# Patient Record
Sex: MALE | Race: WHITE | Employment: OTHER | URBAN - METROPOLITAN AREA
[De-identification: names, ages, dates, MRNs, and addresses within clinical notes are randomized per-mention and may not be internally consistent; named-entity substitution may affect disease eponyms.]

---

## 2019-01-01 ENCOUNTER — APPOINTMENT (OUTPATIENT)
Dept: GENERAL RADIOLOGY | Age: 69
DRG: 215 | End: 2019-01-01
Attending: INTERNAL MEDICINE
Payer: MEDICARE

## 2019-01-01 ENCOUNTER — TELEPHONE (OUTPATIENT)
Dept: CARDIOLOGY CLINIC | Age: 69
End: 2019-01-01

## 2019-01-01 ENCOUNTER — APPOINTMENT (OUTPATIENT)
Dept: NON INVASIVE DIAGNOSTICS | Age: 69
DRG: 215 | End: 2019-01-01
Payer: MEDICARE

## 2019-01-01 ENCOUNTER — HOSPITAL ENCOUNTER (OUTPATIENT)
Age: 69
Discharge: HOME | End: 2019-05-03
Attending: INTERNAL MEDICINE | Admitting: INTERNAL MEDICINE
Payer: MEDICARE

## 2019-01-01 ENCOUNTER — APPOINTMENT (OUTPATIENT)
Dept: NON INVASIVE DIAGNOSTICS | Age: 69
DRG: 215 | End: 2019-01-01
Attending: THORACIC SURGERY (CARDIOTHORACIC VASCULAR SURGERY)
Payer: MEDICARE

## 2019-01-01 ENCOUNTER — APPOINTMENT (OUTPATIENT)
Dept: NON INVASIVE DIAGNOSTICS | Age: 69
DRG: 215 | End: 2019-01-01
Attending: NURSE PRACTITIONER
Payer: MEDICARE

## 2019-01-01 ENCOUNTER — APPOINTMENT (OUTPATIENT)
Dept: GENERAL RADIOLOGY | Age: 69
DRG: 215 | End: 2019-01-01
Attending: THORACIC SURGERY (CARDIOTHORACIC VASCULAR SURGERY)
Payer: MEDICARE

## 2019-01-01 ENCOUNTER — APPOINTMENT (OUTPATIENT)
Dept: ULTRASOUND IMAGING | Age: 69
DRG: 215 | End: 2019-01-01
Attending: NURSE PRACTITIONER
Payer: MEDICARE

## 2019-01-01 ENCOUNTER — APPOINTMENT (OUTPATIENT)
Dept: CT IMAGING | Age: 69
DRG: 215 | End: 2019-01-01
Payer: MEDICARE

## 2019-01-01 ENCOUNTER — APPOINTMENT (OUTPATIENT)
Dept: GENERAL RADIOLOGY | Age: 69
DRG: 215 | End: 2019-01-01
Attending: NURSE PRACTITIONER
Payer: MEDICARE

## 2019-01-01 ENCOUNTER — HOSPITAL ENCOUNTER (OUTPATIENT)
Dept: MRI IMAGING | Age: 69
Discharge: ANOTHER HOSPITAL | End: 2019-05-14
Payer: MEDICARE

## 2019-01-01 ENCOUNTER — APPOINTMENT (OUTPATIENT)
Dept: GENERAL RADIOLOGY | Age: 69
DRG: 215 | End: 2019-01-01
Payer: MEDICARE

## 2019-01-01 ENCOUNTER — ANESTHESIA (OUTPATIENT)
Dept: CARDIOTHORACIC SURGERY | Age: 69
DRG: 215 | End: 2019-01-01
Payer: MEDICARE

## 2019-01-01 ENCOUNTER — APPOINTMENT (OUTPATIENT)
Dept: CT IMAGING | Age: 69
DRG: 215 | End: 2019-01-01
Attending: NURSE PRACTITIONER
Payer: MEDICARE

## 2019-01-01 ENCOUNTER — APPOINTMENT (OUTPATIENT)
Dept: VASCULAR SURGERY | Age: 69
DRG: 215 | End: 2019-01-01
Payer: MEDICARE

## 2019-01-01 ENCOUNTER — OFFICE VISIT (OUTPATIENT)
Dept: CARDIOLOGY CLINIC | Age: 69
End: 2019-01-01

## 2019-01-01 ENCOUNTER — ANESTHESIA EVENT (OUTPATIENT)
Dept: CARDIOTHORACIC SURGERY | Age: 69
DRG: 215 | End: 2019-01-01
Payer: MEDICARE

## 2019-01-01 ENCOUNTER — HOSPITAL ENCOUNTER (INPATIENT)
Age: 69
LOS: 18 days | DRG: 215 | End: 2019-05-27
Attending: INTERNAL MEDICINE | Admitting: INTERNAL MEDICINE
Payer: MEDICARE

## 2019-01-01 ENCOUNTER — APPOINTMENT (OUTPATIENT)
Dept: VASCULAR SURGERY | Age: 69
End: 2019-01-01
Attending: INTERNAL MEDICINE
Payer: MEDICARE

## 2019-01-01 VITALS
WEIGHT: 179.01 LBS | TEMPERATURE: 97.2 F | OXYGEN SATURATION: 91 % | SYSTOLIC BLOOD PRESSURE: 122 MMHG | BODY MASS INDEX: 25.06 KG/M2 | HEIGHT: 71 IN | DIASTOLIC BLOOD PRESSURE: 88 MMHG

## 2019-01-01 VITALS
RESPIRATION RATE: 16 BRPM | HEART RATE: 64 BPM | SYSTOLIC BLOOD PRESSURE: 102 MMHG | HEIGHT: 70 IN | DIASTOLIC BLOOD PRESSURE: 72 MMHG | WEIGHT: 193.4 LBS | BODY MASS INDEX: 27.69 KG/M2 | TEMPERATURE: 97.7 F | OXYGEN SATURATION: 97 %

## 2019-01-01 VITALS
WEIGHT: 187.83 LBS | HEIGHT: 70 IN | HEART RATE: 84 BPM | TEMPERATURE: 98.4 F | DIASTOLIC BLOOD PRESSURE: 74 MMHG | SYSTOLIC BLOOD PRESSURE: 111 MMHG | RESPIRATION RATE: 14 BRPM | BODY MASS INDEX: 26.89 KG/M2 | OXYGEN SATURATION: 99 %

## 2019-01-01 DIAGNOSIS — I73.9 PERIPHERAL VASCULAR DISEASE (HCC): ICD-10-CM

## 2019-01-01 DIAGNOSIS — I24.9 ACS (ACUTE CORONARY SYNDROME) (HCC): ICD-10-CM

## 2019-01-01 DIAGNOSIS — E87.79 OTHER HYPERVOLEMIA: ICD-10-CM

## 2019-01-01 DIAGNOSIS — I25.10 CORONARY ARTERY DISEASE INVOLVING NATIVE CORONARY ARTERY OF NATIVE HEART WITHOUT ANGINA PECTORIS: ICD-10-CM

## 2019-01-01 DIAGNOSIS — R06.02 SHORTNESS OF BREATH: ICD-10-CM

## 2019-01-01 DIAGNOSIS — I25.5 ISCHEMIC CARDIOMYOPATHY: ICD-10-CM

## 2019-01-01 DIAGNOSIS — N18.30 STAGE 3 CHRONIC KIDNEY DISEASE (HCC): ICD-10-CM

## 2019-01-01 DIAGNOSIS — R52 PAIN: ICD-10-CM

## 2019-01-01 DIAGNOSIS — E11.51 CONTROLLED TYPE 2 DIABETES MELLITUS WITH DIABETIC PERIPHERAL ANGIOPATHY WITHOUT GANGRENE, WITHOUT LONG-TERM CURRENT USE OF INSULIN (HCC): ICD-10-CM

## 2019-01-01 DIAGNOSIS — Z71.89 GOALS OF CARE, COUNSELING/DISCUSSION: ICD-10-CM

## 2019-01-01 DIAGNOSIS — R57.0 CARDIOGENIC SHOCK (HCC): ICD-10-CM

## 2019-01-01 DIAGNOSIS — Z71.89 DNR (DO NOT RESUSCITATE) DISCUSSION: ICD-10-CM

## 2019-01-01 DIAGNOSIS — I50.22 SYSTOLIC CHF, CHRONIC (HCC): Primary | ICD-10-CM

## 2019-01-01 DIAGNOSIS — Z01.818 PREOPERATIVE EVALUATION TO RULE OUT SURGICAL CONTRAINDICATION: ICD-10-CM

## 2019-01-01 DIAGNOSIS — N17.9 ACUTE KIDNEY INJURY (HCC): ICD-10-CM

## 2019-01-01 DIAGNOSIS — I35.0 SEVERE AORTIC STENOSIS: ICD-10-CM

## 2019-01-01 DIAGNOSIS — R53.83 FATIGUE, UNSPECIFIED TYPE: ICD-10-CM

## 2019-01-01 DIAGNOSIS — I50.23 ACUTE ON CHRONIC SYSTOLIC (CONGESTIVE) HEART FAILURE (HCC): ICD-10-CM

## 2019-01-01 DIAGNOSIS — E11.59 CONTROLLED TYPE 2 DIABETES MELLITUS WITH OTHER CIRCULATORY COMPLICATION, WITHOUT LONG-TERM CURRENT USE OF INSULIN (HCC): ICD-10-CM

## 2019-01-01 DIAGNOSIS — Z79.899 RECEIVING INOTROPIC MEDICATION: ICD-10-CM

## 2019-01-01 DIAGNOSIS — I50.22 SYSTOLIC CHF, CHRONIC (HCC): ICD-10-CM

## 2019-01-01 DIAGNOSIS — R09.89 HEMODYNAMIC INSTABILITY: ICD-10-CM

## 2019-01-01 DIAGNOSIS — I10 ESSENTIAL HYPERTENSION: ICD-10-CM

## 2019-01-01 DIAGNOSIS — I50.810 RVF (RIGHT VENTRICULAR FAILURE) (HCC): ICD-10-CM

## 2019-01-01 LAB
1,3 BETA GLUCAN SER-MCNC: <31 PG/ML
25(OH)D3+25(OH)D2 SERPL-MCNC: 64 NG/ML (ref 30–100)
ABO + RH BLD: NORMAL
ADMINISTERED INITIALS, ADMINIT: NORMAL
ALBUMIN SERPL ELPH-MCNC: 4 G/DL (ref 2.9–4.4)
ALBUMIN SERPL-MCNC: 2.1 G/DL (ref 3.5–5)
ALBUMIN SERPL-MCNC: 2.2 G/DL (ref 3.5–5)
ALBUMIN SERPL-MCNC: 2.2 G/DL (ref 3.5–5)
ALBUMIN SERPL-MCNC: 2.3 G/DL (ref 3.5–5)
ALBUMIN SERPL-MCNC: 2.4 G/DL (ref 3.5–5)
ALBUMIN SERPL-MCNC: 2.7 G/DL (ref 3.5–5)
ALBUMIN SERPL-MCNC: 2.8 G/DL (ref 3.5–5)
ALBUMIN SERPL-MCNC: 2.9 G/DL (ref 3.5–5)
ALBUMIN SERPL-MCNC: 3.1 G/DL (ref 3.5–5)
ALBUMIN SERPL-MCNC: 4.4 G/DL (ref 3.5–4.8)
ALBUMIN/GLOB SERPL: 0.7 {RATIO} (ref 1.1–2.2)
ALBUMIN/GLOB SERPL: 0.7 {RATIO} (ref 1.1–2.2)
ALBUMIN/GLOB SERPL: 0.8 {RATIO} (ref 1.1–2.2)
ALBUMIN/GLOB SERPL: 0.9 {RATIO} (ref 1.1–2.2)
ALBUMIN/GLOB SERPL: 1 {RATIO} (ref 1.1–2.2)
ALBUMIN/GLOB SERPL: 1.1 {RATIO} (ref 1.1–2.2)
ALBUMIN/GLOB SERPL: 1.2 {RATIO} (ref 1.1–2.2)
ALBUMIN/GLOB SERPL: 1.7 {RATIO} (ref 0.7–1.7)
ALBUMIN/GLOB SERPL: 2.1 {RATIO} (ref 1.2–2.2)
ALP SERPL-CCNC: 55 U/L (ref 45–117)
ALP SERPL-CCNC: 56 U/L (ref 45–117)
ALP SERPL-CCNC: 63 U/L (ref 45–117)
ALP SERPL-CCNC: 67 U/L (ref 45–117)
ALP SERPL-CCNC: 70 U/L (ref 45–117)
ALP SERPL-CCNC: 71 U/L (ref 45–117)
ALP SERPL-CCNC: 71 U/L (ref 45–117)
ALP SERPL-CCNC: 72 U/L (ref 45–117)
ALP SERPL-CCNC: 73 U/L (ref 45–117)
ALP SERPL-CCNC: 74 U/L (ref 45–117)
ALP SERPL-CCNC: 75 U/L (ref 45–117)
ALP SERPL-CCNC: 76 IU/L (ref 39–117)
ALP SERPL-CCNC: 77 U/L (ref 45–117)
ALP SERPL-CCNC: 77 U/L (ref 45–117)
ALP SERPL-CCNC: 79 U/L (ref 45–117)
ALP SERPL-CCNC: 81 U/L (ref 45–117)
ALPHA1 GLOB SERPL ELPH-MCNC: 0.3 G/DL (ref 0–0.4)
ALPHA2 GLOB SERPL ELPH-MCNC: 0.8 G/DL (ref 0.4–1)
ALT SERPL-CCNC: 12 IU/L (ref 0–44)
ALT SERPL-CCNC: 13 U/L (ref 12–78)
ALT SERPL-CCNC: 14 U/L (ref 12–78)
ALT SERPL-CCNC: 16 U/L (ref 12–78)
ALT SERPL-CCNC: 9 U/L (ref 12–78)
ALT SERPL-CCNC: <6 U/L (ref 12–78)
AMPHET UR QL SCN: NEGATIVE
ANION GAP SERPL CALC-SCNC: 10 MMOL/L (ref 5–15)
ANION GAP SERPL CALC-SCNC: 11 MMOL/L (ref 5–15)
ANION GAP SERPL CALC-SCNC: 12 MMOL/L (ref 5–15)
ANION GAP SERPL CALC-SCNC: 7 MMOL/L (ref 5–15)
ANION GAP SERPL CALC-SCNC: 8 MMOL/L (ref 5–15)
ANION GAP SERPL CALC-SCNC: 9 MMOL/L (ref 5–15)
APPEARANCE UR: ABNORMAL
APPEARANCE UR: ABNORMAL
APPEARANCE UR: CLEAR
APTT PPP: 101.3 SEC (ref 22.1–32)
APTT PPP: 115 SEC (ref 22.1–32)
APTT PPP: 120.7 SEC (ref 22.1–32)
APTT PPP: 39.7 SEC (ref 22.1–32)
APTT PPP: 43.3 SEC (ref 22.1–32)
APTT PPP: 44.3 SEC (ref 22.1–32)
APTT PPP: 45.8 SEC (ref 22.1–32)
APTT PPP: 48 SEC (ref 22.1–32)
APTT PPP: 48.9 SEC (ref 22.1–32)
APTT PPP: 49.8 SEC (ref 22.1–32)
APTT PPP: 50.1 SEC (ref 22.1–32)
APTT PPP: 50.4 SEC (ref 22.1–32)
APTT PPP: 50.8 SEC (ref 22.1–32)
APTT PPP: 51 SEC (ref 22.1–32)
APTT PPP: 51 SEC (ref 22.1–32)
APTT PPP: 51.1 SEC (ref 22.1–32)
APTT PPP: 51.5 SEC (ref 22.1–32)
APTT PPP: 51.8 SEC (ref 22.1–32)
APTT PPP: 52.3 SEC (ref 22.1–32)
APTT PPP: 53.2 SEC (ref 22.1–32)
APTT PPP: 53.5 SEC (ref 22.1–32)
APTT PPP: 54 SEC (ref 22.1–32)
APTT PPP: 54.8 SEC (ref 22.1–32)
APTT PPP: 56.5 SEC (ref 22.1–32)
APTT PPP: 60.4 SEC (ref 22.1–32)
APTT PPP: 60.6 SEC (ref 22.1–32)
APTT PPP: 84.3 SEC (ref 22.1–32)
ARTERIAL PATENCY WRIST A: ABNORMAL
AST SERPL-CCNC: 10 U/L (ref 15–37)
AST SERPL-CCNC: 10 U/L (ref 15–37)
AST SERPL-CCNC: 12 U/L (ref 15–37)
AST SERPL-CCNC: 13 IU/L (ref 0–40)
AST SERPL-CCNC: 13 U/L (ref 15–37)
AST SERPL-CCNC: 13 U/L (ref 15–37)
AST SERPL-CCNC: 14 U/L (ref 15–37)
AST SERPL-CCNC: 15 U/L (ref 15–37)
AST SERPL-CCNC: 16 U/L (ref 15–37)
AST SERPL-CCNC: 17 U/L (ref 15–37)
AST SERPL-CCNC: 17 U/L (ref 15–37)
AST SERPL-CCNC: 19 U/L (ref 15–37)
AST SERPL-CCNC: 20 U/L (ref 15–37)
AST SERPL-CCNC: 22 U/L (ref 15–37)
AST SERPL-CCNC: 23 U/L (ref 15–37)
AST SERPL-CCNC: 23 U/L (ref 15–37)
AST SERPL-CCNC: 28 U/L (ref 15–37)
AST SERPL-CCNC: 37 U/L (ref 15–37)
ATRIAL RATE: 105 BPM
ATRIAL RATE: 107 BPM
ATRIAL RATE: 60 BPM
ATRIAL RATE: 77 BPM
B-GLOBULIN SERPL ELPH-MCNC: 1.1 G/DL (ref 0.7–1.3)
BACTERIA SPEC CULT: NORMAL
BACTERIA URNS QL MICRO: NEGATIVE /HPF
BARBITURATES UR QL SCN: NEGATIVE
BASE DEFICIT BLD-SCNC: 3 MMOL/L
BASE DEFICIT BLDV-SCNC: 1 MMOL/L
BASE DEFICIT BLDV-SCNC: 2 MMOL/L
BASE DEFICIT BLDV-SCNC: 6 MMOL/L
BASE DEFICIT BLDV-SCNC: 7 MMOL/L
BASE DEFICIT BLDV-SCNC: 8 MMOL/L
BASE EXCESS BLDV CALC-SCNC: 0 MMOL/L
BASE EXCESS BLDV CALC-SCNC: 1 MMOL/L
BASE EXCESS BLDV CALC-SCNC: 1 MMOL/L
BASE EXCESS BLDV CALC-SCNC: 2 MMOL/L
BASE EXCESS BLDV CALC-SCNC: 3 MMOL/L
BASOPHILS # BLD AUTO: 0 X10E3/UL (ref 0–0.2)
BASOPHILS # BLD: 0 K/UL (ref 0–0.1)
BASOPHILS NFR BLD AUTO: 0 %
BASOPHILS NFR BLD: 0 % (ref 0–1)
BASOPHILS NFR BLD: 0 % (ref 0–1)
BASOPHILS NFR BLD: 1 % (ref 0–1)
BASOPHILS NFR BLD: 1 % (ref 0–1)
BDY SITE: ABNORMAL
BENZODIAZ UR QL: NEGATIVE
BILIRUB SERPL-MCNC: 0.4 MG/DL (ref 0–1.2)
BILIRUB SERPL-MCNC: 0.5 MG/DL (ref 0.2–1)
BILIRUB SERPL-MCNC: 0.6 MG/DL (ref 0.2–1)
BILIRUB SERPL-MCNC: 0.6 MG/DL (ref 0.2–1)
BILIRUB SERPL-MCNC: 0.7 MG/DL (ref 0.2–1)
BILIRUB SERPL-MCNC: 0.7 MG/DL (ref 0.2–1)
BILIRUB SERPL-MCNC: 0.8 MG/DL (ref 0.2–1)
BILIRUB SERPL-MCNC: 0.9 MG/DL (ref 0.2–1)
BILIRUB SERPL-MCNC: 0.9 MG/DL (ref 0.2–1)
BILIRUB SERPL-MCNC: 1 MG/DL (ref 0.2–1)
BILIRUB SERPL-MCNC: 1.1 MG/DL (ref 0.2–1)
BILIRUB SERPL-MCNC: 1.1 MG/DL (ref 0.2–1)
BILIRUB UR QL: NEGATIVE
BLD PROD TYP BPU: NORMAL
BLD PROD TYP BPU: NORMAL
BLOOD GROUP ANTIBODIES SERPL: NORMAL
BNP SERPL-MCNC: ABNORMAL PG/ML
BPU ID: NORMAL
BPU ID: NORMAL
BUN SERPL-MCNC: 39 MG/DL (ref 6–20)
BUN SERPL-MCNC: 41 MG/DL (ref 6–20)
BUN SERPL-MCNC: 42 MG/DL (ref 6–20)
BUN SERPL-MCNC: 42 MG/DL (ref 6–20)
BUN SERPL-MCNC: 42 MG/DL (ref 8–27)
BUN SERPL-MCNC: 43 MG/DL (ref 6–20)
BUN SERPL-MCNC: 45 MG/DL (ref 6–20)
BUN SERPL-MCNC: 46 MG/DL (ref 6–20)
BUN SERPL-MCNC: 47 MG/DL (ref 6–20)
BUN SERPL-MCNC: 47 MG/DL (ref 6–20)
BUN SERPL-MCNC: 48 MG/DL (ref 6–20)
BUN SERPL-MCNC: 48 MG/DL (ref 6–20)
BUN SERPL-MCNC: 55 MG/DL (ref 6–20)
BUN SERPL-MCNC: 55 MG/DL (ref 6–20)
BUN SERPL-MCNC: 56 MG/DL (ref 6–20)
BUN SERPL-MCNC: 61 MG/DL (ref 6–20)
BUN SERPL-MCNC: 69 MG/DL (ref 6–20)
BUN SERPL-MCNC: 71 MG/DL (ref 6–20)
BUN SERPL-MCNC: 73 MG/DL (ref 6–20)
BUN SERPL-MCNC: 73 MG/DL (ref 6–20)
BUN SERPL-MCNC: 75 MG/DL (ref 6–20)
BUN SERPL-MCNC: 76 MG/DL (ref 6–20)
BUN/CREAT SERPL: 18 (ref 10–24)
BUN/CREAT SERPL: 21 (ref 12–20)
BUN/CREAT SERPL: 21 (ref 12–20)
BUN/CREAT SERPL: 22 (ref 12–20)
BUN/CREAT SERPL: 23 (ref 12–20)
BUN/CREAT SERPL: 23 (ref 12–20)
BUN/CREAT SERPL: 24 (ref 12–20)
BUN/CREAT SERPL: 25 (ref 12–20)
BUN/CREAT SERPL: 26 (ref 12–20)
BUN/CREAT SERPL: 27 (ref 12–20)
BUN/CREAT SERPL: 28 (ref 12–20)
BUN/CREAT SERPL: 28 (ref 12–20)
BUN/CREAT SERPL: 29 (ref 12–20)
BUN/CREAT SERPL: 29 (ref 12–20)
CALCIUM SERPL-MCNC: 8 MG/DL (ref 8.5–10.1)
CALCIUM SERPL-MCNC: 8 MG/DL (ref 8.5–10.1)
CALCIUM SERPL-MCNC: 8.1 MG/DL (ref 8.5–10.1)
CALCIUM SERPL-MCNC: 8.1 MG/DL (ref 8.5–10.1)
CALCIUM SERPL-MCNC: 8.2 MG/DL (ref 8.5–10.1)
CALCIUM SERPL-MCNC: 8.3 MG/DL (ref 8.5–10.1)
CALCIUM SERPL-MCNC: 8.4 MG/DL (ref 8.5–10.1)
CALCIUM SERPL-MCNC: 8.4 MG/DL (ref 8.5–10.1)
CALCIUM SERPL-MCNC: 8.5 MG/DL (ref 8.5–10.1)
CALCIUM SERPL-MCNC: 8.6 MG/DL (ref 8.5–10.1)
CALCIUM SERPL-MCNC: 8.6 MG/DL (ref 8.5–10.1)
CALCIUM SERPL-MCNC: 8.7 MG/DL (ref 8.5–10.1)
CALCIUM SERPL-MCNC: 8.9 MG/DL (ref 8.5–10.1)
CALCIUM SERPL-MCNC: 9 MG/DL (ref 8.5–10.1)
CALCIUM SERPL-MCNC: 9.2 MG/DL (ref 8.5–10.1)
CALCIUM SERPL-MCNC: 9.2 MG/DL (ref 8.5–10.1)
CALCIUM SERPL-MCNC: 9.3 MG/DL (ref 8.5–10.1)
CALCIUM SERPL-MCNC: 9.7 MG/DL (ref 8.6–10.2)
CALCULATED P AXIS, ECG09: 41 DEGREES
CALCULATED R AXIS, ECG10: 34 DEGREES
CALCULATED R AXIS, ECG10: 35 DEGREES
CALCULATED R AXIS, ECG10: 48 DEGREES
CALCULATED R AXIS, ECG10: 49 DEGREES
CALCULATED T AXIS, ECG11: 133 DEGREES
CALCULATED T AXIS, ECG11: 142 DEGREES
CALCULATED T AXIS, ECG11: 87 DEGREES
CALCULATED T AXIS, ECG11: 89 DEGREES
CANNABINOIDS UR QL SCN: NEGATIVE
CHLORIDE SERPL-SCNC: 100 MMOL/L (ref 97–108)
CHLORIDE SERPL-SCNC: 101 MMOL/L (ref 97–108)
CHLORIDE SERPL-SCNC: 101 MMOL/L (ref 97–108)
CHLORIDE SERPL-SCNC: 105 MMOL/L (ref 97–108)
CHLORIDE SERPL-SCNC: 108 MMOL/L (ref 97–108)
CHLORIDE SERPL-SCNC: 87 MMOL/L (ref 97–108)
CHLORIDE SERPL-SCNC: 90 MMOL/L (ref 97–108)
CHLORIDE SERPL-SCNC: 90 MMOL/L (ref 97–108)
CHLORIDE SERPL-SCNC: 91 MMOL/L (ref 97–108)
CHLORIDE SERPL-SCNC: 92 MMOL/L (ref 97–108)
CHLORIDE SERPL-SCNC: 93 MMOL/L (ref 97–108)
CHLORIDE SERPL-SCNC: 93 MMOL/L (ref 97–108)
CHLORIDE SERPL-SCNC: 94 MMOL/L (ref 96–106)
CHLORIDE SERPL-SCNC: 94 MMOL/L (ref 97–108)
CHLORIDE SERPL-SCNC: 95 MMOL/L (ref 97–108)
CHLORIDE SERPL-SCNC: 96 MMOL/L (ref 97–108)
CHLORIDE SERPL-SCNC: 97 MMOL/L (ref 97–108)
CHLORIDE SERPL-SCNC: 97 MMOL/L (ref 97–108)
CHLORIDE SERPL-SCNC: 99 MMOL/L (ref 97–108)
CHLORIDE SERPL-SCNC: 99 MMOL/L (ref 97–108)
CO2 SERPL-SCNC: 20 MMOL/L (ref 20–29)
CO2 SERPL-SCNC: 21 MMOL/L (ref 21–32)
CO2 SERPL-SCNC: 22 MMOL/L (ref 21–32)
CO2 SERPL-SCNC: 23 MMOL/L (ref 21–32)
CO2 SERPL-SCNC: 23 MMOL/L (ref 21–32)
CO2 SERPL-SCNC: 24 MMOL/L (ref 21–32)
CO2 SERPL-SCNC: 25 MMOL/L (ref 21–32)
CO2 SERPL-SCNC: 26 MMOL/L (ref 21–32)
CO2 SERPL-SCNC: 26 MMOL/L (ref 21–32)
CO2 SERPL-SCNC: 27 MMOL/L (ref 21–32)
COCAINE UR QL SCN: NEGATIVE
COLLECT DURATION TIME UR: 24 HR
COLOR UR: ABNORMAL
COMMENT, HOLDF: NORMAL
CREAT 24H UR-MRATE: 756 MG/24HR (ref 950–2490)
CREAT SERPL-MCNC: 1.78 MG/DL (ref 0.7–1.3)
CREAT SERPL-MCNC: 1.84 MG/DL (ref 0.7–1.3)
CREAT SERPL-MCNC: 1.85 MG/DL (ref 0.7–1.3)
CREAT SERPL-MCNC: 1.87 MG/DL (ref 0.7–1.3)
CREAT SERPL-MCNC: 1.9 MG/DL (ref 0.7–1.3)
CREAT SERPL-MCNC: 1.91 MG/DL (ref 0.7–1.3)
CREAT SERPL-MCNC: 2 MG/DL (ref 0.7–1.3)
CREAT SERPL-MCNC: 2.01 MG/DL (ref 0.7–1.3)
CREAT SERPL-MCNC: 2.01 MG/DL (ref 0.7–1.3)
CREAT SERPL-MCNC: 2.11 MG/DL (ref 0.7–1.3)
CREAT SERPL-MCNC: 2.13 MG/DL (ref 0.7–1.3)
CREAT SERPL-MCNC: 2.16 MG/DL (ref 0.7–1.3)
CREAT SERPL-MCNC: 2.16 MG/DL (ref 0.7–1.3)
CREAT SERPL-MCNC: 2.26 MG/DL (ref 0.7–1.3)
CREAT SERPL-MCNC: 2.28 MG/DL (ref 0.7–1.3)
CREAT SERPL-MCNC: 2.33 MG/DL (ref 0.76–1.27)
CREAT SERPL-MCNC: 2.55 MG/DL (ref 0.7–1.3)
CREAT SERPL-MCNC: 2.59 MG/DL (ref 0.7–1.3)
CREAT SERPL-MCNC: 2.61 MG/DL (ref 0.7–1.3)
CREAT SERPL-MCNC: 2.62 MG/DL (ref 0.7–1.3)
CREAT SERPL-MCNC: 2.67 MG/DL (ref 0.7–1.3)
CREAT SERPL-MCNC: 2.73 MG/DL (ref 0.7–1.3)
CREAT UR-MCNC: 62 MG/DL
CROSSMATCH RESULT,%XM: NORMAL
CROSSMATCH RESULT,%XM: NORMAL
CRP SERPL HS-MCNC: 3.95 MG/L (ref 0–3)
CRP SERPL HS-MCNC: >9.5 MG/L
D50 ADMINISTERED, D50ADM: 0 ML
D50 ORDER, D50ORD: 0 ML
DIAGNOSIS, 93000: NORMAL
DIFFERENTIAL METHOD BLD: ABNORMAL
DIGOXIN SERPL-MCNC: 0.6 NG/ML (ref 0.9–2)
DIGOXIN SERPL-MCNC: 0.7 NG/ML (ref 0.9–2)
DIGOXIN SERPL-MCNC: 0.8 NG/ML (ref 0.9–2)
DIGOXIN SERPL-MCNC: 0.8 NG/ML (ref 0.9–2)
DIGOXIN SERPL-MCNC: 0.9 NG/ML (ref 0.9–2)
DIGOXIN SERPL-MCNC: 0.9 NG/ML (ref 0.9–2)
DIGOXIN SERPL-MCNC: 1 NG/ML (ref 0.9–2)
DRUG SCRN COMMENT,DRGCM: NORMAL
ECHO AO ROOT DIAM: 3.42 CM
ECHO AV AREA PEAK VELOCITY: 0.8 CM2
ECHO AV AREA VTI: 0.8 CM2
ECHO AV MEAN GRADIENT: 28.7 MMHG
ECHO AV PEAK GRADIENT: 45.2 MMHG
ECHO AV PEAK VELOCITY: 336.32 CM/S
ECHO AV VTI: 67.02 CM
ECHO LA MAJOR AXIS: 4.7 CM
ECHO LA TO AORTIC ROOT RATIO: 1.37
ECHO LV INTERNAL DIMENSION DIASTOLIC: 4.77 CM (ref 4.2–5.9)
ECHO LV INTERNAL DIMENSION SYSTOLIC: 4.45 CM
ECHO LV IVSD: 0.97 CM (ref 0.6–1)
ECHO LV MASS 2D: 191.3 G (ref 88–224)
ECHO LV MASS INDEX 2D: 95.7 G/M2 (ref 49–115)
ECHO LV POSTERIOR WALL DIASTOLIC: 1 CM (ref 0.6–1)
ECHO LVOT DIAM: 2.2 CM
ECHO LVOT PEAK GRADIENT: 2.2 MMHG
ECHO LVOT PEAK VELOCITY: 73.61 CM/S
ECHO LVOT SV: 56 ML
ECHO LVOT VTI: 14.66 CM
ECHO MV A VELOCITY: 36.78 CM/S
ECHO MV AREA PHT: 5.9 CM2
ECHO MV E DECELERATION TIME (DT): 129.5 MS
ECHO MV E VELOCITY: 117.02 CM/S
ECHO MV E/A RATIO: 3.18
ECHO MV PRESSURE HALF TIME (PHT): 37.5 MS
ECHO PULMONARY ARTERY SYSTOLIC PRESSURE (PASP): 55 MMHG
ECHO PV MAX VELOCITY: 51.39 CM/S
ECHO PV PEAK GRADIENT: 1.1 MMHG
ECHO RV INTERNAL DIMENSION: 4.1 CM
ECHO RV TAPSE: 0.65 CM (ref 1.5–2)
ECHO TV REGURGITANT MAX VELOCITY: 324.38 CM/S
ECHO TV REGURGITANT PEAK GRADIENT: 42.1 MMHG
EOSINOPHIL # BLD AUTO: 0.1 X10E3/UL (ref 0–0.4)
EOSINOPHIL # BLD: 0.1 K/UL (ref 0–0.4)
EOSINOPHIL NFR BLD AUTO: 1 %
EOSINOPHIL NFR BLD: 1 % (ref 0–7)
EOSINOPHIL NFR BLD: 1 % (ref 0–7)
EOSINOPHIL NFR BLD: 2 % (ref 0–7)
EOSINOPHIL NFR BLD: 2 % (ref 0–7)
EPITH CASTS URNS QL MICRO: ABNORMAL /LPF
ERYTHROCYTE [DISTWIDTH] IN BLOOD BY AUTOMATED COUNT: 15.1 % (ref 11.5–14.5)
ERYTHROCYTE [DISTWIDTH] IN BLOOD BY AUTOMATED COUNT: 15.1 % (ref 11.5–14.5)
ERYTHROCYTE [DISTWIDTH] IN BLOOD BY AUTOMATED COUNT: 15.3 % (ref 11.5–14.5)
ERYTHROCYTE [DISTWIDTH] IN BLOOD BY AUTOMATED COUNT: 15.4 % (ref 11.5–14.5)
ERYTHROCYTE [DISTWIDTH] IN BLOOD BY AUTOMATED COUNT: 15.5 % (ref 11.5–14.5)
ERYTHROCYTE [DISTWIDTH] IN BLOOD BY AUTOMATED COUNT: 15.6 % (ref 11.5–14.5)
ERYTHROCYTE [DISTWIDTH] IN BLOOD BY AUTOMATED COUNT: 15.6 % (ref 11.5–14.5)
ERYTHROCYTE [DISTWIDTH] IN BLOOD BY AUTOMATED COUNT: 15.7 % (ref 11.5–14.5)
ERYTHROCYTE [DISTWIDTH] IN BLOOD BY AUTOMATED COUNT: 15.7 % (ref 11.5–14.5)
ERYTHROCYTE [DISTWIDTH] IN BLOOD BY AUTOMATED COUNT: 15.8 % (ref 11.5–14.5)
ERYTHROCYTE [DISTWIDTH] IN BLOOD BY AUTOMATED COUNT: 15.8 % (ref 11.5–14.5)
ERYTHROCYTE [DISTWIDTH] IN BLOOD BY AUTOMATED COUNT: 15.9 % (ref 11.5–14.5)
ERYTHROCYTE [DISTWIDTH] IN BLOOD BY AUTOMATED COUNT: 16 % (ref 11.5–14.5)
ERYTHROCYTE [DISTWIDTH] IN BLOOD BY AUTOMATED COUNT: 16 % (ref 12.3–15.4)
ERYTHROCYTE [SEDIMENTATION RATE] IN BLOOD: 114 MM/HR (ref 0–20)
ERYTHROCYTE [SEDIMENTATION RATE] IN BLOOD: 21 MM/HR (ref 0–20)
ERYTHROCYTE [SEDIMENTATION RATE] IN BLOOD: 28 MM/HR (ref 0–20)
ERYTHROCYTE [SEDIMENTATION RATE] IN BLOOD: 28 MM/HR (ref 0–20)
ERYTHROCYTE [SEDIMENTATION RATE] IN BLOOD: 42 MM/HR (ref 0–20)
ERYTHROCYTE [SEDIMENTATION RATE] IN BLOOD: 43 MM/HR (ref 0–20)
ERYTHROCYTE [SEDIMENTATION RATE] IN BLOOD: 54 MM/HR (ref 0–20)
EST. AVERAGE GLUCOSE BLD GHB EST-MCNC: 151 MG/DL
FERRITIN SERPL-MCNC: 20 NG/ML (ref 30–400)
FERRITIN SERPL-MCNC: 885 NG/ML (ref 26–388)
FT4I SERPL CALC-MCNC: 1.6 (ref 1.2–4.9)
GAMMA GLOB SERPL ELPH-MCNC: 0.4 G/DL (ref 0.4–1.8)
GAS FLOW.O2 O2 DELIVERY SYS: ABNORMAL L/MIN
GAS FLOW.O2 SETTING OXYMISER: 12 BPM
GAS FLOW.O2 SETTING OXYMISER: 12 BPM
GAS FLOW.O2 SETTING OXYMISER: 4 L/M
GLOBULIN SER CALC-MCNC: 2.1 G/DL (ref 1.5–4.5)
GLOBULIN SER CALC-MCNC: 2.3 G/DL (ref 2–4)
GLOBULIN SER CALC-MCNC: 2.4 G/DL (ref 2–4)
GLOBULIN SER CALC-MCNC: 2.5 G/DL (ref 2–4)
GLOBULIN SER CALC-MCNC: 2.6 G/DL (ref 2–4)
GLOBULIN SER CALC-MCNC: 2.7 G/DL (ref 2–4)
GLOBULIN SER CALC-MCNC: 2.8 G/DL (ref 2–4)
GLOBULIN SER CALC-MCNC: 2.9 G/DL (ref 2–4)
GLOBULIN SER CALC-MCNC: 3 G/DL (ref 2–4)
GLOBULIN SER CALC-MCNC: 3 G/DL (ref 2–4)
GLOBULIN SER CALC-MCNC: 3.1 G/DL (ref 2–4)
GLOBULIN SER CALC-MCNC: 3.2 G/DL (ref 2–4)
GLOBULIN SER-MCNC: 2.5 G/DL (ref 2.2–3.9)
GLSCOM COMMENTS: NORMAL
GLUCOSE BLD STRIP.AUTO-MCNC: 107 MG/DL (ref 65–100)
GLUCOSE BLD STRIP.AUTO-MCNC: 108 MG/DL (ref 65–100)
GLUCOSE BLD STRIP.AUTO-MCNC: 110 MG/DL (ref 65–100)
GLUCOSE BLD STRIP.AUTO-MCNC: 116 MG/DL (ref 65–100)
GLUCOSE BLD STRIP.AUTO-MCNC: 118 MG/DL (ref 65–100)
GLUCOSE BLD STRIP.AUTO-MCNC: 119 MG/DL (ref 65–100)
GLUCOSE BLD STRIP.AUTO-MCNC: 120 MG/DL (ref 65–100)
GLUCOSE BLD STRIP.AUTO-MCNC: 133 MG/DL (ref 65–100)
GLUCOSE BLD STRIP.AUTO-MCNC: 135 MG/DL (ref 65–100)
GLUCOSE BLD STRIP.AUTO-MCNC: 135 MG/DL (ref 65–100)
GLUCOSE BLD STRIP.AUTO-MCNC: 138 MG/DL (ref 65–100)
GLUCOSE BLD STRIP.AUTO-MCNC: 139 MG/DL (ref 65–100)
GLUCOSE BLD STRIP.AUTO-MCNC: 144 MG/DL (ref 65–100)
GLUCOSE BLD STRIP.AUTO-MCNC: 145 MG/DL (ref 65–100)
GLUCOSE BLD STRIP.AUTO-MCNC: 147 MG/DL (ref 65–100)
GLUCOSE BLD STRIP.AUTO-MCNC: 147 MG/DL (ref 65–100)
GLUCOSE BLD STRIP.AUTO-MCNC: 150 MG/DL (ref 65–100)
GLUCOSE BLD STRIP.AUTO-MCNC: 151 MG/DL (ref 65–100)
GLUCOSE BLD STRIP.AUTO-MCNC: 151 MG/DL (ref 65–100)
GLUCOSE BLD STRIP.AUTO-MCNC: 153 MG/DL (ref 65–100)
GLUCOSE BLD STRIP.AUTO-MCNC: 154 MG/DL (ref 65–100)
GLUCOSE BLD STRIP.AUTO-MCNC: 155 MG/DL (ref 65–100)
GLUCOSE BLD STRIP.AUTO-MCNC: 155 MG/DL (ref 65–100)
GLUCOSE BLD STRIP.AUTO-MCNC: 156 MG/DL (ref 65–100)
GLUCOSE BLD STRIP.AUTO-MCNC: 156 MG/DL (ref 65–100)
GLUCOSE BLD STRIP.AUTO-MCNC: 158 MG/DL (ref 65–100)
GLUCOSE BLD STRIP.AUTO-MCNC: 164 MG/DL (ref 65–100)
GLUCOSE BLD STRIP.AUTO-MCNC: 165 MG/DL (ref 65–100)
GLUCOSE BLD STRIP.AUTO-MCNC: 166 MG/DL (ref 65–100)
GLUCOSE BLD STRIP.AUTO-MCNC: 167 MG/DL (ref 65–100)
GLUCOSE BLD STRIP.AUTO-MCNC: 169 MG/DL (ref 65–100)
GLUCOSE BLD STRIP.AUTO-MCNC: 173 MG/DL (ref 65–100)
GLUCOSE BLD STRIP.AUTO-MCNC: 178 MG/DL (ref 65–100)
GLUCOSE BLD STRIP.AUTO-MCNC: 180 MG/DL (ref 65–100)
GLUCOSE BLD STRIP.AUTO-MCNC: 182 MG/DL (ref 65–100)
GLUCOSE BLD STRIP.AUTO-MCNC: 183 MG/DL (ref 65–100)
GLUCOSE BLD STRIP.AUTO-MCNC: 186 MG/DL (ref 65–100)
GLUCOSE BLD STRIP.AUTO-MCNC: 187 MG/DL (ref 65–100)
GLUCOSE BLD STRIP.AUTO-MCNC: 189 MG/DL (ref 65–100)
GLUCOSE BLD STRIP.AUTO-MCNC: 190 MG/DL (ref 65–100)
GLUCOSE BLD STRIP.AUTO-MCNC: 195 MG/DL (ref 65–100)
GLUCOSE BLD STRIP.AUTO-MCNC: 195 MG/DL (ref 65–100)
GLUCOSE BLD STRIP.AUTO-MCNC: 196 MG/DL (ref 65–100)
GLUCOSE BLD STRIP.AUTO-MCNC: 201 MG/DL (ref 65–100)
GLUCOSE BLD STRIP.AUTO-MCNC: 211 MG/DL (ref 65–100)
GLUCOSE BLD STRIP.AUTO-MCNC: 212 MG/DL (ref 65–100)
GLUCOSE BLD STRIP.AUTO-MCNC: 217 MG/DL (ref 65–100)
GLUCOSE BLD STRIP.AUTO-MCNC: 220 MG/DL (ref 65–100)
GLUCOSE BLD STRIP.AUTO-MCNC: 224 MG/DL (ref 65–100)
GLUCOSE BLD STRIP.AUTO-MCNC: 224 MG/DL (ref 65–100)
GLUCOSE BLD STRIP.AUTO-MCNC: 233 MG/DL (ref 65–100)
GLUCOSE BLD STRIP.AUTO-MCNC: 240 MG/DL (ref 65–100)
GLUCOSE BLD STRIP.AUTO-MCNC: 241 MG/DL (ref 65–100)
GLUCOSE BLD STRIP.AUTO-MCNC: 241 MG/DL (ref 65–100)
GLUCOSE BLD STRIP.AUTO-MCNC: 249 MG/DL (ref 65–100)
GLUCOSE BLD STRIP.AUTO-MCNC: 251 MG/DL (ref 65–100)
GLUCOSE BLD STRIP.AUTO-MCNC: 256 MG/DL (ref 65–100)
GLUCOSE BLD STRIP.AUTO-MCNC: 265 MG/DL (ref 65–100)
GLUCOSE BLD STRIP.AUTO-MCNC: 265 MG/DL (ref 65–100)
GLUCOSE BLD STRIP.AUTO-MCNC: 277 MG/DL (ref 65–100)
GLUCOSE BLD STRIP.AUTO-MCNC: 298 MG/DL (ref 65–100)
GLUCOSE BLD STRIP.AUTO-MCNC: 315 MG/DL (ref 65–100)
GLUCOSE BLD STRIP.AUTO-MCNC: 407 MG/DL (ref 65–100)
GLUCOSE BLD STRIP.AUTO-MCNC: 63 MG/DL (ref 65–100)
GLUCOSE BLD STRIP.AUTO-MCNC: 76 MG/DL (ref 65–100)
GLUCOSE BLD STRIP.AUTO-MCNC: 83 MG/DL (ref 65–100)
GLUCOSE BLD STRIP.AUTO-MCNC: 86 MG/DL (ref 65–100)
GLUCOSE BLD STRIP.AUTO-MCNC: 87 MG/DL (ref 65–100)
GLUCOSE BLD STRIP.AUTO-MCNC: 90 MG/DL (ref 65–100)
GLUCOSE SERPL-MCNC: 105 MG/DL (ref 65–100)
GLUCOSE SERPL-MCNC: 110 MG/DL (ref 65–100)
GLUCOSE SERPL-MCNC: 114 MG/DL (ref 65–100)
GLUCOSE SERPL-MCNC: 117 MG/DL (ref 65–100)
GLUCOSE SERPL-MCNC: 121 MG/DL (ref 65–100)
GLUCOSE SERPL-MCNC: 127 MG/DL (ref 65–100)
GLUCOSE SERPL-MCNC: 142 MG/DL (ref 65–100)
GLUCOSE SERPL-MCNC: 164 MG/DL (ref 65–100)
GLUCOSE SERPL-MCNC: 167 MG/DL (ref 65–99)
GLUCOSE SERPL-MCNC: 168 MG/DL (ref 65–100)
GLUCOSE SERPL-MCNC: 172 MG/DL (ref 65–100)
GLUCOSE SERPL-MCNC: 176 MG/DL (ref 65–100)
GLUCOSE SERPL-MCNC: 187 MG/DL (ref 65–100)
GLUCOSE SERPL-MCNC: 235 MG/DL (ref 65–100)
GLUCOSE SERPL-MCNC: 242 MG/DL (ref 65–100)
GLUCOSE SERPL-MCNC: 244 MG/DL (ref 65–100)
GLUCOSE SERPL-MCNC: 372 MG/DL (ref 65–100)
GLUCOSE SERPL-MCNC: 65 MG/DL (ref 65–100)
GLUCOSE SERPL-MCNC: 78 MG/DL (ref 65–100)
GLUCOSE SERPL-MCNC: 87 MG/DL (ref 65–100)
GLUCOSE SERPL-MCNC: 92 MG/DL (ref 65–100)
GLUCOSE SERPL-MCNC: 99 MG/DL (ref 65–100)
GLUCOSE UR STRIP.AUTO-MCNC: 500 MG/DL
GLUCOSE UR STRIP.AUTO-MCNC: NEGATIVE MG/DL
GLUCOSE UR STRIP.AUTO-MCNC: NEGATIVE MG/DL
GLUCOSE, GLC: 194 MG/DL
HBA1C MFR BLD: 6.9 % (ref 4.2–6.3)
HCO3 BLD-SCNC: 22.7 MMOL/L (ref 22–26)
HCO3 BLDV-SCNC: 18.7 MMOL/L (ref 23–28)
HCO3 BLDV-SCNC: 19.7 MMOL/L (ref 23–28)
HCO3 BLDV-SCNC: 20.6 MMOL/L (ref 23–28)
HCO3 BLDV-SCNC: 22 MMOL/L (ref 23–28)
HCO3 BLDV-SCNC: 22.1 MMOL/L (ref 23–28)
HCO3 BLDV-SCNC: 22.9 MMOL/L (ref 23–28)
HCO3 BLDV-SCNC: 23.3 MMOL/L (ref 23–28)
HCO3 BLDV-SCNC: 23.6 MMOL/L (ref 23–28)
HCO3 BLDV-SCNC: 23.7 MMOL/L (ref 23–28)
HCO3 BLDV-SCNC: 23.8 MMOL/L (ref 23–28)
HCO3 BLDV-SCNC: 24.2 MMOL/L (ref 23–28)
HCO3 BLDV-SCNC: 24.8 MMOL/L (ref 23–28)
HCO3 BLDV-SCNC: 24.8 MMOL/L (ref 23–28)
HCO3 BLDV-SCNC: 26.1 MMOL/L (ref 23–28)
HCO3 BLDV-SCNC: 26.1 MMOL/L (ref 23–28)
HCO3 BLDV-SCNC: 26.6 MMOL/L (ref 23–28)
HCT VFR BLD AUTO: 22.5 % (ref 36.6–50.3)
HCT VFR BLD AUTO: 24.6 % (ref 36.6–50.3)
HCT VFR BLD AUTO: 24.7 % (ref 36.6–50.3)
HCT VFR BLD AUTO: 25.2 % (ref 36.6–50.3)
HCT VFR BLD AUTO: 27.6 % (ref 36.6–50.3)
HCT VFR BLD AUTO: 28.2 % (ref 36.6–50.3)
HCT VFR BLD AUTO: 28.5 % (ref 36.6–50.3)
HCT VFR BLD AUTO: 29.5 % (ref 36.6–50.3)
HCT VFR BLD AUTO: 31.1 % (ref 36.6–50.3)
HCT VFR BLD AUTO: 32.5 % (ref 36.6–50.3)
HCT VFR BLD AUTO: 34.1 % (ref 36.6–50.3)
HCT VFR BLD AUTO: 34.8 % (ref 36.6–50.3)
HCT VFR BLD AUTO: 35.4 % (ref 36.6–50.3)
HCT VFR BLD AUTO: 36 % (ref 36.6–50.3)
HCT VFR BLD AUTO: 37.9 % (ref 36.6–50.3)
HCT VFR BLD AUTO: 42.3 % (ref 36.6–50.3)
HCT VFR BLD AUTO: 43.6 % (ref 37.5–51)
HGB BLD-MCNC: 10.3 G/DL (ref 12.1–17)
HGB BLD-MCNC: 10.5 G/DL (ref 12.1–17)
HGB BLD-MCNC: 10.7 G/DL (ref 12.1–17)
HGB BLD-MCNC: 10.9 G/DL (ref 12.1–17)
HGB BLD-MCNC: 11.5 G/DL (ref 12.1–17)
HGB BLD-MCNC: 12 G/DL (ref 12.1–17)
HGB BLD-MCNC: 12.8 G/DL (ref 12.1–17)
HGB BLD-MCNC: 13.7 G/DL (ref 13–17.7)
HGB BLD-MCNC: 7.1 G/DL (ref 12.1–17)
HGB BLD-MCNC: 7.8 G/DL (ref 12.1–17)
HGB BLD-MCNC: 7.8 G/DL (ref 12.1–17)
HGB BLD-MCNC: 8.1 G/DL (ref 12.1–17)
HGB BLD-MCNC: 8.8 G/DL (ref 12.1–17)
HGB BLD-MCNC: 9 G/DL (ref 12.1–17)
HGB BLD-MCNC: 9.1 G/DL (ref 12.1–17)
HGB BLD-MCNC: 9.3 G/DL (ref 12.1–17)
HGB BLD-MCNC: 9.8 G/DL (ref 12.1–17)
HGB UR QL STRIP: ABNORMAL
HIGH TARGET, HITG: 140 MG/DL
HYALINE CASTS URNS QL MICRO: >20 /LPF (ref 0–5)
HYALINE CASTS URNS QL MICRO: ABNORMAL /LPF (ref 0–5)
IGA SERPL-MCNC: 96 MG/DL (ref 61–437)
IGG SERPL-MCNC: 380 MG/DL (ref 700–1600)
IGG SERPL-MCNC: 385 MG/DL (ref 700–1600)
IGM SERPL-MCNC: 6 MG/DL (ref 20–172)
IMM GRANULOCYTES # BLD AUTO: 0 K/UL (ref 0–0.04)
IMM GRANULOCYTES # BLD AUTO: 0 X10E3/UL (ref 0–0.1)
IMM GRANULOCYTES NFR BLD AUTO: 0 %
IMM GRANULOCYTES NFR BLD AUTO: 0 % (ref 0–0.5)
INR PPP: 1.2 (ref 0.8–1.2)
INR PPP: 1.4 (ref 0.9–1.1)
INSULIN ADMINSTERED, INSADM: 4 UNITS/HOUR
INSULIN ORDER, INSORD: 4 UNITS/HOUR
INTERPRETATION SERPL IEP-IMP: ABNORMAL
IRON SATN MFR SERPL: 40 % (ref 20–50)
IRON SATN MFR SERPL: 9 % (ref 15–55)
IRON SERPL-MCNC: 36 UG/DL (ref 38–169)
IRON SERPL-MCNC: 80 UG/DL (ref 35–150)
KAPPA LC FREE SER-MCNC: 36.9 MG/L (ref 3.3–19.4)
KAPPA LC FREE/LAMBDA FREE SER: 1.78 {RATIO} (ref 0.26–1.65)
KETONES UR QL STRIP.AUTO: ABNORMAL MG/DL
KETONES UR QL STRIP.AUTO: ABNORMAL MG/DL
KETONES UR QL STRIP.AUTO: NEGATIVE MG/DL
LACTATE SERPL-SCNC: 0.7 MMOL/L (ref 0.4–2)
LACTATE SERPL-SCNC: 0.8 MMOL/L (ref 0.4–2)
LACTATE SERPL-SCNC: 0.8 MMOL/L (ref 0.4–2)
LACTATE SERPL-SCNC: 0.9 MMOL/L (ref 0.4–2)
LACTATE SERPL-SCNC: 1 MMOL/L (ref 0.4–2)
LACTATE SERPL-SCNC: 1 MMOL/L (ref 0.4–2)
LACTATE SERPL-SCNC: 1.4 MMOL/L (ref 0.4–2)
LACTATE SERPL-SCNC: 2.6 MMOL/L (ref 0.4–2)
LAMBDA LC FREE SERPL-MCNC: 20.7 MG/L (ref 5.7–26.3)
LDH SERPL L TO P-CCNC: 230 U/L (ref 85–241)
LDH SERPL L TO P-CCNC: 247 U/L (ref 85–241)
LDH SERPL L TO P-CCNC: 260 U/L (ref 85–241)
LDH SERPL L TO P-CCNC: 281 U/L (ref 85–241)
LDH SERPL L TO P-CCNC: 471 U/L (ref 85–241)
LDH SERPL L TO P-CCNC: 485 U/L (ref 85–241)
LDH SERPL L TO P-CCNC: 616 U/L (ref 85–241)
LEFT CCA DIST DIAS: 12.4 CM/S
LEFT CCA DIST SYS: 36.5 CM/S
LEFT CCA PROX DIAS: 12.7 CM/S
LEFT CCA PROX SYS: 51.3 CM/S
LEFT ECA DIAS: 6.73 CM/S
LEFT ECA SYS: 42.5 CM/S
LEFT ICA DIST DIAS: 27.2 CM/S
LEFT ICA DIST SYS: 59.3 CM/S
LEFT ICA MID DIAS: 12.7 CM/S
LEFT ICA MID SYS: 43.2 CM/S
LEFT ICA PROX DIAS: 11.1 CM/S
LEFT ICA PROX SYS: 35.2 CM/S
LEFT ICA/CCA SYS: 1.63
LEFT SUBCLAVIAN DIAS: 0 CM/S
LEFT SUBCLAVIAN SYS: 53.3 CM/S
LEFT VERTEBRAL DIAS: 21.91 CM/S
LEFT VERTEBRAL SYS: 45 CM/S
LEUKOCYTE ESTERASE UR QL STRIP.AUTO: ABNORMAL
LEUKOCYTE ESTERASE UR QL STRIP.AUTO: NEGATIVE
LEUKOCYTE ESTERASE UR QL STRIP.AUTO: NEGATIVE
LIPASE SERPL-CCNC: 37 U/L (ref 73–393)
LIPASE SERPL-CCNC: 67 U/L (ref 73–393)
LOW TARGET, LOT: 100 MG/DL
LYMPHOCYTES # BLD AUTO: 1.3 X10E3/UL (ref 0.7–3.1)
LYMPHOCYTES # BLD: 0.4 K/UL (ref 0.8–3.5)
LYMPHOCYTES # BLD: 0.6 K/UL (ref 0.8–3.5)
LYMPHOCYTES # BLD: 1.2 K/UL (ref 0.8–3.5)
LYMPHOCYTES # BLD: 1.3 K/UL (ref 0.8–3.5)
LYMPHOCYTES NFR BLD AUTO: 19 %
LYMPHOCYTES NFR BLD: 17 % (ref 12–49)
LYMPHOCYTES NFR BLD: 20 % (ref 12–49)
LYMPHOCYTES NFR BLD: 6 % (ref 12–49)
LYMPHOCYTES NFR BLD: 8 % (ref 12–49)
M PROTEIN SERPL ELPH-MCNC: ABNORMAL G/DL
MAGNESIUM SERPL-MCNC: 1.6 MG/DL (ref 1.6–2.4)
MAGNESIUM SERPL-MCNC: 1.7 MG/DL (ref 1.6–2.4)
MAGNESIUM SERPL-MCNC: 1.8 MG/DL (ref 1.6–2.4)
MAGNESIUM SERPL-MCNC: 1.8 MG/DL (ref 1.6–2.4)
MAGNESIUM SERPL-MCNC: 2.1 MG/DL (ref 1.6–2.4)
MAGNESIUM SERPL-MCNC: 2.2 MG/DL (ref 1.6–2.4)
MAGNESIUM SERPL-MCNC: 2.3 MG/DL (ref 1.6–2.4)
MAGNESIUM SERPL-MCNC: 2.3 MG/DL (ref 1.6–2.4)
MAGNESIUM SERPL-MCNC: 2.4 MG/DL (ref 1.6–2.4)
MCH RBC QN AUTO: 26.4 PG (ref 26–34)
MCH RBC QN AUTO: 26.5 PG (ref 26–34)
MCH RBC QN AUTO: 26.6 PG (ref 26–34)
MCH RBC QN AUTO: 26.7 PG (ref 26–34)
MCH RBC QN AUTO: 26.7 PG (ref 26–34)
MCH RBC QN AUTO: 26.8 PG (ref 26–34)
MCH RBC QN AUTO: 26.9 PG (ref 26–34)
MCH RBC QN AUTO: 26.9 PG (ref 26–34)
MCH RBC QN AUTO: 27.1 PG (ref 26.6–33)
MCH RBC QN AUTO: 27.3 PG (ref 26–34)
MCH RBC QN AUTO: 27.4 PG (ref 26–34)
MCHC RBC AUTO-ENTMCNC: 30.3 G/DL (ref 30–36.5)
MCHC RBC AUTO-ENTMCNC: 30.7 G/DL (ref 30–36.5)
MCHC RBC AUTO-ENTMCNC: 30.8 G/DL (ref 30–36.5)
MCHC RBC AUTO-ENTMCNC: 30.8 G/DL (ref 30–36.5)
MCHC RBC AUTO-ENTMCNC: 31.4 G/DL (ref 31.5–35.7)
MCHC RBC AUTO-ENTMCNC: 31.5 G/DL (ref 30–36.5)
MCHC RBC AUTO-ENTMCNC: 31.5 G/DL (ref 30–36.5)
MCHC RBC AUTO-ENTMCNC: 31.6 G/DL (ref 30–36.5)
MCHC RBC AUTO-ENTMCNC: 31.6 G/DL (ref 30–36.5)
MCHC RBC AUTO-ENTMCNC: 31.7 G/DL (ref 30–36.5)
MCHC RBC AUTO-ENTMCNC: 31.9 G/DL (ref 30–36.5)
MCHC RBC AUTO-ENTMCNC: 32.1 G/DL (ref 30–36.5)
MCV RBC AUTO: 82.6 FL (ref 80–99)
MCV RBC AUTO: 83.1 FL (ref 80–99)
MCV RBC AUTO: 83.4 FL (ref 80–99)
MCV RBC AUTO: 83.6 FL (ref 80–99)
MCV RBC AUTO: 83.7 FL (ref 80–99)
MCV RBC AUTO: 83.9 FL (ref 80–99)
MCV RBC AUTO: 84 FL (ref 80–99)
MCV RBC AUTO: 84 FL (ref 80–99)
MCV RBC AUTO: 84.7 FL (ref 80–99)
MCV RBC AUTO: 84.9 FL (ref 80–99)
MCV RBC AUTO: 85.9 FL (ref 80–99)
MCV RBC AUTO: 86 FL (ref 79–97)
MCV RBC AUTO: 86.3 FL (ref 80–99)
MCV RBC AUTO: 86.4 FL (ref 80–99)
MCV RBC AUTO: 86.6 FL (ref 80–99)
MCV RBC AUTO: 87.2 FL (ref 80–99)
MCV RBC AUTO: 87.8 FL (ref 80–99)
METHADONE UR QL: NEGATIVE
MICROALBUMIN UR-MCNC: 28.3 MG/DL
MICROALBUMIN/CREAT UR-RTO: 456 MG/G (ref 0–30)
MINUTES UNTIL NEXT BG, NBG: 60 MIN
MONOCYTES # BLD AUTO: 0.4 X10E3/UL (ref 0.1–0.9)
MONOCYTES # BLD: 0.4 K/UL (ref 0–1)
MONOCYTES # BLD: 0.5 K/UL (ref 0–1)
MONOCYTES # BLD: 0.6 K/UL (ref 0–1)
MONOCYTES # BLD: 0.6 K/UL (ref 0–1)
MONOCYTES NFR BLD AUTO: 6 %
MONOCYTES NFR BLD: 5 % (ref 5–13)
MONOCYTES NFR BLD: 7 % (ref 5–13)
MONOCYTES NFR BLD: 8 % (ref 5–13)
MONOCYTES NFR BLD: 8 % (ref 5–13)
MULTIPLIER, MUL: 0.03
NEUTROPHILS # BLD AUTO: 4.7 X10E3/UL (ref 1.4–7)
NEUTROPHILS NFR BLD AUTO: 74 %
NEUTS SEG # BLD: 4.5 K/UL (ref 1.8–8)
NEUTS SEG # BLD: 5.3 K/UL (ref 1.8–8)
NEUTS SEG # BLD: 6 K/UL (ref 1.8–8)
NEUTS SEG # BLD: 6.5 K/UL (ref 1.8–8)
NEUTS SEG NFR BLD: 69 % (ref 32–75)
NEUTS SEG NFR BLD: 73 % (ref 32–75)
NEUTS SEG NFR BLD: 85 % (ref 32–75)
NEUTS SEG NFR BLD: 86 % (ref 32–75)
NITRITE UR QL STRIP.AUTO: NEGATIVE
NRBC # BLD: 0 K/UL (ref 0–0.01)
NRBC BLD-RTO: 0 PER 100 WBC
NT-PROBNP SERPL-MCNC: ABNORMAL PG/ML (ref 0–376)
O2/TOTAL GAS SETTING VFR VENT: 0.8 %
O2/TOTAL GAS SETTING VFR VENT: 0.8 %
O2/TOTAL GAS SETTING VFR VENT: 21 %
OPIATES UR QL: NEGATIVE
ORDER INITIALS, ORDINIT: NORMAL
OSMOLALITY UR: 283 MOSM/KG H2O
PCO2 BLD: 42.6 MMHG (ref 35–45)
PCO2 BLDV: 31.6 MMHG (ref 41–51)
PCO2 BLDV: 32 MMHG (ref 41–51)
PCO2 BLDV: 33.2 MMHG (ref 41–51)
PCO2 BLDV: 35.4 MMHG (ref 41–51)
PCO2 BLDV: 35.7 MMHG (ref 41–51)
PCO2 BLDV: 36.1 MMHG (ref 41–51)
PCO2 BLDV: 36.2 MMHG (ref 41–51)
PCO2 BLDV: 36.5 MMHG (ref 41–51)
PCO2 BLDV: 36.7 MMHG (ref 41–51)
PCO2 BLDV: 37.2 MMHG (ref 41–51)
PCO2 BLDV: 38 MMHG (ref 41–51)
PCO2 BLDV: 38.4 MMHG (ref 41–51)
PCO2 BLDV: 40.7 MMHG (ref 41–51)
PCO2 BLDV: 47.8 MMHG (ref 41–51)
PCO2 BLDV: 49.3 MMHG (ref 41–51)
PCO2 BLDV: 58 MMHG (ref 41–51)
PCP UR QL: NEGATIVE
PEEP RESPIRATORY: 5 CMH2O
PEEP RESPIRATORY: 5 CMH2O
PH BLD: 7.33 [PH] (ref 7.35–7.45)
PH BLDV: 7.22 [PH] (ref 7.32–7.42)
PH BLDV: 7.23 [PH] (ref 7.32–7.42)
PH BLDV: 7.26 [PH] (ref 7.32–7.42)
PH BLDV: 7.37 [PH] (ref 7.32–7.42)
PH BLDV: 7.38 [PH] (ref 7.32–7.42)
PH BLDV: 7.41 [PH] (ref 7.32–7.42)
PH BLDV: 7.43 [PH] (ref 7.32–7.42)
PH BLDV: 7.44 [PH] (ref 7.32–7.42)
PH BLDV: 7.45 [PH] (ref 7.32–7.42)
PH UR STRIP: 5 [PH] (ref 5–8)
PHOSPHATE SERPL-MCNC: 2.3 MG/DL (ref 2.6–4.7)
PLATELET # BLD AUTO: 107 K/UL (ref 150–400)
PLATELET # BLD AUTO: 113 K/UL (ref 150–400)
PLATELET # BLD AUTO: 137 K/UL (ref 150–400)
PLATELET # BLD AUTO: 139 K/UL (ref 150–400)
PLATELET # BLD AUTO: 146 K/UL (ref 150–400)
PLATELET # BLD AUTO: 150 K/UL (ref 150–400)
PLATELET # BLD AUTO: 173 K/UL (ref 150–400)
PLATELET # BLD AUTO: 200 K/UL (ref 150–400)
PLATELET # BLD AUTO: 235 K/UL (ref 150–400)
PLATELET # BLD AUTO: 256 X10E3/UL (ref 150–379)
PLATELET # BLD AUTO: 66 K/UL (ref 150–400)
PLATELET # BLD AUTO: 69 K/UL (ref 150–400)
PLATELET # BLD AUTO: 73 K/UL (ref 150–400)
PLATELET # BLD AUTO: 77 K/UL (ref 150–400)
PLATELET # BLD AUTO: 80 K/UL (ref 150–400)
PLATELET # BLD AUTO: 91 K/UL (ref 150–400)
PLATELET # BLD AUTO: 99 K/UL (ref 150–400)
PLATELET COMMENTS,PCOM: ABNORMAL
PLEASE NOTE:, 149534: ABNORMAL
PMV BLD AUTO: 10.5 FL (ref 8.9–12.9)
PMV BLD AUTO: 10.6 FL (ref 8.9–12.9)
PMV BLD AUTO: 10.6 FL (ref 8.9–12.9)
PMV BLD AUTO: 10.7 FL (ref 8.9–12.9)
PMV BLD AUTO: 11 FL (ref 8.9–12.9)
PMV BLD AUTO: 11.1 FL (ref 8.9–12.9)
PMV BLD AUTO: 11.6 FL (ref 8.9–12.9)
PMV BLD AUTO: 11.7 FL (ref 8.9–12.9)
PMV BLD AUTO: 12 FL (ref 8.9–12.9)
PMV BLD AUTO: 12.8 FL (ref 8.9–12.9)
PMV BLD AUTO: 12.8 FL (ref 8.9–12.9)
PMV BLD AUTO: 13.1 FL (ref 8.9–12.9)
PO2 BLDV: 23 MMHG (ref 25–40)
PO2 BLDV: 25 MMHG (ref 25–40)
PO2 BLDV: 26 MMHG (ref 25–40)
PO2 BLDV: 27 MMHG (ref 25–40)
PO2 BLDV: 27 MMHG (ref 25–40)
PO2 BLDV: 28 MMHG (ref 25–40)
PO2 BLDV: 29 MMHG (ref 25–40)
PO2 BLDV: 29 MMHG (ref 25–40)
PO2 BLDV: 30 MMHG (ref 25–40)
PO2 BLDV: 30 MMHG (ref 25–40)
PO2 BLDV: 31 MMHG (ref 25–40)
PO2 BLDV: 31 MMHG (ref 25–40)
PO2 BLDV: 33 MMHG (ref 25–40)
PO2 BLDV: 35 MMHG (ref 25–40)
PO2 BLDV: <19 MMHG (ref 25–40)
POTASSIUM SERPL-SCNC: 3.6 MMOL/L (ref 3.5–5.1)
POTASSIUM SERPL-SCNC: 3.7 MMOL/L (ref 3.5–5.1)
POTASSIUM SERPL-SCNC: 3.8 MMOL/L (ref 3.5–5.1)
POTASSIUM SERPL-SCNC: 4 MMOL/L (ref 3.5–5.1)
POTASSIUM SERPL-SCNC: 4.1 MMOL/L (ref 3.5–5.1)
POTASSIUM SERPL-SCNC: 4.2 MMOL/L (ref 3.5–5.1)
POTASSIUM SERPL-SCNC: 4.2 MMOL/L (ref 3.5–5.1)
POTASSIUM SERPL-SCNC: 4.3 MMOL/L (ref 3.5–5.1)
POTASSIUM SERPL-SCNC: 4.4 MMOL/L (ref 3.5–5.1)
POTASSIUM SERPL-SCNC: 4.5 MMOL/L (ref 3.5–5.1)
POTASSIUM SERPL-SCNC: 4.5 MMOL/L (ref 3.5–5.1)
POTASSIUM SERPL-SCNC: 4.6 MMOL/L (ref 3.5–5.1)
POTASSIUM SERPL-SCNC: 4.6 MMOL/L (ref 3.5–5.1)
POTASSIUM SERPL-SCNC: 4.7 MMOL/L (ref 3.5–5.1)
POTASSIUM SERPL-SCNC: 4.8 MMOL/L (ref 3.5–5.1)
POTASSIUM SERPL-SCNC: 4.9 MMOL/L (ref 3.5–5.1)
POTASSIUM SERPL-SCNC: 5.1 MMOL/L (ref 3.5–5.2)
POTASSIUM SERPL-SCNC: 5.3 MMOL/L (ref 3.5–5.1)
POTASSIUM SERPL-SCNC: 5.7 MMOL/L (ref 3.5–5.1)
PREALB SERPL-MCNC: 10.1 MG/DL (ref 20–40)
PREALB SERPL-MCNC: 27 MG/DL (ref 10–36)
PRESSURE SUPPORT SETTING VENT: 5 CMH2O
PRESSURE SUPPORT SETTING VENT: 5 CMH2O
PROCALCITONIN SERPL-MCNC: 1.3 NG/ML
PROCALCITONIN SERPL-MCNC: 1.6 NG/ML
PROCALCITONIN SERPL-MCNC: 3 NG/ML
PROCALCITONIN SERPL-MCNC: 3.2 NG/ML
PROCALCITONIN SERPL-MCNC: 5.2 NG/ML
PROCALCITONIN SERPL-MCNC: 8.1 NG/ML
PROCALCITONIN SERPL-MCNC: <0.1 NG/ML
PROT 24H UR-MRATE: 750 MG/24HR
PROT SERPL-MCNC: 4.9 G/DL (ref 6.4–8.2)
PROT SERPL-MCNC: 5 G/DL (ref 6.4–8.2)
PROT SERPL-MCNC: 5.1 G/DL (ref 6.4–8.2)
PROT SERPL-MCNC: 5.2 G/DL (ref 6.4–8.2)
PROT SERPL-MCNC: 5.4 G/DL (ref 6.4–8.2)
PROT SERPL-MCNC: 5.4 G/DL (ref 6.4–8.2)
PROT SERPL-MCNC: 5.6 G/DL (ref 6.4–8.2)
PROT SERPL-MCNC: 5.7 G/DL (ref 6.4–8.2)
PROT SERPL-MCNC: 5.7 G/DL (ref 6.4–8.2)
PROT SERPL-MCNC: 5.8 G/DL (ref 6.4–8.2)
PROT SERPL-MCNC: 6.5 G/DL (ref 6–8.5)
PROT UR STRIP-MCNC: 100 MG/DL
PROT UR STRIP-MCNC: 30 MG/DL
PROT UR STRIP-MCNC: NEGATIVE MG/DL
PROT/CREAT 24H UR-RTO: 0.99 RATIO
PROTHROMBIN TIME: 11.9 SEC (ref 9.1–12)
PROTHROMBIN TIME: 14.1 SEC (ref 9–11.1)
Q-T INTERVAL, ECG07: 386 MS
Q-T INTERVAL, ECG07: 392 MS
Q-T INTERVAL, ECG07: 428 MS
Q-T INTERVAL, ECG07: 460 MS
QRS DURATION, ECG06: 104 MS
QRS DURATION, ECG06: 84 MS
QRS DURATION, ECG06: 86 MS
QRS DURATION, ECG06: 94 MS
QTC CALCULATION (BEZET), ECG08: 428 MS
QTC CALCULATION (BEZET), ECG08: 436 MS
QTC CALCULATION (BEZET), ECG08: 437 MS
QTC CALCULATION (BEZET), ECG08: 474 MS
RBC # BLD AUTO: 2.69 M/UL (ref 4.1–5.7)
RBC # BLD AUTO: 2.94 M/UL (ref 4.1–5.7)
RBC # BLD AUTO: 2.94 M/UL (ref 4.1–5.7)
RBC # BLD AUTO: 3.05 M/UL (ref 4.1–5.7)
RBC # BLD AUTO: 3.31 M/UL (ref 4.1–5.7)
RBC # BLD AUTO: 3.36 M/UL (ref 4.1–5.7)
RBC # BLD AUTO: 3.43 M/UL (ref 4.1–5.7)
RBC # BLD AUTO: 3.51 M/UL (ref 4.1–5.7)
RBC # BLD AUTO: 3.67 M/UL (ref 4.1–5.7)
RBC # BLD AUTO: 3.83 M/UL (ref 4.1–5.7)
RBC # BLD AUTO: 3.91 M/UL (ref 4.1–5.7)
RBC # BLD AUTO: 4.03 M/UL (ref 4.1–5.7)
RBC # BLD AUTO: 4.09 M/UL (ref 4.1–5.7)
RBC # BLD AUTO: 4.19 M/UL (ref 4.1–5.7)
RBC # BLD AUTO: 4.39 M/UL (ref 4.1–5.7)
RBC # BLD AUTO: 4.82 M/UL (ref 4.1–5.7)
RBC # BLD AUTO: 5.06 X10E6/UL (ref 4.14–5.8)
RBC #/AREA URNS HPF: >100 /HPF (ref 0–5)
RBC #/AREA URNS HPF: ABNORMAL /HPF (ref 0–5)
RBC #/AREA URNS HPF: ABNORMAL /HPF (ref 0–5)
RBC CASTS URNS QL MICRO: ABNORMAL /LPF
RBC MORPH BLD: ABNORMAL
RIGHT ARM BP: 87 MMHG
RIGHT CCA DIST DIAS: 15.6 CM/S
RIGHT CCA DIST SYS: 53.1 CM/S
RIGHT CCA PROX DIAS: 12.3 CM/S
RIGHT CCA PROX SYS: 42.7 CM/S
RIGHT ECA DIAS: 4.18 CM/S
RIGHT ECA SYS: 29 CM/S
RIGHT ICA DIST DIAS: 18.1 CM/S
RIGHT ICA DIST SYS: 43.8 CM/S
RIGHT ICA MID DIAS: 9.8 CM/S
RIGHT ICA MID SYS: 30.2 CM/S
RIGHT ICA PROX DIAS: 16.3 CM/S
RIGHT ICA PROX SYS: 39.3 CM/S
RIGHT ICA/CCA SYS: 0.8
RIGHT SUBCLAVIAN DIAS: 0 CM/S
RIGHT SUBCLAVIAN SYS: 38.4 CM/S
RIGHT TBI: 1.1
RIGHT TOE PRESSURE: 96 MMHG
RIGHT VERTEBRAL DIAS: 13.59 CM/S
RIGHT VERTEBRAL SYS: 38.4 CM/S
SAMPLES BEING HELD,HOLD: NORMAL
SAO2 % BLDV: 42 % (ref 65–88)
SAO2 % BLDV: 44 % (ref 65–88)
SAO2 % BLDV: 46 % (ref 65–88)
SAO2 % BLDV: 51 % (ref 65–88)
SAO2 % BLDV: 52 % (ref 65–88)
SAO2 % BLDV: 53 % (ref 65–88)
SAO2 % BLDV: 57 % (ref 65–88)
SAO2 % BLDV: 57 % (ref 65–88)
SAO2 % BLDV: 58 % (ref 65–88)
SAO2 % BLDV: 60 % (ref 65–88)
SAO2 % BLDV: 61 % (ref 65–88)
SAO2 % BLDV: 63 % (ref 65–88)
SAO2 % BLDV: 65 % (ref 65–88)
SAO2 % BLDV: 67 % (ref 65–88)
SERVICE CMNT-IMP: ABNORMAL
SERVICE CMNT-IMP: NORMAL
SODIUM SERPL-SCNC: 120 MMOL/L (ref 136–145)
SODIUM SERPL-SCNC: 123 MMOL/L (ref 136–145)
SODIUM SERPL-SCNC: 124 MMOL/L (ref 136–145)
SODIUM SERPL-SCNC: 124 MMOL/L (ref 136–145)
SODIUM SERPL-SCNC: 126 MMOL/L (ref 136–145)
SODIUM SERPL-SCNC: 126 MMOL/L (ref 136–145)
SODIUM SERPL-SCNC: 128 MMOL/L (ref 136–145)
SODIUM SERPL-SCNC: 130 MMOL/L (ref 136–145)
SODIUM SERPL-SCNC: 130 MMOL/L (ref 136–145)
SODIUM SERPL-SCNC: 131 MMOL/L (ref 136–145)
SODIUM SERPL-SCNC: 131 MMOL/L (ref 136–145)
SODIUM SERPL-SCNC: 132 MMOL/L (ref 136–145)
SODIUM SERPL-SCNC: 133 MMOL/L (ref 136–145)
SODIUM SERPL-SCNC: 134 MMOL/L (ref 134–144)
SODIUM SERPL-SCNC: 135 MMOL/L (ref 136–145)
SODIUM SERPL-SCNC: 135 MMOL/L (ref 136–145)
SODIUM SERPL-SCNC: 136 MMOL/L (ref 136–145)
SODIUM SERPL-SCNC: 136 MMOL/L (ref 136–145)
SODIUM SERPL-SCNC: 137 MMOL/L (ref 136–145)
SODIUM SERPL-SCNC: 137 MMOL/L (ref 136–145)
SP GR UR REFRACTOMETRY: 1.02 (ref 1–1.03)
SPECIMEN EXP DATE BLD: NORMAL
SPECIMEN TYPE: ABNORMAL
SPECIMEN VOL ?TM UR: 1250 ML
STATUS OF UNIT,%ST: NORMAL
STATUS OF UNIT,%ST: NORMAL
T3RU NFR SERPL: 29 % (ref 24–39)
T4 SERPL-MCNC: 5.4 UG/DL (ref 4.5–12)
THERAPEUTIC RANGE,PTTT: ABNORMAL SECS (ref 58–77)
TIBC SERPL-MCNC: 199 UG/DL (ref 250–450)
TIBC SERPL-MCNC: 400 UG/DL (ref 250–450)
TOTAL RESP. RATE, ITRR: 12
TOTAL RESP. RATE, ITRR: 14
TOTAL RESP. RATE, ITRR: 14
TOTAL RESP. RATE, ITRR: 15
TOTAL RESP. RATE, ITRR: 15
TOTAL RESP. RATE, ITRR: 16
TOTAL RESP. RATE, ITRR: 16
TOTAL RESP. RATE, ITRR: 18
TOTAL RESP. RATE, ITRR: 27
TOTAL RESP. RATE, ITRR: 31
TROPONIN I SERPL-MCNC: 0.23 NG/ML (ref 0–0.04)
TROPONIN I SERPL-MCNC: 1.02 NG/ML
TSH SERPL DL<=0.005 MIU/L-ACNC: 2.6 UIU/ML (ref 0.45–4.5)
UA: UC IF INDICATED,UAUC: ABNORMAL
UIBC SERPL-MCNC: 364 UG/DL (ref 111–343)
UNIT DIVISION, %UDIV: 0
UNIT DIVISION, %UDIV: 0
UR CULT HOLD, URHOLD: NORMAL
URATE SERPL-MCNC: 9.9 MG/DL (ref 3.7–8.6)
UROBILINOGEN UR QL STRIP.AUTO: 0.2 EU/DL (ref 0.2–1)
VENTILATION MODE VENT: ABNORMAL
VENTILATION MODE VENT: ABNORMAL
VENTRICULAR RATE, ECG03: 60 BPM
VENTRICULAR RATE, ECG03: 64 BPM
VENTRICULAR RATE, ECG03: 75 BPM
VENTRICULAR RATE, ECG03: 77 BPM
VT SETTING VENT: 500 ML
VT SETTING VENT: 500 ML
WBC # BLD AUTO: 3.1 K/UL (ref 4.1–11.1)
WBC # BLD AUTO: 3.5 K/UL (ref 4.1–11.1)
WBC # BLD AUTO: 4 K/UL (ref 4.1–11.1)
WBC # BLD AUTO: 4.3 K/UL (ref 4.1–11.1)
WBC # BLD AUTO: 4.9 K/UL (ref 4.1–11.1)
WBC # BLD AUTO: 5.2 K/UL (ref 4.1–11.1)
WBC # BLD AUTO: 6.1 K/UL (ref 4.1–11.1)
WBC # BLD AUTO: 6.1 K/UL (ref 4.1–11.1)
WBC # BLD AUTO: 6.5 X10E3/UL (ref 3.4–10.8)
WBC # BLD AUTO: 6.6 K/UL (ref 4.1–11.1)
WBC # BLD AUTO: 6.6 K/UL (ref 4.1–11.1)
WBC # BLD AUTO: 6.9 K/UL (ref 4.1–11.1)
WBC # BLD AUTO: 6.9 K/UL (ref 4.1–11.1)
WBC # BLD AUTO: 7.1 K/UL (ref 4.1–11.1)
WBC # BLD AUTO: 7.3 K/UL (ref 4.1–11.1)
WBC # BLD AUTO: 7.6 K/UL (ref 4.1–11.1)
WBC # BLD AUTO: 7.9 K/UL (ref 4.1–11.1)
WBC URNS QL MICRO: ABNORMAL /HPF (ref 0–4)

## 2019-01-01 PROCEDURE — 83615 LACTATE (LD) (LDH) ENZYME: CPT

## 2019-01-01 PROCEDURE — 84145 PROCALCITONIN (PCT): CPT

## 2019-01-01 PROCEDURE — 74011250636 HC RX REV CODE- 250/636: Performed by: NURSE PRACTITIONER

## 2019-01-01 PROCEDURE — 74011000250 HC RX REV CODE- 250: Performed by: NURSE PRACTITIONER

## 2019-01-01 PROCEDURE — 02PA3RZ REMOVAL OF SHORT-TERM EXTERNAL HEART ASSIST SYSTEM FROM HEART, PERCUTANEOUS APPROACH: ICD-10-PCS | Performed by: THORACIC SURGERY (CARDIOTHORACIC VASCULAR SURGERY)

## 2019-01-01 PROCEDURE — P9045 ALBUMIN (HUMAN), 5%, 250 ML: HCPCS

## 2019-01-01 PROCEDURE — 82962 GLUCOSE BLOOD TEST: CPT

## 2019-01-01 PROCEDURE — 97530 THERAPEUTIC ACTIVITIES: CPT

## 2019-01-01 PROCEDURE — 77030008467 HC STPLR SKN COVD -B: Performed by: THORACIC SURGERY (CARDIOTHORACIC VASCULAR SURGERY)

## 2019-01-01 PROCEDURE — 74011000250 HC RX REV CODE- 250: Performed by: THORACIC SURGERY (CARDIOTHORACIC VASCULAR SURGERY)

## 2019-01-01 PROCEDURE — 82803 BLOOD GASES ANY COMBINATION: CPT

## 2019-01-01 PROCEDURE — 36415 COLL VENOUS BLD VENIPUNCTURE: CPT

## 2019-01-01 PROCEDURE — 85027 COMPLETE CBC AUTOMATED: CPT

## 2019-01-01 PROCEDURE — 51798 US URINE CAPACITY MEASURE: CPT

## 2019-01-01 PROCEDURE — 74011250637 HC RX REV CODE- 250/637: Performed by: NURSE PRACTITIONER

## 2019-01-01 PROCEDURE — 85730 THROMBOPLASTIN TIME PARTIAL: CPT

## 2019-01-01 PROCEDURE — 76700 US EXAM ABDOM COMPLETE: CPT

## 2019-01-01 PROCEDURE — 93308 TTE F-UP OR LMTD: CPT

## 2019-01-01 PROCEDURE — 71045 X-RAY EXAM CHEST 1 VIEW: CPT

## 2019-01-01 PROCEDURE — C1769 GUIDE WIRE: HCPCS | Performed by: THORACIC SURGERY (CARDIOTHORACIC VASCULAR SURGERY)

## 2019-01-01 PROCEDURE — 77030018846 HC SOL IRR STRL H20 ICUM -A

## 2019-01-01 PROCEDURE — 83735 ASSAY OF MAGNESIUM: CPT

## 2019-01-01 PROCEDURE — 93005 ELECTROCARDIOGRAM TRACING: CPT

## 2019-01-01 PROCEDURE — 93306 TTE W/DOPPLER COMPLETE: CPT

## 2019-01-01 PROCEDURE — 80048 BASIC METABOLIC PNL TOTAL CA: CPT

## 2019-01-01 PROCEDURE — 83605 ASSAY OF LACTIC ACID: CPT

## 2019-01-01 PROCEDURE — 93458 L HRT ARTERY/VENTRICLE ANGIO: CPT | Performed by: INTERNAL MEDICINE

## 2019-01-01 PROCEDURE — 02HP32Z INSERTION OF MONITORING DEVICE INTO PULMONARY TRUNK, PERCUTANEOUS APPROACH: ICD-10-PCS | Performed by: THORACIC SURGERY (CARDIOTHORACIC VASCULAR SURGERY)

## 2019-01-01 PROCEDURE — 74011250636 HC RX REV CODE- 250/636: Performed by: THORACIC SURGERY (CARDIOTHORACIC VASCULAR SURGERY)

## 2019-01-01 PROCEDURE — 74011000272 HC RX REV CODE- 272: Performed by: THORACIC SURGERY (CARDIOTHORACIC VASCULAR SURGERY)

## 2019-01-01 PROCEDURE — 65610000003 HC RM ICU SURGICAL

## 2019-01-01 PROCEDURE — 83880 ASSAY OF NATRIURETIC PEPTIDE: CPT

## 2019-01-01 PROCEDURE — 80053 COMPREHEN METABOLIC PANEL: CPT

## 2019-01-01 PROCEDURE — 74011250636 HC RX REV CODE- 250/636: Performed by: NURSE ANESTHETIST, CERTIFIED REGISTERED

## 2019-01-01 PROCEDURE — 80069 RENAL FUNCTION PANEL: CPT

## 2019-01-01 PROCEDURE — 74011250636 HC RX REV CODE- 250/636: Performed by: INTERNAL MEDICINE

## 2019-01-01 PROCEDURE — C1757 CATH, THROMBECTOMY/EMBOLECT: HCPCS | Performed by: THORACIC SURGERY (CARDIOTHORACIC VASCULAR SURGERY)

## 2019-01-01 PROCEDURE — 93922 UPR/L XTREMITY ART 2 LEVELS: CPT

## 2019-01-01 PROCEDURE — 77030031139 HC SUT VCRL2 J&J -A: Performed by: THORACIC SURGERY (CARDIOTHORACIC VASCULAR SURGERY)

## 2019-01-01 PROCEDURE — 77030028837 HC SYR ANGI PWR INJ COEU -A: Performed by: INTERNAL MEDICINE

## 2019-01-01 PROCEDURE — P9047 ALBUMIN (HUMAN), 25%, 50ML: HCPCS | Performed by: NURSE PRACTITIONER

## 2019-01-01 PROCEDURE — 77030026908

## 2019-01-01 PROCEDURE — 84295 ASSAY OF SERUM SODIUM: CPT

## 2019-01-01 PROCEDURE — 81001 URINALYSIS AUTO W/SCOPE: CPT

## 2019-01-01 PROCEDURE — 77030018836 HC SOL IRR NACL ICUM -A: Performed by: THORACIC SURGERY (CARDIOTHORACIC VASCULAR SURGERY)

## 2019-01-01 PROCEDURE — 71250 CT THORAX DX C-: CPT

## 2019-01-01 PROCEDURE — 74176 CT ABD & PELVIS W/O CONTRAST: CPT

## 2019-01-01 PROCEDURE — 97535 SELF CARE MNGMENT TRAINING: CPT

## 2019-01-01 PROCEDURE — 97164 PT RE-EVAL EST PLAN CARE: CPT

## 2019-01-01 PROCEDURE — 74011000258 HC RX REV CODE- 258: Performed by: NURSE PRACTITIONER

## 2019-01-01 PROCEDURE — 87449 NOS EACH ORGANISM AG IA: CPT

## 2019-01-01 PROCEDURE — 74011636637 HC RX REV CODE- 636/637: Performed by: NURSE PRACTITIONER

## 2019-01-01 PROCEDURE — 83540 ASSAY OF IRON: CPT

## 2019-01-01 PROCEDURE — 65660000001 HC RM ICU INTERMED STEPDOWN

## 2019-01-01 PROCEDURE — C1751 CATH, INF, PER/CENT/MIDLINE: HCPCS

## 2019-01-01 PROCEDURE — 99233 SBSQ HOSP IP/OBS HIGH 50: CPT | Performed by: INTERNAL MEDICINE

## 2019-01-01 PROCEDURE — 97165 OT EVAL LOW COMPLEX 30 MIN: CPT

## 2019-01-01 PROCEDURE — 77030008027: Performed by: THORACIC SURGERY (CARDIOTHORACIC VASCULAR SURGERY)

## 2019-01-01 PROCEDURE — 76450000000

## 2019-01-01 PROCEDURE — 75561 CARDIAC MRI FOR MORPH W/DYE: CPT

## 2019-01-01 PROCEDURE — 86900 BLOOD TYPING SEROLOGIC ABO: CPT

## 2019-01-01 PROCEDURE — 65620000000 HC RM CCU GENERAL

## 2019-01-01 PROCEDURE — 74011000250 HC RX REV CODE- 250: Performed by: INTERNAL MEDICINE

## 2019-01-01 PROCEDURE — 77030018846 HC SOL IRR STRL H20 ICUM -A: Performed by: THORACIC SURGERY (CARDIOTHORACIC VASCULAR SURGERY)

## 2019-01-01 PROCEDURE — 74011250637 HC RX REV CODE- 250/637: Performed by: INTERNAL MEDICINE

## 2019-01-01 PROCEDURE — 99291 CRITICAL CARE FIRST HOUR: CPT | Performed by: INTERNAL MEDICINE

## 2019-01-01 PROCEDURE — 74011250636 HC RX REV CODE- 250/636

## 2019-01-01 PROCEDURE — 80162 ASSAY OF DIGOXIN TOTAL: CPT

## 2019-01-01 PROCEDURE — 74011000258 HC RX REV CODE- 258: Performed by: THORACIC SURGERY (CARDIOTHORACIC VASCULAR SURGERY)

## 2019-01-01 PROCEDURE — 77030020256 HC SOL INJ NACL 0.9%  500ML: Performed by: THORACIC SURGERY (CARDIOTHORACIC VASCULAR SURGERY)

## 2019-01-01 PROCEDURE — 99152 MOD SED SAME PHYS/QHP 5/>YRS: CPT | Performed by: INTERNAL MEDICINE

## 2019-01-01 PROCEDURE — 77030034868 HC PMP CARD PERC IMPELLA RP ABIM -L: Performed by: THORACIC SURGERY (CARDIOTHORACIC VASCULAR SURGERY)

## 2019-01-01 PROCEDURE — 93880 EXTRACRANIAL BILAT STUDY: CPT

## 2019-01-01 PROCEDURE — 77030008771 HC TU NG SALEM SUMP -A: Performed by: ANESTHESIOLOGY

## 2019-01-01 PROCEDURE — 77030011255 HC DSG AQUACEL AG BMS -A

## 2019-01-01 PROCEDURE — 77030003029 HC SUT VCRL J&J -B: Performed by: THORACIC SURGERY (CARDIOTHORACIC VASCULAR SURGERY)

## 2019-01-01 PROCEDURE — 77030004532 HC CATH ANGI DX IMP BSC -A: Performed by: THORACIC SURGERY (CARDIOTHORACIC VASCULAR SURGERY)

## 2019-01-01 PROCEDURE — 85652 RBC SED RATE AUTOMATED: CPT

## 2019-01-01 PROCEDURE — 86923 COMPATIBILITY TEST ELECTRIC: CPT

## 2019-01-01 PROCEDURE — 87040 BLOOD CULTURE FOR BACTERIA: CPT

## 2019-01-01 PROCEDURE — 02WAXRZ REVISION OF SHORT-TERM EXTERNAL HEART ASSIST SYSTEM IN HEART, EXTERNAL APPROACH: ICD-10-PCS | Performed by: THORACIC SURGERY (CARDIOTHORACIC VASCULAR SURGERY)

## 2019-01-01 PROCEDURE — 84484 ASSAY OF TROPONIN QUANT: CPT

## 2019-01-01 PROCEDURE — 99153 MOD SED SAME PHYS/QHP EA: CPT | Performed by: INTERNAL MEDICINE

## 2019-01-01 PROCEDURE — 5A0221D ASSISTANCE WITH CARDIAC OUTPUT USING IMPELLER PUMP, CONTINUOUS: ICD-10-PCS | Performed by: THORACIC SURGERY (CARDIOTHORACIC VASCULAR SURGERY)

## 2019-01-01 PROCEDURE — 82043 UR ALBUMIN QUANTITATIVE: CPT

## 2019-01-01 PROCEDURE — 94760 N-INVAS EAR/PLS OXIMETRY 1: CPT

## 2019-01-01 PROCEDURE — 74011636320 HC RX REV CODE- 636/320

## 2019-01-01 PROCEDURE — 85025 COMPLETE CBC W/AUTO DIFF WBC: CPT

## 2019-01-01 PROCEDURE — 80307 DRUG TEST PRSMV CHEM ANLYZR: CPT

## 2019-01-01 PROCEDURE — 76060000035 HC ANESTHESIA 2 TO 2.5 HR: Performed by: THORACIC SURGERY (CARDIOTHORACIC VASCULAR SURGERY)

## 2019-01-01 PROCEDURE — 76010000112 HC CV SURG 0.5 TO 1 HR: Performed by: THORACIC SURGERY (CARDIOTHORACIC VASCULAR SURGERY)

## 2019-01-01 PROCEDURE — C8929 TTE W OR WO FOL WCON,DOPPLER: HCPCS

## 2019-01-01 PROCEDURE — 30243S1 TRANSFUSION OF NONAUTOLOGOUS GLOBULIN INTO CENTRAL VEIN, PERCUTANEOUS APPROACH: ICD-10-PCS | Performed by: INTERNAL MEDICINE

## 2019-01-01 PROCEDURE — 77030027876 HC STPLR ENDOSC FLX PWR J&J -G1: Performed by: THORACIC SURGERY (CARDIOTHORACIC VASCULAR SURGERY)

## 2019-01-01 PROCEDURE — 97110 THERAPEUTIC EXERCISES: CPT

## 2019-01-01 PROCEDURE — 74018 RADEX ABDOMEN 1 VIEW: CPT

## 2019-01-01 PROCEDURE — 77030008684 HC TU ET CUF COVD -B: Performed by: ANESTHESIOLOGY

## 2019-01-01 PROCEDURE — 97116 GAIT TRAINING THERAPY: CPT

## 2019-01-01 PROCEDURE — C1751 CATH, INF, PER/CENT/MIDLINE: HCPCS | Performed by: THORACIC SURGERY (CARDIOTHORACIC VASCULAR SURGERY)

## 2019-01-01 PROCEDURE — 74011000258 HC RX REV CODE- 258: Performed by: NURSE ANESTHETIST, CERTIFIED REGISTERED

## 2019-01-01 PROCEDURE — B24BZZ4 ULTRASONOGRAPHY OF HEART WITH AORTA, TRANSESOPHAGEAL: ICD-10-PCS | Performed by: ANESTHESIOLOGY

## 2019-01-01 PROCEDURE — 33993 REPOSG PERQ R/L HRT VAD: CPT

## 2019-01-01 PROCEDURE — 02HA3RZ INSERTION OF SHORT-TERM EXTERNAL HEART ASSIST SYSTEM INTO HEART, PERCUTANEOUS APPROACH: ICD-10-PCS | Performed by: THORACIC SURGERY (CARDIOTHORACIC VASCULAR SURGERY)

## 2019-01-01 PROCEDURE — 77030010221 HC SPLNT WR POS TELE -B: Performed by: INTERNAL MEDICINE

## 2019-01-01 PROCEDURE — C1769 GUIDE WIRE: HCPCS | Performed by: INTERNAL MEDICINE

## 2019-01-01 PROCEDURE — 85610 PROTHROMBIN TIME: CPT

## 2019-01-01 PROCEDURE — C1894 INTRO/SHEATH, NON-LASER: HCPCS | Performed by: INTERNAL MEDICINE

## 2019-01-01 PROCEDURE — 83935 ASSAY OF URINE OSMOLALITY: CPT

## 2019-01-01 PROCEDURE — 77030011220 HC DEV ELECSURG COVD -B: Performed by: THORACIC SURGERY (CARDIOTHORACIC VASCULAR SURGERY)

## 2019-01-01 PROCEDURE — 77030011640 HC PAD GRND REM COVD -A: Performed by: THORACIC SURGERY (CARDIOTHORACIC VASCULAR SURGERY)

## 2019-01-01 PROCEDURE — C1768 GRAFT, VASCULAR: HCPCS | Performed by: THORACIC SURGERY (CARDIOTHORACIC VASCULAR SURGERY)

## 2019-01-01 PROCEDURE — 74011636320 HC RX REV CODE- 636/320: Performed by: INTERNAL MEDICINE

## 2019-01-01 PROCEDURE — 86141 C-REACTIVE PROTEIN HS: CPT

## 2019-01-01 PROCEDURE — 77030004549 HC CATH ANGI DX PRF MRTM -A: Performed by: INTERNAL MEDICINE

## 2019-01-01 PROCEDURE — 74011000250 HC RX REV CODE- 250

## 2019-01-01 PROCEDURE — P9045 ALBUMIN (HUMAN), 5%, 250 ML: HCPCS | Performed by: NURSE PRACTITIONER

## 2019-01-01 PROCEDURE — 99223 1ST HOSP IP/OBS HIGH 75: CPT | Performed by: INTERNAL MEDICINE

## 2019-01-01 PROCEDURE — 83690 ASSAY OF LIPASE: CPT

## 2019-01-01 PROCEDURE — 84156 ASSAY OF PROTEIN URINE: CPT

## 2019-01-01 PROCEDURE — 77030019698 HC SYR ANGI MDLON MRTM -A: Performed by: INTERNAL MEDICINE

## 2019-01-01 PROCEDURE — 94010 BREATHING CAPACITY TEST: CPT

## 2019-01-01 PROCEDURE — 93312 ECHO TRANSESOPHAGEAL: CPT

## 2019-01-01 PROCEDURE — 84134 ASSAY OF PREALBUMIN: CPT

## 2019-01-01 PROCEDURE — 71046 X-RAY EXAM CHEST 2 VIEWS: CPT

## 2019-01-01 PROCEDURE — 74011000250 HC RX REV CODE- 250: Performed by: UROLOGY

## 2019-01-01 PROCEDURE — C1887 CATHETER, GUIDING: HCPCS | Performed by: INTERNAL MEDICINE

## 2019-01-01 PROCEDURE — 77030037404 HC CATH FOL COUDE SURESTEP BARD -B

## 2019-01-01 PROCEDURE — 77030038079 HC RELD STPLR ENDOSC J&J -G: Performed by: THORACIC SURGERY (CARDIOTHORACIC VASCULAR SURGERY)

## 2019-01-01 PROCEDURE — 77030013798 HC KT TRNSDUC PRSSR EDWD -B

## 2019-01-01 PROCEDURE — 83036 HEMOGLOBIN GLYCOSYLATED A1C: CPT

## 2019-01-01 PROCEDURE — 76010000108 HC CV SURG 2 TO 2.5 HR: Performed by: THORACIC SURGERY (CARDIOTHORACIC VASCULAR SURGERY)

## 2019-01-01 PROCEDURE — A9579 GAD-BASE MR CONTRAST NOS,1ML: HCPCS

## 2019-01-01 PROCEDURE — 77030026438 HC STYL ET INTUB CARD -A: Performed by: ANESTHESIOLOGY

## 2019-01-01 PROCEDURE — 77030011255 HC DSG AQUACEL AG BMS -A: Performed by: THORACIC SURGERY (CARDIOTHORACIC VASCULAR SURGERY)

## 2019-01-01 PROCEDURE — 97161 PT EVAL LOW COMPLEX 20 MIN: CPT

## 2019-01-01 PROCEDURE — 82728 ASSAY OF FERRITIN: CPT

## 2019-01-01 PROCEDURE — 77030010506 HC ADH BIOGLU CRYO -G: Performed by: THORACIC SURGERY (CARDIOTHORACIC VASCULAR SURGERY)

## 2019-01-01 PROCEDURE — 77030019569 HC BND COMPR RAD TERU -B: Performed by: INTERNAL MEDICINE

## 2019-01-01 PROCEDURE — 82784 ASSAY IGA/IGD/IGG/IGM EACH: CPT

## 2019-01-01 PROCEDURE — 93355 ECHO TRANSESOPHAGEAL (TEE): CPT

## 2019-01-01 PROCEDURE — 77030008543 HC TBNG MON PRSS MRTM -A: Performed by: INTERNAL MEDICINE

## 2019-01-01 DEVICE — HEMASHIELD PLATINUM WOVEN STRAIGHT DOUBLE VELOUR VASCULAR GRAFT WITH GRAFT SIZER ACCESSORY
Type: IMPLANTABLE DEVICE | Site: SUBCLAVIAN | Status: FUNCTIONAL
Brand: HEMASHIELD

## 2019-01-01 RX ORDER — TAMSULOSIN HYDROCHLORIDE 0.4 MG/1
0.4 CAPSULE ORAL DAILY
Status: DISCONTINUED | OUTPATIENT
Start: 2019-01-01 | End: 2019-01-01 | Stop reason: HOSPADM

## 2019-01-01 RX ORDER — SODIUM CHLORIDE 0.9 % (FLUSH) 0.9 %
5-40 SYRINGE (ML) INJECTION EVERY 8 HOURS
Status: DISCONTINUED | OUTPATIENT
Start: 2019-01-01 | End: 2019-01-01 | Stop reason: HOSPADM

## 2019-01-01 RX ORDER — LORAZEPAM 2 MG/ML
1 INJECTION INTRAMUSCULAR
Status: DISCONTINUED | OUTPATIENT
Start: 2019-01-01 | End: 2019-01-01 | Stop reason: HOSPADM

## 2019-01-01 RX ORDER — GLIPIZIDE 5 MG/1
5 TABLET ORAL 2 TIMES DAILY
Status: ON HOLD | COMMUNITY
End: 2019-01-01

## 2019-01-01 RX ORDER — HEPARIN SODIUM 200 [USP'U]/100ML
INJECTION, SOLUTION INTRAVENOUS
Status: COMPLETED | OUTPATIENT
Start: 2019-01-01 | End: 2019-01-01

## 2019-01-01 RX ORDER — SPIRONOLACTONE 25 MG/1
25 TABLET ORAL DAILY
Status: DISCONTINUED | OUTPATIENT
Start: 2019-01-01 | End: 2019-01-01

## 2019-01-01 RX ORDER — DOCUSATE SODIUM 100 MG/1
100 CAPSULE, LIQUID FILLED ORAL AS NEEDED
Status: DISCONTINUED | OUTPATIENT
Start: 2019-01-01 | End: 2019-01-01 | Stop reason: HOSPADM

## 2019-01-01 RX ORDER — TOLVAPTAN 30 MG/1
30 TABLET ORAL ONCE
Status: COMPLETED | OUTPATIENT
Start: 2019-01-01 | End: 2019-01-01

## 2019-01-01 RX ORDER — BUMETANIDE 0.25 MG/ML
1 INJECTION INTRAMUSCULAR; INTRAVENOUS ONCE
Status: ACTIVE | OUTPATIENT
Start: 2019-01-01 | End: 2019-01-01

## 2019-01-01 RX ORDER — GUAIFENESIN 100 MG/5ML
81 LIQUID (ML) ORAL DAILY
COMMUNITY

## 2019-01-01 RX ORDER — POTASSIUM CHLORIDE 1.5 G/1.77G
40 POWDER, FOR SOLUTION ORAL DAILY
Status: DISCONTINUED | OUTPATIENT
Start: 2019-01-01 | End: 2019-01-01

## 2019-01-01 RX ORDER — MIDAZOLAM HYDROCHLORIDE 1 MG/ML
INJECTION, SOLUTION INTRAMUSCULAR; INTRAVENOUS
Status: COMPLETED
Start: 2019-01-01 | End: 2019-01-01

## 2019-01-01 RX ORDER — INSULIN LISPRO 100 [IU]/ML
INJECTION, SOLUTION INTRAVENOUS; SUBCUTANEOUS
Status: DISCONTINUED | OUTPATIENT
Start: 2019-01-01 | End: 2019-01-01 | Stop reason: HOSPADM

## 2019-01-01 RX ORDER — SODIUM CHLORIDE 9 MG/ML
9 INJECTION, SOLUTION INTRAVENOUS CONTINUOUS
Status: DISCONTINUED | OUTPATIENT
Start: 2019-01-01 | End: 2019-01-01 | Stop reason: HOSPADM

## 2019-01-01 RX ORDER — METFORMIN HYDROCHLORIDE 1000 MG/1
1000 TABLET ORAL 2 TIMES DAILY WITH MEALS
COMMUNITY

## 2019-01-01 RX ORDER — SPIRONOLACTONE 25 MG/1
25 TABLET ORAL DAILY
Qty: 30 TAB | Refills: 3 | Status: SHIPPED | OUTPATIENT
Start: 2019-01-01 | End: 2019-01-01 | Stop reason: SDUPTHER

## 2019-01-01 RX ORDER — FUROSEMIDE 40 MG/1
40 TABLET ORAL DAILY
Status: DISCONTINUED | OUTPATIENT
Start: 2019-01-01 | End: 2019-01-01 | Stop reason: HOSPADM

## 2019-01-01 RX ORDER — SODIUM CHLORIDE, SODIUM LACTATE, POTASSIUM CHLORIDE, CALCIUM CHLORIDE 600; 310; 30; 20 MG/100ML; MG/100ML; MG/100ML; MG/100ML
INJECTION, SOLUTION INTRAVENOUS
Status: DISCONTINUED | OUTPATIENT
Start: 2019-01-01 | End: 2019-01-01 | Stop reason: HOSPADM

## 2019-01-01 RX ORDER — METOPROLOL SUCCINATE 50 MG/1
100 TABLET, EXTENDED RELEASE ORAL DAILY
Status: DISCONTINUED | OUTPATIENT
Start: 2019-01-01 | End: 2019-01-01 | Stop reason: HOSPADM

## 2019-01-01 RX ORDER — DOCUSATE SODIUM 100 MG/1
100 CAPSULE, LIQUID FILLED ORAL 2 TIMES DAILY
Status: DISCONTINUED | OUTPATIENT
Start: 2019-01-01 | End: 2019-01-01 | Stop reason: HOSPADM

## 2019-01-01 RX ORDER — GUAIFENESIN 100 MG/5ML
81 LIQUID (ML) ORAL DAILY
Status: DISCONTINUED | OUTPATIENT
Start: 2019-01-01 | End: 2019-01-01 | Stop reason: HOSPADM

## 2019-01-01 RX ORDER — POTASSIUM CHLORIDE 750 MG/1
40 TABLET, FILM COATED, EXTENDED RELEASE ORAL DAILY
Status: DISCONTINUED | OUTPATIENT
Start: 2019-01-01 | End: 2019-01-01

## 2019-01-01 RX ORDER — METOPROLOL SUCCINATE 100 MG/1
100 TABLET, EXTENDED RELEASE ORAL DAILY
Qty: 30 TAB | Refills: 11 | Status: SHIPPED | OUTPATIENT
Start: 2019-01-01

## 2019-01-01 RX ORDER — POTASSIUM CHLORIDE 1.5 G/1.77G
40 POWDER, FOR SOLUTION ORAL 2 TIMES DAILY WITH MEALS
Status: DISCONTINUED | OUTPATIENT
Start: 2019-01-01 | End: 2019-01-01

## 2019-01-01 RX ORDER — VASOPRESSIN 20 U/ML
INJECTION PARENTERAL AS NEEDED
Status: DISCONTINUED | OUTPATIENT
Start: 2019-01-01 | End: 2019-01-01 | Stop reason: HOSPADM

## 2019-01-01 RX ORDER — INSULIN LISPRO 100 [IU]/ML
INJECTION, SOLUTION INTRAVENOUS; SUBCUTANEOUS
Status: DISCONTINUED | OUTPATIENT
Start: 2019-01-01 | End: 2019-01-01

## 2019-01-01 RX ORDER — FENTANYL CITRATE 50 UG/ML
INJECTION, SOLUTION INTRAMUSCULAR; INTRAVENOUS AS NEEDED
Status: DISCONTINUED | OUTPATIENT
Start: 2019-01-01 | End: 2019-01-01 | Stop reason: HOSPADM

## 2019-01-01 RX ORDER — GUAIFENESIN 100 MG/5ML
81 LIQUID (ML) ORAL DAILY
Status: DISCONTINUED | OUTPATIENT
Start: 2019-01-01 | End: 2019-01-01

## 2019-01-01 RX ORDER — GLIPIZIDE 5 MG/1
7.5 TABLET ORAL
Status: DISCONTINUED | OUTPATIENT
Start: 2019-01-01 | End: 2019-01-01

## 2019-01-01 RX ORDER — PANTOPRAZOLE SODIUM 40 MG/1
40 TABLET, DELAYED RELEASE ORAL DAILY
Status: DISCONTINUED | OUTPATIENT
Start: 2019-01-01 | End: 2019-01-01 | Stop reason: HOSPADM

## 2019-01-01 RX ORDER — DRONABINOL 2.5 MG/1
2.5 CAPSULE ORAL DAILY
Status: DISCONTINUED | OUTPATIENT
Start: 2019-01-01 | End: 2019-01-01 | Stop reason: HOSPADM

## 2019-01-01 RX ORDER — ATORVASTATIN CALCIUM 40 MG/1
40 TABLET, FILM COATED ORAL
Status: DISCONTINUED | OUTPATIENT
Start: 2019-01-01 | End: 2019-01-01

## 2019-01-01 RX ORDER — SPIRONOLACTONE 25 MG/1
TABLET ORAL
Qty: 90 TAB | Refills: 3 | Status: SHIPPED | OUTPATIENT
Start: 2019-01-01

## 2019-01-01 RX ORDER — POLYETHYLENE GLYCOL 3350 17 G/17G
17 POWDER, FOR SOLUTION ORAL DAILY
Status: DISCONTINUED | OUTPATIENT
Start: 2019-01-01 | End: 2019-01-01

## 2019-01-01 RX ORDER — RANOLAZINE 500 MG/1
500 TABLET, EXTENDED RELEASE ORAL EVERY 12 HOURS
Qty: 60 TAB | Refills: 11 | Status: SHIPPED | OUTPATIENT
Start: 2019-01-01 | End: 2019-01-01

## 2019-01-01 RX ORDER — HEPARIN SODIUM 1000 [USP'U]/ML
INJECTION, SOLUTION INTRAVENOUS; SUBCUTANEOUS AS NEEDED
Status: DISCONTINUED | OUTPATIENT
Start: 2019-01-01 | End: 2019-01-01 | Stop reason: HOSPADM

## 2019-01-01 RX ORDER — INSULIN GLARGINE 100 [IU]/ML
10 INJECTION, SOLUTION SUBCUTANEOUS
Status: DISCONTINUED | OUTPATIENT
Start: 2019-01-01 | End: 2019-01-01

## 2019-01-01 RX ORDER — QUINAPRIL 20 MG/1
20 TABLET ORAL
Status: ON HOLD | COMMUNITY
End: 2019-01-01 | Stop reason: ALTCHOICE

## 2019-01-01 RX ORDER — NALOXONE HYDROCHLORIDE 0.4 MG/ML
0.4 INJECTION, SOLUTION INTRAMUSCULAR; INTRAVENOUS; SUBCUTANEOUS AS NEEDED
Status: DISCONTINUED | OUTPATIENT
Start: 2019-01-01 | End: 2019-01-01 | Stop reason: HOSPADM

## 2019-01-01 RX ORDER — GLIMEPIRIDE 4 MG/1
4 TABLET ORAL 2 TIMES DAILY
COMMUNITY

## 2019-01-01 RX ORDER — METOPROLOL TARTRATE 50 MG/1
50 TABLET ORAL DAILY
Status: ON HOLD | COMMUNITY
End: 2019-01-01 | Stop reason: ALTCHOICE

## 2019-01-01 RX ORDER — ONDANSETRON 2 MG/ML
INJECTION INTRAMUSCULAR; INTRAVENOUS AS NEEDED
Status: DISCONTINUED | OUTPATIENT
Start: 2019-01-01 | End: 2019-01-01 | Stop reason: HOSPADM

## 2019-01-01 RX ORDER — VANCOMYCIN/0.9 % SOD CHLORIDE 1.5G/250ML
1500 PLASTIC BAG, INJECTION (ML) INTRAVENOUS
Status: COMPLETED | OUTPATIENT
Start: 2019-01-01 | End: 2019-01-01

## 2019-01-01 RX ORDER — SUCCINYLCHOLINE CHLORIDE 20 MG/ML
INJECTION INTRAMUSCULAR; INTRAVENOUS AS NEEDED
Status: DISCONTINUED | OUTPATIENT
Start: 2019-01-01 | End: 2019-01-01 | Stop reason: HOSPADM

## 2019-01-01 RX ORDER — BUMETANIDE 0.25 MG/ML
1 INJECTION INTRAMUSCULAR; INTRAVENOUS 3 TIMES DAILY
Status: DISCONTINUED | OUTPATIENT
Start: 2019-01-01 | End: 2019-01-01

## 2019-01-01 RX ORDER — BUMETANIDE 0.25 MG/ML
2 INJECTION INTRAMUSCULAR; INTRAVENOUS DAILY
Status: DISCONTINUED | OUTPATIENT
Start: 2019-01-01 | End: 2019-01-01

## 2019-01-01 RX ORDER — LIDOCAINE HYDROCHLORIDE 20 MG/ML
JELLY TOPICAL AS NEEDED
Status: DISCONTINUED | OUTPATIENT
Start: 2019-01-01 | End: 2019-01-01 | Stop reason: HOSPADM

## 2019-01-01 RX ORDER — MILRINONE LACTATE 0.2 MG/ML
0.12 INJECTION, SOLUTION INTRAVENOUS CONTINUOUS
Status: DISCONTINUED | OUTPATIENT
Start: 2019-01-01 | End: 2019-01-01

## 2019-01-01 RX ORDER — GLIPIZIDE 5 MG/1
5 TABLET ORAL
Status: DISCONTINUED | OUTPATIENT
Start: 2019-01-01 | End: 2019-01-01

## 2019-01-01 RX ORDER — MIDAZOLAM HYDROCHLORIDE 1 MG/ML
INJECTION, SOLUTION INTRAMUSCULAR; INTRAVENOUS AS NEEDED
Status: DISCONTINUED | OUTPATIENT
Start: 2019-01-01 | End: 2019-01-01 | Stop reason: HOSPADM

## 2019-01-01 RX ORDER — DEXTROSE MONOHYDRATE 50 MG/ML
4-20 INJECTION, SOLUTION INTRAVENOUS CONTINUOUS
Status: DISCONTINUED | OUTPATIENT
Start: 2019-01-01 | End: 2019-01-01 | Stop reason: HOSPADM

## 2019-01-01 RX ORDER — MORPHINE SULFATE 10 MG/ML
4 INJECTION, SOLUTION INTRAMUSCULAR; INTRAVENOUS
Status: DISCONTINUED | OUTPATIENT
Start: 2019-01-01 | End: 2019-01-01 | Stop reason: HOSPADM

## 2019-01-01 RX ORDER — SODIUM CHLORIDE 9 MG/ML
100 INJECTION, SOLUTION INTRAVENOUS CONTINUOUS
Status: DISCONTINUED | OUTPATIENT
Start: 2019-01-01 | End: 2019-01-01

## 2019-01-01 RX ORDER — ALBUMIN HUMAN 50 G/1000ML
SOLUTION INTRAVENOUS AS NEEDED
Status: DISCONTINUED | OUTPATIENT
Start: 2019-01-01 | End: 2019-01-01 | Stop reason: HOSPADM

## 2019-01-01 RX ORDER — ONDANSETRON 2 MG/ML
4 INJECTION INTRAMUSCULAR; INTRAVENOUS
Status: DISCONTINUED | OUTPATIENT
Start: 2019-01-01 | End: 2019-01-01 | Stop reason: HOSPADM

## 2019-01-01 RX ORDER — SODIUM CHLORIDE 0.9 % (FLUSH) 0.9 %
5-40 SYRINGE (ML) INJECTION AS NEEDED
Status: CANCELLED | OUTPATIENT
Start: 2019-01-01

## 2019-01-01 RX ORDER — ACETAMINOPHEN 325 MG/1
650 TABLET ORAL
Status: DISCONTINUED | OUTPATIENT
Start: 2019-01-01 | End: 2019-01-01 | Stop reason: HOSPADM

## 2019-01-01 RX ORDER — SODIUM CHLORIDE 0.9 % (FLUSH) 0.9 %
5-40 SYRINGE (ML) INJECTION AS NEEDED
Status: DISCONTINUED | OUTPATIENT
Start: 2019-01-01 | End: 2019-01-01 | Stop reason: HOSPADM

## 2019-01-01 RX ORDER — MORPHINE SULFATE 2 MG/ML
2 INJECTION, SOLUTION INTRAMUSCULAR; INTRAVENOUS
Status: DISCONTINUED | OUTPATIENT
Start: 2019-01-01 | End: 2019-01-01

## 2019-01-01 RX ORDER — POLYETHYLENE GLYCOL 3350 17 G/17G
17 POWDER, FOR SOLUTION ORAL 2 TIMES DAILY
Status: DISCONTINUED | OUTPATIENT
Start: 2019-01-01 | End: 2019-01-01 | Stop reason: HOSPADM

## 2019-01-01 RX ORDER — HYDROGEN PEROXIDE 3 %
20 SOLUTION, NON-ORAL MISCELLANEOUS DAILY
COMMUNITY

## 2019-01-01 RX ORDER — SPIRONOLACTONE 25 MG/1
25 TABLET ORAL 2 TIMES DAILY
Status: DISCONTINUED | OUTPATIENT
Start: 2019-01-01 | End: 2019-01-01

## 2019-01-01 RX ORDER — METFORMIN HYDROCHLORIDE 500 MG/1
1000 TABLET ORAL 2 TIMES DAILY WITH MEALS
Status: DISCONTINUED | OUTPATIENT
Start: 2019-01-01 | End: 2019-01-01

## 2019-01-01 RX ORDER — SODIUM CHLORIDE 450 MG/100ML
9 INJECTION, SOLUTION INTRAVENOUS CONTINUOUS
Status: DISCONTINUED | OUTPATIENT
Start: 2019-01-01 | End: 2019-01-01

## 2019-01-01 RX ORDER — DEXTROSE MONOHYDRATE 50 MG/ML
4-20 INJECTION, SOLUTION INTRAVENOUS CONTINUOUS
Status: DISCONTINUED | OUTPATIENT
Start: 2019-01-01 | End: 2019-01-01

## 2019-01-01 RX ORDER — BUMETANIDE 0.25 MG/ML
1 INJECTION INTRAMUSCULAR; INTRAVENOUS 2 TIMES DAILY
Status: DISCONTINUED | OUTPATIENT
Start: 2019-01-01 | End: 2019-01-01

## 2019-01-01 RX ORDER — AMOXICILLIN 250 MG
1 CAPSULE ORAL DAILY
Status: DISCONTINUED | OUTPATIENT
Start: 2019-01-01 | End: 2019-01-01

## 2019-01-01 RX ORDER — LANOLIN ALCOHOL/MO/W.PET/CERES
400 CREAM (GRAM) TOPICAL DAILY
Status: DISCONTINUED | OUTPATIENT
Start: 2019-01-01 | End: 2019-01-01

## 2019-01-01 RX ORDER — DEXTROSE 50 % IN WATER (D50W) INTRAVENOUS SYRINGE
25-50 AS NEEDED
Status: DISCONTINUED | OUTPATIENT
Start: 2019-01-01 | End: 2019-01-01 | Stop reason: HOSPADM

## 2019-01-01 RX ORDER — BUMETANIDE 0.25 MG/ML
1 INJECTION INTRAMUSCULAR; INTRAVENOUS
Status: DISCONTINUED | OUTPATIENT
Start: 2019-01-01 | End: 2019-01-01 | Stop reason: HOSPADM

## 2019-01-01 RX ORDER — SODIUM CHLORIDE 0.9 % (FLUSH) 0.9 %
SYRINGE (ML) INJECTION
Status: DISPENSED
Start: 2019-01-01 | End: 2019-01-01

## 2019-01-01 RX ORDER — CEFAZOLIN SODIUM 1 G/3ML
1 INJECTION, POWDER, FOR SOLUTION INTRAMUSCULAR; INTRAVENOUS ONCE
Status: DISCONTINUED | OUTPATIENT
Start: 2019-01-01 | End: 2019-01-01 | Stop reason: HOSPADM

## 2019-01-01 RX ORDER — ATORVASTATIN CALCIUM 40 MG/1
40 TABLET, FILM COATED ORAL
Qty: 30 TAB | Refills: 11 | Status: SHIPPED | OUTPATIENT
Start: 2019-01-01

## 2019-01-01 RX ORDER — LIDOCAINE HYDROCHLORIDE 20 MG/ML
INJECTION, SOLUTION EPIDURAL; INFILTRATION; INTRACAUDAL; PERINEURAL AS NEEDED
Status: DISCONTINUED | OUTPATIENT
Start: 2019-01-01 | End: 2019-01-01 | Stop reason: HOSPADM

## 2019-01-01 RX ORDER — HYDROMORPHONE HYDROCHLORIDE 1 MG/ML
1 INJECTION, SOLUTION INTRAMUSCULAR; INTRAVENOUS; SUBCUTANEOUS
Status: DISCONTINUED | OUTPATIENT
Start: 2019-01-01 | End: 2019-01-01 | Stop reason: HOSPADM

## 2019-01-01 RX ORDER — RANOLAZINE 500 MG/1
500 TABLET, EXTENDED RELEASE ORAL EVERY 12 HOURS
Qty: 60 TAB | Refills: 11 | Status: SHIPPED | OUTPATIENT
Start: 2019-01-01 | End: 2019-01-01 | Stop reason: SINTOL

## 2019-01-01 RX ORDER — ALBUMIN HUMAN 50 G/1000ML
SOLUTION INTRAVENOUS
Status: COMPLETED
Start: 2019-01-01 | End: 2019-01-01

## 2019-01-01 RX ORDER — TOLVAPTAN 30 MG/1
60 TABLET ORAL ONCE
Status: COMPLETED | OUTPATIENT
Start: 2019-01-01 | End: 2019-01-01

## 2019-01-01 RX ORDER — CEFAZOLIN SODIUM/WATER 2 G/20 ML
2 SYRINGE (ML) INTRAVENOUS EVERY 8 HOURS
Status: DISCONTINUED | OUTPATIENT
Start: 2019-01-01 | End: 2019-01-01

## 2019-01-01 RX ORDER — SODIUM CHLORIDE 0.9 % (FLUSH) 0.9 %
5-40 SYRINGE (ML) INJECTION EVERY 8 HOURS
Status: CANCELLED | OUTPATIENT
Start: 2019-01-01

## 2019-01-01 RX ORDER — AMOXICILLIN 250 MG
1 CAPSULE ORAL 2 TIMES DAILY
Status: DISCONTINUED | OUTPATIENT
Start: 2019-01-01 | End: 2019-01-01 | Stop reason: HOSPADM

## 2019-01-01 RX ORDER — CEFAZOLIN SODIUM 1 G/3ML
INJECTION, POWDER, FOR SOLUTION INTRAMUSCULAR; INTRAVENOUS AS NEEDED
Status: DISCONTINUED | OUTPATIENT
Start: 2019-01-01 | End: 2019-01-01 | Stop reason: HOSPADM

## 2019-01-01 RX ORDER — VERAPAMIL HYDROCHLORIDE 2.5 MG/ML
INJECTION, SOLUTION INTRAVENOUS AS NEEDED
Status: DISCONTINUED | OUTPATIENT
Start: 2019-01-01 | End: 2019-01-01 | Stop reason: HOSPADM

## 2019-01-01 RX ORDER — LIDOCAINE HYDROCHLORIDE 20 MG/ML
JELLY TOPICAL ONCE
Status: COMPLETED | OUTPATIENT
Start: 2019-01-01 | End: 2019-01-01

## 2019-01-01 RX ORDER — GLYCOPYRROLATE 0.2 MG/ML
INJECTION INTRAMUSCULAR; INTRAVENOUS AS NEEDED
Status: DISCONTINUED | OUTPATIENT
Start: 2019-01-01 | End: 2019-01-01 | Stop reason: HOSPADM

## 2019-01-01 RX ORDER — LANOLIN ALCOHOL/MO/W.PET/CERES
3 CREAM (GRAM) TOPICAL
Status: DISCONTINUED | OUTPATIENT
Start: 2019-01-01 | End: 2019-01-01

## 2019-01-01 RX ORDER — SODIUM CHLORIDE 0.9 % (FLUSH) 0.9 %
10 SYRINGE (ML) INJECTION
Status: ACTIVE | OUTPATIENT
Start: 2019-01-01 | End: 2019-01-01

## 2019-01-01 RX ORDER — DIGOXIN 125 MCG
0.12 TABLET ORAL DAILY
Status: DISCONTINUED | OUTPATIENT
Start: 2019-01-01 | End: 2019-01-01

## 2019-01-01 RX ORDER — ALBUMIN HUMAN 50 G/1000ML
12.5 SOLUTION INTRAVENOUS ONCE
Status: COMPLETED | OUTPATIENT
Start: 2019-01-01 | End: 2019-01-01

## 2019-01-01 RX ORDER — BUMETANIDE 0.25 MG/ML
1 INJECTION INTRAMUSCULAR; INTRAVENOUS
Status: DISCONTINUED | OUTPATIENT
Start: 2019-01-01 | End: 2019-01-01

## 2019-01-01 RX ORDER — MAGNESIUM SULFATE 100 %
4 CRYSTALS MISCELLANEOUS AS NEEDED
Status: DISCONTINUED | OUTPATIENT
Start: 2019-01-01 | End: 2019-01-01 | Stop reason: HOSPADM

## 2019-01-01 RX ORDER — DOBUTAMINE HYDROCHLORIDE 200 MG/100ML
10 INJECTION INTRAVENOUS CONTINUOUS
Status: DISCONTINUED | OUTPATIENT
Start: 2019-01-01 | End: 2019-01-01 | Stop reason: HOSPADM

## 2019-01-01 RX ORDER — LANOLIN ALCOHOL/MO/W.PET/CERES
1 CREAM (GRAM) TOPICAL
Status: DISCONTINUED | OUTPATIENT
Start: 2019-01-01 | End: 2019-01-01

## 2019-01-01 RX ORDER — HYDROCODONE BITARTRATE AND ACETAMINOPHEN 5; 325 MG/1; MG/1
1 TABLET ORAL
Status: DISCONTINUED | OUTPATIENT
Start: 2019-01-01 | End: 2019-01-01 | Stop reason: HOSPADM

## 2019-01-01 RX ORDER — LEVOFLOXACIN 5 MG/ML
750 INJECTION, SOLUTION INTRAVENOUS
Status: DISCONTINUED | OUTPATIENT
Start: 2019-01-01 | End: 2019-01-01 | Stop reason: SDUPTHER

## 2019-01-01 RX ORDER — ALBUMIN HUMAN 250 G/1000ML
12.5 SOLUTION INTRAVENOUS 3 TIMES DAILY
Status: DISCONTINUED | OUTPATIENT
Start: 2019-01-01 | End: 2019-01-01

## 2019-01-01 RX ORDER — PANTOPRAZOLE SODIUM 40 MG/1
40 TABLET, DELAYED RELEASE ORAL
Status: DISCONTINUED | OUTPATIENT
Start: 2019-01-01 | End: 2019-01-01 | Stop reason: HOSPADM

## 2019-01-01 RX ORDER — MAGNESIUM SULFATE 1 G/100ML
1 INJECTION INTRAVENOUS ONCE
Status: COMPLETED | OUTPATIENT
Start: 2019-01-01 | End: 2019-01-01

## 2019-01-01 RX ORDER — TOLVAPTAN 30 MG/1
30 TABLET ORAL ONCE
Status: DISCONTINUED | OUTPATIENT
Start: 2019-01-01 | End: 2019-01-01

## 2019-01-01 RX ORDER — LIDOCAINE HYDROCHLORIDE 10 MG/ML
INJECTION, SOLUTION EPIDURAL; INFILTRATION; INTRACAUDAL; PERINEURAL AS NEEDED
Status: DISCONTINUED | OUTPATIENT
Start: 2019-01-01 | End: 2019-01-01 | Stop reason: HOSPADM

## 2019-01-01 RX ORDER — RANOLAZINE 500 MG/1
500 TABLET, EXTENDED RELEASE ORAL EVERY 12 HOURS
Status: DISCONTINUED | OUTPATIENT
Start: 2019-01-01 | End: 2019-01-01 | Stop reason: HOSPADM

## 2019-01-01 RX ORDER — MIDAZOLAM HYDROCHLORIDE 1 MG/ML
1-2 INJECTION, SOLUTION INTRAMUSCULAR; INTRAVENOUS ONCE
Status: COMPLETED | OUTPATIENT
Start: 2019-01-01 | End: 2019-01-01

## 2019-01-01 RX ORDER — GLIPIZIDE 5 MG/1
5 TABLET ORAL 2 TIMES DAILY
Status: DISCONTINUED | OUTPATIENT
Start: 2019-01-01 | End: 2019-01-01 | Stop reason: HOSPADM

## 2019-01-01 RX ORDER — DIPHENHYDRAMINE HYDROCHLORIDE 50 MG/ML
INJECTION, SOLUTION INTRAMUSCULAR; INTRAVENOUS AS NEEDED
Status: DISCONTINUED | OUTPATIENT
Start: 2019-01-01 | End: 2019-01-01 | Stop reason: HOSPADM

## 2019-01-01 RX ORDER — LANOLIN ALCOHOL/MO/W.PET/CERES
800 CREAM (GRAM) TOPICAL 2 TIMES DAILY
Status: DISCONTINUED | OUTPATIENT
Start: 2019-01-01 | End: 2019-01-01

## 2019-01-01 RX ORDER — BUMETANIDE 0.25 MG/ML
2 INJECTION INTRAMUSCULAR; INTRAVENOUS ONCE
Status: COMPLETED | OUTPATIENT
Start: 2019-01-01 | End: 2019-01-01

## 2019-01-01 RX ORDER — METOPROLOL SUCCINATE 50 MG/1
100 TABLET, EXTENDED RELEASE ORAL DAILY
Status: DISCONTINUED | OUTPATIENT
Start: 2019-01-01 | End: 2019-01-01

## 2019-01-01 RX ORDER — HYDROGEN PEROXIDE 3 %
20 SOLUTION, NON-ORAL MISCELLANEOUS DAILY
Status: DISCONTINUED | OUTPATIENT
Start: 2019-01-01 | End: 2019-01-01 | Stop reason: CLARIF

## 2019-01-01 RX ORDER — BALSAM PERU/CASTOR OIL
OINTMENT (GRAM) TOPICAL 2 TIMES DAILY
Status: DISCONTINUED | OUTPATIENT
Start: 2019-01-01 | End: 2019-01-01 | Stop reason: HOSPADM

## 2019-01-01 RX ORDER — DEXMEDETOMIDINE HYDROCHLORIDE 4 UG/ML
INJECTION, SOLUTION INTRAVENOUS
Status: DISCONTINUED | OUTPATIENT
Start: 2019-01-01 | End: 2019-01-01 | Stop reason: HOSPADM

## 2019-01-01 RX ORDER — NEOSTIGMINE METHYLSULFATE 1 MG/ML
INJECTION INTRAVENOUS AS NEEDED
Status: DISCONTINUED | OUTPATIENT
Start: 2019-01-01 | End: 2019-01-01 | Stop reason: HOSPADM

## 2019-01-01 RX ORDER — FUROSEMIDE 40 MG/1
TABLET ORAL
Qty: 30 TAB | Refills: 11 | Status: SHIPPED | OUTPATIENT
Start: 2019-01-01

## 2019-01-01 RX ORDER — CALCIUM CARBONATE 200(500)MG
200 TABLET,CHEWABLE ORAL
Status: DISCONTINUED | OUTPATIENT
Start: 2019-01-01 | End: 2019-01-01 | Stop reason: HOSPADM

## 2019-01-01 RX ORDER — SODIUM CHLORIDE 9 MG/ML
INJECTION, SOLUTION INTRAVENOUS
Status: DISCONTINUED | OUTPATIENT
Start: 2019-01-01 | End: 2019-01-01 | Stop reason: HOSPADM

## 2019-01-01 RX ORDER — ROCURONIUM BROMIDE 10 MG/ML
INJECTION, SOLUTION INTRAVENOUS AS NEEDED
Status: DISCONTINUED | OUTPATIENT
Start: 2019-01-01 | End: 2019-01-01 | Stop reason: HOSPADM

## 2019-01-01 RX ORDER — IODIXANOL 320 MG/ML
INJECTION, SOLUTION INTRAVASCULAR AS NEEDED
Status: DISCONTINUED | OUTPATIENT
Start: 2019-01-01 | End: 2019-01-01 | Stop reason: HOSPADM

## 2019-01-01 RX ORDER — MAGNESIUM SULFATE 100 %
4 CRYSTALS MISCELLANEOUS AS NEEDED
Status: DISCONTINUED | OUTPATIENT
Start: 2019-01-01 | End: 2019-01-01

## 2019-01-01 RX ORDER — TRAMADOL HYDROCHLORIDE 50 MG/1
50 TABLET ORAL
Status: DISCONTINUED | OUTPATIENT
Start: 2019-01-01 | End: 2019-01-01

## 2019-01-01 RX ORDER — TOLVAPTAN 15 MG/1
15 TABLET ORAL ONCE
Status: COMPLETED | OUTPATIENT
Start: 2019-01-01 | End: 2019-01-01

## 2019-01-01 RX ORDER — DEXTROSE 50 % IN WATER (D50W) INTRAVENOUS SYRINGE
12.5-25 AS NEEDED
Status: DISCONTINUED | OUTPATIENT
Start: 2019-01-01 | End: 2019-01-01

## 2019-01-01 RX ORDER — SIMVASTATIN 10 MG/1
10 TABLET, FILM COATED ORAL
COMMUNITY
End: 2019-01-01

## 2019-01-01 RX ORDER — ATORVASTATIN CALCIUM 40 MG/1
40 TABLET, FILM COATED ORAL
Status: DISCONTINUED | OUTPATIENT
Start: 2019-01-01 | End: 2019-01-01 | Stop reason: HOSPADM

## 2019-01-01 RX ORDER — BUMETANIDE 0.25 MG/ML
2 INJECTION INTRAMUSCULAR; INTRAVENOUS 2 TIMES DAILY
Status: DISCONTINUED | OUTPATIENT
Start: 2019-01-01 | End: 2019-01-01

## 2019-01-01 RX ORDER — DIPHENHYDRAMINE HYDROCHLORIDE 50 MG/ML
25 INJECTION, SOLUTION INTRAMUSCULAR; INTRAVENOUS ONCE
Status: COMPLETED | OUTPATIENT
Start: 2019-01-01 | End: 2019-01-01

## 2019-01-01 RX ORDER — MAGNESIUM SULFATE HEPTAHYDRATE 40 MG/ML
2 INJECTION, SOLUTION INTRAVENOUS ONCE
Status: COMPLETED | OUTPATIENT
Start: 2019-01-01 | End: 2019-01-01

## 2019-01-01 RX ORDER — BUMETANIDE 0.25 MG/ML
1 INJECTION INTRAMUSCULAR; INTRAVENOUS EVERY 12 HOURS
Status: DISCONTINUED | OUTPATIENT
Start: 2019-01-01 | End: 2019-01-01

## 2019-01-01 RX ADMIN — ASPIRIN 81 MG CHEWABLE TABLET 81 MG: 81 TABLET CHEWABLE at 08:37

## 2019-01-01 RX ADMIN — FENTANYL CITRATE 100 MCG: 50 INJECTION, SOLUTION INTRAMUSCULAR; INTRAVENOUS at 08:44

## 2019-01-01 RX ADMIN — Medication 2 G: at 00:31

## 2019-01-01 RX ADMIN — INSULIN LISPRO 2 UNITS: 100 INJECTION, SOLUTION INTRAVENOUS; SUBCUTANEOUS at 17:03

## 2019-01-01 RX ADMIN — ONDANSETRON 4 MG: 2 INJECTION INTRAMUSCULAR; INTRAVENOUS at 22:26

## 2019-01-01 RX ADMIN — BUMETANIDE 2 MG/HR: 0.25 INJECTION INTRAMUSCULAR; INTRAVENOUS at 23:47

## 2019-01-01 RX ADMIN — BUMETANIDE 2 MG/HR: 0.25 INJECTION INTRAMUSCULAR; INTRAVENOUS at 11:25

## 2019-01-01 RX ADMIN — SENNOSIDES, DOCUSATE SODIUM 1 TABLET: 50; 8.6 TABLET, FILM COATED ORAL at 09:17

## 2019-01-01 RX ADMIN — DIGOXIN 0.12 MG: 125 TABLET ORAL at 09:18

## 2019-01-01 RX ADMIN — ONDANSETRON 4 MG: 2 INJECTION INTRAMUSCULAR; INTRAVENOUS at 06:35

## 2019-01-01 RX ADMIN — Medication 800 MG: at 17:29

## 2019-01-01 RX ADMIN — LIDOCAINE HYDROCHLORIDE 100 MG: 20 INJECTION, SOLUTION EPIDURAL; INFILTRATION; INTRACAUDAL; PERINEURAL at 08:44

## 2019-01-01 RX ADMIN — Medication 800 MG: at 09:18

## 2019-01-01 RX ADMIN — ALBUMIN (HUMAN) 12.5 G: 0.25 INJECTION, SOLUTION INTRAVENOUS at 17:29

## 2019-01-01 RX ADMIN — Medication 10 ML: at 17:10

## 2019-01-01 RX ADMIN — CASTOR OIL AND BALSAM, PERU: 788; 87 OINTMENT TOPICAL at 17:58

## 2019-01-01 RX ADMIN — DOBUTAMINE IN DEXTROSE 7.5 MCG/KG/MIN: 200 INJECTION, SOLUTION INTRAVENOUS at 04:12

## 2019-01-01 RX ADMIN — SPIRONOLACTONE 25 MG: 25 TABLET, FILM COATED ORAL at 09:18

## 2019-01-01 RX ADMIN — ASPIRIN 81 MG CHEWABLE TABLET 81 MG: 81 TABLET CHEWABLE at 08:54

## 2019-01-01 RX ADMIN — ASPIRIN 81 MG CHEWABLE TABLET 81 MG: 81 TABLET CHEWABLE at 08:56

## 2019-01-01 RX ADMIN — TRAMADOL HYDROCHLORIDE 50 MG: 50 TABLET, FILM COATED ORAL at 18:00

## 2019-01-01 RX ADMIN — TOLVAPTAN 60 MG: 30 TABLET ORAL at 13:50

## 2019-01-01 RX ADMIN — PANTOPRAZOLE SODIUM 40 MG: 40 TABLET, DELAYED RELEASE ORAL at 06:58

## 2019-01-01 RX ADMIN — DEXTROSE MONOHYDRATE 12.9 ML/HR: 5 INJECTION, SOLUTION INTRAVENOUS at 08:55

## 2019-01-01 RX ADMIN — ASPIRIN 81 MG CHEWABLE TABLET 81 MG: 81 TABLET CHEWABLE at 08:01

## 2019-01-01 RX ADMIN — INSULIN LISPRO 2 UNITS: 100 INJECTION, SOLUTION INTRAVENOUS; SUBCUTANEOUS at 07:11

## 2019-01-01 RX ADMIN — SPIRONOLACTONE 25 MG: 25 TABLET, FILM COATED ORAL at 17:45

## 2019-01-01 RX ADMIN — INSULIN LISPRO 2 UNITS: 100 INJECTION, SOLUTION INTRAVENOUS; SUBCUTANEOUS at 17:06

## 2019-01-01 RX ADMIN — Medication 800 MG: at 08:50

## 2019-01-01 RX ADMIN — ATORVASTATIN CALCIUM 40 MG: 40 TABLET, FILM COATED ORAL at 21:01

## 2019-01-01 RX ADMIN — DEXTROSE MONOHYDRATE 4 ML/HR: 5 INJECTION, SOLUTION INTRAVENOUS at 11:58

## 2019-01-01 RX ADMIN — SODIUM CHLORIDE 250 ML: 900 INJECTION, SOLUTION INTRAVENOUS at 18:05

## 2019-01-01 RX ADMIN — VASOPRESSIN 2 UNITS: 20 INJECTION PARENTERAL at 08:45

## 2019-01-01 RX ADMIN — Medication 800 MG: at 18:10

## 2019-01-01 RX ADMIN — LIDOCAINE HYDROCHLORIDE: 20 JELLY TOPICAL at 13:09

## 2019-01-01 RX ADMIN — APIXABAN 5 MG: 5 TABLET, FILM COATED ORAL at 08:46

## 2019-01-01 RX ADMIN — DEXTROSE MONOHYDRATE 14.1 ML/HR: 5 INJECTION, SOLUTION INTRAVENOUS at 21:05

## 2019-01-01 RX ADMIN — BUMETANIDE 1 MG: 0.25 INJECTION INTRAMUSCULAR; INTRAVENOUS at 09:08

## 2019-01-01 RX ADMIN — Medication 10 ML: at 06:00

## 2019-01-01 RX ADMIN — BIVALIRUDIN 13.8 ML/HR: 250 INJECTION, POWDER, LYOPHILIZED, FOR SOLUTION INTRAVENOUS at 10:50

## 2019-01-01 RX ADMIN — CASTOR OIL AND BALSAM, PERU: 788; 87 OINTMENT TOPICAL at 18:10

## 2019-01-01 RX ADMIN — ALBUMIN (HUMAN) 12.5 G: 0.25 INJECTION, SOLUTION INTRAVENOUS at 21:03

## 2019-01-01 RX ADMIN — LEVOFLOXACIN 750 MG: 500 TABLET, FILM COATED ORAL at 08:50

## 2019-01-01 RX ADMIN — GLIPIZIDE 7.5 MG: 5 TABLET ORAL at 17:45

## 2019-01-01 RX ADMIN — INSULIN LISPRO 2 UNITS: 100 INJECTION, SOLUTION INTRAVENOUS; SUBCUTANEOUS at 21:39

## 2019-01-01 RX ADMIN — DOBUTAMINE IN DEXTROSE 10 MCG/KG/MIN: 200 INJECTION, SOLUTION INTRAVENOUS at 20:03

## 2019-01-01 RX ADMIN — SODIUM CHLORIDE 100 ML/HR: 900 INJECTION, SOLUTION INTRAVENOUS at 12:08

## 2019-01-01 RX ADMIN — ATORVASTATIN CALCIUM 40 MG: 40 TABLET, FILM COATED ORAL at 21:06

## 2019-01-01 RX ADMIN — TRAMADOL HYDROCHLORIDE 50 MG: 50 TABLET, FILM COATED ORAL at 00:56

## 2019-01-01 RX ADMIN — Medication 10 ML: at 06:06

## 2019-01-01 RX ADMIN — POTASSIUM CHLORIDE 40 MEQ: 1.5 POWDER, FOR SOLUTION ORAL at 17:44

## 2019-01-01 RX ADMIN — INSULIN LISPRO 3 UNITS: 100 INJECTION, SOLUTION INTRAVENOUS; SUBCUTANEOUS at 23:12

## 2019-01-01 RX ADMIN — GLIPIZIDE 7.5 MG: 5 TABLET ORAL at 06:35

## 2019-01-01 RX ADMIN — GLIPIZIDE 5 MG: 5 TABLET ORAL at 08:56

## 2019-01-01 RX ADMIN — Medication 2 G: at 09:06

## 2019-01-01 RX ADMIN — ALBUMIN (HUMAN) 12.5 G: 12.5 INJECTION, SOLUTION INTRAVENOUS at 09:42

## 2019-01-01 RX ADMIN — DIPHENHYDRAMINE HYDROCHLORIDE 25 MG: 50 INJECTION, SOLUTION INTRAMUSCULAR; INTRAVENOUS at 14:14

## 2019-01-01 RX ADMIN — Medication 10 ML: at 07:07

## 2019-01-01 RX ADMIN — PANTOPRAZOLE SODIUM 40 MG: 40 TABLET, DELAYED RELEASE ORAL at 06:47

## 2019-01-01 RX ADMIN — ACETAMINOPHEN 650 MG: 325 TABLET ORAL at 22:22

## 2019-01-01 RX ADMIN — Medication 10 ML: at 06:54

## 2019-01-01 RX ADMIN — POLYETHYLENE GLYCOL 3350 17 G: 17 POWDER, FOR SOLUTION ORAL at 11:40

## 2019-01-01 RX ADMIN — SPIRONOLACTONE 25 MG: 25 TABLET ORAL at 08:46

## 2019-01-01 RX ADMIN — MAGNESIUM SULFATE HEPTAHYDRATE 2 G: 40 INJECTION, SOLUTION INTRAVENOUS at 08:56

## 2019-01-01 RX ADMIN — ASPIRIN 81 MG CHEWABLE TABLET 81 MG: 81 TABLET CHEWABLE at 08:50

## 2019-01-01 RX ADMIN — CEFAZOLIN SODIUM 1 G: 1 INJECTION, POWDER, FOR SOLUTION INTRAMUSCULAR; INTRAVENOUS at 08:02

## 2019-01-01 RX ADMIN — BUMETANIDE 2 MG/HR: 0.25 INJECTION INTRAMUSCULAR; INTRAVENOUS at 19:18

## 2019-01-01 RX ADMIN — DOBUTAMINE IN DEXTROSE 5 MCG/KG/MIN: 200 INJECTION, SOLUTION INTRAVENOUS at 17:17

## 2019-01-01 RX ADMIN — POTASSIUM CHLORIDE 40 MEQ: 750 TABLET, EXTENDED RELEASE ORAL at 08:05

## 2019-01-01 RX ADMIN — PANTOPRAZOLE SODIUM 40 MG: 40 TABLET, DELAYED RELEASE ORAL at 07:15

## 2019-01-01 RX ADMIN — Medication 10 ML: at 22:17

## 2019-01-01 RX ADMIN — GLIPIZIDE 5 MG: 5 TABLET ORAL at 06:32

## 2019-01-01 RX ADMIN — ACETAMINOPHEN 650 MG: 325 TABLET ORAL at 17:25

## 2019-01-01 RX ADMIN — APIXABAN 5 MG: 5 TABLET, FILM COATED ORAL at 17:39

## 2019-01-01 RX ADMIN — CALCIUM CARBONATE (ANTACID) CHEW TAB 500 MG 200 MG: 500 CHEW TAB at 17:49

## 2019-01-01 RX ADMIN — CEFAZOLIN SODIUM 1 G: 1 INJECTION, POWDER, FOR SOLUTION INTRAMUSCULAR; INTRAVENOUS at 20:56

## 2019-01-01 RX ADMIN — DOBUTAMINE IN DEXTROSE 10 MCG/KG/MIN: 200 INJECTION, SOLUTION INTRAVENOUS at 02:00

## 2019-01-01 RX ADMIN — ACETAMINOPHEN 650 MG: 325 TABLET ORAL at 23:16

## 2019-01-01 RX ADMIN — APIXABAN 5 MG: 5 TABLET, FILM COATED ORAL at 08:26

## 2019-01-01 RX ADMIN — Medication 10 ML: at 13:45

## 2019-01-01 RX ADMIN — BIVALIRUDIN 10.3 ML/HR: 250 INJECTION, POWDER, LYOPHILIZED, FOR SOLUTION INTRAVENOUS at 23:32

## 2019-01-01 RX ADMIN — DOBUTAMINE IN DEXTROSE 7.5 MCG/KG/MIN: 200 INJECTION, SOLUTION INTRAVENOUS at 07:18

## 2019-01-01 RX ADMIN — METFORMIN HYDROCHLORIDE 1000 MG: 500 TABLET ORAL at 08:37

## 2019-01-01 RX ADMIN — PANTOPRAZOLE SODIUM 40 MG: 40 TABLET, DELAYED RELEASE ORAL at 06:54

## 2019-01-01 RX ADMIN — SENNOSIDES, DOCUSATE SODIUM 1 TABLET: 50; 8.6 TABLET, FILM COATED ORAL at 16:51

## 2019-01-01 RX ADMIN — BUMETANIDE 2 MG: 0.25 INJECTION INTRAMUSCULAR; INTRAVENOUS at 23:52

## 2019-01-01 RX ADMIN — CASTOR OIL AND BALSAM, PERU: 788; 87 OINTMENT TOPICAL at 08:02

## 2019-01-01 RX ADMIN — SENNOSIDES, DOCUSATE SODIUM 1 TABLET: 50; 8.6 TABLET, FILM COATED ORAL at 11:40

## 2019-01-01 RX ADMIN — TRAMADOL HYDROCHLORIDE 50 MG: 50 TABLET, FILM COATED ORAL at 01:54

## 2019-01-01 RX ADMIN — Medication 800 MG: at 08:56

## 2019-01-01 RX ADMIN — ONDANSETRON 4 MG: 2 INJECTION INTRAMUSCULAR; INTRAVENOUS at 01:35

## 2019-01-01 RX ADMIN — PANTOPRAZOLE SODIUM 40 MG: 40 TABLET, DELAYED RELEASE ORAL at 06:34

## 2019-01-01 RX ADMIN — TRAMADOL HYDROCHLORIDE 50 MG: 50 TABLET, FILM COATED ORAL at 01:11

## 2019-01-01 RX ADMIN — SODIUM CHLORIDE 9 ML/HR: 900 INJECTION, SOLUTION INTRAVENOUS at 18:10

## 2019-01-01 RX ADMIN — DOBUTAMINE IN DEXTROSE 5 MCG/KG/MIN: 200 INJECTION, SOLUTION INTRAVENOUS at 12:03

## 2019-01-01 RX ADMIN — DRONABINOL 2.5 MG: 2.5 CAPSULE ORAL at 11:22

## 2019-01-01 RX ADMIN — ONDANSETRON 4 MG: 2 INJECTION INTRAMUSCULAR; INTRAVENOUS at 09:39

## 2019-01-01 RX ADMIN — TRAMADOL HYDROCHLORIDE 50 MG: 50 TABLET, FILM COATED ORAL at 14:48

## 2019-01-01 RX ADMIN — ONDANSETRON 4 MG: 2 INJECTION INTRAMUSCULAR; INTRAVENOUS at 13:28

## 2019-01-01 RX ADMIN — POLYETHYLENE GLYCOL 3350 17 G: 17 POWDER, FOR SOLUTION ORAL at 17:44

## 2019-01-01 RX ADMIN — ATORVASTATIN CALCIUM 40 MG: 40 TABLET, FILM COATED ORAL at 22:20

## 2019-01-01 RX ADMIN — ASPIRIN 81 MG CHEWABLE TABLET 81 MG: 81 TABLET CHEWABLE at 09:00

## 2019-01-01 RX ADMIN — DEXTROSE MONOHYDRATE 15.8 ML/HR: 5 INJECTION, SOLUTION INTRAVENOUS at 15:22

## 2019-01-01 RX ADMIN — BUMETANIDE 1 MG: 0.25 INJECTION INTRAMUSCULAR; INTRAVENOUS at 08:05

## 2019-01-01 RX ADMIN — INSULIN LISPRO 5 UNITS: 100 INJECTION, SOLUTION INTRAVENOUS; SUBCUTANEOUS at 08:02

## 2019-01-01 RX ADMIN — Medication 10 ML: at 22:00

## 2019-01-01 RX ADMIN — CEFAZOLIN SODIUM 1 G: 1 INJECTION, POWDER, FOR SOLUTION INTRAMUSCULAR; INTRAVENOUS at 21:14

## 2019-01-01 RX ADMIN — BUMETANIDE 2 MG/HR: 0.25 INJECTION INTRAMUSCULAR; INTRAVENOUS at 17:25

## 2019-01-01 RX ADMIN — INSULIN LISPRO 2 UNITS: 100 INJECTION, SOLUTION INTRAVENOUS; SUBCUTANEOUS at 23:30

## 2019-01-01 RX ADMIN — POLYETHYLENE GLYCOL 3350 17 G: 17 POWDER, FOR SOLUTION ORAL at 08:50

## 2019-01-01 RX ADMIN — SENNOSIDES, DOCUSATE SODIUM 1 TABLET: 50; 8.6 TABLET, FILM COATED ORAL at 17:24

## 2019-01-01 RX ADMIN — POTASSIUM CHLORIDE 40 MEQ: 1.5 POWDER, FOR SOLUTION ORAL at 09:00

## 2019-01-01 RX ADMIN — Medication 10 ML: at 05:14

## 2019-01-01 RX ADMIN — LEVOFLOXACIN 750 MG: 5 INJECTION, SOLUTION INTRAVENOUS at 10:09

## 2019-01-01 RX ADMIN — DOBUTAMINE IN DEXTROSE 5 MCG/KG/MIN: 200 INJECTION, SOLUTION INTRAVENOUS at 07:53

## 2019-01-01 RX ADMIN — IRON SUCROSE 100 MG: 20 INJECTION, SOLUTION INTRAVENOUS at 11:12

## 2019-01-01 RX ADMIN — VASOPRESSIN 0.04 UNITS/MIN: 20 INJECTION INTRAVENOUS at 09:39

## 2019-01-01 RX ADMIN — DEXTROSE MONOHYDRATE 15.1 ML/HR: 5 INJECTION, SOLUTION INTRAVENOUS at 20:08

## 2019-01-01 RX ADMIN — TAMSULOSIN HYDROCHLORIDE 0.4 MG: 0.4 CAPSULE ORAL at 08:35

## 2019-01-01 RX ADMIN — TRAMADOL HYDROCHLORIDE 50 MG: 50 TABLET, FILM COATED ORAL at 17:03

## 2019-01-01 RX ADMIN — TAMSULOSIN HYDROCHLORIDE 0.4 MG: 0.4 CAPSULE ORAL at 08:56

## 2019-01-01 RX ADMIN — INSULIN GLARGINE 10 UNITS: 100 INJECTION, SOLUTION SUBCUTANEOUS at 21:38

## 2019-01-01 RX ADMIN — ONDANSETRON 4 MG: 2 INJECTION INTRAMUSCULAR; INTRAVENOUS at 10:27

## 2019-01-01 RX ADMIN — Medication 10 ML: at 06:44

## 2019-01-01 RX ADMIN — ATORVASTATIN CALCIUM 40 MG: 40 TABLET, FILM COATED ORAL at 21:31

## 2019-01-01 RX ADMIN — SENNOSIDES, DOCUSATE SODIUM 1 TABLET: 50; 8.6 TABLET, FILM COATED ORAL at 08:50

## 2019-01-01 RX ADMIN — Medication 10 ML: at 13:19

## 2019-01-01 RX ADMIN — Medication 10 ML: at 21:03

## 2019-01-01 RX ADMIN — CASTOR OIL AND BALSAM, PERU: 788; 87 OINTMENT TOPICAL at 17:29

## 2019-01-01 RX ADMIN — SENNOSIDES, DOCUSATE SODIUM 1 TABLET: 50; 8.6 TABLET, FILM COATED ORAL at 17:29

## 2019-01-01 RX ADMIN — POLYETHYLENE GLYCOL 3350 17 G: 17 POWDER, FOR SOLUTION ORAL at 17:24

## 2019-01-01 RX ADMIN — TAMSULOSIN HYDROCHLORIDE 0.4 MG: 0.4 CAPSULE ORAL at 11:22

## 2019-01-01 RX ADMIN — CALCIUM CARBONATE (ANTACID) CHEW TAB 500 MG 200 MG: 500 CHEW TAB at 16:27

## 2019-01-01 RX ADMIN — BIVALIRUDIN: 250 INJECTION, POWDER, LYOPHILIZED, FOR SOLUTION INTRAVENOUS at 15:07

## 2019-01-01 RX ADMIN — INSULIN LISPRO 2 UNITS: 100 INJECTION, SOLUTION INTRAVENOUS; SUBCUTANEOUS at 16:43

## 2019-01-01 RX ADMIN — DIGOXIN 0.12 MG: 125 TABLET ORAL at 18:00

## 2019-01-01 RX ADMIN — ASPIRIN 81 MG CHEWABLE TABLET 81 MG: 81 TABLET CHEWABLE at 11:22

## 2019-01-01 RX ADMIN — CASTOR OIL AND BALSAM, PERU: 788; 87 OINTMENT TOPICAL at 11:41

## 2019-01-01 RX ADMIN — Medication 10 ML: at 21:11

## 2019-01-01 RX ADMIN — BUMETANIDE 0.5 MG/HR: 0.25 INJECTION INTRAMUSCULAR; INTRAVENOUS at 23:32

## 2019-01-01 RX ADMIN — ONDANSETRON 4 MG: 2 INJECTION INTRAMUSCULAR; INTRAVENOUS at 08:54

## 2019-01-01 RX ADMIN — INSULIN LISPRO 2 UNITS: 100 INJECTION, SOLUTION INTRAVENOUS; SUBCUTANEOUS at 12:19

## 2019-01-01 RX ADMIN — INSULIN LISPRO 2 UNITS: 100 INJECTION, SOLUTION INTRAVENOUS; SUBCUTANEOUS at 12:56

## 2019-01-01 RX ADMIN — DOBUTAMINE IN DEXTROSE 10 MCG/KG/MIN: 200 INJECTION, SOLUTION INTRAVENOUS at 16:35

## 2019-01-01 RX ADMIN — Medication 800 MG: at 11:40

## 2019-01-01 RX ADMIN — INSULIN LISPRO 9 UNITS: 100 INJECTION, SOLUTION INTRAVENOUS; SUBCUTANEOUS at 11:48

## 2019-01-01 RX ADMIN — INSULIN LISPRO 3 UNITS: 100 INJECTION, SOLUTION INTRAVENOUS; SUBCUTANEOUS at 11:30

## 2019-01-01 RX ADMIN — CASTOR OIL AND BALSAM, PERU: 788; 87 OINTMENT TOPICAL at 17:51

## 2019-01-01 RX ADMIN — GLIPIZIDE 5 MG: 5 TABLET ORAL at 07:07

## 2019-01-01 RX ADMIN — Medication 10 ML: at 21:39

## 2019-01-01 RX ADMIN — LIDOCAINE HYDROCHLORIDE: 20 JELLY TOPICAL at 22:20

## 2019-01-01 RX ADMIN — APIXABAN 5 MG: 5 TABLET, FILM COATED ORAL at 08:37

## 2019-01-01 RX ADMIN — INSULIN GLARGINE 10 UNITS: 100 INJECTION, SOLUTION SUBCUTANEOUS at 21:29

## 2019-01-01 RX ADMIN — ASPIRIN 81 MG CHEWABLE TABLET 81 MG: 81 TABLET CHEWABLE at 08:35

## 2019-01-01 RX ADMIN — SPIRONOLACTONE 25 MG: 25 TABLET ORAL at 08:05

## 2019-01-01 RX ADMIN — Medication 10 ML: at 22:21

## 2019-01-01 RX ADMIN — POLYETHYLENE GLYCOL 3350 17 G: 17 POWDER, FOR SOLUTION ORAL at 08:02

## 2019-01-01 RX ADMIN — ATORVASTATIN CALCIUM 40 MG: 40 TABLET, FILM COATED ORAL at 21:29

## 2019-01-01 RX ADMIN — Medication 800 MG: at 17:45

## 2019-01-01 RX ADMIN — ONDANSETRON 4 MG: 2 INJECTION INTRAMUSCULAR; INTRAVENOUS at 04:02

## 2019-01-01 RX ADMIN — MIDAZOLAM HYDROCHLORIDE 1 MG: 1 INJECTION, SOLUTION INTRAMUSCULAR; INTRAVENOUS at 09:18

## 2019-01-01 RX ADMIN — BUMETANIDE 1 MG: 0.25 INJECTION INTRAMUSCULAR; INTRAVENOUS at 12:33

## 2019-01-01 RX ADMIN — Medication 2 G: at 08:35

## 2019-01-01 RX ADMIN — PANTOPRAZOLE SODIUM 40 MG: 40 TABLET, DELAYED RELEASE ORAL at 11:40

## 2019-01-01 RX ADMIN — ONDANSETRON 4 MG: 2 INJECTION INTRAMUSCULAR; INTRAVENOUS at 13:32

## 2019-01-01 RX ADMIN — Medication 10 ML: at 07:04

## 2019-01-01 RX ADMIN — DIPHENHYDRAMINE HYDROCHLORIDE 25 MG: 50 INJECTION, SOLUTION INTRAMUSCULAR; INTRAVENOUS at 08:47

## 2019-01-01 RX ADMIN — GLIPIZIDE 5 MG: 5 TABLET ORAL at 17:08

## 2019-01-01 RX ADMIN — PANTOPRAZOLE SODIUM 40 MG: 40 TABLET, DELAYED RELEASE ORAL at 06:32

## 2019-01-01 RX ADMIN — TRAMADOL HYDROCHLORIDE 50 MG: 50 TABLET, FILM COATED ORAL at 19:24

## 2019-01-01 RX ADMIN — ONDANSETRON 4 MG: 2 INJECTION INTRAMUSCULAR; INTRAVENOUS at 17:14

## 2019-01-01 RX ADMIN — VANCOMYCIN HYDROCHLORIDE 1500 MG: 10 INJECTION, POWDER, LYOPHILIZED, FOR SOLUTION INTRAVENOUS at 06:09

## 2019-01-01 RX ADMIN — SPIRONOLACTONE 25 MG: 25 TABLET ORAL at 12:33

## 2019-01-01 RX ADMIN — GLIPIZIDE 5 MG: 5 TABLET ORAL at 06:54

## 2019-01-01 RX ADMIN — Medication 10 ML: at 14:05

## 2019-01-01 RX ADMIN — CASTOR OIL AND BALSAM, PERU: 788; 87 OINTMENT TOPICAL at 08:36

## 2019-01-01 RX ADMIN — Medication 10 ML: at 22:11

## 2019-01-01 RX ADMIN — Medication 2 G: at 18:22

## 2019-01-01 RX ADMIN — POTASSIUM CHLORIDE 40 MEQ: 750 TABLET, EXTENDED RELEASE ORAL at 17:07

## 2019-01-01 RX ADMIN — PANTOPRAZOLE SODIUM 40 MG: 40 TABLET, DELAYED RELEASE ORAL at 07:07

## 2019-01-01 RX ADMIN — INSULIN LISPRO 5 UNITS: 100 INJECTION, SOLUTION INTRAVENOUS; SUBCUTANEOUS at 13:17

## 2019-01-01 RX ADMIN — Medication 800 MG: at 08:35

## 2019-01-01 RX ADMIN — ALBUMIN (HUMAN) 12.5 G: 0.25 INJECTION, SOLUTION INTRAVENOUS at 21:13

## 2019-01-01 RX ADMIN — CASTOR OIL AND BALSAM, PERU: 788; 87 OINTMENT TOPICAL at 17:25

## 2019-01-01 RX ADMIN — Medication 10 ML: at 14:00

## 2019-01-01 RX ADMIN — ALBUMIN (HUMAN) 12.5 G: 0.25 INJECTION, SOLUTION INTRAVENOUS at 16:17

## 2019-01-01 RX ADMIN — CALCIUM CARBONATE (ANTACID) CHEW TAB 500 MG 200 MG: 500 CHEW TAB at 19:22

## 2019-01-01 RX ADMIN — INSULIN LISPRO 2 UNITS: 100 INJECTION, SOLUTION INTRAVENOUS; SUBCUTANEOUS at 06:35

## 2019-01-01 RX ADMIN — MORPHINE SULFATE 2 MG: 2 INJECTION, SOLUTION INTRAMUSCULAR; INTRAVENOUS at 20:42

## 2019-01-01 RX ADMIN — CEFAZOLIN SODIUM 1 G: 1 INJECTION, POWDER, FOR SOLUTION INTRAMUSCULAR; INTRAVENOUS at 08:57

## 2019-01-01 RX ADMIN — DRONABINOL 2.5 MG: 2.5 CAPSULE ORAL at 09:12

## 2019-01-01 RX ADMIN — ROCURONIUM BROMIDE 10 MG: 10 INJECTION, SOLUTION INTRAVENOUS at 08:44

## 2019-01-01 RX ADMIN — DOBUTAMINE IN DEXTROSE 10 MCG/KG/MIN: 200 INJECTION, SOLUTION INTRAVENOUS at 17:34

## 2019-01-01 RX ADMIN — INSULIN LISPRO 2 UNITS: 100 INJECTION, SOLUTION INTRAVENOUS; SUBCUTANEOUS at 17:49

## 2019-01-01 RX ADMIN — Medication 10 ML: at 17:59

## 2019-01-01 RX ADMIN — VASOPRESSIN 2 UNITS: 20 INJECTION PARENTERAL at 09:40

## 2019-01-01 RX ADMIN — BUMETANIDE 1 MG: 0.25 INJECTION INTRAMUSCULAR; INTRAVENOUS at 19:24

## 2019-01-01 RX ADMIN — MAGNESIUM OXIDE TAB 400 MG (241.3 MG ELEMENTAL MG) 400 MG: 400 (241.3 MG) TAB at 12:30

## 2019-01-01 RX ADMIN — GLIPIZIDE 5 MG: 5 TABLET ORAL at 16:51

## 2019-01-01 RX ADMIN — GLIPIZIDE 5 MG: 5 TABLET ORAL at 17:03

## 2019-01-01 RX ADMIN — DOBUTAMINE IN DEXTROSE 7.5 MCG/KG/MIN: 200 INJECTION, SOLUTION INTRAVENOUS at 18:53

## 2019-01-01 RX ADMIN — DOBUTAMINE IN DEXTROSE 7.5 MCG/KG/MIN: 200 INJECTION, SOLUTION INTRAVENOUS at 18:59

## 2019-01-01 RX ADMIN — Medication 800 MG: at 20:22

## 2019-01-01 RX ADMIN — POTASSIUM CHLORIDE 40 MEQ: 1.5 POWDER, FOR SOLUTION ORAL at 11:40

## 2019-01-01 RX ADMIN — PANTOPRAZOLE SODIUM 40 MG: 40 TABLET, DELAYED RELEASE ORAL at 06:36

## 2019-01-01 RX ADMIN — Medication 800 MG: at 17:24

## 2019-01-01 RX ADMIN — ALBUMIN HUMAN 250 ML: 50 SOLUTION INTRAVENOUS at 10:16

## 2019-01-01 RX ADMIN — CEFAZOLIN SODIUM 2 G: 1 INJECTION, POWDER, FOR SOLUTION INTRAMUSCULAR; INTRAVENOUS at 09:10

## 2019-01-01 RX ADMIN — GLIPIZIDE 5 MG: 5 TABLET ORAL at 06:34

## 2019-01-01 RX ADMIN — GLIPIZIDE 5 MG: 5 TABLET ORAL at 16:43

## 2019-01-01 RX ADMIN — ONDANSETRON 4 MG: 2 INJECTION INTRAMUSCULAR; INTRAVENOUS at 09:31

## 2019-01-01 RX ADMIN — BUMETANIDE 1 MG: 0.25 INJECTION INTRAMUSCULAR; INTRAVENOUS at 21:03

## 2019-01-01 RX ADMIN — DRONABINOL 2.5 MG: 2.5 CAPSULE ORAL at 09:00

## 2019-01-01 RX ADMIN — SODIUM CHLORIDE: 9 INJECTION, SOLUTION INTRAVENOUS at 08:20

## 2019-01-01 RX ADMIN — Medication 800 MG: at 09:00

## 2019-01-01 RX ADMIN — ONDANSETRON 4 MG: 2 INJECTION INTRAMUSCULAR; INTRAVENOUS at 21:24

## 2019-01-01 RX ADMIN — GLIPIZIDE 5 MG: 5 TABLET ORAL at 06:36

## 2019-01-01 RX ADMIN — ATORVASTATIN CALCIUM 40 MG: 40 TABLET, FILM COATED ORAL at 21:03

## 2019-01-01 RX ADMIN — INSULIN LISPRO 3 UNITS: 100 INJECTION, SOLUTION INTRAVENOUS; SUBCUTANEOUS at 07:27

## 2019-01-01 RX ADMIN — ALBUMIN (HUMAN) 12.5 G: 0.25 INJECTION, SOLUTION INTRAVENOUS at 21:36

## 2019-01-01 RX ADMIN — Medication 10 ML: at 21:32

## 2019-01-01 RX ADMIN — GLIPIZIDE 5 MG: 5 TABLET ORAL at 07:14

## 2019-01-01 RX ADMIN — TAMSULOSIN HYDROCHLORIDE 0.4 MG: 0.4 CAPSULE ORAL at 09:12

## 2019-01-01 RX ADMIN — INSULIN LISPRO 2 UNITS: 100 INJECTION, SOLUTION INTRAVENOUS; SUBCUTANEOUS at 07:28

## 2019-01-01 RX ADMIN — IMMUNE GLOBULIN INTRAVENOUS (HUMAN) 10 G: 5 INJECTION, SOLUTION INTRAVENOUS at 11:57

## 2019-01-01 RX ADMIN — TRAMADOL HYDROCHLORIDE 50 MG: 50 TABLET, FILM COATED ORAL at 22:42

## 2019-01-01 RX ADMIN — ONDANSETRON 4 MG: 2 INJECTION INTRAMUSCULAR; INTRAVENOUS at 16:28

## 2019-01-01 RX ADMIN — INSULIN LISPRO 2 UNITS: 100 INJECTION, SOLUTION INTRAVENOUS; SUBCUTANEOUS at 22:20

## 2019-01-01 RX ADMIN — BUMETANIDE 1 MG: 0.25 INJECTION INTRAMUSCULAR; INTRAVENOUS at 09:18

## 2019-01-01 RX ADMIN — DRONABINOL 2.5 MG: 2.5 CAPSULE ORAL at 08:50

## 2019-01-01 RX ADMIN — ATORVASTATIN CALCIUM 40 MG: 40 TABLET, FILM COATED ORAL at 22:35

## 2019-01-01 RX ADMIN — POTASSIUM CHLORIDE 40 MEQ: 750 TABLET, EXTENDED RELEASE ORAL at 08:54

## 2019-01-01 RX ADMIN — DOBUTAMINE IN DEXTROSE 10 MCG/KG/MIN: 200 INJECTION, SOLUTION INTRAVENOUS at 04:44

## 2019-01-01 RX ADMIN — ACETAMINOPHEN 650 MG: 325 TABLET ORAL at 13:51

## 2019-01-01 RX ADMIN — BUMETANIDE 1 MG: 0.25 INJECTION INTRAMUSCULAR; INTRAVENOUS at 19:01

## 2019-01-01 RX ADMIN — INSULIN LISPRO 3 UNITS: 100 INJECTION, SOLUTION INTRAVENOUS; SUBCUTANEOUS at 13:05

## 2019-01-01 RX ADMIN — IMMUNE GLOBULIN INTRAVENOUS (HUMAN) 40 G: 5 INJECTION, SOLUTION INTRAVENOUS at 11:42

## 2019-01-01 RX ADMIN — ALBUMIN (HUMAN) 12.5 G: 0.25 INJECTION, SOLUTION INTRAVENOUS at 17:44

## 2019-01-01 RX ADMIN — Medication 2 G: at 17:44

## 2019-01-01 RX ADMIN — ASPIRIN 81 MG CHEWABLE TABLET 81 MG: 81 TABLET CHEWABLE at 08:27

## 2019-01-01 RX ADMIN — ATORVASTATIN CALCIUM 40 MG: 40 TABLET, FILM COATED ORAL at 22:01

## 2019-01-01 RX ADMIN — Medication 10 ML: at 22:19

## 2019-01-01 RX ADMIN — ACETAMINOPHEN 650 MG: 325 TABLET ORAL at 23:51

## 2019-01-01 RX ADMIN — Medication 2 G: at 08:31

## 2019-01-01 RX ADMIN — TRAMADOL HYDROCHLORIDE 50 MG: 50 TABLET, FILM COATED ORAL at 18:34

## 2019-01-01 RX ADMIN — BUMETANIDE 1 MG/HR: 0.25 INJECTION INTRAMUSCULAR; INTRAVENOUS at 22:54

## 2019-01-01 RX ADMIN — Medication 10 ML: at 22:35

## 2019-01-01 RX ADMIN — TRAMADOL HYDROCHLORIDE 50 MG: 50 TABLET, FILM COATED ORAL at 01:00

## 2019-01-01 RX ADMIN — Medication 10 ML: at 06:04

## 2019-01-01 RX ADMIN — ALBUMIN (HUMAN) 12.5 G: 0.25 INJECTION, SOLUTION INTRAVENOUS at 08:01

## 2019-01-01 RX ADMIN — BUMETANIDE 1 MG: 0.25 INJECTION INTRAMUSCULAR; INTRAVENOUS at 12:28

## 2019-01-01 RX ADMIN — TRAMADOL HYDROCHLORIDE 50 MG: 50 TABLET, FILM COATED ORAL at 12:15

## 2019-01-01 RX ADMIN — Medication 800 MG: at 08:01

## 2019-01-01 RX ADMIN — BUMETANIDE 1 MG/HR: 0.25 INJECTION INTRAMUSCULAR; INTRAVENOUS at 08:58

## 2019-01-01 RX ADMIN — DIGOXIN 0.12 MG: 125 TABLET ORAL at 09:00

## 2019-01-01 RX ADMIN — ASPIRIN 81 MG 81 MG: 81 TABLET ORAL at 08:57

## 2019-01-01 RX ADMIN — BUMETANIDE 1 MG: 0.25 INJECTION INTRAMUSCULAR; INTRAVENOUS at 08:35

## 2019-01-01 RX ADMIN — BUMETANIDE 2 MG/HR: 0.25 INJECTION INTRAMUSCULAR; INTRAVENOUS at 06:58

## 2019-01-01 RX ADMIN — INSULIN LISPRO 2 UNITS: 100 INJECTION, SOLUTION INTRAVENOUS; SUBCUTANEOUS at 12:15

## 2019-01-01 RX ADMIN — TAMSULOSIN HYDROCHLORIDE 0.4 MG: 0.4 CAPSULE ORAL at 08:01

## 2019-01-01 RX ADMIN — BUMETANIDE 1 MG: 0.25 INJECTION INTRAMUSCULAR; INTRAVENOUS at 21:09

## 2019-01-01 RX ADMIN — DEXTROSE MONOHYDRATE 13.7 ML/HR: 5 INJECTION, SOLUTION INTRAVENOUS at 04:59

## 2019-01-01 RX ADMIN — DOBUTAMINE IN DEXTROSE 7.5 MCG/KG/MIN: 200 INJECTION, SOLUTION INTRAVENOUS at 15:15

## 2019-01-01 RX ADMIN — ATORVASTATIN CALCIUM 40 MG: 40 TABLET, FILM COATED ORAL at 22:10

## 2019-01-01 RX ADMIN — ALBUMIN (HUMAN) 12.5 G: 0.25 INJECTION, SOLUTION INTRAVENOUS at 21:31

## 2019-01-01 RX ADMIN — Medication 800 MG: at 17:07

## 2019-01-01 RX ADMIN — Medication 10 ML: at 21:52

## 2019-01-01 RX ADMIN — GLIPIZIDE 5 MG: 5 TABLET ORAL at 16:17

## 2019-01-01 RX ADMIN — BUMETANIDE 1 MG: 0.25 INJECTION INTRAMUSCULAR; INTRAVENOUS at 21:07

## 2019-01-01 RX ADMIN — GLIPIZIDE 5 MG: 5 TABLET ORAL at 07:11

## 2019-01-01 RX ADMIN — DEXTROSE MONOHYDRATE 13.7 ML/HR: 5 INJECTION, SOLUTION INTRAVENOUS at 04:46

## 2019-01-01 RX ADMIN — SENNOSIDES, DOCUSATE SODIUM 1 TABLET: 50; 8.6 TABLET, FILM COATED ORAL at 09:12

## 2019-01-01 RX ADMIN — Medication 2 G: at 17:40

## 2019-01-01 RX ADMIN — BUMETANIDE 1 MG: 0.25 INJECTION INTRAMUSCULAR; INTRAVENOUS at 21:31

## 2019-01-01 RX ADMIN — Medication 10 ML: at 13:32

## 2019-01-01 RX ADMIN — CASTOR OIL AND BALSAM, PERU: 788; 87 OINTMENT TOPICAL at 09:00

## 2019-01-01 RX ADMIN — ONDANSETRON 4 MG: 2 INJECTION INTRAMUSCULAR; INTRAVENOUS at 04:44

## 2019-01-01 RX ADMIN — TRAMADOL HYDROCHLORIDE 50 MG: 50 TABLET, FILM COATED ORAL at 22:05

## 2019-01-01 RX ADMIN — POTASSIUM CHLORIDE 40 MEQ: 1.5 POWDER, FOR SOLUTION ORAL at 08:35

## 2019-01-01 RX ADMIN — SODIUM CHLORIDE 9 ML/HR: 900 INJECTION, SOLUTION INTRAVENOUS at 20:09

## 2019-01-01 RX ADMIN — PANTOPRAZOLE SODIUM 40 MG: 40 TABLET, DELAYED RELEASE ORAL at 06:43

## 2019-01-01 RX ADMIN — INSULIN LISPRO 3 UNITS: 100 INJECTION, SOLUTION INTRAVENOUS; SUBCUTANEOUS at 07:14

## 2019-01-01 RX ADMIN — INSULIN LISPRO 3 UNITS: 100 INJECTION, SOLUTION INTRAVENOUS; SUBCUTANEOUS at 11:40

## 2019-01-01 RX ADMIN — CASTOR OIL AND BALSAM, PERU: 788; 87 OINTMENT TOPICAL at 13:44

## 2019-01-01 RX ADMIN — INSULIN LISPRO 2 UNITS: 100 INJECTION, SOLUTION INTRAVENOUS; SUBCUTANEOUS at 18:27

## 2019-01-01 RX ADMIN — DOBUTAMINE IN DEXTROSE 7.5 MCG/KG/MIN: 200 INJECTION, SOLUTION INTRAVENOUS at 13:46

## 2019-01-01 RX ADMIN — DOBUTAMINE IN DEXTROSE 7.5 MCG/KG/MIN: 200 INJECTION, SOLUTION INTRAVENOUS at 01:53

## 2019-01-01 RX ADMIN — DOBUTAMINE IN DEXTROSE 7.5 MCG/KG/MIN: 200 INJECTION, SOLUTION INTRAVENOUS at 12:37

## 2019-01-01 RX ADMIN — Medication 10 ML: at 18:23

## 2019-01-01 RX ADMIN — POTASSIUM CHLORIDE 40 MEQ: 1.5 POWDER, FOR SOLUTION ORAL at 16:17

## 2019-01-01 RX ADMIN — ONDANSETRON 4 MG: 2 INJECTION INTRAMUSCULAR; INTRAVENOUS at 01:19

## 2019-01-01 RX ADMIN — GLIPIZIDE 5 MG: 5 TABLET ORAL at 06:38

## 2019-01-01 RX ADMIN — CEFAZOLIN SODIUM 1 G: 1 INJECTION, POWDER, FOR SOLUTION INTRAMUSCULAR; INTRAVENOUS at 09:11

## 2019-01-01 RX ADMIN — ACETAMINOPHEN 650 MG: 325 TABLET ORAL at 14:05

## 2019-01-01 RX ADMIN — Medication 3 MG: at 21:29

## 2019-01-01 RX ADMIN — CEFAZOLIN SODIUM 1 G: 1 INJECTION, POWDER, FOR SOLUTION INTRAMUSCULAR; INTRAVENOUS at 08:50

## 2019-01-01 RX ADMIN — INSULIN LISPRO 2 UNITS: 100 INJECTION, SOLUTION INTRAVENOUS; SUBCUTANEOUS at 12:28

## 2019-01-01 RX ADMIN — BUMETANIDE 1 MG: 0.25 INJECTION INTRAMUSCULAR; INTRAVENOUS at 08:46

## 2019-01-01 RX ADMIN — DEXTROSE MONOHYDRATE 14.1 ML/HR: 5 INJECTION, SOLUTION INTRAVENOUS at 12:50

## 2019-01-01 RX ADMIN — METHYLPREDNISOLONE SODIUM SUCCINATE 125 MG: 125 INJECTION, POWDER, FOR SOLUTION INTRAMUSCULAR; INTRAVENOUS at 14:14

## 2019-01-01 RX ADMIN — SENNOSIDES, DOCUSATE SODIUM 1 TABLET: 50; 8.6 TABLET, FILM COATED ORAL at 17:44

## 2019-01-01 RX ADMIN — INSULIN LISPRO 2 UNITS: 100 INJECTION, SOLUTION INTRAVENOUS; SUBCUTANEOUS at 12:32

## 2019-01-01 RX ADMIN — GLYCOPYRROLATE 0.2 MG: 0.2 INJECTION INTRAMUSCULAR; INTRAVENOUS at 10:27

## 2019-01-01 RX ADMIN — BUMETANIDE 2 MG/HR: 0.25 INJECTION INTRAMUSCULAR; INTRAVENOUS at 01:56

## 2019-01-01 RX ADMIN — DOBUTAMINE IN DEXTROSE 10 MCG/KG/MIN: 200 INJECTION, SOLUTION INTRAVENOUS at 08:58

## 2019-01-01 RX ADMIN — Medication 2 G: at 11:40

## 2019-01-01 RX ADMIN — DOBUTAMINE IN DEXTROSE 10 MCG/KG/MIN: 200 INJECTION, SOLUTION INTRAVENOUS at 23:17

## 2019-01-01 RX ADMIN — ALBUMIN (HUMAN) 12.5 G: 0.25 INJECTION, SOLUTION INTRAVENOUS at 21:24

## 2019-01-01 RX ADMIN — MILRINONE LACTATE IN DEXTROSE 0.12 MCG/KG/MIN: 200 INJECTION, SOLUTION INTRAVENOUS at 18:13

## 2019-01-01 RX ADMIN — POLYETHYLENE GLYCOL 3350 17 G: 17 POWDER, FOR SOLUTION ORAL at 20:22

## 2019-01-01 RX ADMIN — ASPIRIN 81 MG CHEWABLE TABLET 81 MG: 81 TABLET CHEWABLE at 08:46

## 2019-01-01 RX ADMIN — TRAMADOL HYDROCHLORIDE 50 MG: 50 TABLET, FILM COATED ORAL at 23:21

## 2019-01-01 RX ADMIN — Medication 10 ML: at 21:05

## 2019-01-01 RX ADMIN — INSULIN LISPRO 2 UNITS: 100 INJECTION, SOLUTION INTRAVENOUS; SUBCUTANEOUS at 06:43

## 2019-01-01 RX ADMIN — ALBUMIN (HUMAN) 12.5 G: 0.25 INJECTION, SOLUTION INTRAVENOUS at 16:51

## 2019-01-01 RX ADMIN — BUMETANIDE 1 MG/HR: 0.25 INJECTION INTRAMUSCULAR; INTRAVENOUS at 20:08

## 2019-01-01 RX ADMIN — SUCCINYLCHOLINE CHLORIDE 140 MG: 20 INJECTION INTRAMUSCULAR; INTRAVENOUS at 08:44

## 2019-01-01 RX ADMIN — Medication 800 MG: at 18:22

## 2019-01-01 RX ADMIN — GLIPIZIDE 5 MG: 5 TABLET ORAL at 11:40

## 2019-01-01 RX ADMIN — INSULIN LISPRO 7 UNITS: 100 INJECTION, SOLUTION INTRAVENOUS; SUBCUTANEOUS at 17:29

## 2019-01-01 RX ADMIN — LEVOFLOXACIN 750 MG: 5 INJECTION, SOLUTION INTRAVENOUS at 11:40

## 2019-01-01 RX ADMIN — PANTOPRAZOLE SODIUM 40 MG: 40 TABLET, DELAYED RELEASE ORAL at 07:11

## 2019-01-01 RX ADMIN — ALBUMIN (HUMAN) 12.5 G: 0.25 INJECTION, SOLUTION INTRAVENOUS at 08:57

## 2019-01-01 RX ADMIN — APIXABAN 5 MG: 5 TABLET, FILM COATED ORAL at 08:54

## 2019-01-01 RX ADMIN — BUMETANIDE 0.5 MG/HR: 0.25 INJECTION INTRAMUSCULAR; INTRAVENOUS at 18:13

## 2019-01-01 RX ADMIN — PANTOPRAZOLE SODIUM 40 MG: 40 TABLET, DELAYED RELEASE ORAL at 08:56

## 2019-01-01 RX ADMIN — PANTOPRAZOLE SODIUM 40 MG: 40 TABLET, DELAYED RELEASE ORAL at 07:28

## 2019-01-01 RX ADMIN — RANOLAZINE 500 MG: 500 TABLET, FILM COATED, EXTENDED RELEASE ORAL at 22:18

## 2019-01-01 RX ADMIN — SENNOSIDES, DOCUSATE SODIUM 1 TABLET: 50; 8.6 TABLET, FILM COATED ORAL at 20:22

## 2019-01-01 RX ADMIN — Medication 2 G: at 09:18

## 2019-01-01 RX ADMIN — Medication 2 G: at 02:02

## 2019-01-01 RX ADMIN — SODIUM CHLORIDE, SODIUM LACTATE, POTASSIUM CHLORIDE, CALCIUM CHLORIDE: 600; 310; 30; 20 INJECTION, SOLUTION INTRAVENOUS at 08:20

## 2019-01-01 RX ADMIN — DRONABINOL 2.5 MG: 2.5 CAPSULE ORAL at 09:18

## 2019-01-01 RX ADMIN — MAGNESIUM SULFATE HEPTAHYDRATE 1 G: 1 INJECTION, SOLUTION INTRAVENOUS at 06:50

## 2019-01-01 RX ADMIN — SENNOSIDES, DOCUSATE SODIUM 1 TABLET: 50; 8.6 TABLET, FILM COATED ORAL at 18:10

## 2019-01-01 RX ADMIN — TRAMADOL HYDROCHLORIDE 50 MG: 50 TABLET, FILM COATED ORAL at 13:32

## 2019-01-01 RX ADMIN — Medication 10 ML: at 05:26

## 2019-01-01 RX ADMIN — Medication 10 ML: at 06:35

## 2019-01-01 RX ADMIN — ASPIRIN 81 MG CHEWABLE TABLET 81 MG: 81 TABLET CHEWABLE at 08:30

## 2019-01-01 RX ADMIN — INSULIN LISPRO 3 UNITS: 100 INJECTION, SOLUTION INTRAVENOUS; SUBCUTANEOUS at 17:40

## 2019-01-01 RX ADMIN — APIXABAN 5 MG: 5 TABLET, FILM COATED ORAL at 17:08

## 2019-01-01 RX ADMIN — GLIPIZIDE 5 MG: 5 TABLET ORAL at 18:00

## 2019-01-01 RX ADMIN — GLIPIZIDE 7.5 MG: 5 TABLET ORAL at 07:18

## 2019-01-01 RX ADMIN — CEFAZOLIN SODIUM 1 G: 1 INJECTION, POWDER, FOR SOLUTION INTRAMUSCULAR; INTRAVENOUS at 21:24

## 2019-01-01 RX ADMIN — DRONABINOL 2.5 MG: 2.5 CAPSULE ORAL at 08:56

## 2019-01-01 RX ADMIN — BUMETANIDE 1 MG: 0.25 INJECTION INTRAMUSCULAR; INTRAVENOUS at 17:44

## 2019-01-01 RX ADMIN — DOBUTAMINE IN DEXTROSE 10 MCG/KG/MIN: 200 INJECTION, SOLUTION INTRAVENOUS at 04:32

## 2019-01-01 RX ADMIN — PERFLUTREN 2 ML: 6.52 INJECTION, SUSPENSION INTRAVENOUS at 09:49

## 2019-01-01 RX ADMIN — PANTOPRAZOLE SODIUM 40 MG: 40 TABLET, DELAYED RELEASE ORAL at 06:39

## 2019-01-01 RX ADMIN — GLIPIZIDE 5 MG: 5 TABLET ORAL at 15:36

## 2019-01-01 RX ADMIN — BUMETANIDE 0.5 MG/HR: 0.25 INJECTION INTRAMUSCULAR; INTRAVENOUS at 14:03

## 2019-01-01 RX ADMIN — IRON SUCROSE 100 MG: 20 INJECTION, SOLUTION INTRAVENOUS at 09:00

## 2019-01-01 RX ADMIN — Medication 800 MG: at 17:40

## 2019-01-01 RX ADMIN — ACETAMINOPHEN 650 MG: 325 TABLET ORAL at 21:49

## 2019-01-01 RX ADMIN — INSULIN LISPRO 2 UNITS: 100 INJECTION, SOLUTION INTRAVENOUS; SUBCUTANEOUS at 17:59

## 2019-01-01 RX ADMIN — BUMETANIDE 1 MG: 0.25 INJECTION INTRAMUSCULAR; INTRAVENOUS at 16:17

## 2019-01-01 RX ADMIN — INSULIN LISPRO 2 UNITS: 100 INJECTION, SOLUTION INTRAVENOUS; SUBCUTANEOUS at 17:13

## 2019-01-01 RX ADMIN — GLIPIZIDE 5 MG: 5 TABLET ORAL at 07:28

## 2019-01-01 RX ADMIN — MAGNESIUM OXIDE TAB 400 MG (241.3 MG ELEMENTAL MG) 400 MG: 400 (241.3 MG) TAB at 08:05

## 2019-01-01 RX ADMIN — DEXTROSE MONOHYDRATE 15.1 ML/HR: 5 INJECTION, SOLUTION INTRAVENOUS at 07:28

## 2019-01-01 RX ADMIN — INSULIN LISPRO 2 UNITS: 100 INJECTION, SOLUTION INTRAVENOUS; SUBCUTANEOUS at 06:38

## 2019-01-01 RX ADMIN — SPIRONOLACTONE 25 MG: 25 TABLET ORAL at 09:00

## 2019-01-01 RX ADMIN — BUMETANIDE 2 MG: 0.25 INJECTION INTRAMUSCULAR; INTRAVENOUS at 21:00

## 2019-01-01 RX ADMIN — ATORVASTATIN CALCIUM 40 MG: 40 TABLET, FILM COATED ORAL at 21:11

## 2019-01-01 RX ADMIN — GLIPIZIDE 7.5 MG: 5 TABLET ORAL at 17:07

## 2019-01-01 RX ADMIN — ATORVASTATIN CALCIUM 40 MG: 40 TABLET, FILM COATED ORAL at 21:24

## 2019-01-01 RX ADMIN — ALBUMIN (HUMAN) 12.5 G: 0.25 INJECTION, SOLUTION INTRAVENOUS at 08:35

## 2019-01-01 RX ADMIN — ONDANSETRON 4 MG: 2 INJECTION INTRAMUSCULAR; INTRAVENOUS at 23:14

## 2019-01-01 RX ADMIN — DOBUTAMINE IN DEXTROSE 7.5 MCG/KG/MIN: 200 INJECTION, SOLUTION INTRAVENOUS at 23:34

## 2019-01-01 RX ADMIN — SPIRONOLACTONE 25 MG: 25 TABLET ORAL at 09:18

## 2019-01-01 RX ADMIN — INSULIN LISPRO 3 UNITS: 100 INJECTION, SOLUTION INTRAVENOUS; SUBCUTANEOUS at 18:34

## 2019-01-01 RX ADMIN — ATORVASTATIN CALCIUM 40 MG: 40 TABLET, FILM COATED ORAL at 22:18

## 2019-01-01 RX ADMIN — DEXTROSE MONOHYDRATE 13.1 ML/HR: 5 INJECTION, SOLUTION INTRAVENOUS at 09:51

## 2019-01-01 RX ADMIN — ONDANSETRON 4 MG: 2 INJECTION INTRAMUSCULAR; INTRAVENOUS at 11:28

## 2019-01-01 RX ADMIN — DEXTROSE MONOHYDRATE 14 ML/HR: 5 INJECTION, SOLUTION INTRAVENOUS at 01:28

## 2019-01-01 RX ADMIN — HYDROMORPHONE HYDROCHLORIDE 1 MG: 1 INJECTION, SOLUTION INTRAMUSCULAR; INTRAVENOUS; SUBCUTANEOUS at 09:09

## 2019-01-01 RX ADMIN — Medication 800 MG: at 09:12

## 2019-01-01 RX ADMIN — INSULIN LISPRO 3 UNITS: 100 INJECTION, SOLUTION INTRAVENOUS; SUBCUTANEOUS at 22:35

## 2019-01-01 RX ADMIN — MORPHINE SULFATE 2 MG: 2 INJECTION, SOLUTION INTRAMUSCULAR; INTRAVENOUS at 18:04

## 2019-01-01 RX ADMIN — ASPIRIN 81 MG CHEWABLE TABLET 81 MG: 81 TABLET CHEWABLE at 09:12

## 2019-01-01 RX ADMIN — DOBUTAMINE IN DEXTROSE 7.5 MCG/KG/MIN: 200 INJECTION, SOLUTION INTRAVENOUS at 03:04

## 2019-01-01 RX ADMIN — APIXABAN 5 MG: 5 TABLET, FILM COATED ORAL at 08:05

## 2019-01-01 RX ADMIN — Medication 10 ML: at 06:39

## 2019-01-01 RX ADMIN — PANTOPRAZOLE SODIUM 40 MG: 40 TABLET, DELAYED RELEASE ORAL at 06:35

## 2019-01-01 RX ADMIN — HEPARIN SODIUM 2000 UNITS: 1000 INJECTION, SOLUTION INTRAVENOUS; SUBCUTANEOUS at 09:21

## 2019-01-01 RX ADMIN — ALBUMIN (HUMAN) 12.5 G: 0.25 INJECTION, SOLUTION INTRAVENOUS at 16:19

## 2019-01-01 RX ADMIN — INSULIN LISPRO 3 UNITS: 100 INJECTION, SOLUTION INTRAVENOUS; SUBCUTANEOUS at 18:13

## 2019-01-01 RX ADMIN — DEXMEDETOMIDINE HYDROCHLORIDE 0.5 MCG/KG/HR: 4 INJECTION, SOLUTION INTRAVENOUS at 10:22

## 2019-01-01 RX ADMIN — ALTEPLASE 2 MG: 2.2 INJECTION, POWDER, LYOPHILIZED, FOR SOLUTION INTRAVENOUS at 06:02

## 2019-01-01 RX ADMIN — Medication 10 ML: at 21:20

## 2019-01-01 RX ADMIN — IRON SUCROSE 100 MG: 20 INJECTION, SOLUTION INTRAVENOUS at 11:14

## 2019-01-01 RX ADMIN — ALBUMIN (HUMAN) 12.5 G: 0.25 INJECTION, SOLUTION INTRAVENOUS at 09:11

## 2019-01-01 RX ADMIN — DOBUTAMINE IN DEXTROSE 10 MCG/KG/MIN: 200 INJECTION, SOLUTION INTRAVENOUS at 20:08

## 2019-01-01 RX ADMIN — SODIUM CHLORIDE 1.5 ML: 9 INJECTION INTRAMUSCULAR; INTRAVENOUS; SUBCUTANEOUS at 18:19

## 2019-01-01 RX ADMIN — INSULIN LISPRO 5 UNITS: 100 INJECTION, SOLUTION INTRAVENOUS; SUBCUTANEOUS at 14:01

## 2019-01-01 RX ADMIN — DEXTROSE MONOHYDRATE 14 ML/HR: 5 INJECTION, SOLUTION INTRAVENOUS at 12:43

## 2019-01-01 RX ADMIN — POTASSIUM CHLORIDE 40 MEQ: 750 TABLET, EXTENDED RELEASE ORAL at 12:04

## 2019-01-01 RX ADMIN — TAMSULOSIN HYDROCHLORIDE 0.4 MG: 0.4 CAPSULE ORAL at 08:50

## 2019-01-01 RX ADMIN — DEXTROSE MONOHYDRATE 15.9 ML/HR: 5 INJECTION, SOLUTION INTRAVENOUS at 18:54

## 2019-01-01 RX ADMIN — GLIPIZIDE 7.5 MG: 5 TABLET ORAL at 06:58

## 2019-01-01 RX ADMIN — DOBUTAMINE IN DEXTROSE 7.5 MCG/KG/MIN: 200 INJECTION, SOLUTION INTRAVENOUS at 22:26

## 2019-01-01 RX ADMIN — POLYETHYLENE GLYCOL 3350 17 G: 17 POWDER, FOR SOLUTION ORAL at 09:17

## 2019-01-01 RX ADMIN — Medication 10 ML: at 13:06

## 2019-01-01 RX ADMIN — GLIPIZIDE 5 MG: 5 TABLET ORAL at 17:25

## 2019-01-01 RX ADMIN — TRAMADOL HYDROCHLORIDE 50 MG: 50 TABLET, FILM COATED ORAL at 04:00

## 2019-01-01 RX ADMIN — SENNOSIDES, DOCUSATE SODIUM 1 TABLET: 50; 8.6 TABLET, FILM COATED ORAL at 08:35

## 2019-01-01 RX ADMIN — TOLVAPTAN 15 MG: 15 TABLET ORAL at 10:47

## 2019-01-01 RX ADMIN — Medication 800 MG: at 16:51

## 2019-01-01 RX ADMIN — Medication 2 G: at 09:00

## 2019-01-01 RX ADMIN — DOBUTAMINE IN DEXTROSE 7.5 MCG/KG/MIN: 200 INJECTION, SOLUTION INTRAVENOUS at 14:18

## 2019-01-01 RX ADMIN — CASTOR OIL AND BALSAM, PERU: 788; 87 OINTMENT TOPICAL at 08:59

## 2019-01-01 RX ADMIN — BUMETANIDE 1 MG: 0.25 INJECTION INTRAMUSCULAR; INTRAVENOUS at 17:30

## 2019-01-01 RX ADMIN — SPIRONOLACTONE 25 MG: 25 TABLET ORAL at 08:30

## 2019-01-01 RX ADMIN — Medication 10 ML: at 21:01

## 2019-01-01 RX ADMIN — GLIPIZIDE 5 MG: 5 TABLET ORAL at 17:13

## 2019-01-01 RX ADMIN — DOBUTAMINE IN DEXTROSE 7.5 MCG/KG/MIN: 200 INJECTION, SOLUTION INTRAVENOUS at 21:46

## 2019-01-01 RX ADMIN — SODIUM CHLORIDE 9 ML/HR: 900 INJECTION, SOLUTION INTRAVENOUS at 18:15

## 2019-01-01 RX ADMIN — ASPIRIN 81 MG CHEWABLE TABLET 81 MG: 81 TABLET CHEWABLE at 08:05

## 2019-01-01 RX ADMIN — SPIRONOLACTONE 25 MG: 25 TABLET ORAL at 08:37

## 2019-01-01 RX ADMIN — DRONABINOL 2.5 MG: 2.5 CAPSULE ORAL at 08:35

## 2019-01-01 RX ADMIN — SENNOSIDES, DOCUSATE SODIUM 1 TABLET: 50; 8.6 TABLET, FILM COATED ORAL at 08:56

## 2019-01-01 RX ADMIN — INSULIN LISPRO 2 UNITS: 100 INJECTION, SOLUTION INTRAVENOUS; SUBCUTANEOUS at 13:17

## 2019-01-01 RX ADMIN — POLYETHYLENE GLYCOL 3350 17 G: 17 POWDER, FOR SOLUTION ORAL at 09:11

## 2019-01-01 RX ADMIN — ALBUMIN (HUMAN) 12.5 G: 12.5 INJECTION, SOLUTION INTRAVENOUS at 11:26

## 2019-01-01 RX ADMIN — DRONABINOL 2.5 MG: 2.5 CAPSULE ORAL at 08:01

## 2019-01-01 RX ADMIN — ALBUMIN (HUMAN) 12.5 G: 0.25 INJECTION, SOLUTION INTRAVENOUS at 11:22

## 2019-01-01 RX ADMIN — DOBUTAMINE IN DEXTROSE 7.5 MCG/KG/MIN: 200 INJECTION, SOLUTION INTRAVENOUS at 06:00

## 2019-01-01 RX ADMIN — GLIPIZIDE 5 MG: 5 TABLET ORAL at 17:29

## 2019-01-01 RX ADMIN — INSULIN LISPRO 2 UNITS: 100 INJECTION, SOLUTION INTRAVENOUS; SUBCUTANEOUS at 17:08

## 2019-01-01 RX ADMIN — CEFAZOLIN SODIUM 1 G: 1 INJECTION, POWDER, FOR SOLUTION INTRAMUSCULAR; INTRAVENOUS at 20:44

## 2019-01-01 RX ADMIN — ALBUMIN (HUMAN) 12.5 G: 0.25 INJECTION, SOLUTION INTRAVENOUS at 09:00

## 2019-01-01 RX ADMIN — BUMETANIDE 0.5 MG/HR: 0.25 INJECTION INTRAMUSCULAR; INTRAVENOUS at 10:09

## 2019-01-01 RX ADMIN — SENNOSIDES, DOCUSATE SODIUM 1 TABLET: 50; 8.6 TABLET, FILM COATED ORAL at 08:02

## 2019-01-01 RX ADMIN — INSULIN LISPRO 3 UNITS: 100 INJECTION, SOLUTION INTRAVENOUS; SUBCUTANEOUS at 07:11

## 2019-01-01 RX ADMIN — APIXABAN 5 MG: 5 TABLET, FILM COATED ORAL at 19:31

## 2019-01-01 RX ADMIN — DOBUTAMINE IN DEXTROSE 7.5 MCG/KG/MIN: 200 INJECTION, SOLUTION INTRAVENOUS at 05:07

## 2019-01-01 RX ADMIN — GLIPIZIDE 5 MG: 5 TABLET ORAL at 06:43

## 2019-01-01 RX ADMIN — GLIPIZIDE 7.5 MG: 5 TABLET ORAL at 17:40

## 2019-01-01 RX ADMIN — FUROSEMIDE 40 MG: 40 TABLET ORAL at 08:56

## 2019-01-01 RX ADMIN — GLIPIZIDE 5 MG: 5 TABLET ORAL at 06:39

## 2019-01-01 RX ADMIN — Medication 2 G: at 01:16

## 2019-01-01 RX ADMIN — ALBUMIN HUMAN 250 ML: 50 SOLUTION INTRAVENOUS at 10:08

## 2019-01-01 RX ADMIN — APIXABAN 5 MG: 5 TABLET, FILM COATED ORAL at 17:03

## 2019-01-01 RX ADMIN — TOLVAPTAN 30 MG: 30 TABLET ORAL at 11:16

## 2019-01-01 RX ADMIN — DOBUTAMINE IN DEXTROSE 7.5 MCG/KG/MIN: 200 INJECTION, SOLUTION INTRAVENOUS at 15:36

## 2019-01-01 RX ADMIN — Medication 2 G: at 17:00

## 2019-01-01 RX ADMIN — HYDROMORPHONE HYDROCHLORIDE 1 MG: 1 INJECTION, SOLUTION INTRAMUSCULAR; INTRAVENOUS; SUBCUTANEOUS at 22:05

## 2019-01-01 RX ADMIN — Medication 10 ML: at 06:32

## 2019-01-01 RX ADMIN — HYDROMORPHONE HYDROCHLORIDE 1 MG: 1 INJECTION, SOLUTION INTRAMUSCULAR; INTRAVENOUS; SUBCUTANEOUS at 05:26

## 2019-01-01 RX ADMIN — BUMETANIDE 1 MG: 0.25 INJECTION INTRAMUSCULAR; INTRAVENOUS at 08:01

## 2019-01-01 RX ADMIN — ATORVASTATIN CALCIUM 40 MG: 40 TABLET, FILM COATED ORAL at 21:05

## 2019-01-01 RX ADMIN — ROCURONIUM BROMIDE 40 MG: 10 INJECTION, SOLUTION INTRAVENOUS at 08:58

## 2019-01-01 RX ADMIN — APIXABAN 5 MG: 5 TABLET, FILM COATED ORAL at 17:07

## 2019-01-01 RX ADMIN — APIXABAN 5 MG: 5 TABLET, FILM COATED ORAL at 19:42

## 2019-01-01 RX ADMIN — PANTOPRAZOLE SODIUM 40 MG: 40 TABLET, DELAYED RELEASE ORAL at 06:38

## 2019-01-01 RX ADMIN — ATORVASTATIN CALCIUM 40 MG: 40 TABLET, FILM COATED ORAL at 21:09

## 2019-01-01 RX ADMIN — DOBUTAMINE IN DEXTROSE 7.5 MCG/KG/MIN: 200 INJECTION, SOLUTION INTRAVENOUS at 11:34

## 2019-01-01 RX ADMIN — POTASSIUM CHLORIDE 40 MEQ: 1.5 POWDER, FOR SOLUTION ORAL at 09:18

## 2019-01-01 RX ADMIN — METOPROLOL SUCCINATE 100 MG: 50 TABLET, EXTENDED RELEASE ORAL at 08:57

## 2019-01-01 RX ADMIN — DOBUTAMINE IN DEXTROSE 5 MCG/KG/MIN: 200 INJECTION, SOLUTION INTRAVENOUS at 20:11

## 2019-01-01 RX ADMIN — BUMETANIDE 1 MG: 0.25 INJECTION INTRAMUSCULAR; INTRAVENOUS at 19:31

## 2019-01-01 RX ADMIN — GLIPIZIDE 5 MG: 5 TABLET ORAL at 16:19

## 2019-01-01 RX ADMIN — CALCIUM CARBONATE (ANTACID) CHEW TAB 500 MG 200 MG: 500 CHEW TAB at 09:54

## 2019-01-01 RX ADMIN — MAGNESIUM OXIDE TAB 400 MG (241.3 MG ELEMENTAL MG) 400 MG: 400 (241.3 MG) TAB at 12:33

## 2019-01-01 RX ADMIN — DOBUTAMINE IN DEXTROSE 7.5 MCG/KG/MIN: 200 INJECTION, SOLUTION INTRAVENOUS at 21:56

## 2019-01-01 RX ADMIN — BUMETANIDE 1 MG: 0.25 INJECTION INTRAMUSCULAR; INTRAVENOUS at 09:00

## 2019-01-01 RX ADMIN — RANOLAZINE 500 MG: 500 TABLET, FILM COATED, EXTENDED RELEASE ORAL at 08:56

## 2019-01-01 RX ADMIN — POLYETHYLENE GLYCOL 3350 17 G: 17 POWDER, FOR SOLUTION ORAL at 08:35

## 2019-01-01 RX ADMIN — CASTOR OIL AND BALSAM, PERU: 788; 87 OINTMENT TOPICAL at 09:13

## 2019-01-01 RX ADMIN — INSULIN LISPRO 2 UNITS: 100 INJECTION, SOLUTION INTRAVENOUS; SUBCUTANEOUS at 17:24

## 2019-01-01 RX ADMIN — DEXTROSE MONOHYDRATE 25 G: 500 INJECTION PARENTERAL at 05:59

## 2019-01-01 RX ADMIN — NEOSTIGMINE METHYLSULFATE 1 MG: 1 INJECTION INTRAVENOUS at 10:27

## 2019-01-01 RX ADMIN — Medication 2 G: at 18:00

## 2019-01-01 RX ADMIN — BUMETANIDE 1 MG/HR: 0.25 INJECTION INTRAMUSCULAR; INTRAVENOUS at 13:27

## 2019-01-01 RX ADMIN — PANTOPRAZOLE SODIUM 40 MG: 40 TABLET, DELAYED RELEASE ORAL at 07:18

## 2019-01-01 RX ADMIN — GLIPIZIDE 5 MG: 5 TABLET ORAL at 06:47

## 2019-01-01 RX ADMIN — MAGNESIUM OXIDE TAB 400 MG (241.3 MG ELEMENTAL MG) 400 MG: 400 (241.3 MG) TAB at 08:30

## 2019-01-01 RX ADMIN — POLYETHYLENE GLYCOL 3350 17 G: 17 POWDER, FOR SOLUTION ORAL at 17:29

## 2019-01-01 RX ADMIN — ACETAMINOPHEN 650 MG: 325 TABLET ORAL at 17:07

## 2019-01-01 RX ADMIN — GLIPIZIDE 7.5 MG: 5 TABLET ORAL at 18:22

## 2019-01-01 RX ADMIN — DEXTROSE MONOHYDRATE 13.5 ML/HR: 5 INJECTION, SOLUTION INTRAVENOUS at 07:12

## 2019-01-01 RX ADMIN — ATORVASTATIN CALCIUM 40 MG: 40 TABLET, FILM COATED ORAL at 21:20

## 2019-01-01 RX ADMIN — ASPIRIN 81 MG CHEWABLE TABLET 81 MG: 81 TABLET CHEWABLE at 09:18

## 2019-01-01 RX ADMIN — BUMETANIDE 1 MG: 0.25 INJECTION INTRAMUSCULAR; INTRAVENOUS at 09:48

## 2019-01-01 RX ADMIN — BUMETANIDE 2 MG/HR: 0.25 INJECTION INTRAMUSCULAR; INTRAVENOUS at 04:41

## 2019-01-01 RX ADMIN — Medication 2 G: at 01:46

## 2019-01-01 RX ADMIN — Medication 10 ML: at 17:11

## 2019-01-01 RX ADMIN — Medication 10 ML: at 07:29

## 2019-01-01 RX ADMIN — INSULIN LISPRO 2 UNITS: 100 INJECTION, SOLUTION INTRAVENOUS; SUBCUTANEOUS at 17:09

## 2019-01-01 RX ADMIN — Medication 10 ML: at 21:31

## 2019-01-01 RX ADMIN — Medication 2 G: at 01:15

## 2019-01-01 RX ADMIN — BUMETANIDE 1 MG: 0.25 INJECTION INTRAMUSCULAR; INTRAVENOUS at 12:38

## 2019-01-01 RX ADMIN — FERROUS SULFATE TAB 325 MG (65 MG ELEMENTAL FE) 325 MG: 325 (65 FE) TAB at 08:30

## 2019-01-01 RX ADMIN — DOBUTAMINE IN DEXTROSE 10 MCG/KG/MIN: 200 INJECTION, SOLUTION INTRAVENOUS at 15:25

## 2019-01-01 RX ADMIN — ATORVASTATIN CALCIUM 40 MG: 40 TABLET, FILM COATED ORAL at 21:32

## 2019-01-01 RX ADMIN — VASOPRESSIN 2 UNITS: 20 INJECTION PARENTERAL at 08:44

## 2019-01-01 RX ADMIN — DIGOXIN 0.12 MG: 125 TABLET ORAL at 08:30

## 2019-01-01 RX ADMIN — INSULIN LISPRO 3 UNITS: 100 INJECTION, SOLUTION INTRAVENOUS; SUBCUTANEOUS at 12:30

## 2019-01-01 RX ADMIN — BUMETANIDE 2 MG: 0.25 INJECTION INTRAMUSCULAR; INTRAVENOUS at 08:38

## 2019-05-02 NOTE — PROGRESS NOTES
CM met with patient to discuss prescription coverage. Patient does have Medicare Part D. Preferred Rx  Is [facility] at the [facility]. CM will need scripts to determine cost to patient. Specifically Entresto and Manjeet. Mitchell Esquivel RN CM Ext [phone]

## 2019-05-02 NOTE — Clinical Note
TRANSFER - OUT REPORT:  
 
Verbal report given to: Zulema Akins RN. Report consisted of patient's Situation, Background, Assessment and  
Recommendations(SBAR). Opportunity for questions and clarification was provided. Patient transported with a Registered Nurse and [address] [place] [address] / [facility]. Patient transported to: [facility]CU.

## 2019-05-02 NOTE — DIABETES MGMT
DTC Consult Note Recommendations/ Comments: Please consider the followin) resuming metformin 48 hrs post cath 2) resume invokana at discharge Agree with current medications. Current hospital DM medication: glipizide 5mg ac b/d and humalog correction DTC will continue to follow patient as needed. ____________________________ Consult received for:   []           Hospital Medication Recommendations [x]           Hospital Blood Glucose Management Chart reviewed and initial evaluation complete on [facility]. Patient is a 77 y.o. male with known Type 2 Diabetes on glipizide 5mg ac b/d and metformin 1000mg ac b/d at home. A1c:  
No results found for: HBA1C, HGBE8, FJQ3BMKN Recent Glucose Results:  
Lab Results Component Value Date/Time  (H) 2019 11:30 AM  
 GLUCPOC 120 (H) 2019 02:46 PM  
  
 
Lab Results Component Value Date/Time Creatinine 2.01 (H) 2019 11:30 AM  
 
 
Active Orders Diet DIET CARDIAC Regular PO intake: No data found. Thank you. Deb Stinson, BSN, RN, CDE [facility] Time spent: 5 min

## 2019-05-02 NOTE — DISCHARGE INSTRUCTIONS
[address] [facility], [address]   [phone]  [address]    [email or web address]    Patient Discharge Instructions    Nadiya Pardo / [id number] : 1950    Admitted 2019 Discharged: 2019       Medicine changes:  1. Stop metoprolol tartrate 100mg daily and change to Toprol XL 100mg daily  2. Stop quinapril; start Entresto  twice daily on Saturday morning  3. Start ranexa 500mg twice daily  4. Start lasix 40mg daily  5. Change pravastatin to atorvastatin 40mg daily        · It is important that you take the medication exactly as they are prescribed. · Keep your medication in the bottles provided by the pharmacist and keep a list of the medication names, dosages, and times to be taken in your wallet. · Do not take other medications without consulting your doctor. BRING ALL OF YOUR MEDICINES TO YOUR OFFICE VISIT with Grabiel Rodríguez DO or my nurse practitioner, Orestes Bhatti. .    Follow-up with Grabiel Rodríguez DO or my nurse practitioner, Orestes Bhatti in 2 weeks. Cardiac Catheterization  Discharge Instructions    Transradial Catheterization Discharge Instructions (WRIST)    Discharge instructions: Your radial artery in your wrist was used for your cardiac catheterization. This site may be slightly bruised and sore following your procedure. Expect mild tingling or the hand and tenderness at the puncture site for up to 3 days. Excess movement of the wrist used should be avoided for the next 24-48 hours. 1. No lifting over 2 pounds (approximately a ½ gallon of milk) with this arm for 24 hours. 2. Keep the site of the procedure covered with a bandage for 24 hours. 3. You may shower the day after your procedure. Do not take a tub bath or submerge the puncture site in water for 48 hours. 4. No heavy impact activity/lifting > 30 pounds for 1 week.      If bleeding of the wrist occurs at home:   If the site on your wrist where you had the catheterization procedure begins to bleed, do not panic. 1. Place 1 or 2 fingers over the puncture site and hold pressure to stop the bleeding. You may be able to feel your pulse as you hold pressure. 2. Lift your fingers after 5 minutes to see if the bleeding has stopped. 3. Once the bleeding has stopped, gently wipe the wrist area clean and cover with a bandage. If the bleeding from your wrist does not stop after 15 minutes, or if there is a large amount of bleeding or spurting, call 911 immediately (do not drive yourself to the hospital). Other concerns: The site may be slightly bruised and sore following your procedure. Should any of the following occur, contact your physician immediately:   1. Any cool or coldness of the arm, discoloration over a large area, ongoing numbness or any abnormal sensations , moderate to severe pain or swelling in the arm. 2. Redness, soreness, swelling, chills or fever, or colored drainage at the procedure site within 3-7 days after your procedure. If you have any further questions or concerns regarding your procedure please call the Cardiac Cath Lab office at [phone]. During regular business hours ask to speak to Dr. Viky Dash. During non-business hours the answering service will answer. Ask to speak to the physician on call for [facility].     Transfemoral Catheterization Discharge Instructions Ruben Cole)     Do not drive, operate any machinery, or sign any legal documents for 24 hours after your procedure. You must have someone to drive you home.  You may take a shower 24 hours after your cardiac catheterization. Be sure to get the dressing wet and then remove it; gently wash the area with warm soapy water. Pat dry and leave open to air. To help prevent infections, be sure to keep the cath site clean and dry. No lotions, creams, powders, ointments, etc. in the cath site for approximately 1 week.      Do not take a tub bath, get in a hot tub or swimming pool for approximately 5 days or until the cath site is completely healed.  No strenuous activity or heavy lifting over 10 lbs. for 7 days.  Drink plenty of fluids for 24-48 hours after your cath to flush the contrast dye from your kidneys. No alcoholic beverages for 24 hours. You may resume your previous diet (low fat, low cholesterol) after your cath.  After your cath, some bruising or discomfort is common during the healing process. Tylenol, 1-2 tablets every 6 hours as needed, is recommended if you experience any discomfort. If you experience any signs or symptoms of infection such as fever, chills, or poorly healing incision, persistent tenderness or swelling in the groin, redness and/or warmth to the touch, numbness, significant tingling or pain at the groin site or affected extremity, rash, drainage from the cath site, or if the leg feels tight or swollen, call your physician right away.  If bleeding at the cath site occurs, take a clean gauze pad and apply direct pressure to the groin just above the puncture site. Call 911 immediately, and continue to apply direct pressure until an ambulance gets to your location.  You may return to work  2  days after your cardiac cath if no groin bleeding. Information obtained by :  I understand that if any problems occur once I am at home I am to contact my physician. I understand and acknowledge receipt of the instructions indicated above.                                                                                                                                            R.N.'s Signature                                                                  Date/Time                                                                                                                                              Patient or Representative Signature Date/Time      [address], DO             [address] [facility], [address]    [phone]  [place], [address]    [email or web address]

## 2019-05-02 NOTE — PROGRESS NOTES
Options limited. Dr. Hall Shelter to see in the AM.  Will discuss w/ Dr. Rukhsana Bourgeois 
Will have one of my partners discharge him in the AM. Med changes: 
Stop metoprolol tartrate 100mg DAILY and change to Toprol XL 100mg daily Stop ACEi; start Entresto 24/26 BID on Saturday Start ranexa 500mg BID Start lasix 40mg daily Change statin to atorva 40mg Echo in my office next week See me in 2 weeks

## 2019-05-02 NOTE — PROGRESS NOTES
CM acknowledged consult regarding med changes. CM will need script to process to retail pharmacy. FYI to attending and nursing informed. Roya Arreola RN CM Ext [phone]

## 2019-05-02 NOTE — CONSULTS
CSS Consult Subjective:  
  
Namita Osborne is a 77 y.o. male who was referred for cardiac evaluation from Dr. Mario Hobbs. Patient has a chief complaint of fatigue and shortness of breath. This began about 6 weeks ago. He noticed increasing shortness of breath and [place] . He had to stop and take breaks. It has not been worsening over the past 6 weeks, but it has been constant. He has also noticed some accompanied swelling in his extremities. Denies chest pain, palpitations, weight gain, and fainting. Past medical history significant for mild/moderate AS, DIDDM, PVD s/p toe amputation, hypertension, dyslipidemia, and CKD. Cardiac Testing Cardiac catheterization: 
Awaiting report ECHO: 
2018 Mild/moderate AS, mean gradient 25mmHg EF 50% Stress test 2019 EF 20% Abnormal 
 
No past medical history on file. No past surgical history on file. Social History Tobacco Use  Smoking status: Not on file Substance Use Topics  Alcohol use: Not on file No family history on file. Prior to Admission medications Medication Sig Start Date End Date Taking? Authorizing Provider  
glipiZIDE (GLUCOTROL) 5 mg tablet Take 5 mg by mouth two (2) times a day. Yes Provider, Historical  
canagliflozin (INVOKANA) 300 mg tablet Take 300 mg by mouth Daily (before breakfast). Yes Provider, Historical  
metFORMIN (GLUCOPHAGE) 1,000 mg tablet Take 1,000 mg by mouth two (2) times daily (with meals). Yes Provider, Historical  
esomeprazole (NEXIUM) 20 mg capsule Take 20 mg by mouth daily. Yes Provider, Historical  
simvastatin (ZOCOR) 10 mg tablet Take 10 mg by mouth nightly. Yes Provider, Historical  
aspirin 81 mg chewable tablet Take 81 mg by mouth daily. Yes Provider, Historical  
   
Allergies Allergen Reactions  Penicillins Hives Review of Systems:  
Consititutional: Denies fever or chills. Eyes:  Denies use of glasses or vision problems(cataracts). ENT:  Denies hearing or swallowing difficulty. CV: Denies CP, claudication, +HTN. Resp: + dyspnea, +productive cough. : Denies dialysis, +kidney problems. GI: Denies ulcers, esophageal strictures, liver problems. M/S: Denies joint or bone problems, or implanted artificial hardware. Skin: Denies varicose veins, edema. Neuro: Denies strokes, or TIAs. Psych: Denies anxiety or depression. Endocrine: Denies thyroid problems, +diabetes. Heme/Lymphatic: Denies easy bruising or lymphedema. Objective:  
 
VS:  
Visit Vitals BP (!) 88/54 Pulse 82 Temp 97.8 °F (36.6 °C) Resp 19 Ht 5' 10\" (1.778 m) Wt 168 lb (76.2 kg) SpO2 100% BMI 24.11 kg/m² Physical Exam:   
General appearance: alert, cooperative, no distress Head: normocephalic, without obvious abnormality; atraumatic Eyes: conjunctivae/corneas clear; EOM's intact. Nose: nares normal; no drainage. Neck: no carotid bruit and no JVD Lungs: clear to auscultation bilaterally Heart: regular rate and rhythm; + murmur Abdomen: soft, non-tender; bowel sounds normal 
Extremities: moves all extremities; no weakness. 1+ edema b/l LE Skin: Skin color normal; No varicose veins or edema. Neurologic: Grossly normal   
 
Labs:  
Recent Labs 05/02/19 
1446 05/02/19 
1130 WBC  --  7.3 HGB  --  12.8 HCT  --  42.3 PLT  --  235 NA  --  136 K  --  4.3 BUN  --  45* CREA  --  2.01* GLU  --  121* GLUCPOC 120*  --   
 
Assessment:  
 
Multivessel CAD Plan: STS Risk Calculator V2.81 - Discussed by surgeon with patient. Risk of Mortality: 2.218% Renal Failure: 7.043% Permanent Stroke: 1.488% Prolonged Ventilation: 8.977% DSW Infection: 0.182% Reoperation: 3.138% Morbidity or Mortality: 19.967% Short Length of Stay: 22.868% Long Length of Stay: 10.210% Treatment Plan: 1.  Multivessel CAD with depressed EF: Dr. Yimi Cote to discuss options tomorrow morning. He is not a surgical candidate. Will discuss with cardiologists. 2. AS: Plans per Dr. Richa Adame 3. Acute on chronic CHF Class III: Management per cardiology 4. CKD: Monitor Cr.  
5. Hypertension: Home meds 6. Hyperlipidemia: Home meds 7. PVD s/p toe amputation: Stable Signed By: OBED Liang May 2, 2019

## 2019-05-02 NOTE — Clinical Note
Single view of the left ventricle obtained using power injection. Total volume = 33 mL. Rate = 11 mL/sec. Pressure = 900 PSI.

## 2019-05-02 NOTE — PROGRESS NOTES
Bedside and Verbal shift change report given to Arianne Thomas (oncoming nurse) by Conor Wick  (offgoing nurse). Report included the following information SBAR, Kardex, Intake/Output, MAR, Accordion and Recent Results.

## 2019-05-03 NOTE — PROGRESS NOTES
Discharge instructions reviewed with patient and grandson. Allowed adequate time to ask questions, all questions answered. Printed copy of AVS given to patient. All belongings gathered, IV and tele discontinued. Transported via wheelchair to main entrance and into care of family.

## 2019-05-03 NOTE — PROGRESS NOTES
Progress Note 5/3/2019 8:38 AM 
NAME: Tonia Moody MRN:  [id number] Admit Diagnosis: ACS (acute coronary syndrome) ([facility] [address] [I24.9] Problem List:  
 
- Cath 5/2019 with 100% mid LAD after large septal, 100% mid RCA likely  faintly collatorallized - EF 15% with severe AS 
- Sinus with anterior Q waves - DM follows with Dr. Stepan Terry endocrinology - Dysdlipidemia 
- CKD cr 2.0 
- PVD with left toe amputation Dr. Debra Mckenzie - Regular dental visits Single, one child, was working in [address] [place] [address] [employer],advised to no longer drive Assessment/Plan:  
 
- Evaluated by Dr. Tamela Tran and felt to be poor bypass candidate - Would check carotid dopplers, scheduled for echo next week, would also then proceed with TAVR CTA. If carotid and TAVR CTA with no serious issue, would bring back for attempted  of LAD does not look promising, then high risk TAVR. Possiblity. 
- Agree with advanced heart failure consult, given DM, not clear he would be LVAD candidate, but will have him evaluated. Meds as outlined by Dr. Milagros Robertson - Add aspirin, likely add plavix at follow up - Change metoprolol to metoprolol Succ 
- Change quinapril to entresto - Add Ranexa - Add diuretic - Optimize statin Carotid doppler this AM. Dr. Brian Albarado as inpt vs outpt. Has follow up for echo next week. [x]       High complexity decision making was performed in this patient at high risk for decompensation with multiple organ involvement. Subjective:  
 
Tonia Moody denies chest pain, dyspnea. Discussed with RN events overnight. Review of Systems: 
 
Symptom Y/N Comments  Symptom Y/N Comments Fever/Chills N   Chest Pain N Poor Appetite N   Edema N   
Cough N   Abdominal Pain N Sputum N   Joint Pain N   
SOB/CHENG N   Pruritis/Rash N   
Nausea/vomit N   Tolerating PT/OT Y Diarrhea N   Tolerating Diet Y Constipation N   Other Could NOT obtain due to:   
 
Objective:  
  
Physical Exam: Last 24hrs VS reviewed since prior progress note. Most recent are: 
 
Visit Vitals /83 Pulse 79 Temp 97.4 °F (36.3 °C) Resp 20 Ht 5' 10\" (1.778 m) Wt 85.2 kg (187 lb 13.3 oz) SpO2 98% BMI 26.95 kg/m² Intake/Output Summary (Last 24 hours) at 5/3/2019 9786 Last data filed at 5/3/2019 0400 Gross per 24 hour Intake  Output 400 ml Net -400 ml General Appearance: Well developed, well nourished, alert & oriented x 3,  
 no acute distress. Ears/Nose/Mouth/Throat: Hearing grossly normal. 
Neck: Supple. Chest: Lungs clear to auscultation bilaterally. Cardiovascular: Regular rate and rhythm, S1S2 normal, III/VI FLORIAN. Abdomen: Soft, non-tender, bowel sounds are active. Extremities: +1 edema bilaterally. Skin: Warm and dry. PMH/SH reviewed - no change compared to H&P Data Review Telemetry: normal sinus rhythm Lab Data Personally Reviewed: 
 
Recent Labs 05/03/19 
0302 05/02/19 
1130 WBC 6.6 7.3 HGB 12.0* 12.8 HCT 37.9 42.3  235 No results for input(s): INR, PTP, APTT in the last 72 hours. No lab exists for component: INREXT Recent Labs 05/03/19 
0302 05/02/19 
1130  136  
K 4.2 4.3  105 CO2 21 21 BUN 41* 45* CREA 1.84* 2.01* * 121* CA 8.7 8.9 No results for input(s): CPK, CKNDX, TROIQ in the last 72 hours. No lab exists for component: CPKMB No results found for: CHOL, CHOLX, CHLST, CHOLV, HDL, LDL, LDLC, DLDLP, TGLX, TRIGL, TRIGP, CHHD, CHHDX No results for input(s): SGOT, GPT, AP, TBIL, TP, ALB, GLOB, GGT, AML, LPSE in the last 72 hours. No lab exists for component: AMYP, HLPSE No results for input(s): PH, PCO2, PO2 in the last 72 hours. Medications Personally Reviewed: 
 
Current Facility-Administered Medications Medication Dose Route Frequency  aspirin chewable tablet 81 mg  81 mg Oral DAILY  pantoprazole (PROTONIX) tablet 40 mg  40 mg Oral DAILY  glipiZIDE (GLUCOTROL) tablet 5 mg  5 mg Oral BID  atorvastatin (LIPITOR) tablet 40 mg  40 mg Oral QHS  [START ON 5/4/2019] sacubitril-valsartan (ENTRESTO) 24-26 mg tablet 1 Tab  1 Tab Oral Q12H  
 metoprolol succinate (TOPROL-XL) XL tablet 100 mg  100 mg Oral DAILY  ranolazine ER (RANEXA) tablet 500 mg  500 mg Oral Q12H  furosemide (LASIX) tablet 40 mg  40 mg Oral DAILY  glucose chewable tablet 16 g  4 Tab Oral PRN  
 dextrose (D50W) injection syrg 12.5-25 g  25-50 mL IntraVENous PRN  
 glucagon (GLUCAGEN) injection 1 mg  1 mg IntraMUSCular PRN  
 insulin lispro (HUMALOG) injection   SubCUTAneous TIDAC  sodium chloride (NS) flush 5-40 mL  5-40 mL IntraVENous Q8H  
 sodium chloride (NS) flush 5-40 mL  5-40 mL IntraVENous PRN  
 acetaminophen (TYLENOL) tablet 650 mg  650 mg Oral Q4H PRN  
 HYDROcodone-acetaminophen (NORCO) 5-325 mg per tablet 1 Tab  1 Tab Oral Q4H PRN  
 morphine 10 mg/ml injection 4 mg  4 mg IntraVENous Q4H PRN  
 naloxone (NARCAN) injection 0.4 mg  0.4 mg IntraVENous PRN  
 ondansetron (ZOFRAN) injection 4 mg  4 mg IntraVENous Q4H PRN  
 docusate sodium (COLACE) capsule 100 mg  100 mg Oral BID Jogre Washington MD

## 2019-05-03 NOTE — ROUTINE PROCESS
MICA 
Discharge home w Follow up appointment for HF Dr. Jefry Hernandez scheduled. Nursing informed CM need for follow up with Dr. Jefry Hernandez. Failed attempt to schedule. Office is closed at this time. Answering service answered. Scripts faxed to [facility]. [phone] (entresto and rexana). Patient is scheduled for carotid doppler this am. 
 
1134am 
Pharmacist Eliza Sykes at [facility] informed CM that Maura Miner is $47 and Ranexa is $134.89 
 
PCP updated. Dr. Carranza Coad. 1153am 
CM reviewed cost of Entresto and Ranexa with patient. Patient is agreeable to copays. Entresto and Ranexa free/copay cards provided to patient. Follow up appointment with Dr. Jefry Hernandez scheduled and noted on AVS. Patient is alert and oriented. Family present to provide transportation home. Care Management Interventions PCP Verified by CM: Yes Mode of Transport at Discharge: Other (see comment)(family) Physical Therapy Consult: No 
Occupational Therapy Consult: No 
Confirm Follow Up Transport: Family Plan discussed with Pt/Family/Caregiver: Yes Discharge Location Discharge Placement: Home CM will continue to follow as requested for discharge planning. Barbara Whitt RN CM Ext [phone]

## 2019-05-07 PROBLEM — I35.0 SEVERE AORTIC STENOSIS: Status: ACTIVE | Noted: 2019-01-01

## 2019-05-07 PROBLEM — I73.9 PERIPHERAL VASCULAR DISEASE (HCC): Status: ACTIVE | Noted: 2019-01-01

## 2019-05-07 PROBLEM — I25.10 CORONARY ARTERY DISEASE INVOLVING NATIVE CORONARY ARTERY OF NATIVE HEART WITHOUT ANGINA PECTORIS: Status: ACTIVE | Noted: 2019-01-01

## 2019-05-07 PROBLEM — I10 ESSENTIAL HYPERTENSION: Status: ACTIVE | Noted: 2019-01-01

## 2019-05-07 PROBLEM — E11.59 CONTROLLED TYPE 2 DIABETES MELLITUS WITH CIRCULATORY DISORDER, WITHOUT LONG-TERM CURRENT USE OF INSULIN (HCC): Status: ACTIVE | Noted: 2019-01-01

## 2019-05-07 PROBLEM — I25.5 ISCHEMIC CARDIOMYOPATHY: Status: ACTIVE | Noted: 2019-01-01

## 2019-05-07 NOTE — PATIENT INSTRUCTIONS
1. Discontinue ranexa 2. Start spironolactone 25 mg daily 3. Start eliquis 5 mg, 1 tablet twice daily 4. Continue entresto 24/26 mg, 1 tablet twice daily 5. Continue metoprolol succinate 100 mg daily - take after eating a meal with protein 6. Echocardiogram with [facility] tomorrow 7. Follow up with Dr. Xiomara Perez

## 2019-05-07 NOTE — LETTER
5/7/2019 4:08 PM 
 
Patient:  Ryley Freire  
YOB: 1950 Date of Visit: 5/7/2019 Dear Lenise Shone III, DO 
[address] [address] VIA Facsimile: [phone] Kellee Amaya MD 
[address] [phone] VIA In Basket 
 : Thank you for referring Mr. Fela Arevalo to me for evaluation/treatment. Below are the relevant portions of my assessment and plan of care. If you have questions, please do not hesitate to call me. I look forward to following Mr. Cecile Ang along with you. Sincerely, Juan Moreno MD

## 2019-05-07 NOTE — ASSESSMENT & PLAN NOTE
Key Antihyperglycemic Medications   
    
  
 glipiZIDE (GLUCOTROL) 5 mg tablet (Taking) Take 5 mg by mouth two (2) times a day. canagliflozin (INVOKANA) 300 mg tablet (Taking) Take 300 mg by mouth Daily (before breakfast). metFORMIN (GLUCOPHAGE) 1,000 mg tablet (Taking) Take 1,000 mg by mouth two (2) times daily (with meals). Other Key Diabetic Medications   
    
  
 sacubitril-valsartan (ENTRESTO) 24 mg/26 mg tablet (Taking) Take 1 Tab by mouth every twelve (12) hours. atorvastatin (LIPITOR) 40 mg tablet (Taking) Take 1 Tab by mouth nightly. Lab Results Component Value Date/Time Hemoglobin A1c 6.9 05/02/2019 04:09 PM  
 Glucose 117 05/03/2019 03:02 AM  
 Creatinine 1.84 05/03/2019 03:02 AM  
 
Diabetic Foot and Eye Exam HM Status Topic Date Due  
 Diabetic Foot Care  07[birth year]16[birth year]1958 [address] [place] [address] Eye Exam  07/16/1960

## 2019-05-07 NOTE — PROGRESS NOTES
[facility] Consultation Note DOS:   5/7/2019 NAME:  Vicente Calvert  
MRN:   [id number] REFERRING PROVIDER:  Dat Crump MD 
PRIMARY CARE PHYSICIAN: Keaton Mckeon MD 
PRIMARY CARDIOLOGIST: Dat Crump MD 
 
 
Chief Complaint:  
Chief Complaint Patient presents with  
[facility] Follow Up  Fatigue  Leg Swelling  
  left leg more than right HPI: 77y.o. year old male with a history of HTN, T2DM, PVD, CAD, ICM, severe AS who presents for further evaluation of his chronic systolic heart failure. He developed progressive fatigue and dyspnea with exertion prompting further evaluation by Dr. Michael Ramirez with a heart cath at [facility]. His cath revealed severe 3 vessel disease with  of his LAD, severe LV systolic failure (LVEF 42%), and severe AS. He is unable to walk a block without limiting dyspnea and develops dyspnea with making the bed. He denies orthopnea, PND, but has bilateral peripheral edema. History: 
Past Medical History:  
Diagnosis Date  3-vessel coronary artery disease  Aortic stenosis, severe  Chronic kidney disease (CKD), stage III (moderate) (HCC)  Chronic total occlusion of coronary artery  Hypertension  Ischemic cardiomyopathy  Peripheral vascular disease ([facility] [address]  Type 2 diabetes mellitus treated without insulin ([facility] [address] Past Surgical History:  
Procedure Laterality Date  HX AMPUTATION TOE Left 2017 Social History Socioeconomic History  Marital status:  Spouse name: Not on file  Number of children: Not on file  Years of education: Not on file  Highest education level: Not on file Occupational History  Not on file Social Needs  Financial resource strain: Not on file  Food insecurity:  
  Worry: Not on file Inability: Not on file  Transportation needs:  
  Medical: Not on file Non-medical: Not on file Tobacco Use  Smoking status: Former Smoker  Smokeless tobacco: Never Used Substance and Sexual Activity  Alcohol use: Not Currently  Drug use: Not on file  Sexual activity: Not on file Lifestyle  Physical activity:  
  Days per week: Not on file Minutes per session: Not on file  Stress: Not on file Relationships  Social connections:  
  Talks on phone: Not on file Gets together: Not on file Attends Yarsanism service: Not on file Active member of club or organization: Not on file Attends meetings of clubs or organizations: Not on file Relationship status: Not on file  Intimate partner violence:  
  Fear of current or ex partner: Not on file Emotionally abused: Not on file Physically abused: Not on file Forced sexual activity: Not on file Other Topics Concern  Not on file Social History Narrative  Not on file No family history on file. Current Medications:  
Current Outpatient Medications Medication Sig Dispense Refill  spironolactone (ALDACTONE) 25 mg tablet Take 1 Tab by mouth daily. 30 Tab 3  
 sacubitril-valsartan (ENTRESTO) 24 mg/26 mg tablet Take 1 Tab by mouth every twelve (12) hours. 60 Tab 11  
 glipiZIDE (GLUCOTROL) 5 mg tablet Take 5 mg by mouth two (2) times a day.  canagliflozin (INVOKANA) 300 mg tablet Take 300 mg by mouth Daily (before breakfast).  metFORMIN (GLUCOPHAGE) 1,000 mg tablet Take 1,000 mg by mouth two (2) times daily (with meals).  esomeprazole (NEXIUM) 20 mg capsule Take 20 mg by mouth daily.  aspirin 81 mg chewable tablet Take 81 mg by mouth daily.  atorvastatin (LIPITOR) 40 mg tablet Take 1 Tab by mouth nightly. 30 Tab 11  
 furosemide (LASIX) 40 mg tablet Take one tab daily 30 Tab 11  
 metoprolol succinate (TOPROL-XL) 100 mg tablet Take 1 Tab by mouth daily. 30 Tab 11 Allergies: Allergies Allergen Reactions  Penicillins Hives ROS:   
General:  positive for  - fatigue HEENT: negative Pulmonary: positive for - shortness of breath Cardiac: positive for dyspnea, fatigue, lower extremity edema, dyspnea on exertion, dizziness GI:  no abdominal pain, change in bowel habits, or black or bloody stools 
positive for - abdominal distension Musculo: negative Neuro: no TIA or stroke symptoms Skin:  negative Allergies: REMINDER:DOCUMENT ALLERGIES IN ALLERGY ACTIVITY Psych: negative Admission Weight: Last Weight Weight: 193 lb 6.4 oz (87.7 kg) Weight: 193 lb 6.4 oz (87.7 kg) Physical Exam:  
Vitals:   
Visit Vitals /72 (BP 1 Location: Right arm, BP Patient Position: Sitting) Pulse 64 Temp 97.7 °F (36.5 °C) (Oral) Resp 16 Ht 5' 10\" (1.778 m) Wt 193 lb 6.4 oz (87.7 kg) SpO2 97% BMI 27.75 kg/m² General:  fatigued, cooperative, no distress HEENT: PERRLA, EOMI and Sclera clear, anicteric Neck:  supple, no significant adenopathy, carotids upstroke normal bilaterally, no bruits CVP:  10 cm  ( + ) HJR Cardiac: Irregularly irregular rate, regular rhythm, S1, S2 normal, S3 present and late peaking systolic murmur present with radiation to the carotids bilaterally Chest: breath sounds clear and equal bilaterally Abdomen: soft, non-tender. Bowel sounds normal. No masses, hepatomegaly Extremity: edema 2+ bilateral 
Neuro: Alert and oriented to person, place, and time; normal strength and tone. Normal symmetric reflexes. Normal coordination and gait Skin:   Stasis dermatitis Recent Labs:  
Labs Latest Ref Rng & Units 5/3/2019 5/2/2019 WBC 4.1 - 11.1 K/uL 6.6 7.3  
RBC 4.10 - 5.70 M/uL 4.39 4.82 Hemoglobin 12.1 - 17.0 g/dL 12. 0(L) 12.8 Hematocrit 36.6 - 50.3 % 37.9 42.3 MCV 80.0 - 99.0 FL 86.3 87.8 Platelets 370 - 195 K/uL 200 235 Lymphocytes 12 - 49 % 20 17 Monocytes 5 - 13 % 8 7 Eosinophils 0 - 7 % 2 2 Basophils 0 - 1 % 1 1 Calcium 8.5 - 10.1 MG/DL 8.7 8.9 Glucose 65 - 100 mg/dL 117(H) 121(H) BUN 6 - 20 MG/DL 41(H) 45(H) Creatinine 0.70 - 1.30 MG/DL 1.84(H) 2.01(H) Sodium 136 - 145 mmol/L 137 136 Potassium 3.5 - 5.1 mmol/L 4.2 4.3 [id number] Atrial fibrillation, rate 60 bpm 
Anterior infarct Low voltage QRS 92 msec Echocardiogram:  
pending Cardiac Catheterizations:  
2019 Hemodynamics: Ao: 96/68/81 LV: 137/20 
  
AoV mean gradient by dual lumen Real:  41mmHg 
  
Cors:  
Dominance: [x] Right  [] Left  [] Mixed LM: Large caliber vessel without significant stenosis  
  
LAD: Moderate caliber heavily calcified vessel that is occluded in the mid without distal collateralization. D1: Moderate caliber calcified vessel with severe diffuse disease 
  
LCX: Moderate caliber calcified vessel without significant stenosis. OM1: Moderate caliber vessel with luminal irregularities and small vessel severe distal disease OM2: Very small caliber vessel without significant stenosis.  
  
RCA:   Large caliber heavily calcified dominant vessel that is occluded in the mid. A small segment of the PDA is filled faintly by left to right collaterals. PDA: Occluded PLB: Occluded 
  
LV angiography:  
EF: 10% Wall motion: severe basal HK. The anterior wall, anteroseptum, anterolateral, apical, and mid to distal inferior wall are akinetic. MR:  mild 
  
Radiology (CXR, CT scans):  
Duplex Carotids Bilateral -  5/3/2019 · There is mild stenosis in the right ICA (<50%). · The right vertebral is antegrade. · There is mild stenosis in the left ICA (<50%). · The left vertebral is antegrade. Impression / Plan: 1. ICM - Stage D, NYHA Class IV - LVEF 10% On metoprolol succinate, entresto, and furosemide Start spironolactone 25 mg daily Check New HF labs 2. Severe AS 
 TAVR CTA pending Follow up with Dr. Esme Gross 3. CAD with  of LAD On ASA, BB, statin Consider cardiac MRI to assess viability 4. High Risk of SCD Recommend LifeVest 
 
5. New onset Atrial fibrillation Start eliquis 5 mg BID for CVA prophylaxis On metoprolol succinate for rate control Check TFTs Recommend outpatient PSG 
 
6. HTN - well controlled On metoprolol succinate and entresto 6. HLD On lipitor 7. T2DM On metformin, glipizide, canagliflozin 8. PVD 
 stable Cris French MD, Romulo Moss Chief of Cardiology, [facility] Medical Director [facility] [address], [address] [place] [address] Office [phone] Fax [phone]

## 2019-05-08 NOTE — TELEPHONE ENCOUNTER
Patient's daughter called with concern because patient had fall this morning due to extreme weakness. She is concerned because he is deteriorating very quickly. She states he had his echocardiogram today-will bring disk to Dr. Marva Israel this afternoon-I advised Dr. Marva Israel will need to review and also share this with Dr. Blayne Vergara also spoke with Dr. Marva Israel and she confirmed this. Daughter also states patient refused to take eliquis due to cost of over $400-I called pharmacy and they state prior authorization had been done in past and cost is $47-they will prepare and have ready. I will advise patient's daughter when she comes to office. Dr. Marva Israel also wants patient to follow up with Dr Lizbeth Porter next week, go to ED if deteriorates further in the interim. I sent order to [facility] for rollator.

## 2019-05-09 PROBLEM — I50.23 ACUTE ON CHRONIC SYSTOLIC (CONGESTIVE) HEART FAILURE (HCC): Status: ACTIVE | Noted: 2019-01-01

## 2019-05-09 NOTE — H&P
[facility] H/P Note DOS:   5/9/2019 NAME:  Yusuf Vincent  
MRN:   [id number] REFERRING PROVIDER:  Dr. Michelle Sánchez PRIMARY CARE PHYSICIAN: Kaykay Holbrook MD 
 
 
Chief Complaint: [de-identified] 
 
HPI:77y.o. year old male with a history of HTN, T2DM, PVD, CAD, ICM, severe AS who presents for further evaluation of his chronic systolic heart failure. He developed progressive fatigue and dyspnea with exertion prompting further evaluation by Dr. Michelle Sánchez with a heart cath at [facility]. His cath revealed severe 3 vessel disease with  of his LAD, severe LV systolic failure (LVEF 86%), and severe AS. He is unable to walk a block without limiting dyspnea and develops dyspnea with making the bed. He denies orthopnea, PND, but has bilateral peripheral edema. He will be admitted to [facility] for acute on chronic HFrEF Impression / Plan:  
Heart Failure Status: NYHA Class IV on admission ICM - Stage D, NYHA Class IV - LVEF 10% On metoprolol succinate, entresto, furosemide Hold Merida-Escobar Hold metoprolol succinate Start dobutamine 5mcg/kg/min IV bumex 2 mg BID Cont spironolactone 25 mg daily Daily weights, strict I/O, low NA diet  
  
 Severe AS 
           TAVR evaluation in process - per Dr. Bud Prabhakar 
  
 CAD with  of LAD On ASA, BB, statin Consider cardiac MRI to assess viability High Risk of SCD LifeVest 
  
 New onset Atrial fibrillation Cont eliquis 5 mg BID for CVA prophylaxis On metoprolol succinate for rate control Recommend outpatient PSG 
  
 HTN - well controlled On metoprolol succinate and entresto      
  
 HLD On lipitor 
  
T2DM On metformin, glipizide, canagliflozin 
  
  
 
 
rCis Lopez MD, Ming Johnson Chief of Cardiology, [facility] Medical Director [facility] [address], [address] Office [phone] Fax [phone] History: 
Past Medical History:  
Diagnosis Date  3-vessel coronary artery disease  Aortic stenosis, severe  Chronic kidney disease (CKD), stage III (moderate) (HCC)  Chronic total occlusion of coronary artery  Hypertension  Ischemic cardiomyopathy  Peripheral vascular disease ([facility] [address]  Type 2 diabetes mellitus treated without insulin ([facility] [address] Past Surgical History:  
Procedure Laterality Date  HX AMPUTATION TOE Left 2017 Social History Socioeconomic History  Marital status:  Spouse name: Not on file  Number of children: Not on file  Years of education: Not on file  Highest education level: Not on file Occupational History  Not on file Social Needs  Financial resource strain: Not on file  Food insecurity:  
  Worry: Not on file Inability: Not on file  Transportation needs:  
  Medical: Not on file Non-medical: Not on file Tobacco Use  Smoking status: Former Smoker  Smokeless tobacco: Never Used Substance and Sexual Activity  Alcohol use: Not Currently  Drug use: Not on file  Sexual activity: Not on file Lifestyle  Physical activity:  
  Days per week: Not on file Minutes per session: Not on file  Stress: Not on file Relationships  Social connections:  
  Talks on phone: Not on file Gets together: Not on file Attends Tenriism service: Not on file Active member of club or organization: Not on file Attends meetings of clubs or organizations: Not on file Relationship status: Not on file  Intimate partner violence:  
  Fear of current or ex partner: Not on file Emotionally abused: Not on file Physically abused: Not on file Forced sexual activity: Not on file Other Topics Concern  Not on file Social History Narrative  Not on file No family history on file. Current Medications:  
Current Facility-Administered Medications Medication Dose Route Frequency Provider Last Rate Last Dose  apixaban (ELIQUIS) tablet 5 mg  5 mg Oral BID Colette Schroeder, NP   5 mg at 05/09/19 1942  
 [START ON 5/10/2019] aspirin chewable tablet 81 mg  81 mg Oral DAILY Colette Schroeder, NP      
 atorvastatin (LIPITOR) tablet 40 mg  40 mg Oral QHS Colette Schroeder B, NP   40 mg at 05/09/19 2101  . PHARMACY TO SUBSTITUTE PER PROTOCOL (Reordered from: canagliflozin (INVOKANA) 300 mg tablet)    Per Protocol Colette Schroeder NP      
 [START ON 5/10/2019] glipiZIDE (GLUCOTROL) tablet 5 mg  5 mg Oral ACB&D Colette Schroeder NP      
 metFORMIN (GLUCOPHAGE) tablet 1,000 mg  1,000 mg Oral BID WITH MEALS Colette Schroeder NP   Stopped at 05/09/19 1900  [START ON 5/10/2019] metoprolol succinate (TOPROL-XL) XL tablet 100 mg  100 mg Oral DAILY Colette Schroeder, NP      
 [START ON 5/10/2019] spironolactone (ALDACTONE) tablet 25 mg  25 mg Oral DAILY Colette Schroeder, NP      
 sodium chloride (NS) flush 5-40 mL  5-40 mL IntraVENous Q8H Colette Schroeder, NP   10 mL at 05/09/19 2101  
 sodium chloride (NS) flush 5-40 mL  5-40 mL IntraVENous PRN Colette Schroeder, NP      
 ondansetron (ZOFRAN) injection 4 mg  4 mg IntraVENous Q6H PRN Colette Schroeder, NP      
 acetaminophen (TYLENOL) tablet 650 mg  650 mg Oral Q6H PRN Colette Schroeder, NP      
 docusate sodium (COLACE) capsule 100 mg  100 mg Oral PRN Colette Schroeder, NP      
 DOBUTamine (DOBUTREX) 500 mg/250 mL (2,000 mcg/mL) infusion  5 mcg/kg/min IntraVENous CONTINUOUS Colette Schroeder NP 12.7 mL/hr at 05/09/19 2011 5 mcg/kg/min at 05/09/19 2011  
 [START ON 5/10/2019] pantoprazole (PROTONIX) tablet 40 mg  40 mg Oral ACB Jocelyn Schroeder NP Allergies: Allergies Allergen Reactions  Penicillins Hives ROS:   
Review of Systems Constitutional: Positive for malaise/fatigue. HENT: Negative. Eyes: Negative. Respiratory: Positive for shortness of breath. Cardiovascular: Positive for leg swelling. Negative for orthopnea. Gastrointestinal:  
     Abdominal swelling Genitourinary: Negative. Musculoskeletal: Negative. Skin: Negative. Neurological: Positive for weakness. Endo/Heme/Allergies: Negative. Psychiatric/Behavioral: The patient is nervous/anxious. Physical Exam:  
Physical Exam  
Constitutional: He is oriented to person, place, and time. He is cooperative. He appears ill. HENT:  
Head: Normocephalic and atraumatic. Eyes: Pupils are equal, round, and reactive to light. EOM are normal.  
Neck: Normal range of motion. Neck supple. JVD present. Cardiovascular: Regular rhythm. Exam reveals gallop. Murmur heard. Pulmonary/Chest: Breath sounds normal.  
Abdominal: He exhibits distension. Musculoskeletal: Normal range of motion. He exhibits edema. Neurological: He is alert and oriented to person, place, and time. Skin: Skin is warm and dry. Psychiatric: He has a normal mood and affect. Vitals:  
Visit Vitals /79 Pulse (!) 57 Temp 97.5 °F (36.4 °C) Resp 13 Wt 186 lb 15.2 oz (84.8 kg) SpO2 98% BMI 26.82 kg/m² Temp (24hrs), Av.5 °F (36.4 °C), Min:97.5 °F (36.4 °C), Max:97.5 °F (36.4 °C) Admission Weight: Last Weight Weight: 186 lb 15.2 oz (84.8 kg) Weight: 186 lb 15.2 oz (84.8 kg) Intake / Output / Drain: 
Last 24 hrs.: No intake or output data in the 24 hours ending 19 Oxygen Therapy: 
Oxygen Therapy O2 Sat (%): 98 % (19 190) O2 Device: Room air (19) Recent Labs:  
Labs Latest Ref Rng & Units 5/3/2019 2019 WBC 4.1 - 11.1 K/uL 6.6 7.3  
RBC 4.10 - 5.70 M/uL 4.39 4.82 Hemoglobin 12.1 - 17.0 g/dL 12. 0(L) 12.8 Hematocrit 36.6 - 50.3 % 37.9 42.3 MCV 80.0 - 99.0 FL 86.3 87.8 Platelets 849 - 041 K/uL 200 235 Lymphocytes 12 - 49 % 20 17 Monocytes 5 - 13 % 8 7 Eosinophils 0 - 7 % 2 2 Basophils 0 - 1 % 1 1 Calcium 8.5 - 10.1 MG/DL 8.7 8.9 Glucose 65 - 100 mg/dL 117(H) 121(H) BUN 6 - 20 MG/DL 41(H) 45(H) Creatinine 0.70 - 1.30 MG/DL 1.84(H) 2.01(H) Sodium 136 - 145 mmol/L 137 136 Potassium 3.5 - 5.1 mmol/L 4.2 4.3 EKG:  
EKG Results Procedure 720 Value Units Date/Time EKG, 12 LEAD, INITIAL [[id number]] Collected:  05/09/19 2001 Order Status:  Completed Updated:  05/09/19 2004 Ventricular Rate 60 BPM   
  Atrial Rate 60 BPM   
  QRS Duration 86 ms   
  Q-T Interval 428 ms QTC Calculation (Bezet) 428 ms Calculated R Axis 34 degrees Calculated T Axis 142 degrees Diagnosis -- Atrial fibrillation Low voltage QRS Septal infarct , age undetermined No previous ECGs available No specialty comments available. CXR Results  (Last 48 hours) 05/09/19 2129  XR CHEST PA LAT Final result Impression:  IMPRESSION:  
   
Small right pleural effusion with adjacent atelectasis versus pneumonia. Recommend followup PA and lateral chest views in 8-10 weeks to ensure  
resolution. Narrative:  EXAM: XR CHEST PA LAT INDICATION: Dyspnea on exertion. Hospitalization for acute on chronic CHF. COMPARISON: None TECHNIQUE: PA and lateral chest views FINDINGS: Cardiac monitoring wires overlie the thorax. The cardiomediastinal and  
hilar contours are within normal limits. The pulmonary vasculature is within  
normal limits. Right lung base opacity has a component of pleural effusion. Left lung is clear. No pneumothorax. Bones are osteopenic. Shoulder arthritis is partially imaged. Echo Results  (Last 48 hours) None 5/2/19 LHC: EF 10% LM: Large caliber vessel without significant stenosis  
  
 LAD: Moderate caliber heavily calcified vessel that is occluded in the mid without distal collateralization. D1: Moderate caliber calcified vessel with severe diffuse disease 
  
LCX: Moderate caliber calcified vessel without significant stenosis. OM1: Moderate caliber vessel with luminal irregularities and small vessel severe distal disease OM2: Very small caliber vessel without significant stenosis.  
  
RCA:   Large caliber heavily calcified dominant vessel that is occluded in the mid. A small segment of the PDA is filled faintly by left to right collaterals. PDA: Occluded PLB: Occluded MD Lyndon Gibbs [address] [place] [address], [address] [place] [address] Office [phone] Fax [phone] 
24 hour VAD/HF Pager: [phone]

## 2019-05-09 NOTE — TELEPHONE ENCOUNTER
Called patient to advise hospital admission at [address] [place] [address]. Spoke with Dr. Lucía Amado who agrees with admission to [address] [place] [address].

## 2019-05-10 NOTE — PROGRESS NOTES
Orders received, chart reviewed and patient evaluated by physical therapy. Recommend patient to discharge to home pending progress with skilled acute care physical therapy. Recommend with nursing patient to complete as able in order to maintain strength, endurance and independence: OOB to chair 3x/day with assist x1 and ambulating with assist x1. Thank you for your assistance. Full evaluation to follow. Army Shalini, PT Vitals:  
 05/10/19 1202 05/10/19 1205 05/10/19 1212 05/10/19 1219 BP: (!) 78/45 (!) 81/58 (!) 87/53 (!) 86/55 BP 1 Location: Left arm Left arm Left arm Left arm BP Patient Position: Supine;Pre-activity Sitting Standing Sitting;Post activity Pulse: 62 77 75 77 Resp:      
Temp:      
SpO2: on room air 97% 100% 98% 98% Onelia Jeong

## 2019-05-10 NOTE — PROGRESS NOTES
Pt instructed to call for assistance as needed Bedside and Verbal shift change report given to Del Durán (oncoming nurse) by Isis Balderrama RN (offgoing nurse). Report included the following information SBAR, Kardex, Intake/Output, MAR and Recent Results.

## 2019-05-10 NOTE — PROGRESS NOTES
Problem: Mobility Impaired (Adult and Pediatric) Goal: *Acute Goals and Plan of Care (Insert Text) Description Physical Therapy Goals Initiated 5/10/2019 1. Patient will move from supine to sit and sit to supine , scoot up and down and roll side to side in bed with independence within 7 day(s). 2.  Patient will transfer from bed to chair and chair to bed with modified independence using the least restrictive device within 7 day(s). 3.  Patient will perform sit to stand with modified independence within 7 day(s). 4.  Patient will ambulate with modified independence for 300 feet with the least restrictive device within 7 day(s). 5.  Patient will ascend/descend 4 stairs with no handrail(s) with modified independence within 7 day(s). 6.  Patient will participate in a six minute walk test within 48 hours prior to discharge. Outcome: Progressing Towards Goal 
 
PHYSICAL THERAPY EVALUATION Patient: Ryley Freire (77 y.o. male) Date: 5/10/2019 Primary Diagnosis: Acute on chronic systolic (congestive) [facility] ([facility] [I50.23] Precautions:   Fall(and life vest) ASSESSMENT : 
Based on the objective data described below, the patient presents with low but stable BP (pt not symptomatic) that did improve with activity (discussed with his nurse), see chart below. .Pt was admitted with acute on chronic heart failure with an EF of 10% and was recently fit for a life vest. He is presently under the work up for a possible TAVR. Per pt: at baseline: independent, lives with close friends (like an extended family), and reports 2-3 falls in the last 12 months (one on the stairs just two days ago but rails have since been installed) At present level of mobility recommend discharge to home. Disposition will continue to be assessed as pt's treatment plan for his cardiac issues evolves.  
 
For the weekend I recommend that pt is up to the chair for meals and amb to the bathroom and in the hallway with nursing assist. While pt did fine with me without an assistive device, for the weekend I recommend use of a rolling walker as needed for pt fatigue. PT will follow up after the weekend. Vitals:  
  05/10/19 1202 05/10/19 1205 05/10/19 1212 05/10/19 1219 BP: (!) 78/45 (!) 81/58 (!) 87/53 (!) 86/55 BP 1 Location: Left arm Left arm Left arm Left arm BP Patient Position: Supine;Pre-activity Sitting Standing Sitting;Post activity Pulse: 62 77 75 77 Resp:          
Temp:          
SpO2: on room air 97% 100% 98% 98% Patient will benefit from skilled intervention to address the above impairments. Patients rehabilitation potential is considered to be Good Factors which may influence rehabilitation potential include:  
? None noted ? Mental ability/status ? Medical condition ? Home/family situation and support systems ? Safety awareness 
? Pain tolerance/management 
? Other: PLAN : 
Recommendations and Planned Interventions: 
?           Bed Mobility Training             ? Neuromuscular Re-Education ? Transfer Training                   ? Orthotic/Prosthetic Training 
? Gait Training                         ? Modalities ? Therapeutic Exercises           ? Edema Management/Control ? Therapeutic Activities            ? Patient and Family Training/Education ? Other (comment): Frequency/Duration: Patient will be followed by physical therapy  5 times a week to address goals. Discharge Recommendations: To Be Determined and None Further Equipment Recommendations for Discharge: none at this point SUBJECTIVE:  
Patient stated I got rails put up on the stairs yesterday.  OBJECTIVE DATA SUMMARY:  
Consult received, chart reviewed, pt cleared by nursing HISTORY:   
Past Medical History: Diagnosis Date  3-vessel coronary artery disease  Aortic stenosis, severe  Chronic kidney disease (CKD), stage III (moderate) ([facility])  Chronic total occlusion of coronary artery  Hypertension  Ischemic cardiomyopathy  Peripheral vascular disease ([facility]  Type 2 diabetes mellitus treated without insulin ([facility] Past Surgical History:  
Procedure Laterality Date  HX AMPUTATION TOE Left 2017 Prior Level of Function/Home Situation: Per pt: at baseline: independent, lives with close friends (like an extended family), and reports 2-3 falls in the last 12 months (one on the stairs just two days ago) Personal factors and/or comorbidities impacting plan of care: heart failure and received lift vest 
 
Home Situation Home Environment: Private residence # Steps to Enter: 4 Rails to Enter: Yes Hand Rails : Bilateral 
One/Two Story Residence: Two story, live on 1st floor Living Alone: No 
Support Systems: Friends \ neighbors(live together) Patient Expects to be Discharged to[de-identified] Private residence Current DME Used/Available at Home: Glucometer, Blood pressure cuff EXAMINATION/PRESENTATION/DECISION MAKING:  
Critical Behavior: 
  
  
  
  
Hearing: Auditory Auditory Impairment: None Skin:  refer to MD and nursing notes Edema: refer to MD and nursing notes Range Of Motion: 
AROM: Generally decreased, functional 
  
  
  
  
  
  
  
Strength:   
Strength: Generally decreased, functional 
  
  
  
  
  
  
Tone & Sensation:  
  
  
  
  
  
  
  
  
  
   
Coordination: 
  
Vision:  
  
Functional Mobility: 
Bed Mobility: 
  
Supine to Sit: Supervision Transfers: 
Sit to Stand: Contact guard assistance Stand to Sit: Contact guard assistance Balance:  
Sitting: Intact; Without support Standing: Impaired; Without support Standing - Static: Good Standing - Dynamic : Good Ambulation/Gait Training: 
Distance (ft): 40 Feet (ft) Assistive Device: Gait belt Ambulation - Level of Assistance: Contact guard assistance Gait Abnormalities: Decreased step clearance Base of Support: Widened Stairs: Therapeutic Exercises:  
 
 
Functional Measure: 
Timed up and go: 
 
Timed Get Up And Go Test: 16(extrapolated) < than 10 seconds=Normal  Greater then 13.5 seconds (in elderly)=Increased fall risk Roseanne OVALLE. Predicting the probability for falls in community dwelling older adults using the Timed Up and Go Test. Phys Ther. 2000;80:[id number]. Physical Therapy Evaluation Charge Determination History Examination Presentation Decision-Making HIGH Complexity :3+ comorbidities / personal factors will impact the outcome/ POC  MEDIUM Complexity : 3 Standardized tests and measures addressing body structure, function, activity limitation and / or participation in recreation  LOW Complexity : Stable, uncomplicated  LOW Complexity : FOTO score of  Based on the above components, the patient evaluation is determined to be of the following complexity level: LOW Pain: 
Pain Scale 1: Numeric (0 - 10) Pain Intensity 1: 3 Pain Location 1: Back Pain Orientation 1: Lower Pain Description 1: Tightness Pain Intervention(s) 1: Emotional support Activity Tolerance:  
See assessment above Please refer to the flowsheet for vital signs taken during this treatment. After treatment:  
?         Patient left in no apparent distress sitting up in chair ? Patient left in no apparent distress in bed 
? Call bell left within reach ? Nursing notified ? Caregiver present ? Bed alarm activated COMMUNICATION/EDUCATION:  
The patients plan of care was discussed with: Registered Nurse. ?         Fall prevention education was provided and the patient/caregiver indicated understanding. ?         Patient/family have participated as able in goal setting and plan of care. ?         Patient/family agree to work toward stated goals and plan of care. ?         Patient understands intent and goals of therapy, but is neutral about his/her participation. ? Patient is unable to participate in goal setting and plan of care. Thank you for this referral. 
Simona Maxwell Time Calculation: 31 mins

## 2019-05-10 NOTE — PROGRESS NOTES
05/09/19 2022 Dale [facility] Fall Risk Mobility 1.0 Mobility Interventions Communicate number of staff needed for ambulation/transfer;Patient to call before getting OOB Mentation 0 Medication 1 Medication Interventions Evaluate medications/consider consulting pharmacy; Patient to call before getting OOB Elimination 1.0 Elimination Interventions Call light in reach; Patient to call for help with toileting needs; Toileting schedule/hourly rounds Prior Fall History 1 History of Falls Interventions Door open when patient unattended;Evaluate medications/consider consulting pharmacy; Investigate reason for fall;Room close to nurse's station;Utilize gait belt for transfer/ambulation Total Score 4 Standard Fall Precautions Yes High Fall Risk Yes Verified jovana fall risk, we won't be able to perform six minute walk test due to high fall risk.

## 2019-05-10 NOTE — CARDIO/PULMONARY
Cardiac Rehab: Living with Heart Failure Booklet given to Meghna Mejia.   
 
Educated using teach back method. Discussed diagnosis definition and assessed patient understanding. He is being evaluated for TAVR. Reviewed importance of daily weight monitoring and Low Sodium diet (9716-5493 mg. Daily). Discussed the Cardiac Rehab Program, benefits, format, and encouraged enrollment, when eligible. Patient is interested and we will follow up, by phone, once eligible.  
Jodie Johnson RN

## 2019-05-10 NOTE — DIABETES MGMT
DTC Progress Note Recommendations/ Comments: Chart reviewed for hypoglycemia, pt with BG of 78 mg/dl on chemistry at 0322am 
 
If appropriate, please consider: 
Adding Humalog for correction, high sensitivity scale Current hospital DM medication: glipizide 5 mg BID and Metformin 1000 mg BID Chart reviewed on Rachael Gomes. Patient is a 77 y.o. male with hx Type 2 Diabetes on Glipizide 5 mg BID and Metformin 1000 mg BID at home. A1c:  
Lab Results Component Value Date/Time Hemoglobin A1c 6.9 (H) 05/02/2019 04:09 PM  
 
 
Recent Glucose Results:  
Lab Results Component Value Date/Time GLU 78 05/10/2019 03:22 AM  
 GLUCPOC 135 (H) 05/09/2019 09:03 PM  
  
 
Lab Results Component Value Date/Time Creatinine 2.26 (H) 05/10/2019 03:22 AM  
 
Estimated Creatinine Clearance: 31.4 mL/min (A) (based on SCr of 2.26 mg/dL (H)). Active Orders Diet DIET CARDIAC Regular; 2 GM NA (House Low NA) PO intake: No data found. Will continue to follow as needed. Thank you Corry Mcfarlane RD, CDE [facility] Pager: [phone] Time spent: 10 min

## 2019-05-10 NOTE — WOUND CARE
WOCN Note:  
 
New consult placed for sacral and lower extremity assessment. Chart reviewed. Admitted DX:  Acute on chronic systolic (congestive) heart failure ([facility] [I50.23] Past Medical History:  HTN, T2DM, PVD, CAD, ICM, severe AS. Assessment:  
Patient is A&O x 3, communicative, continent and mobile independently. Bed: total care Heel intact without erythema. Lower extremity edema. Dry abrasions to bilateral knees from falls PTA. Bilateral buttocks and gluteal cleft have dry blanching pink skin with dark dyschromia. 1.  Left medial low leg, venous leg ulcer:  100% yellow base; no exudate; no odor; mild erythema to flo wound; edema present. Recommendations: 1. Left medial low leg:  Daily cleanse with saline; apply Xeroform; cover with dry dressing. 2.  Knees and buttocks:  Twice daily apply Aloe [place] cream. 
 
Skin Care & Pressure Prevention: 
Minimize layers of linen/pads under patient to optimize support surface. Turn/reposition approximately every 2 hours and offload heels. Discussed above plan with patient and RN. Transition of Care: Plan to follow as needed while admitted to hospital. 
 
Ranell Aschoff, BSN RN [facility] Wound Care Office [phone] Pager [phone]

## 2019-05-10 NOTE — NURSE NAVIGATOR
Chart reviewed by Heart Failure Nurse Navigator. Heart Failure database completed. EF:  10% ACEi/ARB/ARNi: Held renal failure BB: Held while on Dobutamine Aldosterone Antagonist: Aldactone Obstructive Sleep Apnea Screening: STOP-BANG score: 
 Referred to Sleep Medicine: CRT   
 
NYHA Functional Class IV Heart Failure Teach Back in Patient Education. Heart Failure Avoiding Triggers on Discharge Instructions. [address] discharge follow up phone call to be made within 48-72 hours of discharge. Medicare Heart Failure bundle Active

## 2019-05-10 NOTE — ROUTINE PROCESS
Primary Nurse Jermaine Rebolledo RN and Ezequiel Toscano RN performed a dual skin assessment on this patient Impairment noted- see wound doc flow sheet - abrasion on L knee and red, nonblanchable sacrum Terry score is 19 Bedside shift change report given to Ezequiel Toscano RN (oncoming nurse) by Stephen Forrest RN (offgoing nurse).  Report included the following information SBAR, Kardex, ED Summary, Intake/Output, MAR, Accordion, Recent Results and Cardiac Rhythm SB.

## 2019-05-10 NOTE — PROGRESS NOTES
[facility] Progress Note DOS:   5/10/2019 NAME:  Jerome Morris  
MRN:   [id number] REFERRING PROVIDER:  Dr. Thu Serna PRIMARY CARE PHYSICIAN: Jennifer Stokes MD 
 
 
Chief Complaint: [de-identified] 
 
HPI:77y.o. year old male with a history of HTN, T2DM, PVD, CAD, ICM, severe AS who presents for further evaluation of his chronic systolic heart failure. He developed progressive fatigue and dyspnea with exertion prompting further evaluation by Dr. Thu Serna with a heart cath at [facility]. His cath revealed severe 3 vessel disease with  of his LAD, severe LV systolic failure (LVEF 74%), and severe AS. He is unable to walk a block without limiting dyspnea and develops dyspnea with making the bed. He denies orthopnea, PND, but has bilateral peripheral edema. He will be admitted to [facility] for acute on chronic HFrEF 24Hr Events Admitted to [facility] Started on dobutamine Diuresed Creatinine slightly improved Impression / Plan:  
Heart Failure Status: NYHA Class IV on admission ICM - Stage D, NYHA Class IV - LVEF 10% Stop toprol while on Dobutamine Cont Spironolactone Hold Entresto until renal failure improves Cont dobutamine 5mcg/kg/min Decrease IV bumex 1mg BID Daily weights, strict I/O, low NA diet Will order CMRI 
  
 Severe AS 
           TAVR evaluation in process - per Dr. Kathleen Borden Unable to do CTA due to renal function at this time  
  
 CAD with  of LAD On ASA, BB, statin   
           cardiac MRI to assess viability High Risk of SCD Cont to wear lifevest while admitted  
  
 New onset Atrial fibrillation Cont eliquis 5 mg BID for CVA prophylaxis- may need to decrease if renal function does not improved Recommend outpatient PSG 
 TFTs WNL             
  
 HLD On lipitor 
  
T2DM On metformin, glipizide, canagliflozin Dispo: Remain in IMCU until volume status improved Patient seen and examined. Data and note reviewed. I have discussed and agree with the plans as noted. 77year old male with a history of severe CAD with  of his LAD, severe AS, and severely reduced LV systolic function. He was admitted with acute on chronic systolic heart failure and THEODORE on CKD. His RAASi was discontinued. IV dobutamine was started. Will follow his renal function closely - if no improvement, he would benefit from a PA cath to guide optimization. Cardiac MRI to assess myocardial viability and TAVR CTA once renal function improves. Thank you for allowing us to participate in your patient's care. Cris Rai MD, No Emanuel Chief of Cardiology, [facility] Medical Director Ned Lackey [address], [address] Office [phone] Fax [phone] History: 
Past Medical History:  
Diagnosis Date  3-vessel coronary artery disease  Aortic stenosis, severe  Chronic kidney disease (CKD), stage III (moderate) (HCC)  Chronic total occlusion of coronary artery  Hypertension  Ischemic cardiomyopathy  Peripheral vascular disease ([address]  Type 2 diabetes mellitus treated without insulin ([address] Past Surgical History:  
Procedure Laterality Date  HX AMPUTATION TOE Left 2017 Social History Socioeconomic History  Marital status:  Spouse name: Not on file  Number of children: Not on file  Years of education: Not on file  Highest education level: Not on file Occupational History  Not on file Social Needs  Financial resource strain: Not on file  Food insecurity:  
  Worry: Not on file Inability: Not on file  Transportation needs:  
  Medical: Not on file Non-medical: Not on file Tobacco Use  Smoking status: Former Smoker  Smokeless tobacco: Never Used Substance and Sexual Activity  Alcohol use: Not Currently  Drug use: Not on file  Sexual activity: Not on file Lifestyle  Physical activity:  
  Days per week: Not on file Minutes per session: Not on file  Stress: Not on file Relationships  Social connections:  
  Talks on phone: Not on file Gets together: Not on file Attends [employer] service: Not on file Active member of club or organization: Not on file Attends meetings of clubs or organizations: Not on file Relationship status: Not on file  Intimate partner violence:  
  Fear of current or ex partner: Not on file Emotionally abused: Not on file Physically abused: Not on file Forced sexual activity: Not on file Other Topics Concern  Not on file Social History Narrative  Not on file No family history on file. Current Medications:  
Current Facility-Administered Medications Medication Dose Route Frequency Provider Last Rate Last Dose  bumetanide (BUMEX) injection 1 mg  1 mg IntraVENous BID Elda, Colette B, NP      
 insulin lispro (HUMALOG) injection   SubCUTAneous AC&HS Elda, Colette B, NP      
 glucose chewable tablet 16 g  4 Tab Oral PRN Elda, Colette B, NP      
 dextrose (D50W) injection syrg 12.5-25 g  12.5-25 g IntraVENous PRN Elda, Colette B, NP      
 glucagon (GLUCAGEN) injection 1 mg  1 mg IntraMUSCular PRN Elda, Colette B, NP      
 apixaban (ELIQUIS) tablet 5 mg  5 mg Oral BID Elda, Colette B, NP   5 mg at 05/10/19 8664  aspirin chewable tablet 81 mg  81 mg Oral DAILY Elda, Colette B, NP   81 mg at 05/10/19 0837  
 atorvastatin (LIPITOR) tablet 40 mg  40 mg Oral QHS Elda, Colette B, NP   40 mg at 05/09/19 2101  . PHARMACY TO SUBSTITUTE PER PROTOCOL (Reordered from: canagliflozin (INVOKANA) 300 mg tablet)    Per Protocol Elda, Colette B, NP      
 glipiZIDE (GLUCOTROL) tablet 5 mg  5 mg Oral ACB&D Elda, Colette B, NP   5 mg at 05/10/19 2568  metFORMIN (GLUCOPHAGE) tablet 1,000 mg  1,000 mg Oral BID WITH MEALS Elda, Colette B, NP   1,000 mg at 05/10/19 4593  spironolactone (ALDACTONE) tablet 25 mg  25 mg Oral DAILY Elda, Colette B, NP   25 mg at 05/10/19 0837  
 sodium chloride (NS) flush 5-40 mL  5-40 mL IntraVENous Q8H Elda, Colette B, NP   10 mL at 05/10/19 9954  sodium chloride (NS) flush 5-40 mL  5-40 mL IntraVENous PRN Elda, Colette B, NP      
 ondansetron (ZOFRAN) injection 4 mg  4 mg IntraVENous Q6H PRN Elda, Colette B, NP      
 acetaminophen (TYLENOL) tablet 650 mg  650 mg Oral Q6H PRN Elda, Colette B, NP   650 mg at 05/09/19 2351  docusate sodium (COLACE) capsule 100 mg  100 mg Oral PRN Lam MC NP      
 DOBUTamine (DOBUTREX) 500 mg/250 mL (2,000 mcg/mL) infusion  5 mcg/kg/min IntraVENous CONTINUOUS Elda, Colette B, NP 12.7 mL/hr at 05/09/19 2011 5 mcg/kg/min at 05/09/19 2011  pantoprazole (PROTONIX) tablet 40 mg  40 mg Oral ACB Elda, Colette B, NP   40 mg at 05/10/19 6042 Allergies: Allergies Allergen Reactions  Penicillins Hives ROS:   
Review of Systems Constitutional: Positive for malaise/fatigue. HENT: Negative. Eyes: Negative. Respiratory: Positive for shortness of breath. Cardiovascular: Positive for leg swelling. Negative for orthopnea. Gastrointestinal:  
     Abdominal swelling Genitourinary: Negative. Musculoskeletal: Negative. Skin: Negative. Neurological: Positive for weakness. Endo/Heme/Allergies: Negative. Psychiatric/Behavioral: The patient is nervous/anxious. Physical Exam:  
Physical Exam  
Constitutional: He is oriented to person, place, and time. He is cooperative. He appears ill. HENT:  
Head: Normocephalic and atraumatic. Eyes: Pupils are equal, round, and reactive to light. EOM are normal.  
Neck: Normal range of motion. Neck supple. JVD present. Cardiovascular: Regular rhythm. Exam reveals gallop. Murmur heard. Pulmonary/Chest: Breath sounds normal.  
Abdominal: He exhibits distension. Musculoskeletal: Normal range of motion. He exhibits edema. Neurological: He is alert and oriented to person, place, and time. Skin: Skin is warm and dry. Psychiatric: He has a normal mood and affect. Vitals:  
Visit Vitals BP (!) 83/55 (BP 1 Location: Left arm, BP Patient Position: At rest) Pulse 67 Temp 97.4 °F (36.3 °C) Resp 16 Wt 187 lb 9.8 oz (85.1 kg) SpO2 99% BMI 26.92 kg/m² Temp (24hrs), Av.7 °F (36.5 °C), Min:97.4 °F (36.3 °C), Max:98.2 °F (36.8 °C) Admission Weight: Last Weight Weight: 186 lb 15.2 oz (84.8 kg) Weight: 187 lb 9.8 oz (85.1 kg) Intake / Output / Drain: 
Last 24 hrs.:  
 
Intake/Output Summary (Last 24 hours) at 5/10/2019 1129 Last data filed at 5/10/2019 9535 Gross per 24 hour Intake  Output 2305 ml Net -2305 ml Oxygen Therapy: 
Oxygen Therapy O2 Sat (%): 99 % (05/10/19 1112) O2 Device: Room air (05/10/19 0406) Recent Labs:  
Labs Latest Ref Rng & Units 5/10/2019 5/3/2019 2019 WBC 4.1 - 11.1 K/uL 6.1 6.6 7.3  
RBC 4.10 - 5.70 M/uL 4.19 4.39 4.82 Hemoglobin 12.1 - 17.0 g/dL 11. 5(L) 12. 0(L) 12.8 Hematocrit 36.6 - 50.3 % 36. 0(L) 37.9 42.3 MCV 80.0 - 99.0 FL 85.9 86.3 87.8 Platelets 955 - 205 K/uL 173 200 235 Lymphocytes 12 - 49 % - 20 17 Monocytes 5 - 13 % - 8 7 Eosinophils 0 - 7 % - 2 2 Basophils 0 - 1 % - 1 1 Albumin 3.5 - 5.0 g/dL 3. 1(L) - -  
Calcium 8.5 - 10.1 MG/DL 9.2 8.7 8.9 SGOT 15 - 37 U/L 15 - -  
Glucose 65 - 100 mg/dL 78 117(H) 121(H) BUN 6 - 20 MG/DL 56(H) 41(H) 45(H) Creatinine 0.70 - 1.30 MG/DL 2.26(H) 1.84(H) 2.01(H) Sodium 136 - 145 mmol/L 135(L) 137 136 Potassium 3.5 - 5.1 mmol/L 3.7 4.2 4.3 EKG:  
EKG Results Procedure 720 Value Units Date/Time EKG, 12 LEAD, INITIAL [[id number]] Collected:  05/10/19 1121 Order Status:  Completed Updated:  05/10/19 1124 Ventricular Rate 64 BPM   
  Atrial Rate 77 BPM   
  QRS Duration 104 ms Q-T Interval 460 ms QTC Calculation (Bezet) 474 ms Calculated P Axis 41 degrees Calculated R Axis 35 degrees Calculated T Axis 133 degrees Diagnosis --  
  Sinus rhythm with AV dissociation and Junctional rhythm Low voltage QRS Cannot rule out Anteroseptal infarct (cited on or before 09-MAY-2019) When compared with ECG of 09-MAY-2019 20:01, 
Junctional rhythm has replaced Atrial fibrillation Questionable change in initial forces of Anterior leads Nonspecific T wave abnormality no longer evident in Inferior leads Nonspecific T wave abnormality, improved in Lateral leads EKG, 12 LEAD, INITIAL [[id number]] Collected:  05/09/19 2001 Order Status:  Completed Updated:  05/10/19 9457 Ventricular Rate 60 BPM   
  Atrial Rate 60 BPM   
  QRS Duration 86 ms   
  Q-T Interval 428 ms QTC Calculation (Bezet) 428 ms Calculated R Axis 34 degrees Calculated T Axis 142 degrees Diagnosis -- Atrial fibrillation Low voltage QRS Septal infarct , age undetermined No previous ECGs available Confirmed by Carol Quiñones MD, Anita Galvez ([id number]) on 5/10/2019 8:37:23 AM 
  
 [address] [place] [address] [[id number]] Collected:  05/10/19 7742 Order Status:  Completed Updated:  05/10/19 7806 No specialty comments available. CXR Results  (Last 48 hours) 05/09/19 2129  XR CHEST PA LAT Final result Impression:  IMPRESSION:  
   
Small right pleural effusion with adjacent atelectasis versus pneumonia. Recommend followup PA and lateral chest views in 8-10 weeks to ensure  
resolution. Narrative:  EXAM: XR CHEST PA LAT INDICATION: Dyspnea on exertion. Hospitalization for acute on chronic CHF. COMPARISON: None TECHNIQUE: PA and lateral chest views FINDINGS: Cardiac monitoring wires overlie the thorax. The cardiomediastinal and  
hilar contours are within normal limits. The pulmonary vasculature is within  
normal limits. Right lung base opacity has a component of pleural effusion. Left lung is clear. No pneumothorax. Bones are osteopenic. Shoulder arthritis is partially imaged. Echo Results  (Last 48 hours) None 5/2/19 LHC: EF 10% LM: Large caliber vessel without significant stenosis  
  
LAD: Moderate caliber heavily calcified vessel that is occluded in the mid without distal collateralization. D1: Moderate caliber calcified vessel with severe diffuse disease 
  
LCX: Moderate caliber calcified vessel without significant stenosis. OM1: Moderate caliber vessel with luminal irregularities and small vessel severe distal disease OM2: Very small caliber vessel without significant stenosis.  
  
RCA:   Large caliber heavily calcified dominant vessel that is occluded in the mid. A small segment of the PDA is filled faintly by left to right collaterals. PDA: Occluded PLB: Occluded BRENDEN Santizo [address], [address] [place] [address] Office [phone] Fax [phone] 
24 hour VAD/HF Pager: [phone]

## 2019-05-11 NOTE — PROGRESS NOTES
1515: BP 88/59. NP Kylah notified via tigertext. NP Kylah gave orders via tigertext to hold all further diuretics. Will continue to monitor. 1930: Bedside shift change report given to Shirlene Lucia RN (oncoming nurse) by Israel Mcintosh RN (offgoing nurse). Report included the following information SBAR, Kardex, Intake/Output, MAR, Recent Results and Cardiac Rhythm A fib/sinus arrythmia. Problem: Falls - Risk of 
Goal: *Absence of Falls Description Document Sharon Latin Fall Risk and appropriate interventions in the flowsheet. Outcome: Progressing Towards Goal 
Note:  
Fall Risk Interventions: 
Mobility Interventions: Communicate number of staff needed for ambulation/transfer, Patient to call before getting OOB Medication Interventions: Evaluate medications/consider consulting pharmacy Elimination Interventions: Call light in reach History of Falls Interventions: Consult care management for discharge planning Problem: Heart Failure: Day 3 Goal: Activity/Safety Outcome: Progressing Towards Goal 
Note:  
Patient up with one assist. Up to chair today. Goal: Diagnostic Test/Procedures Outcome: Progressing Towards Goal 
Note:  
Patient scheduled for cardiac MRI on 5/14. Getting worked up for potential TAVR and/or LVAD. Goal: Medications Outcome: Progressing Towards Goal 
Note:  
Patient on dobutamine drip. Receiving bumex and aldactone. Goal: Respiratory Outcome: Progressing Towards Goal 
Note:  
Pt on room air. Maintaining SpO2 >90%. No complaints of dyspnea or acute distress. Will continue to monitor. Goal: Treatments/Interventions/Procedures Outcome: Progressing Towards Goal 
Note: Pt wearing life vest 
Goal: *Hemodynamically stable Outcome: Progressing Towards Goal 
Note:  
Blood pressure slightly low overnight with systolic blood pressure between 80-100s. Dr. Murillo Likens aware. Will continue to monitor.

## 2019-05-11 NOTE — PROGRESS NOTES
Problem: Diabetes Self-Management Goal: *Using medications safely Description State effect of diabetes medications on diabetes; name diabetes medication taking, action and side effects. Outcome: Progressing Towards Goal 
Note:  
[facility] center met with pt today. Metformin discontinued d/t increased Cr and Invokana discontinued d/t increased GFR. DM being controlled with Glipizide and Sliding Scale insulin. Pt also uses Trulicity to control BG at home. Trulicity not being used during hospitalization d/t availability. Problem: Falls - Risk of 
Goal: *Absence of Falls Description Document Giovanny Aranda Fall Risk and appropriate interventions in the flowsheet. Outcome: Progressing Towards Goal 
Note:  
Fall Risk Interventions: 
Mobility Interventions: Communicate number of staff needed for ambulation/transfer, Patient to call before getting OOB, PT Consult for mobility concerns, PT Consult for assist device competence, OT consult for ADLs, Utilize walker, cane, or other assistive device, Utilize gait belt for transfers/ambulation Bedside shift change report given to Diogo Garcia RN (oncoming nurse) by Jamila Parker RN (offgoing nurse). Report included the following information SBAR, Kardex, ED Summary, Intake/Output, MAR, Accordion, Recent Results and Cardiac Rhythm sinus arrhythmia . Medication Interventions: Evaluate medications/consider consulting pharmacy, Patient to call before getting OOB, Teach patient to arise slowly Elimination Interventions: Call light in reach, Patient to call for help with toileting needs, Toilet paper/wipes in reach, Toileting schedule/hourly rounds, Urinal in reach History of Falls Interventions: Consult care management for discharge planning, Door open when patient unattended, Evaluate medications/consider consulting pharmacy, Investigate reason for fall

## 2019-05-11 NOTE — PROGRESS NOTES
Problem: Heart Failure: Day 2 Goal: Activity/Safety Outcome: Progressing Towards Goal 
 
Bedside shift change report given to 1710 Didier Ariza (oncoming nurse) by Nazanin Leonard RN (offgoing nurse). Report included the following information SBAR, Kardex, Intake/Output, MAR, Accordion and Recent Results. Last 3 Recorded Weights in this Encounter 05/10/19 0413 05/10/19 1745 05/11/19 0440 Weight: 85.1 kg (187 lb 9.8 oz) 81.6 kg (180 lb) 81.3 kg (179 lb 3.7 oz)

## 2019-05-11 NOTE — PROGRESS NOTES
Problem: Heart Failure: Day 1 Goal: Activity/Safety Outcome: Progressing Towards Goal 
Note:  
Pt up with one person assistance and a walker if needed. PT and OT following. Pt instructed to utilize call bell for assistance. Pt belongings within reach. Goal: Consults, if ordered Outcome: Progressing Towards Goal 
Note:  
Advanced HF following pt. Goal: Diagnostic Test/Procedures Outcome: Progressing Towards Goal 
Note:  
EF 10%. Cardiac MRI scheduled for 5-14-19. Goal: Nutrition/Diet Outcome: Progressing Towards Goal 
Note:  
Cardiac low Na diet included in plan of care. Goal: Medications Outcome: Progressing Towards Goal 
Note:  
Pt receiving Eliquis for history of A-fib. Aspirin, Spironolactone, and Bumex also included. Bumex decreased today from 2mg BID to 1mg BID d/t low BPs. Output recorded in chart. Goal: *Oxygen saturation within defined limits Outcome: Progressing Towards Goal 
Note:  
O2 saturations remain above 94% throughout shift while on RA.

## 2019-05-11 NOTE — PROGRESS NOTES
[facility] Progress Note DOS:   5/11/2019 NAME:  Urbano Mitchell  
MRN:   [id number] REFERRING PROVIDER:  Dr. Barbara Singleton PRIMARY CARE PHYSICIAN: Shayna Connor MD 
 
 
Chief Complaint: [de-identified] 
 
HPI:77y.o. year old male with a history of HTN, T2DM, PVD, CAD, ICM, severe AS who presents for further evaluation of his chronic systolic heart failure. He developed progressive fatigue and dyspnea with exertion prompting further evaluation by Dr. Barbara Singleton with a heart cath at [facility]. His cath revealed severe 3 vessel disease with  of his LAD, severe LV systolic failure (LVEF 40%), and severe AS. He is unable to walk a block without limiting dyspnea and develops dyspnea with making the bed. He denies orthopnea, PND, but has bilateral peripheral edema. He will be admitted to [facility] for acute on chronic HFrEF 24Hr Events Excellent diuresis on current inotropic and diuretic regimen Cr improving Symptoms improving Impression / Plan:  
Heart Failure Status: NYHA Class IV on admission ICM - Stage D, NYHA Class IV - LVEF 10% Stop toprol while on Dobutamine Cont Spironolactone Hold Entresto until renal failure improves Cont dobutamine 5mcg/kg/min Continue IV bumex 1mg BID Daily weights, strict I/O, low NA diet cMRI ordered - scheduling pending  
  
 Severe AS 
           TAVR evaluation in process - per Dr. Yesica Kim Unable to do CTA due to renal function at this time  
  
 CAD with  of LAD On ASA, BB, statin cMRI to assess viability High Risk of SCD Cont to wear lifevest while admitted  
  
 New onset Atrial fibrillation Cont eliquis 5 mg BID for CVA prophylaxis- may need to decrease if renal function does not improved Recommend outpatient PSG 
 TFTs WNL             
  
 HLD On lipitor 
  
T2DM On glipizide Dispo: Remain in IMCU until volume status improved History: 
Past Medical History:  
Diagnosis Date  3-vessel coronary artery disease  Aortic stenosis, severe  Chronic kidney disease (CKD), stage III (moderate) (HCC)  Chronic total occlusion of coronary artery  Hypertension  Ischemic cardiomyopathy  Peripheral vascular disease ([facility]  Type 2 diabetes mellitus treated without insulin ([facility] Past Surgical History:  
Procedure Laterality Date  HX AMPUTATION TOE Left 2017 Social History Socioeconomic History  Marital status:  Spouse name: Not on file  Number of children: Not on file  Years of education: Not on file  Highest education level: Not on file Occupational History  Not on file Social Needs  Financial resource strain: Not on file  Food insecurity:  
  Worry: Not on file Inability: Not on file  Transportation needs:  
  Medical: Not on file Non-medical: Not on file Tobacco Use  Smoking status: Former Smoker  Smokeless tobacco: Never Used Substance and Sexual Activity  Alcohol use: Not Currently  Drug use: Not on file  Sexual activity: Not on file Lifestyle  Physical activity:  
  Days per week: Not on file Minutes per session: Not on file  Stress: Not on file Relationships  Social connections:  
  Talks on phone: Not on file Gets together: Not on file Attends Jain service: Not on file Active member of club or organization: Not on file Attends meetings of clubs or organizations: Not on file Relationship status: Not on file  Intimate partner violence:  
  Fear of current or ex partner: Not on file Emotionally abused: Not on file Physically abused: Not on file Forced sexual activity: Not on file Other Topics Concern  Not on file Social History Narrative  Not on file No family history on file. Current Medications: Current Facility-Administered Medications Medication Dose Route Frequency Provider Last Rate Last Dose  potassium chloride SR (KLOR-CON 10) tablet 40 mEq  40 mEq Oral DAILY Caleb Montero, NP      
 bumetanide (BUMEX) injection 1 mg  1 mg IntraVENous BID Elda, Colette B, NP   1 mg at 05/11/19 0846  
 insulin lispro (HUMALOG) injection   SubCUTAneous AC&HS Leopold Grates B, NP   Stopped at 05/10/19 2200  
 glucose chewable tablet 16 g  4 Tab Oral PRN Elda Faustina Lipps B, NP      
 dextrose (D50W) injection syrg 12.5-25 g  12.5-25 g IntraVENous PRN Elda, Colette B, NP      
 glucagon (GLUCAGEN) injection 1 mg  1 mg IntraMUSCular PRN Elda, Colette B, NP      
 traMADol (ULTRAM) tablet 50 mg  50 mg Oral Q8H PRN Elda, Colette B, NP   50 mg at 05/10/19 2321  apixaban (ELIQUIS) tablet 5 mg  5 mg Oral BID Elda, Colette B, NP   5 mg at 05/11/19 0846  
 aspirin chewable tablet 81 mg  81 mg Oral DAILY Elda, Colette B, NP   81 mg at 05/11/19 0846  
 atorvastatin (LIPITOR) tablet 40 mg  40 mg Oral QHS Elda, Colette B, NP   40 mg at 05/10/19 2132  
 glipiZIDE (GLUCOTROL) tablet 5 mg  5 mg Oral ACB&D Leopold Grates B, NP   5 mg at 05/11/19 2677  spironolactone (ALDACTONE) tablet 25 mg  25 mg Oral DAILY Elda, Colette B, NP   25 mg at 05/11/19 6709  sodium chloride (NS) flush 5-40 mL  5-40 mL IntraVENous Q8H Elda, Colette B, NP   10 mL at 05/11/19 6780  sodium chloride (NS) flush 5-40 mL  5-40 mL IntraVENous PRN Elda, Colette B, NP      
 ondansetron (ZOFRAN) injection 4 mg  4 mg IntraVENous Q6H PRN Elda, Colette B, NP      
 acetaminophen (TYLENOL) tablet 650 mg  650 mg Oral Q6H PRN Elda, Colette B, NP   650 mg at 05/09/19 2351  docusate sodium (COLACE) capsule 100 mg  100 mg Oral PRN Mir Schroederin B, NP      
 DOBUTamine (DOBUTREX) 500 mg/250 mL (2,000 mcg/mL) infusion  5 mcg/kg/min IntraVENous CONTINUOUS Elda, Colette B, NP 12.7 mL/hr at 05/10/19 1717 5 mcg/kg/min at 05/10/19 1717  pantoprazole (PROTONIX) tablet 40 mg  40 mg Oral ACB Elda, Erin JESUSITA, NP   40 mg at 19 5857 Allergies: Allergies Allergen Reactions  Penicillins Hives ROS:   
Review of Systems Constitutional: Negative for malaise/fatigue. HENT: Negative. Eyes: Negative. Respiratory: Negative for shortness of breath. Cardiovascular: Positive for orthopnea and leg swelling. LE edema improved Gastrointestinal:  
     Abdominal swelling - improving Genitourinary: Negative. Musculoskeletal: Positive for falls. Skin: Negative. Neurological: Positive for weakness. Endo/Heme/Allergies: Negative. Psychiatric/Behavioral: The patient is not nervous/anxious. Physical Exam:  
Physical Exam  
Constitutional: He is oriented to person, place, and time. He is cooperative. He appears ill. HENT:  
Head: Normocephalic and atraumatic. Eyes: Pupils are equal, round, and reactive to light. EOM are normal.  
Neck: Normal range of motion. Neck supple. JVD present. Cardiovascular: Regular rhythm. Exam reveals gallop. Murmur heard. Pulmonary/Chest: Breath sounds normal.  
Abdominal: He exhibits distension. Musculoskeletal: Normal range of motion. He exhibits edema. Neurological: He is alert and oriented to person, place, and time. Skin: Skin is warm and dry. Skin tears to left knee s/p fall PTA Psychiatric: He has a normal mood and affect. Vitals:  
Visit Vitals /86 (BP 1 Location: Right arm, BP Patient Position: At rest) Pulse 71 Temp 98.2 °F (36.8 °C) Resp 14 Ht 5' 11\" (1.803 m) Wt 179 lb 3.7 oz (81.3 kg) SpO2 99% BMI 25.72 kg/m² Temp (24hrs), Av.8 °F (36.6 °C), Min:97.1 °F (36.2 °C), Max:98.2 °F (36.8 °C) Admission Weight: Last Weight Weight: 186 lb 15.2 oz (84.8 kg) Weight: 179 lb 3.7 oz (81.3 kg) Intake / Output / Drain: 
Last 24 hrs.:  
 
 Intake/Output Summary (Last 24 hours) at 5/11/2019 1140 Last data filed at 5/11/2019 1125 Gross per 24 hour Intake 600 ml Output 3350 ml Net -2750 ml Oxygen Therapy: 
Oxygen Therapy O2 Sat (%): 99 % (05/11/19 0729) O2 Device: Room air (05/11/19 0810) Recent Labs:  
Labs Latest Ref Rng & Units 5/11/2019 5/10/2019 5/7/2019 5/3/2019 5/2/2019 WBC 4.1 - 11.1 K/uL - 6.1 6.5 6.6 7.3  
RBC 4.10 - 5.70 M/uL - 4.19 5.06 4.39 4.82 Hemoglobin 12.1 - 17.0 g/dL - 11. 5(L) 13.7 12. 0(L) 12.8 Hematocrit 36.6 - 50.3 % - 36. 0(L) 43.6 37.9 42.3 MCV 80.0 - 99.0 FL - 85.9 86 86.3 87.8 Platelets 781 - 166 K/uL - 173 256 200 235 Lymphocytes 12 - 49 % - - - 20 17 Monocytes Not Estab. % - - 6 8 7 Eosinophils Not Estab. % - - 1 2 2 Basophils Not Estab. % - - 0 1 1 Albumin 3.5 - 5.0 g/dL 2. 9(L) 3. 1(L) 4.4 - -  
Calcium 8.5 - 10.1 MG/DL 9.2 9.2 9.7 8.7 8.9 SGOT 15 - 37 U/L 10(L) 15 13 - -  
Glucose 65 - 100 mg/dL 99 78 167(H) 117(H) 121(H) BUN 6 - 20 MG/DL 47(H) 56(H) 42(H) 41(H) 45(H) Creatinine 0.70 - 1.30 MG/DL 1.91(H) 2.26(H) 2.33(H) 1.84(H) 2.01(H) Sodium 136 - 145 mmol/L 137 135(L) 134 137 136 Potassium 3.5 - 5.1 mmol/L 3.6 3.7 5.1 4.2 4.3 TSH 0.450 - 4.500 uIU/mL - - 2.600 - -  
 
EKG:  
EKG Results Procedure 720 Value Units Date/Time SCANNED CARDIAC RHYTHM STRIP [[id number]] Collected:  05/11/19 0321 Order Status:  Completed Updated:  05/11/19 0976 EKG, 12 LEAD, INITIAL [[id number]] Collected:  05/10/19 1121 Order Status:  Completed Updated:  05/10/19 1207 Ventricular Rate 64 BPM   
  Atrial Rate 77 BPM   
  QRS Duration 104 ms Q-T Interval 460 ms QTC Calculation (Bezet) 474 ms Calculated P Axis 41 degrees Calculated R Axis 35 degrees Calculated T Axis 133 degrees Diagnosis --  
  Sinus rhythm with AV dissociation and Junctional rhythm Low voltage QRS Cannot rule out Anteroseptal infarct (cited on or before 09-MAY-2019) When compared with ECG of 09-MAY-2019 20:01, 
Junctional rhythm has replaced Atrial fibrillation Questionable change in initial forces of Anterior leads Nonspecific T wave abnormality no longer evident in Inferior leads Nonspecific T wave abnormality, improved in Lateral leads Confirmed by Herb Underwood MD, Javier Barrera ([id number]) on 5/10/2019 12:07:00 PM 
  
 EKG, 12 LEAD, INITIAL [[id number]] Collected:  05/09/19 2001 Order Status:  Completed Updated:  05/10/19 6913 Ventricular Rate 60 BPM   
  Atrial Rate 60 BPM   
  QRS Duration 86 ms   
  Q-T Interval 428 ms QTC Calculation (Bezet) 428 ms Calculated R Axis 34 degrees Calculated T Axis 142 degrees Diagnosis -- Atrial fibrillation Low voltage QRS Septal infarct , age undetermined No previous ECGs available Confirmed by Herb Underwood MD, Javier Barrera ([id number]) on 5/10/2019 8:37:23 AM 
  
 [address] [place] [address] [[id number]] Collected:  05/10/19 2922 Order Status:  Completed Updated:  05/10/19 9592 No specialty comments available. CXR Results  (Last 48 hours) 05/09/19 2129  XR CHEST PA LAT Final result Impression:  IMPRESSION:  
   
Small right pleural effusion with adjacent atelectasis versus pneumonia. Recommend followup PA and lateral chest views in 8-10 weeks to ensure  
resolution. Narrative:  EXAM: XR CHEST PA LAT INDICATION: Dyspnea on exertion. Hospitalization for acute on chronic CHF. COMPARISON: None TECHNIQUE: PA and lateral chest views FINDINGS: Cardiac monitoring wires overlie the thorax. The cardiomediastinal and  
hilar contours are within normal limits. The pulmonary vasculature is within  
normal limits. Right lung base opacity has a component of pleural effusion. Left lung is clear. No pneumothorax. Bones are osteopenic. Shoulder arthritis is partially imaged. Echo Results  (Last 48 hours) None 5/2/19 LHC: EF 10% LM: Large caliber vessel without significant stenosis  
  
LAD: Moderate caliber heavily calcified vessel that is occluded in the mid without distal collateralization. D1: Moderate caliber calcified vessel with severe diffuse disease 
  
LCX: Moderate caliber calcified vessel without significant stenosis. OM1: Moderate caliber vessel with luminal irregularities and small vessel severe distal disease OM2: Very small caliber vessel without significant stenosis.  
  
RCA:   Large caliber heavily calcified dominant vessel that is occluded in the mid. A small segment of the PDA is filled faintly by left to right collaterals. PDA: Occluded PLB: Occluded BRENDEN Wellington [address], [address] [place] [address] Office [phone] Fax [phone] 
24 hour VAD/HF Pager: [phone]

## 2019-05-12 NOTE — PROGRESS NOTES
Problem: Falls - Risk of 
Goal: *Absence of Falls Description Document Johny Bella Fall Risk and appropriate interventions in the flowsheet. Outcome: Progressing Towards Goal 
  
Problem: Heart Failure: Day 2 Goal: Activity/Safety Outcome: Progressing Towards Goal 
 
Bedside shift change report given to 1710 Didier Ariza (oncoming nurse) by Arden Fletcher RN (offgoing nurse). Report included the following information SBAR, Kardex, Intake/Output, MAR, Accordion and Recent Results. Last 3 Recorded Weights in this Encounter 05/10/19 1745 05/11/19 0440 05/12/19 9578 Weight: 81.6 kg (180 lb) 81.3 kg (179 lb 3.7 oz) 80.1 kg (176 lb 9.4 oz) Patient weighed on standing scale.

## 2019-05-12 NOTE — PROGRESS NOTES
Reason for Admission:   HF 
            
RRAT Score:     21 Resources/supports as identified by patient/family:   Patient stated Jasmin Westbrook and 10 teenagers live with him and he has plenty of support Top Challenges facing patient (as identified by patient/family and CM): Finances/Medication cost?   No concerns expressed by patient as he is insured by  Medicare A[employer] and  BC Transportation? Family will provide Support system or lack thereof? Has a good support system as claims Jasmin Westbrook and teenagers to help Living arrangements? Lives with significant other and family Self-care/ADLs/Cognition? Reports full independence and drives Current Advanced Directive/Advance Care Plan:  Patient stated he does have an advance directive. Encouraged patient to bring in a copy for record Plan for utilizing home health:   TBD Likelihood of readmission: 21 high based on RRAT Transition of Care Plan:     Patient admitted due to chronic systolic HF with a PMH significant for HTN, DM, PVD, CAD  With EF of 10%. Patient will life vest. Upon interview patient stated he independent, drives with good family support. Patient plans to return home at discharge. HF NN aware of admission. CM to follow for Hospital to Home VS home health and [facility] needs. Care Management Interventions PCP Verified by CM: Yes MyChart Signup: No 
Discharge Durable Medical Equipment: No 
Physical Therapy Consult: Yes Occupational Therapy Consult: No 
Speech Therapy Consult: No 
Current Support Network: Own Home(Lives with Sharmaine Juan) Confirm Follow Up Transport: Family Plan discussed with Pt/Family/Caregiver: Yes Freedom of Choice Offered: Yes Discharge Location Discharge Placement: [facility], [facility]

## 2019-05-12 NOTE — PROGRESS NOTES
1530: Bedside shift change report given to Shonna Jaimes (oncoming nurse) by Janelle Newberry RN (offgoing nurse). Report included the following information SBAR, Kardex, Intake/Output, MAR, Recent Results and Cardiac Rhythm A fib. Problem: Falls - Risk of 
Goal: *Absence of Falls Description Document Colt Thompson Fall Risk and appropriate interventions in the flowsheet. Outcome: Progressing Towards Goal 
Note:  
Fall Risk Interventions: 
Mobility Interventions: Communicate number of staff needed for ambulation/transfer, OT consult for ADLs, Patient to call before getting OOB, PT Consult for mobility concerns, PT Consult for assist device competence, Strengthening exercises (ROM-active/passive), Utilize walker, cane, or other assistive device Medication Interventions: Assess postural VS orthostatic hypotension, Evaluate medications/consider consulting pharmacy, Patient to call before getting OOB, Teach patient to arise slowly Elimination Interventions: Call light in reach, Patient to call for help with toileting needs, Stay With Me (per policy), Toileting schedule/hourly rounds, Toilet paper/wipes in reach History of Falls Interventions: Consult care management for discharge planning, Door open when patient unattended, Evaluate medications/consider consulting pharmacy, Investigate reason for fall, Room close to nurse's station, Utilize gait belt for transfer/ambulation Problem: Heart Failure: Day 4 Goal: Activity/Safety Outcome: Progressing Towards Goal 
Note:  
Patient up with one assist. Sitting in chair. Goal: Medications Outcome: Progressing Towards Goal 
Note:  
Dobutamine drip increased to 7mcg/kg/hr per NP Kylah. Goal: Respiratory Outcome: Progressing Towards Goal 
Note:  
Pt on room air. No complaints of dyspnea or sx of acute distress. Will continue to monitor. Goal: *Hemodynamically stable Outcome: Progressing Towards Goal 
Note: SBP in 80s this morning. NP Polliard aware, holding diuretics for now. Dobutamine adjusted per NP orders. Will continue to monitor.

## 2019-05-12 NOTE — PROGRESS NOTES
Bedside shift change report given to Lili Yanez RN (oncoming nurse) by Se Morrell (offgoing nurse). Report included the following information SBAR, Kardex, Intake/Output and Recent Results.

## 2019-05-12 NOTE — PROGRESS NOTES
A Spiritual Care Partner Volunteer visited patient in Rm 414 on 5/12/2019. Documented by: 
Chaplain Lowry MDiv, MS, [address] ([id number])

## 2019-05-12 NOTE — PROGRESS NOTES
[facility] Progress Note DOS:   5/12/2019 NAME:  Andres Spencer  
MRN:   [id number] REFERRING PROVIDER:  Dr. Sandra Dove PRIMARY CARE PHYSICIAN: Lucia Bolanos MD 
 
Chief Complaint: [de-identified] 
 
HPI:77y.o. year old male with a history of HTN, T2DM, PVD, CAD, ICM, severe AS who presents for further evaluation of his chronic systolic heart failure. He developed progressive fatigue and dyspnea with exertion prompting further evaluation by Dr. Sandra Dove with a heart cath at [facility]. His cath revealed severe 3 vessel disease with  of his LAD, severe LV systolic failure (LVEF 36%), and severe AS. He is unable to walk a block without limiting dyspnea and develops dyspnea with making the bed. He denies orthopnea, PND, but has bilateral peripheral edema. He will be admitted to [address] [place] [address] for acute on chronic HFrEF 24Hr Events Excellent diuresis on current inotropic support Cr improving daily Symptoms improving daily Marginal BPs - asymptomatic Impression / Plan:  
Heart Failure Status: NYHA Class IV on admission ICM - Stage D, NYHA Class IV - LVEF 10% Stop toprol while on Dobutamine Cont Spironolactone Hold Entresto until renal failure improves Cont dobutamine 5mcg/kg/min Hold diuretics today Daily weights, strict I/O, low NA diet cMRI ordered - scheduling pending  
  
 Severe AS 
           TAVR evaluation in process - per Dr. Esme Gross Unable to do CTA due to renal function at this time  
  
 CAD with  of LAD On ASA, BB, statin cMRI to assess viability High Risk of SCD Cont to wear lifevest while admitted  
  
 New onset Atrial fibrillation Cont eliquis 5 mg BID for CVA prophylaxis- may need to decrease if renal function does not improved Recommend outpatient PSG 
 TFTs WNL             
  
 HLD On lipitor 
  
T2DM On glipizide Left leg wounds s/p fall WOCN consult Leg erythematous but not warm, pt is afebrile - low threshold for abx Dispo: 
 Remain in IMCU until volume status improved History: 
Past Medical History:  
Diagnosis Date  3-vessel coronary artery disease  Aortic stenosis, severe  Chronic kidney disease (CKD), stage III (moderate) (HCC)  Chronic total occlusion of coronary artery  Hypertension  Ischemic cardiomyopathy  Peripheral vascular disease ([facility]  Type 2 diabetes mellitus treated without insulin ([facility] Past Surgical History:  
Procedure Laterality Date  HX AMPUTATION TOE Left 2017 Social History Socioeconomic History  Marital status:  Spouse name: Not on file  Number of children: Not on file  Years of education: Not on file  Highest education level: Not on file Occupational History  Not on file Social Needs  Financial resource strain: Not on file  Food insecurity:  
  Worry: Not on file Inability: Not on file  Transportation needs:  
  Medical: Not on file Non-medical: Not on file Tobacco Use  Smoking status: Former Smoker  Smokeless tobacco: Never Used Substance and Sexual Activity  Alcohol use: Not Currently  Drug use: Not on file  Sexual activity: Not on file Lifestyle  Physical activity:  
  Days per week: Not on file Minutes per session: Not on file  Stress: Not on file Relationships  Social connections:  
  Talks on phone: Not on file Gets together: Not on file Attends Congregational service: Not on file Active member of club or organization: Not on file Attends meetings of clubs or organizations: Not on file Relationship status: Not on file  Intimate partner violence:  
  Fear of current or ex partner: Not on file Emotionally abused: Not on file Physically abused: Not on file Forced sexual activity: Not on file Other Topics Concern  Not on file Social History Narrative  Not on file No family history on file. Current Medications:  
Current Facility-Administered Medications Medication Dose Route Frequency Provider Last Rate Last Dose  potassium chloride SR (KLOR-CON 10) tablet 40 mEq  40 mEq Oral DAILY Caleb Montero NP   40 mEq at 05/12/19 5892  bumetanide (BUMEX) injection 1 mg  1 mg IntraVENous BID Elda, Colette B, NP   Stopped at 05/11/19 1800  
 insulin lispro (HUMALOG) injection   SubCUTAneous AC&HS Faustina Schroeder Lipps B, NP   2 Units at 05/12/19 0643  
 glucose chewable tablet 16 g  4 Tab Oral PRN Elda, Faustina Lipps B, NP      
 dextrose (D50W) injection syrg 12.5-25 g  12.5-25 g IntraVENous PRN Elda, Colette B, NP      
 glucagon (GLUCAGEN) injection 1 mg  1 mg IntraMUSCular PRN Elda Colette B, NP      
 traMADol (ULTRAM) tablet 50 mg  50 mg Oral Q8H PRN Elda, Colette B, NP   50 mg at 05/12/19 0056  apixaban (ELIQUIS) tablet 5 mg  5 mg Oral BID Elda, Colette B, NP   5 mg at 05/12/19 0196  aspirin chewable tablet 81 mg  81 mg Oral DAILY Elda, Colette B, NP   81 mg at 05/12/19 0490  atorvastatin (LIPITOR) tablet 40 mg  40 mg Oral QHS Elda, Colette B, NP   40 mg at 05/11/19 2105  
 glipiZIDE (GLUCOTROL) tablet 5 mg  5 mg Oral ACB&D Leopold Grates B, NP   5 mg at 05/12/19 4311  spironolactone (ALDACTONE) tablet 25 mg  25 mg Oral DAILY Elda, Colette B, NP   Stopped at 05/12/19 0900  
 sodium chloride (NS) flush 5-40 mL  5-40 mL IntraVENous Q8H Elda, Colette B, NP   10 mL at 05/12/19 0639  
 sodium chloride (NS) flush 5-40 mL  5-40 mL IntraVENous PRN Elda, Colette B, NP      
 ondansetron (ZOFRAN) injection 4 mg  4 mg IntraVENous Q6H PRN Elda, Colette B, NP      
 acetaminophen (TYLENOL) tablet 650 mg  650 mg Oral Q6H PRN Colette Schroeder NP   650 mg at 05/09/19 3531  docusate sodium (COLACE) capsule 100 mg  100 mg Oral PRN Kilo Angel NP      
  DOBUTamine (DOBUTREX) 500 mg/250 mL (2,000 mcg/mL) infusion  7.5 mcg/kg/min IntraVENous CONTINUOUS Polliard, Dajuan Ruslan JORDAN NP 19.1 mL/hr at 19 0855 7.5 mcg/kg/min at 19 3957  pantoprazole (PROTONIX) tablet 40 mg  40 mg Oral ACB Elda, Erin B, NP   40 mg at 19 1414 Allergies: Allergies Allergen Reactions  Penicillins Hives ROS:   
Review of Systems Constitutional: Negative for malaise/fatigue. HENT: Negative. Eyes: Negative. Respiratory: Negative for shortness of breath. Cardiovascular: Positive for orthopnea and leg swelling. LE edema improved Gastrointestinal:  
     Abdominal swelling - improving Genitourinary: Negative. Musculoskeletal: Positive for falls. Skin: Negative. Neurological: Positive for weakness. Endo/Heme/Allergies: Negative. Psychiatric/Behavioral: The patient is not nervous/anxious. Physical Exam:  
Physical Exam  
Constitutional: He is oriented to person, place, and time. He is cooperative. He appears ill. HENT:  
Head: Normocephalic and atraumatic. Eyes: Pupils are equal, round, and reactive to light. EOM are normal.  
Neck: Normal range of motion. Neck supple. JVD present. Cardiovascular: Regular rhythm. Exam reveals gallop. Murmur heard. Pulmonary/Chest: Breath sounds normal.  
Abdominal: He exhibits distension. Musculoskeletal: Normal range of motion. He exhibits edema. Neurological: He is alert and oriented to person, place, and time. Skin: Skin is warm and dry. Skin tears to left knee s/p fall PTA Psychiatric: He has a normal mood and affect. Vitals:  
Visit Vitals /64 (BP 1 Location: Left arm, BP Patient Position: At rest) Pulse 80 Temp 98.4 °F (36.9 °C) Resp 14 Ht 5' 11\" (1.803 m) Wt 176 lb 9.4 oz (80.1 kg) SpO2 95% BMI 25.34 kg/m² Temp (24hrs), Av.3 °F (36.8 °C), Min:97.5 °F (36.4 °C), Max:98.7 °F (37.1 °C) Admission Weight: Last Weight Weight: 186 lb 15.2 oz (84.8 kg) Weight: 176 lb 9.4 oz (80.1 kg) Intake / Output / Drain: 
Last 24 hrs.:  
 
Intake/Output Summary (Last 24 hours) at 5/12/2019 1207 Last data filed at 5/12/2019 0945 Gross per 24 hour Intake 640 ml Output 1850 ml Net -1210 ml Oxygen Therapy: 
Oxygen Therapy O2 Sat (%): 95 % (05/12/19 1202) O2 Device: Room air (05/12/19 0810) Recent Labs:  
Labs Latest Ref Rng & Units 5/12/2019 5/11/2019 5/10/2019 5/7/2019 5/3/2019 5/2/2019 WBC 4.1 - 11.1 K/uL - - 6.1 6.5 6.6 7.3  
RBC 4.10 - 5.70 M/uL - - 4.19 5.06 4.39 4.82 Hemoglobin 12.1 - 17.0 g/dL - - 11. 5(L) 13.7 12. 0(L) 12.8 Hematocrit 36.6 - 50.3 % - - 36. 0(L) 43.6 37.9 42.3 MCV 80.0 - 99.0 FL - - 85.9 86 86.3 87.8 Platelets 192 - 914 K/uL - - 173 256 200 235 Lymphocytes 12 - 49 % - - - - 20 17 Monocytes Not Estab. % - - - 6 8 7 Eosinophils Not Estab. % - - - 1 2 2 Basophils Not Estab. % - - - 0 1 1 Albumin 3.5 - 5.0 g/dL 2. 9(L) 2. 9(L) 3. 1(L) 4.4 - -  
Calcium 8.5 - 10.1 MG/DL 9.3 9.2 9.2 9.7 8.7 8.9 SGOT 15 - 37 U/L 16 10(L) 15 13 - -  
Glucose 65 - 100 mg/dL 172(H) 99 78 167(H) 117(H) 121(H) BUN 6 - 20 MG/DL 43(H) 47(H) 56(H) 42(H) 41(H) 45(H) Creatinine 0.70 - 1.30 MG/DL 1.85(H) 1.91(H) 2.26(H) 2.33(H) 1.84(H) 2.01(H) Sodium 136 - 145 mmol/L 136 137 135(L) 134 137 136 Potassium 3.5 - 5.1 mmol/L 4.1 3.6 3.7 5.1 4.2 4.3 TSH 0.450 - 4.500 uIU/mL - - - 2.600 - -  
 
EKG:  
EKG Results Procedure 720 Value Units Date/Time SCANNED CARDIAC RHYTHM STRIP [[id number]] Collected:  05/11/19 0321 Order Status:  Completed Updated:  05/11/19 5728 EKG, 12 LEAD, INITIAL [[id number]] Collected:  05/10/19 1121 Order Status:  Completed Updated:  05/10/19 1207 Ventricular Rate 64 BPM   
  Atrial Rate 77 BPM   
  QRS Duration 104 ms Q-T Interval 460 ms QTC Calculation (Bezet) 474 ms Calculated P Axis 41 degrees Calculated R Axis 35 degrees Calculated T Axis 133 degrees Diagnosis --  
  Sinus rhythm with AV dissociation and Junctional rhythm Low voltage QRS Cannot rule out Anteroseptal infarct (cited on or before 09-MAY-2019) When compared with ECG of 09-MAY-2019 20:01, 
Junctional rhythm has replaced Atrial fibrillation Questionable change in initial forces of Anterior leads Nonspecific T wave abnormality no longer evident in Inferior leads Nonspecific T wave abnormality, improved in Lateral leads Confirmed by Dieter Parham MD, Saw Ba ([id number]) on 5/10/2019 12:07:00 PM 
  
 EKG, 12 LEAD, INITIAL [[id number]] Collected:  05/09/19 2001 Order Status:  Completed Updated:  05/10/19 6641 Ventricular Rate 60 BPM   
  Atrial Rate 60 BPM   
  QRS Duration 86 ms   
  Q-T Interval 428 ms QTC Calculation (Bezet) 428 ms Calculated R Axis 34 degrees Calculated T Axis 142 degrees Diagnosis -- Atrial fibrillation Low voltage QRS Septal infarct , age undetermined No previous ECGs available Confirmed by Dieter Parham MD, Saw Ba ([id number]) on 5/10/2019 8:37:23 AM 
  
 [address] [place] [address] [[id number]] Collected:  05/10/19 8545 Order Status:  Completed Updated:  05/10/19 7219 No specialty comments available. CXR Results  (Last 48 hours) None Echo Results  (Last 48 hours) None 5/2/19 LHC: EF 10% LM: Large caliber vessel without significant stenosis  
  
LAD: Moderate caliber heavily calcified vessel that is occluded in the mid without distal collateralization. D1: Moderate caliber calcified vessel with severe diffuse disease 
  
LCX: Moderate caliber calcified vessel without significant stenosis. OM1: Moderate caliber vessel with luminal irregularities and small vessel severe distal disease OM2: Very small caliber vessel without significant stenosis.  
  
RCA:   Large caliber heavily calcified dominant vessel that is occluded in the mid. A small segment of the PDA is filled faintly by left to right collaterals. PDA: Occluded PLB: Occluded BRENDEN Boland [id number], [address] [place] [address] Office [phone] Fax [phone] 
24 hour VAD/HF Pager: [phone]

## 2019-05-13 NOTE — PROGRESS NOTES
1930: Bedside shift change report given to Trev Elliott, RN (oncoming nurse) by Jean Stratton RN (offgoing nurse). Report included the following information SBAR, Kardex, Intake/Output, MAR, Recent Results and Cardiac Rhythm A fib. Problem: Falls - Risk of 
Goal: *Absence of Falls Description Document Marizol Kruger Fall Risk and appropriate interventions in the flowsheet. Outcome: Progressing Towards Goal 
Note:  
Fall Risk Interventions: 
Mobility Interventions: Communicate number of staff needed for ambulation/transfer, OT consult for ADLs, Patient to call before getting OOB, PT Consult for mobility concerns, PT Consult for assist device competence, Strengthening exercises (ROM-active/passive), Utilize walker, cane, or other assistive device Medication Interventions: Assess postural VS orthostatic hypotension, Evaluate medications/consider consulting pharmacy, Patient to call before getting OOB, Teach patient to arise slowly Elimination Interventions: Call light in reach, Patient to call for help with toileting needs, Stay With Me (per policy), Toilet paper/wipes in reach, Toileting schedule/hourly rounds, Urinal in reach History of Falls Interventions: Consult care management for discharge planning, Evaluate medications/consider consulting pharmacy, Door open when patient unattended, Investigate reason for fall, Room close to nurse's station, Utilize gait belt for transfer/ambulation Problem: Heart Failure: Day 5 Goal: Medications Outcome: Progressing Towards Goal 
Note:  
Pt on dobutamine drip. Pt tolerating well. Will continue to monitor. Goal: Treatments/Interventions/Procedures Outcome: Progressing Towards Goal 
Note:  
Pt scheduled for cardiac MRI tomorrow. MRI screening form completed, faxed to [facility], and is on pt chart.

## 2019-05-13 NOTE — PROGRESS NOTES
05/13/19 0800 Clide Failing Fall Risk Mobility 1.0 Mobility Interventions Communicate number of staff needed for ambulation/transfer;OT consult for ADLs; Patient to call before getting OOB;PT Consult for mobility concerns;PT Consult for assist device competence;Strengthening exercises (ROM-active/passive); Utilize walker, cane, or other assistive device Mentation 0 Medication 1 Medication Interventions Assess postural VS orthostatic hypotension; Evaluate medications/consider consulting pharmacy; Patient to call before getting OOB; Teach patient to arise slowly Elimination 1.0 Elimination Interventions Call light in reach; Patient to call for help with toileting needs;Stay With Me (per policy); Toilet paper/wipes in reach; Toileting schedule/hourly rounds;Urinal in reach Prior Fall History 1 History of Falls Interventions Consult care management for discharge planning;Evaluate medications/consider consulting pharmacy; Door open when patient unattended; Investigate reason for fall;Room close to nurse's station;Utilize gait belt for transfer/ambulation Total Score 4 Standard Fall Precautions Yes High Fall Risk Yes RT won't be able to perform six minute walk test due to high fall risk.

## 2019-05-13 NOTE — PROGRESS NOTES
[facility] Progress Note DOS:   5/13/2019 NAME:  Andres Spencer  
MRN:   [id number] REFERRING PROVIDER:  Dr. Sandra Dove PRIMARY CARE PHYSICIAN: Lucia Bolanos MD 
 
Chief Complaint: [de-identified] 
 
HPI:77y.o. year old male with a history of HTN, T2DM, PVD, CAD, ICM, severe AS who presents for further evaluation of his chronic systolic heart failure. He developed progressive fatigue and dyspnea with exertion prompting further evaluation by Dr. Sandra Dove with a heart cath at [id number] [place] [address]. His cath revealed severe 3 vessel disease with  of his LAD, severe LV systolic failure (LVEF 26%), and severe AS. He is unable to walk a block without limiting dyspnea and develops dyspnea with making the bed. He denies orthopnea, PND, but has bilateral peripheral edema. He will be admitted to [facility] for acute on chronic HFrEF 24Hr Events Excellent diuresis on current inotropic support Diuretics held yesterday Cr improving daily Marginal BPs - asymptomatic Impression / Plan:  
Heart Failure Status: NYHA Class IV on admission ICM - Stage D, NYHA Class IV - LVEF 10% Stop toprol while on Dobutamine Cont Spironolactone Hold Entresto until renal failure improves Cont dobutamine 5mcg/kg/min Hold diuretics again today- eval daily Will have LCSW complete social eval for LVAD potential and if ok then will order medical eval  
            Daily weights, strict I/O, low NA diet cMRI scheduled for tomorrow  
  Severe AS 
           TAVR evaluation in process - per Dr. Esme Gross If renal function stable post MRI- will order CTA for TAVR work up 
  
 CAD with  of LAD On ASA, BB, statin cMRI to assess viability scheduled for tomorrow High Risk of SCD Cont to wear lifevest while admitted  
  
 New onset Atrial fibrillation            Cont eliquis 5 mg BID for CVA prophylaxis- may need to decrease if renal function does not improved Recommend outpatient PSG 
 TFTs WNL             
  
 HLD On lipitor 
  
T2DM On glipizide Left leg wounds s/p fall WOCN consult Leg erythematous but not warm, pt is afebrile - low threshold for abx Dispo: 
 Remain in IMCU until volume status improved History: 
Past Medical History:  
Diagnosis Date  3-vessel coronary artery disease  Aortic stenosis, severe  Chronic kidney disease (CKD), stage III (moderate) (HCC)  Chronic total occlusion of coronary artery  Hypertension  Ischemic cardiomyopathy  Peripheral vascular disease ([facility]  Type 2 diabetes mellitus treated without insulin ([facility] Past Surgical History:  
Procedure Laterality Date  HX AMPUTATION TOE Left 2017 Social History Socioeconomic History  Marital status:  Spouse name: Not on file  Number of children: Not on file  Years of education: Not on file  Highest education level: Not on file Occupational History  Not on file Social Needs  Financial resource strain: Not on file  Food insecurity:  
  Worry: Not on file Inability: Not on file  Transportation needs:  
  Medical: Not on file Non-medical: Not on file Tobacco Use  Smoking status: Former Smoker  Smokeless tobacco: Never Used Substance and Sexual Activity  Alcohol use: Not Currently  Drug use: Not on file  Sexual activity: Not on file Lifestyle  Physical activity:  
  Days per week: Not on file Minutes per session: Not on file  Stress: Not on file Relationships  Social connections:  
  Talks on phone: Not on file Gets together: Not on file Attends [employer] service: Not on file Active member of club or organization: Not on file Attends meetings of clubs or organizations: Not on file Relationship status: Not on file  Intimate partner violence: Fear of current or ex partner: Not on file Emotionally abused: Not on file Physically abused: Not on file Forced sexual activity: Not on file Other Topics Concern  Not on file Social History Narrative  Not on file No family history on file. Current Medications:  
Current Facility-Administered Medications Medication Dose Route Frequency Provider Last Rate Last Dose  magnesium oxide (MAG-OX) tablet 400 mg  400 mg Oral DAILY Daniela Monteroer, NP   400 mg at 05/13/19 1705  potassium chloride SR (KLOR-CON 10) tablet 40 mEq  40 mEq Oral DAILY Daniela Monteroer, NP   40 mEq at 05/13/19 9173  bumetanide (BUMEX) injection 1 mg  1 mg IntraVENous BID Elda, Colette B, NP   1 mg at 05/13/19 7385  insulin lispro (HUMALOG) injection   SubCUTAneous AC&HS Elda, Colette B, NP   3 Units at 05/13/19 1140  
 glucose chewable tablet 16 g  4 Tab Oral PRN Elda, Colette B, NP      
 dextrose (D50W) injection syrg 12.5-25 g  12.5-25 g IntraVENous PRN Elda, Colette B, NP      
 glucagon (GLUCAGEN) injection 1 mg  1 mg IntraMUSCular PRN Elda, Colette B, NP      
 traMADol (ULTRAM) tablet 50 mg  50 mg Oral Q8H PRN Elda, Colette B, NP   50 mg at 05/13/19 0111  
 apixaban (ELIQUIS) tablet 5 mg  5 mg Oral BID EldaAlfie zafar B, NP   5 mg at 05/13/19 0632  aspirin chewable tablet 81 mg  81 mg Oral DAILY Elda, Colette B, NP   81 mg at 05/13/19 1693  atorvastatin (LIPITOR) tablet 40 mg  40 mg Oral QHS Elda, Colette B, NP   40 mg at 05/12/19 2120  
 glipiZIDE (GLUCOTROL) tablet 5 mg  5 mg Oral ACB&D Gabrielle Santana B, NP   5 mg at 05/13/19 1993  spironolactone (ALDACTONE) tablet 25 mg  25 mg Oral DAILY Elda, Colette B, NP   25 mg at 05/13/19 3004  sodium chloride (NS) flush 5-40 mL  5-40 mL IntraVENous Q8H Elda, Colette B, NP   10 mL at 05/13/19 0600  
 sodium chloride (NS) flush 5-40 mL  5-40 mL IntraVENous PRN Marlo Pope NP      
  ondansetron (ZOFRAN) injection 4 mg  4 mg IntraVENous Q6H PRN Elda, Colette B, NP      
 acetaminophen (TYLENOL) tablet 650 mg  650 mg Oral Q6H PRN Elda, Colette B, NP   650 mg at 05/12/19 1405  docusate sodium (COLACE) capsule 100 mg  100 mg Oral PRN Angelic Melendez B, NP      
 DOBUTamine (DOBUTREX) 500 mg/250 mL (2,000 mcg/mL) infusion  7.5 mcg/kg/min IntraVENous CONTINUOUS Missy Montero T, NP 19.1 mL/hr at 05/13/19 1134 7.5 mcg/kg/min at 05/13/19 1134  pantoprazole (PROTONIX) tablet 40 mg  40 mg Oral ACB Elda, Coeltte B, NP   40 mg at 05/13/19 1834 Allergies: Allergies Allergen Reactions  Penicillins Hives ROS:   
Review of Systems Constitutional: Negative for malaise/fatigue. HENT: Negative. Eyes: Negative. Respiratory: Negative for shortness of breath. Cardiovascular: Positive for leg swelling. Negative for orthopnea. LE edema improved Gastrointestinal:  
     Abdominal swelling - improving Genitourinary: Negative. Musculoskeletal: Positive for falls. Skin: Negative. Neurological: Positive for weakness. Endo/Heme/Allergies: Negative. Psychiatric/Behavioral: The patient is not nervous/anxious. Physical Exam:  
Physical Exam  
Constitutional: He is oriented to person, place, and time. He is cooperative. He appears ill. HENT:  
Head: Normocephalic and atraumatic. Eyes: Pupils are equal, round, and reactive to light. EOM are normal.  
Neck: Normal range of motion. Neck supple. No JVD present. Cardiovascular: Regular rhythm. Exam reveals gallop. Murmur heard. Pulmonary/Chest: Breath sounds normal.  
Abdominal: He exhibits no distension. Musculoskeletal: Normal range of motion. He exhibits edema. Neurological: He is alert and oriented to person, place, and time. Skin: Skin is warm and dry. Skin tears to left knee s/p fall PTA Psychiatric: He has a normal mood and affect. Vitals:  
Visit Vitals BP 91/68 (BP 1 Location: Right arm, BP Patient Position: At rest) Pulse 85 Temp 98.7 °F (37.1 °C) Resp 17 Ht 5' 11\" (1.803 m) Wt 179 lb 14.3 oz (81.6 kg) SpO2 97% BMI 25.81 kg/m² Temp (24hrs), Av.4 °F (36.9 °C), Min:98.1 °F (36.7 °C), Max:98.7 °F (37.1 °C) Admission Weight: Last Weight Weight: 186 lb 15.2 oz (84.8 kg) Weight: 179 lb 14.3 oz (81.6 kg) Intake / Output / Drain: 
Last 24 hrs.:  
 
Intake/Output Summary (Last 24 hours) at 2019 1156 Last data filed at 2019 0472 Gross per 24 hour Intake 340 ml Output 575 ml Net -235 ml Oxygen Therapy: 
Oxygen Therapy O2 Sat (%): 97 % (19 1127) O2 Device: Room air (19 0800) Recent Labs:  
Labs Latest Ref Rng & Units 2019 2019 2019 5/10/2019 2019 5/3/2019 2019 WBC 4.1 - 11.1 K/uL - - - 6.1 6.5 6.6 7.3  
RBC 4.10 - 5.70 M/uL - - - 4.19 5.06 4.39 4.82 Hemoglobin 12.1 - 17.0 g/dL - - - 11. 5(L) 13.7 12. 0(L) 12.8 Hematocrit 36.6 - 50.3 % - - - 36. 0(L) 43.6 37.9 42.3 MCV 80.0 - 99.0 FL - - - 85.9 86 86.3 87.8 Platelets 735 - 798 K/uL - - - 173 256 200 235 Lymphocytes 12 - 49 % - - - - - 20 17 Monocytes Not Estab. % - - - - 6 8 7 Eosinophils Not Estab. % - - - - 1 2 2 Basophils Not Estab. % - - - - 0 1 1 Albumin 3.5 - 5.0 g/dL 2. 8(L) 2. 9(L) 2. 9(L) 3. 1(L) 4.4 - -  
Calcium 8.5 - 10.1 MG/DL 9.0 9.3 9.2 9.2 9.7 8.7 8.9 SGOT 15 - 37 U/L 14(L) 16 10(L) 15 13 - -  
Glucose 65 - 100 mg/dL 142(H) 172(H) 99 78 167(H) 117(H) 121(H) BUN 6 - 20 MG/DL 42(H) 43(H) 47(H) 56(H) 42(H) 41(H) 45(H) Creatinine 0.70 - 1.30 MG/DL 1.87(H) 1.85(H) 1.91(H) 2.26(H) 2.33(H) 1.84(H) 2.01(H) Sodium 136 - 145 mmol/L 135(L) 136 137 135(L) 134 137 136 Potassium 3.5 - 5.1 mmol/L 4.5 4.1 3.6 3.7 5.1 4.2 4.3 TSH 0.450 - 4.500 uIU/mL - - - - 2.600 - -  
 
EKG:  
EKG Results Procedure 720 Value Units Date/Time SCANNED CARDIAC RHYTHM STRIP [[id number]] Collected:  05/13/19 0430 Order Status:  Completed Updated:  05/13/19 0926 SCANNED CARDIAC RHYTHM STRIP [[id number]] Collected:  05/13/19 0142 Order Status:  Completed Updated:  05/13/19 109 [facility] SCANNED CARDIAC RHYTHM STRIP [[id number]] Collected:  05/11/19 0321 Order Status:  Completed Updated:  05/11/19 9070 EKG, 12 LEAD, INITIAL [[id number]] Collected:  05/10/19 1121 Order Status:  Completed Updated:  05/10/19 1207 Ventricular Rate 64 BPM   
  Atrial Rate 77 BPM   
  QRS Duration 104 ms Q-T Interval 460 ms QTC Calculation (Bezet) 474 ms Calculated P Axis 41 degrees Calculated R Axis 35 degrees Calculated T Axis 133 degrees Diagnosis --  
  Sinus rhythm with AV dissociation and Junctional rhythm Low voltage QRS Cannot rule out Anteroseptal infarct (cited on or before 09-MAY-2019) When compared with ECG of 09-MAY-2019 20:01, 
Junctional rhythm has replaced Atrial fibrillation Questionable change in initial forces of Anterior leads Nonspecific T wave abnormality no longer evident in Inferior leads Nonspecific T wave abnormality, improved in Lateral leads Confirmed by Aj Feliz MD, Dim[place] Salas ([id number]) on 5/10/2019 12:07:00 PM 
  
 EKG, 12 LEAD, INITIAL [[id number]] Collected:  05/09/19 2001 Order Status:  Completed Updated:  05/10/19 6543 Ventricular Rate 60 BPM   
  Atrial Rate 60 BPM   
  QRS Duration 86 ms   
  Q-T Interval 428 ms QTC Calculation (Bezet) 428 ms Calculated R Axis 34 degrees Calculated T Axis 142 degrees Diagnosis -- Atrial fibrillation Low voltage QRS Septal infarct , age undetermined No previous ECGs available Confirmed by Aj Feliz MD, Dim[place] Salas ([id number]) on 5/10/2019 8:37:23 AM 
  
 [address] [place] [address] [[id number]] Collected:  05/10/19 4099 Order Status:  Completed Updated:  05/10/19 2642 No specialty comments available. CXR Results  (Last 48 hours) None  
  
 
 
  
Echo Results  (Last 48 hours) None 5/2/19 LHC: EF 10% LM: Large caliber vessel without significant stenosis  
  
LAD: Moderate caliber heavily calcified vessel that is occluded in the mid without distal collateralization. D1: Moderate caliber calcified vessel with severe diffuse disease 
  
LCX: Moderate caliber calcified vessel without significant stenosis. OM1: Moderate caliber vessel with luminal irregularities and small vessel severe distal disease OM2: Very small caliber vessel without significant stenosis.  
  
RCA:   Large caliber heavily calcified dominant vessel that is occluded in the mid. A small segment of the PDA is filled faintly by left to right collaterals. PDA: Occluded PLB: Occluded BRENDEN Vora [address], [address] [place] [address] [phone] Fax [phone] 
24 hour VAD/HF Pager: [phone] F ATTENDING ADDENDUM Patient was seen and examined in person. Data and notes were reviewed. I have discussed and agree with the plan as noted in the NP note above without further additions. Mayra Jalloh MD PhD 
Nedmatt Lackey [phone] [facility]

## 2019-05-13 NOTE — PROGRESS NOTES
Admission Medication Reconciliation: 
 
Information obtained from:  patient, chart review, insurance claim information Comments/Recommendations: The patient was an excellent historian and could speak to all medications. Insurance claim information was used to confirm current medications, doses, and frequencies. Changes made to Prior to Admission (PTA) Medication List: ? Medications Added:  
- trulicity, glimeperide ? Medications Changed:  
- None ? Medications Removed:  
- glipizide Significant PMH/Disease States:  
Past Medical History:  
Diagnosis Date 3-vessel coronary artery disease Aortic stenosis, severe Chronic kidney disease (CKD), stage III (moderate) ([facility] Chronic total occlusion of coronary artery Hypertension Ischemic cardiomyopathy Peripheral vascular disease ([facility] Type 2 diabetes mellitus treated without insulin ([facility] Chief Complaint for this Admission:  No chief complaint on file. Allergies:  Penicillins Prior to Admission Medications:  
Prior to Admission Medications Prescriptions Last Dose Informant Patient Reported? Taking? apixaban (ELIQUIS) 5 mg tablet 2019 at Unknown time  No Yes Sig: Take 1 Tab by mouth two (2) times a day. aspirin 81 mg chewable tablet 2019 at Unknown time  Yes Yes Sig: Take 81 mg by mouth daily. atorvastatin (LIPITOR) 40 mg tablet 2019 at Unknown time  No Yes Sig: Take 1 Tab by mouth nightly. canagliflozin (INVOKANA) 300 mg tablet 2019 at Unknown time  Yes Yes Sig: Take 300 mg by mouth Daily (before breakfast). dulaglutide (TRULICITY) 1.5 AH/6.9 mL sub-q pen   Yes Yes Si.5 mg by SubCUTAneous route every . esomeprazole (NEXIUM) 20 mg capsule 2019 at Unknown time  Yes Yes Sig: Take 20 mg by mouth daily. furosemide (LASIX) 40 mg tablet 2019 at Unknown time  No Yes Sig: Take one tab daily  
glimepiride (AMARYL) 4 mg tablet   Yes Yes Sig: Take 4 mg by mouth two (2) times a day. metFORMIN (GLUCOPHAGE) 1,000 mg tablet 5/9/2019 at Unknown time  Yes Yes Sig: Take 1,000 mg by mouth two (2) times daily (with meals). metoprolol succinate (TOPROL-XL) 100 mg tablet 5/9/2019 at Unknown time  No Yes Sig: Take 1 Tab by mouth daily. sacubitril-valsartan (ENTRESTO) 24 mg/26 mg tablet 5/9/2019 at Unknown time  No Yes Sig: Take 1 Tab by mouth every twelve (12) hours. spironolactone (ALDACTONE) 25 mg tablet 5/8/2019 at Unknown time  No Yes Sig: TAKE 1 TABLET BY MOUTH EVERY DAY Facility-Administered Medications: None Thank you for allowing pharmacy to participate in the coordination of this patient's care. If you have any other questions, please contact the medication reconciliation pharmacist at x [phone]. Danica Flowers Pharm. D., BCPS

## 2019-05-13 NOTE — PROGRESS NOTES
Problem: Falls - Risk of 
Goal: *Absence of Falls Description Document Marcello Bunting Fall Risk and appropriate interventions in the flowsheet. Outcome: Progressing Towards Goal 
  
Problem: Heart Failure: Day 3 Goal: Off Pathway (Use only if patient is Off Pathway) Outcome: Progressing Towards Goal 
  
Bedside shift change report given to 1710 Didier Ariza (oncoming nurse) by Kike Dorantes RN (offgoing nurse). Report included the following information SBAR, Kardex, Intake/Output, MAR, Accordion and Recent Results. Last 3 Recorded Weights in this Encounter 05/11/19 0440 05/12/19 6800 05/13/19 0403 Weight: 81.3 kg (179 lb 3.7 oz) 80.1 kg (176 lb 9.4 oz) 81.6 kg (179 lb 14.3 oz)

## 2019-05-13 NOTE — WOUND CARE
WOCN Note:  
  
Reconsult for left 3rd toe wound. 
  
Chart reviewed. Admitted DX:  Acute on chronic systolic (congestive) heart failure ([facility] [I50.23] Past Medical History:  HTN, T2DM, PVD, CAD, ICM, severe AS. 
  
Assessment:  
Patient is A&O x 3, communicative, continent and mobile independently in bed. Bed: total care Heel intact without erythema. Lower extremity edema. Dry abrasions to bilateral knees from falls PTA. Bilateral buttocks and gluteal cleft have dry blanching pink skin with dark dyschromia. 
  
1. Left medial low leg, venous leg ulcer:  Dressing changed by RN this morning and is dry and intact. 2.  Left anterior 3rd toe, deflated bullae:  1 x 1 x 0.1 cm; maroon base; no exudate or erythema. Recommendations: 1. Left medial low leg and left anterior 3rd toe:  Daily cleanse with saline; apply Xeroform; cover with dry dressing. 2.  Knees and buttocks:  Twice daily apply Aloe Hunter cream. 
  
Skin Care & Pressure Prevention: 
Minimize layers of linen/pads under patient to optimize support surface. Turn/reposition approximately every 2 hours and offload heels. 
  
Discussed above plan with patient and RN. 
  
Transition of Care: Plan to follow as needed while admitted to hospital. 
  
ILDA LopezN RN [facility] Wound Care Office [phone] Pager [phone]

## 2019-05-13 NOTE — PROGRESS NOTES
Chart checked, pt cleared by nursing. Attempted to work with pt but he politely declined stating that he was just too tired, having not long returned to bed from the chair. PT will defer, follow, and see as able and appropriate.   Case discussed at rounds and note plan is for a cardiac MRI tomorrow   Thank you, Iris Weston, PT

## 2019-05-13 NOTE — PROGRESS NOTES
1604: Faxed MRI screening form with patient signature to [phone] [facility]. Confirmation form attached to MRI screening form and placed on patients chart 4176: 83/55. Dr Brie Castro paged. Received telephone orders to hold bumex and give 250 CC bolus

## 2019-05-14 NOTE — PROGRESS NOTES
Called the patient's potential LVAD caregiver, Kira Zhang, and arranged to meet tomorrow at 1:00pm in the patient's hospital room to review the caregiver contract and complete caregiver assessment. MARNIE Harrison, LCSW Clinical  Ned Lackey [phone]

## 2019-05-14 NOTE — PROGRESS NOTES
Pt transported to [facility] via critical care transport for scheduled MRI, pt remained on dobutamine gtt @7.5mcg/k on transport, life vest remained on pt for transport.

## 2019-05-14 NOTE — CONSULTS
[facility] Interventional Cardiology Consult Note Date of consult:  05/14/19 Date of admission: 5/9/2019 Primary Cardiologist: Dr Maria E Ruiz Physician Requesting consult: Dr Hung Doyle CC / Reason for consult: shortness of breath, AS, CHF - possible TAVR History of the presenting illness: 
Kathy Cates is a 77 y.o. with symptoms of worsening shortness of breath related to acute on chronic systolic heart failure. Recently he has noted that walking even 1 block has become quite difficult and he is easily worn out. He has had lower extremity edema as well. He was admitted by Dr Yessica Black for worsening heart failure and has been started on medical therapies including dobutamine (currently at 7.5mcg/kg/min) and is somewhat improved since starting this. He has some orthopnea and still cannot lie flat for long. His recent work-up has shown complex heart disease, with severe aortic stenosis (low flow low gradient), severe cardiomyopathy with an EF of 15-20% (appears to be ischemic in nature) and severe CAD with  of both the LAD and RCA on a recent coronary angiogram by Dr Krystyna Richardson. The patient is not aware of ever having had a clinical MI, so presumably he has had a silent MI at some point in the past. 
 
Other co-morbidities of significance include diabetes and CKD, as well as vascular disease. Past Medical History:  
Diagnosis Date  3-vessel coronary artery disease  Aortic stenosis, severe  Chronic kidney disease (CKD), stage III (moderate) (HCC)  Chronic total occlusion of coronary artery  Hypertension  Ischemic cardiomyopathy  Peripheral vascular disease ([facility]  [facility] 2 [facility] mellitus [facility] Prior to Admission medications Medication Sig Start Date End Date Taking? Authorizing Provider  
glimepiride (AMARYL) 4 mg tablet Take 4 mg by mouth two (2) times a day.    Yes Provider, Historical  
 dulaglutide (TRULICITY) 1.5 BY/0.4 mL sub-q pen 1.5 mg by SubCUTAneous route every Sunday. Yes Provider, Historical  
spironolactone (ALDACTONE) 25 mg tablet TAKE 1 TABLET BY MOUTH EVERY DAY 5/7/19  Yes Evans Miguel MD  
apixaban (ELIQUIS) 5 mg tablet Take 1 Tab by mouth two (2) times a day. 5/7/19  Yes Evans Miguel MD  
sacubitril-valsartan (ENTRESTO) 24 mg/26 mg tablet Take 1 Tab by mouth every twelve (12) hours. 5/4/19  Yes Dottie Mohan MD  
canagliflozin (INVOKANA) 300 mg tablet Take 300 mg by mouth Daily (before breakfast). Yes Provider, Historical  
metFORMIN (GLUCOPHAGE) 1,000 mg tablet Take 1,000 mg by mouth two (2) times daily (with meals). Yes Provider, Historical  
esomeprazole (NEXIUM) 20 mg capsule Take 20 mg by mouth daily. Yes Provider, Historical  
aspirin 81 mg chewable tablet Take 81 mg by mouth daily. Yes Provider, Historical  
atorvastatin (LIPITOR) 40 mg tablet Take 1 Tab by mouth nightly. 5/2/19  Yes Dominic Corbin III, DO  
furosemide (LASIX) 40 mg tablet Take one tab daily 5/3/19  Yes Douglas Corbin III, DO  
metoprolol succinate (TOPROL-XL) 100 mg tablet Take 1 Tab by mouth daily. 5/3/19  Yes Martha Chu III, DO No family history on file. Reviewed - non-contributory. Social History Socioeconomic History  Marital status:  Spouse name: Not on file  Number of children: Not on file  Years of education: Not on file  Highest education level: Not on file Occupational History  Not on file Social Needs  Financial resource strain: Not on file  Food insecurity:  
  Worry: Not on file Inability: Not on file  Transportation needs:  
  Medical: Not on file Non-medical: Not on file Tobacco Use  Smoking status: Former Smoker  Smokeless tobacco: Never Used Substance and Sexual Activity  Alcohol use: Not Currently  Drug use: Not on file  Sexual activity: Not on file Lifestyle  Physical activity:  
  Days per week: Not on file Minutes per session: Not on file  Stress: Not on file Relationships  Social connections:  
  Talks on phone: Not on file Gets together: Not on file Attends Jewish service: Not on file Active member of club or organization: Not on file Attends meetings of clubs or organizations: Not on file Relationship status: Not on file  Intimate partner violence:  
  Fear of current or ex partner: Not on file Emotionally abused: Not on file Physically abused: Not on file Forced sexual activity: Not on file Other Topics Concern  Not on file Social History Narrative  Not on file Review of Systems Constitutional: Negative for chills and fever. HENT: Negative for hearing loss and nosebleeds. Eyes: Negative for blurred vision and double vision. Respiratory: Positive for shortness of breath. Negative for cough and sputum production. Cardiovascular: Positive for orthopnea and leg swelling. Negative for chest pain. Gastrointestinal: Negative for abdominal pain, nausea and vomiting. Genitourinary: Negative for frequency and urgency. Musculoskeletal: Negative for back pain and joint pain. Skin: Negative for itching and rash. Neurological: Negative for dizziness and headaches. Endo/Heme/Allergies: Negative for environmental allergies. Does not bruise/bleed easily. Visit Vitals /70 (BP 1 Location: Left arm, BP Patient Position: At rest;Sitting) Pulse 86 Temp 98.6 °F (37 °C) Resp 18 Ht 5' 11\" (1.803 m) Wt 81.9 kg (180 lb 8.9 oz) SpO2 96% BMI 25.91 kg/m² Physical Exam  
Constitutional: He is oriented to person, place, and time and well-developed, well-nourished, and in no distress. HENT:  
Head: Normocephalic and atraumatic. Eyes: Conjunctivae are normal. No scleral icterus. Neck: No JVD present. Cardiovascular: Normal rate and regular rhythm. Exam reveals no gallop. Murmur heard. Crescendo systolic murmur is present with a grade of 3/6. Pulmonary/Chest: Effort normal and breath sounds normal. No stridor. No respiratory distress. He has no wheezes. Abdominal: Soft. He exhibits no distension. Musculoskeletal: Normal range of motion. He exhibits edema. He exhibits no deformity. Left leg swelling > right leg. Neurological: He is alert and oriented to person, place, and time. Skin: Skin is warm and dry. Psychiatric: Mood and affect normal.  
 
 
Lab review: 
BMP:  
Lab Results Component Value Date/Time  (L) 05/14/2019 04:40 AM  
 K 4.6 05/14/2019 04:40 AM  
 CL 99 05/14/2019 04:40 AM  
 CO2 25 05/14/2019 04:40 AM  
 AGAP 9 05/14/2019 04:40 AM  
  (H) 05/14/2019 04:40 AM  
 BUN 39 (H) 05/14/2019 04:40 AM  
 CREA 1.78 (H) 05/14/2019 04:40 AM  
 GFRAA 46 (L) 05/14/2019 04:40 AM  
 GFRNA 38 (L) 05/14/2019 04:40 AM  
  
 
CBC: 
Lab Results Component Value Date/Time WBC 6.1 05/14/2019 04:40 AM  
 HGB 10.9 (L) 05/14/2019 04:40 AM  
 HCT 35.4 (L) 05/14/2019 04:40 AM  
 PLATELET 159 (L) 84/99/4876 04:40 AM  
 MCV 86.6 05/14/2019 04:40 AM  
 
 
All Cardiac Markers in the last 24 hours:   
Lab Results Component Value Date/Time BNPNT >35,000 (H) 05/14/2019 04:40 AM  
 
 
Data review: 
EKG tracing personally reviewed: EKG: Sinus rhythm, Mobitz type I AV block, anteroseptal MI pattern Echocardiogram: 
05/09/19 ECHO ADULT COMPLETE 05/14/2019 5/14/2019 Narrative · Left Ventricle: Mildly dilated left ventricle. Estimated left  
ventricular ejection fraction is 16 - 20%. · Aortic Valve: Severe aortic stenosis, mean gradient is 28 mmHg. Aortic  
valve area is 0.8 cm2. · Tricuspid Valve: Mild to moderate tricuspid valve regurgitation is  
present with moderate pulmonary HTN, estimated PA of 55 mmHg. Signed by: Ketty Lindo MD  
 
Other cardiac testing: Cardiac cath films reviewed: - severe disease with  of the LAD,  of the RCA with heavy calcification. Cardiac MRI 5/14/19 1. Markedly dilated left ventricle by 3-D volumetric assessment index to body 
surface area. The LV end-diastolic volume index is 320 mL/sq m. Severe left 
ventricular systolic dysfunction. Severe hypokinesis of the mid to distal 
anterior wall, anteroseptal wall, anterolateral wall. Dyskinesis of the apex, 
apical septal wall, apical lateral wall, inferoapical wall. Severe hypokinesis 
of the base to mid inferoseptal wall. The entire apex, apical septal wall, 
apical lateral wall and anteroapical wall is significantly thinned. 3-D LVEF 15% 
(patient was on dobutamine when this EF was calculated). 2. Mildly dilated right ventricle by 3-D volumetric assessment and index to body 
surface area. Severe right ventricular systolic dysfunction. Severe global 
hypokinesis with regional variation. Akinesis of the mid to distal anterior wall 
of the right ventricle. 3-D RVEF 17% (patient was on dobutamine when this EF was 
calculated). 3. Severe aortic valve stenosis with aortic valve area of 0.7 sq cm by 
planimetry. 4. On LGE study for viability, there is a large infarct involving greater than 
75% thickness of the mid to distal anterior wall, anteroapical wall, apex, 
apical septal wall, mid septal wall, inferoapical wall without any significant 
viable myocardium. There is a medium-size subendocardial infarct involving 50-75% thickness of the base to mid inferolateral and anterolateral wall. The 
only myocardial walls which demonstrate significant viability are mid to distal 
inferior wall, and lateral wall which are in the distribution of the RCA and 
circumflex territories. 5. Normal pleura and pericardium. Small to medium-sized right pleural effusion. 6. Large right hepatic cyst measuring 8.5 x 6 cm. Other imaging: has not yet had a CTA for TAVR planning Assessment: 1. Acute on chronic systolic heart failure NYHA class IV: on inotropes. Some improvement with current therapy 2. Aortic stenosis: severe, low flow low gradient 3. CAD: severe, with  of the LAD and RCA 4. Type II diabetes with diabetic CKD 5. CKD stage III 6. Anemia of CKD 7. H/o vascular disease Recommendations / Plan: 
 
Complex patient with multiple severe cardiac issues Case discussed with Dr Ruddy Tellez and Dr Jacky Snow Really no role for coronary revascularization given the lack of viability Agree that TAVR seems like it might be a reasonable option to unload the LV and help his overall clinical picture Will need a TAVR CTA Chest and Abdo High risk on clinical grounds, and would need to be prepared to go on pump during deployment Would likely plan for self expanding Medtronic device to avoid need for rapid pacing. Signed: 
Lavon Dia [facility] Interventional Cardiology 05/14/19

## 2019-05-14 NOTE — PROGRESS NOTES
Problem: Diabetes Self-Management Goal: *Disease process and treatment process Description Define diabetes and identify own type of diabetes; list 3 options for treating diabetes. Outcome: Progressing Towards Goal 
  
Problem: Falls - Risk of 
Goal: *Absence of Falls Description Document Zan Sandoval Fall Risk and appropriate interventions in the flowsheet. Outcome: Progressing Towards Goal 
  
Problem: Heart Failure: Day 5 Goal: Medications Outcome: Progressing Towards Goal 
Goal: Respiratory Outcome: Progressing Towards Goal 
Goal: Psychosocial 
Outcome: Progressing Towards Goal 
  
Bedside and Verbal shift change report given to Kurtis Last (oncoming nurse) by Jim Corley RN (offgoing nurse). Report included the following information SBAR, Kardex, Intake/Output, MAR and Recent Results.

## 2019-05-14 NOTE — PROGRESS NOTES
[facility] Social Work LVAD/Heart Transplant/ Heart Failure Evaluation Date: 5/10/2019 NAME: Jose Eduardo Adames \"Macario\" : 1950 ADDRESS: [address] [place] [address],[address] 
[place]  [address] PHONE NUMBERS:  Cell # [phone]; Home #[phone]; Sharmaine Juan [facility] Cell # [phone] Holiness:Anabaptism  COUNTRY OF BIRTH: [place] CITIZENSHIP: [place] Citizen MARITAL STATUS:  PRIMARYINSURANCE: Medicare A/ B 
SECONDARY INSURANCE: BCBS 
 STATUS: N/A Family Information:  
Where were you born? : [place] [address] Who raised you? Mother & Father Where were you raised? [place], [place] Does your family have a HX of heart problems? yes Are your parents living or ?  Do you have any siblings? Yes, younger brother Edna [place] (local - in [place]) Do you have a good relationship with your siblings? Yes Will they be able to offer support before, during, and after LVAD/Transplant surgery? Yes Do you live with anyone? Yes, lives with Sharmaine Juan (doesn't work - raises six children; four of which are minors) along with Susi's  Janet Sheppard and the six children  If so, are the people you live with in good health? Yes Are you involved in a significant relationship? (If not ) no Do you have any children? Yes, daughter Tyler Bryan (works at [employer] [place]) Do you have a good relationship with your children? No  
Will they be able to offer support before, during, and after LVAD/Transplant surgery? No 
 
Do you have any pets? Yes, 1 cat and 2 dogs at the home Are you currently a caregiver? no 
 
Educational History:  
What is the highest level of education you have obtained? college graduate; [employer] Do you have any problems with reading or writing? no How do you obtain information best? visual 
Financial/Work History: Are you employed? 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 N/A What is your source of income? PT job, [employer] & in the process of working out 5 annuities per his report How do you plan to cover your expenses while off work? Working out annuities Have you applied for disability and Medicaid? No 
Do you have difficulty meeting your current monthly expenses? No 
Do you currently have any financial concerns? no 
Pharmacy / Medication History:  
Where do you fill your medications? [facility]  Address and phone number [place] [address] #[phone] Are you compliant with your medications? yes Do you have any difficulties in obtaining or taking your medications? yes - per his report getting ready to go into the donut hole due to trulicity and eliquis Dentist name and number? Dr. Sohail Logan; #[phone] PCP name and number? Lilli Lima #[phone] Substance Abuse History: Do you smoke? No; reports in his twenties smoked for visual effect but did not inhale Does anyone else in your household smoke? yes Do you drink? No; reports he started in college and quit when he was 30years old - would drink occasionally on weekends Does anyone else in your household drink? no 
Do you use illegal drugs?  No 
Does anyone else in your household use illegal drugs? no 
Have you even been involved in a drug or alcohol treatment program? no 
 
Psychiatric History:  
Have you ever been under the care of a mental health professional? no 
Have you ever been hospitalized for psychiatric reasons? no 
Current/Past suicidal ideations?: No 
Current/Past homicidal ideations?: No 
Are you currently on any medications for mental health issues? no 
Is there history of mental illness in your family? no 
Have you ever left the hospital AMA? no 
 Do you have a mental health history? No   
Mental Status Exam: 1. Presenting problem has affected: Work and [employer] 2. Client manner of dress:   Casual 
 
3. Patient Hygiene:  [address] 4. Level of Responsiveness: Alert and oriented to all spheres 5. Client motor behavior: Normal 
 
6. Evaluation of client level of distress:  none 7. Signs of client distress during interview:  Appropriate 8. Affect:   Appropriate 9. Thought Process: Within normal limits 10. Thought Content / Preoccupations: No evidence of impairment 11. Unusual Perceptual Experiences:  none 12. Attention / concentration:  intact 13. Orientation for:  Oriented in all spheres 14. Memory Functions:  N/A 
 
15. Insight (as age appropriate):  fair 12.  Judgement (as age appropriate):    difficult to assess Legal Concerns:  
Are you currently or have you ever been on parole, probation, or involved in litigation? no 
Have you had any substance related legal problems? no 
Have you ever been incarcerated? no 
Do you have a valid s license? yes Lifestyle/Functional Ability / Personal Care:  
What is your diagnosis and when were you diagnosed? Told one year ago about a weak heart valve, 1-2 weeks ago he found out he had a blockage Has your illness affected your life? yes What are your daily activities? 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 \"I'm surprised I took this as well as I did\"  What are your coping mechanisms? Born again [address] What is your living situation? single family home with 4 [address]; bedroom on first floor Do you use any DME? no none Are you open to home health or personal care? no 
Transportation- Do you drive? yes Household/personal tasks- Are you independent in cooking, cleaning/laundry, yardwork, shopping, dressing, and bathing? yes Power company name and account number? [employer] Support System: Who will be your primary support person before, during, and after your LVAD/Transplant surgery? Claude Pastor Will anyone else be providing assistance and support? Potentially brother Who will bring you to clinic appointments before and after LVAD implementation?friend - Verenice Evans Do they have a reliable automobile? yes Do they have a valid s license? yes Are you active in community agencies, Religion, Voodoo, or any other chester based community? No; prefers to watch [place] from home Who do you usually count on for emotional support? \"God\" Have you read material about the LVAD/Heart transplant surgery? no 
Are you interested in meeting someone with a LVAD/Heart transplant? yes How does your spouse, significant other, family feel about LVAD/Transplant? \"Susi wants me to get it\" Concerns and Questions: Do you have any fears or concerns regarding LVAD/Transplant surgery? Deferred due to lack of education on LVAD How do you think you will prepare yourself for the LVAD/Transplant surgery? Deferred due to lack of education on LVAD Do you believe that you understand what challenges and changes you will experience with LVAD/Transplant surgery? Deferred due to lack of education on LVAD Do you have a living will or an advance directive? No but agreeable to completing one Impressions/Barriers:  
 
 met with oYshi Dailey \"Macario\" Ronald Almaraz in his hospital room to complete the psychosocial assessment for potential LVAD candidacy. Ja Young was polite, engaging and maintained intermittent eye contact. He shared he works part time for [employer] as a . 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 He shared Verenice Evans, her  and their six children have \"adopted\" Ja Young as a member of their family.  Per Macario's report Christin Dee does not work (she cares for the children) and he feels she would be his caregiver should he need an LVAD. He also verbalizes she wants him to get it - he's \"fine\" with receiving a pump if medically necessary. Of note, Artie Bucio has an adult daughter, [address], who he reports works at [employer] in radiology. He shared they are not estranged yet do not have a good relationship and she's unaware of his hospitalization. Dicussed the need for Macario to create an AMD to designate medical decision makers. Artie Bucio denies any significant financial concerns, substance abuse history and legal history. He also denies SI/HI and reports no psychiatric history. He denies any learning disability diagnoses and identifies as a visual learner. He shared he's excited about the prospect of an LVAD if it benefits his heart failure symptoms. Pending his friend Susi's caregiver assessment/contract and further LVAD education for Artie Bucio -  feels Artie Bucio would be an adequate DT LVAD candidate.  feels he would be DT due to advanced age. Avtar Trinh, MSW, LCSW Clinical  [id number]

## 2019-05-14 NOTE — PROGRESS NOTES
Chart checked, case discussed with pt's nurse who reports that pt has left for the cardiac MRI. PT will defer, follow, and see as able and appropriate.  Thank you, Ketty Lindo, PT

## 2019-05-14 NOTE — PROGRESS NOTES
[facility] Progress Note DOS:   5/14/2019 NAME:  Huang Allen  
MRN:   [id number] REFERRING PROVIDER:  Dr. Lizbeth Porter PRIMARY CARE PHYSICIAN: Susie Shah MD 
 
Chief Complaint: [de-identified] 
 
HPI:77y.o. year old male with a history of HTN, T2DM, PVD, CAD, ICM, severe AS who presents for further evaluation of his chronic systolic heart failure. He developed progressive fatigue and dyspnea with exertion prompting further evaluation by Dr. Lizbeth Porter with a heart cath at [facility]. His cath revealed severe 3 vessel disease with  of his LAD, severe LV systolic failure (LVEF 53%), and severe AS. He is unable to walk a block without limiting dyspnea and develops dyspnea with making the bed. He denies orthopnea, PND, but has bilateral peripheral edema. He will be admitted to [facility] for acute on chronic HFrEF 24Hr Events Excellent diuresis on current inotropic support Cr improving daily Impression / Plan:  
Heart Failure Status: NYHA Class IV on admission ICM - Stage D, NYHA Class IV - LVEF 10% Stop toprol while on Dobutamine Cont Spironolactone Hold Entresto until renal failure improves Cont dobutamine 5mcg/kg/min Bumex 1 BID Will have LCSW complete social eval for LVAD potential and if ok then will order medical eval  
            Daily weights, strict I/O, low NA diet cMRI today  
  
 Severe AS 
           TAVR evaluation in process - per Dr. Eliana Sanchez If renal function stable post MRI- will order CTA for TAVR work up 
  
 CAD with  of LAD On ASA, BB, statin cMRI to assess viability today High Risk of SCD Cont to wear lifevest while admitted  
  
 New onset Atrial fibrillation Cont eliquis 5 mg BID for CVA prophylaxis Recommend outpatient PSG 
 TFTs WNL             
  
 HLD On lipitor 
  
T2DM On glipizide Left leg wounds s/p fall WOCN consult Leg erythematous but not warm, pt is afebrile - low threshold for abx Dispo: 
 Remain in IMCU until volume status improved History: 
Past Medical History:  
Diagnosis Date  3-vessel coronary artery disease  Aortic stenosis, severe  Chronic kidney disease (CKD), stage III (moderate) (HCC)  Chronic total occlusion of coronary artery  Hypertension  Ischemic cardiomyopathy  Peripheral vascular disease ([facility] [address] [facility]  Type 2 diabetes mellitus treated without insulin ([facility] [address] Past Surgical History:  
Procedure Laterality Date  HX AMPUTATION TOE Left 2017 Social History Socioeconomic History  Marital status:  Spouse name: Not on file  Number of children: Not on file  Years of education: Not on file  Highest education level: Not on file Occupational History  Not on file Social Needs  Financial resource strain: Not on file  Food insecurity:  
  Worry: Not on file Inability: Not on file  Transportation needs:  
  Medical: Not on file Non-medical: Not on file Tobacco Use  Smoking status: Former Smoker  Smokeless tobacco: Never Used Substance and Sexual Activity  Alcohol use: Not Currently  Drug use: Not on file  Sexual activity: Not on file Lifestyle  Physical activity:  
  Days per week: Not on file Minutes per session: Not on file  Stress: Not on file Relationships  Social connections:  
  Talks on phone: Not on file Gets together: Not on file Attends Latter day service: Not on file Active member of club or organization: Not on file Attends meetings of clubs or organizations: Not on file Relationship status: Not on file  Intimate partner violence:  
  Fear of current or ex partner: Not on file Emotionally abused: Not on file Physically abused: Not on file Forced sexual activity: Not on file Other Topics Concern  Not on file Social History Narrative  Not on file No family history on file. Current Medications:  
Current Facility-Administered Medications Medication Dose Route Frequency Provider Last Rate Last Dose  magnesium oxide (MAG-OX) tablet 400 mg  400 mg Oral DAILY Juanito Montero NP   400 mg at 05/13/19 6553  potassium chloride SR (KLOR-CON 10) tablet 40 mEq  40 mEq Oral DAILY Juanito Montero NP   40 mEq at 05/13/19 5240  bumetanide (BUMEX) injection 1 mg  1 mg IntraVENous BID Elda, Colette B, NP   Stopped at 05/13/19 1800  
 insulin lispro (HUMALOG) injection   SubCUTAneous AC&HS Edward Meneses B, NP   2 Units at 05/14/19 9409  
 glucose chewable tablet 16 g  4 Tab Oral PRN Joey Schroeder, NP      
 dextrose (D50W) injection syrg 12.5-25 g  12.5-25 g IntraVENous PRN Mir Schroederin B, NP      
 glucagon (GLUCAGEN) injection 1 mg  1 mg IntraMUSCular PRN Elda Colette B, NP      
 traMADol (ULTRAM) tablet 50 mg  50 mg Oral Q8H PRN Edward Jenningsk B, NP   50 mg at 05/13/19 2205  apixaban (ELIQUIS) tablet 5 mg  5 mg Oral BID Elda, Colette B, NP   5 mg at 05/14/19 7879  
 aspirin chewable tablet 81 mg  81 mg Oral DAILY Elda, Colette B, NP   81 mg at 05/14/19 0827  
 atorvastatin (LIPITOR) tablet 40 mg  40 mg Oral QHS Elda Colette B, NP   40 mg at 05/13/19 2201  
 glipiZIDE (GLUCOTROL) tablet 5 mg  5 mg Oral ACB&D Lenetta Latrobe B, NP   5 mg at 05/14/19 4598  spironolactone (ALDACTONE) tablet 25 mg  25 mg Oral DAILY Elda Colette B, NP   25 mg at 05/13/19 8719  sodium chloride (NS) flush 5-40 mL  5-40 mL IntraVENous Q8H Elda Colette B, NP   10 mL at 05/14/19 8115  sodium chloride (NS) flush 5-40 mL  5-40 mL IntraVENous PRN Elda Colette B, NP      
 ondansetron (ZOFRAN) injection 4 mg  4 mg IntraVENous Q6H PRN Mir Schroederin B, NP      
 acetaminophen (TYLENOL) tablet 650 mg  650 mg Oral Q6H PRN Colette Schroeder B, NP   650 mg at 05/12/19 1409  docusate sodium (COLACE) capsule 100 mg  100 mg Oral PRN Richarda Spurling B, NP      
 DOBUTamine (DOBUTREX) 500 mg/250 mL (2,000 mcg/mL) infusion  7.5 mcg/kg/min IntraVENous CONTINUOUS Wilder Montero NP 19.1 mL/hr at 05/14/19 0153 7.5 mcg/kg/min at 05/14/19 0153  pantoprazole (PROTONIX) tablet 40 mg  40 mg Oral ACB Colette Schroeder NP   40 mg at 05/14/19 3176 Allergies: Allergies Allergen Reactions  Penicillins Hives ROS:   
Review of Systems Constitutional: Negative for malaise/fatigue. HENT: Negative. Eyes: Negative. Respiratory: Negative for shortness of breath. Cardiovascular: Positive for leg swelling. Negative for orthopnea. LE edema improved Gastrointestinal:  
     Abdominal swelling - improving Genitourinary: Negative. Musculoskeletal: Positive for falls. Skin: Negative. Neurological: Positive for weakness. Endo/Heme/Allergies: Negative. Psychiatric/Behavioral: The patient is not nervous/anxious. Physical Exam:  
Physical Exam  
Constitutional: He is oriented to person, place, and time. He is cooperative. He appears ill. HENT:  
Head: Normocephalic and atraumatic. Eyes: Pupils are equal, round, and reactive to light. EOM are normal.  
Neck: Normal range of motion. Neck supple. No JVD present. Cardiovascular: Regular rhythm. Exam reveals gallop. Murmur heard. Pulmonary/Chest: Breath sounds normal.  
Abdominal: He exhibits no distension. Musculoskeletal: Normal range of motion. He exhibits edema. Neurological: He is alert and oriented to person, place, and time. Skin: Skin is warm and dry. Skin tears to left knee s/p fall PTA Psychiatric: He has a normal mood and affect. Vitals:  
Visit Vitals BP 90/65 (BP 1 Location: Left arm, BP Patient Position: At rest;Supine) Pulse 88 Temp 97.3 °F (36.3 °C) Resp 18 Ht 5' 11\" (1.803 m) Wt 180 lb 8.9 oz (81.9 kg) SpO2 97% BMI 25.91 kg/m² Temp (24hrs), Av °F (36.7 °C), Min:97.3 °F (36.3 °C), Max:98.4 °F (36.9 °C) Admission Weight: Last Weight Weight: 186 lb 15.2 oz (84.8 kg) Weight: 180 lb 8.9 oz (81.9 kg) Intake / Output / Drain: 
Last 24 hrs.:  
 
Intake/Output Summary (Last 24 hours) at 2019 1213 Last data filed at 2019 1233 Gross per 24 hour Intake 240 ml Output 300 ml Net -60 ml Oxygen Therapy: 
Oxygen Therapy O2 Sat (%): 97 % (19 0759) O2 Device: Room air (19 0431) Recent Labs:  
Labs Latest Ref Rng & Units 2019 2019 2019 2019 5/10/2019 2019 5/3/2019 WBC 4.1 - 11.1 K/uL 6.1 - - - 6.1 6.5 6.6  
RBC 4.10 - 5.70 M/uL 4.09(L) - - - 4.19 5.06 4.39 Hemoglobin 12.1 - 17.0 g/dL 10. 9(L) - - - 11. 5(L) 13.7 12. 0(L) Hematocrit 36.6 - 50.3 % 35. 4(L) - - - 36. 0(L) 43.6 37.9 MCV 80.0 - 99.0 FL 86.6 - - - 85.9 86 86.3 Platelets 125 - 160 K/uL 139(L) - - - 173 256 200 Lymphocytes 12 - 49 % - - - - - - 20 Monocytes Not Estab. % - - - - - 6 8 Eosinophils Not Estab. % - - - - - 1 2 Basophils Not Estab. % - - - - - 0 1 Albumin 3.5 - 5.0 g/dL 2. 9(L) 2. 8(L) 2. 9(L) 2. 9(L) 3. 1(L) 4.4 - Calcium 8.5 - 10.1 MG/DL 8.5 9.0 9.3 9.2 9.2 9.7 8.7 SGOT 15 - 37 U/L 12(L) 14(L) 16 10(L) 15 13 - Glucose 65 - 100 mg/dL 127(H) 142(H) 172(H) 99 78 167(H) 117(H) BUN 6 - 20 MG/DL 39(H) 42(H) 43(H) 47(H) 56(H) 42(H) 41(H) Creatinine 0.70 - 1.30 MG/DL 1.78(H) 1.87(H) 1.85(H) 1.91(H) 2.26(H) 2.33(H) 1.84(H) Sodium 136 - 145 mmol/L 133(L) 135(L) 136 137 135(L) 134 137 Potassium 3.5 - 5.1 mmol/L 4.6 4.5 4.1 3.6 3.7 5.1 4.2 TSH 0.450 - 4.500 uIU/mL - - - - - 2.600 -  
 
EKG:  
EKG Results Procedure 720 Value Units Date/Time SCANNED CARDIAC RHYTHM STRIP [[id number]] Collected:  19 0430 Order Status:  Completed Updated:  19 7903 SCANNED CARDIAC RHYTHM STRIP [[id number]] Collected:  19 0084 Order Status:  Completed Updated:  19 109 [facility] SCANNED CARDIAC RHYTHM STRIP [[id number]] Collected:  05/11/19 0321 Order Status:  Completed Updated:  05/11/19 2627 EKG, 12 LEAD, INITIAL [[id number]] Collected:  05/10/19 1121 Order Status:  Completed Updated:  05/10/19 1207 Ventricular Rate 64 BPM   
  Atrial Rate 77 BPM   
  QRS Duration 104 ms Q-T Interval 460 ms QTC Calculation (Bezet) 474 ms Calculated P Axis 41 degrees Calculated R Axis 35 degrees Calculated T Axis 133 degrees Diagnosis --  
  Sinus rhythm with AV dissociation and Junctional rhythm Low voltage QRS Cannot rule out Anteroseptal infarct (cited on or before 09-MAY-2019) When compared with ECG of 09-MAY-2019 20:01, 
Junctional rhythm has replaced Atrial fibrillation Questionable change in initial forces of Anterior leads Nonspecific T wave abnormality no longer evident in Inferior leads Nonspecific T wave abnormality, improved in Lateral leads Confirmed by Herb Underwood MD, Javier Barrera ([id number]) on 5/10/2019 12:07:00 PM 
  
 EKG, 12 LEAD, INITIAL [[id number]] Collected:  05/09/19 2001 Order Status:  Completed Updated:  05/10/19 1680 Ventricular Rate 60 BPM   
  Atrial Rate 60 BPM   
  QRS Duration 86 ms   
  Q-T Interval 428 ms QTC Calculation (Bezet) 428 ms Calculated R Axis 34 degrees Calculated T Axis 142 degrees Diagnosis -- Atrial fibrillation Low voltage QRS Septal infarct , age undetermined No previous ECGs available Confirmed by Herb Underwood MD, Javier Barrera ([id number]) on 5/10/2019 8:37:23 AM 
  
 [address] [place] [address] [[id number]] Collected:  05/10/19 1002 Order Status:  Completed Updated:  05/10/19 4103 No specialty comments available. CXR Results  (Last 48 hours) None Echo Results  (Last 48 hours) None 5/2/19 LHC: EF 10% LM: Large caliber vessel without significant stenosis  
  
LAD: Moderate caliber heavily calcified vessel that is occluded in the mid without distal collateralization. D1: Moderate caliber calcified vessel with severe diffuse disease 
  
LCX: Moderate caliber calcified vessel without significant stenosis. OM1: Moderate caliber vessel with luminal irregularities and small vessel severe distal disease OM2: Very small caliber vessel without significant stenosis.  
  
RCA:   Large caliber heavily calcified dominant vessel that is occluded in the mid. A small segment of the PDA is filled faintly by left to right collaterals. PDA: Occluded PLB: Occluded BRENDEN Menjivar [address], [address] [address] Office [phone] Fax [phone] 
24 hour VAD/HF Pager: [phone] AHF ATTENDING ADDENDUM Patient was seen and examined in person. Data and notes were reviewed. I have discussed and agree with the plan as noted in the NP note above without further additions. Venu Carolina MD PhD 
Ned Lackey 1721 9 [facility]

## 2019-05-14 NOTE — PROGRESS NOTES
Problem: Falls - Risk of 
Goal: *Absence of Falls Description Document Isauro Angel Fall Risk and appropriate interventions in the flowsheet. Outcome: Progressing Towards Goal 
  
Problem: Heart Failure: Day 5 Goal: Activity/Safety Outcome: Progressing Towards Goal 
  
Problem: Heart Failure: Day 5 Goal: Diagnostic Test/Procedures Outcome: Progressing Towards Goal 
  
Problem: Heart Failure: Day 5 Goal: Nutrition/Diet Outcome: Progressing Towards Goal 
  
Problem: Heart Failure: Day 5 Goal: Discharge Planning Outcome: Progressing Towards Goal 
  
Problem: Heart Failure: Day 5 Goal: Treatments/Interventions/Procedures Outcome: Progressing Towards Goal

## 2019-05-15 NOTE — PROGRESS NOTES
Central Line Procedure Note Indication: Need for vasopressors Risks, benefits, alternatives explained and patient agrees to proceed. Patient positioned in Trendelenburg. 7-Step Sterility Protocol followed. (cap, mask sterile gown, sterile gloves, large sterile sheet, hand hygiene, 2% chlorhexidine for cutaneous antisepsis) 5 mL 1% Lidocaine placed at insertion site. Right internal jugular cannulated x 1 attempt(s) utilizing the Seldinger technique. 9F, cordis placed. PAC inserted but unable to get into RV or PA because of significant TR. Venous cannulation confirmed with column drop test.   
Catheter secured & Biopatch applied. Sterile Tegaderm placed. CXR pending.

## 2019-05-15 NOTE — PROGRESS NOTES
Procedure note: In situ CCO PA catheter advanced into the PA using waveform pressure guidance. PAP 58/26, PAOP 31.

## 2019-05-15 NOTE — PROGRESS NOTES
TRANSFER - OUT REPORT: 
 
Verbal report given to Kimberly RN(name) on Huang Allen  being transferred to [facility] RN(unit) for change in patient condition(hypotension) Report consisted of patients Situation, Background, Assessment and  
Recommendations(SBAR). Information from the following report(s) SBAR, Kardex and Recent Results was reviewed with the receiving nurse. Lines:  
Peripheral IV 05/09/19 Left Hand (Active) Site Assessment Clean, dry, & intact 5/15/2019  3:45 AM  
Phlebitis Assessment 0 5/15/2019  3:45 AM  
Infiltration Assessment 0 5/15/2019  3:45 AM  
Dressing Status Clean, dry, & intact 5/15/2019  3:45 AM  
Dressing Type Transparent;Tape 5/15/2019  3:45 AM  
Hub Color/Line Status Blue 5/15/2019  3:45 AM  
Action Taken Open ports on tubing capped 5/15/2019  3:45 AM  
Alcohol Cap Used Yes 5/15/2019  3:45 AM  
   
Peripheral IV 05/15/19 Right Antecubital (Active) Opportunity for questions and clarification was provided. Patient transported with: 
 Registered Nurse

## 2019-05-15 NOTE — PROGRESS NOTES
NYHA class IV A/C systolic HF Severe AS 
PAD 
A/C kidney disease CAD Called to see patient for decompensated HF Very short of breath this afternoon Looking a little gray Hgb and platelets look good Creatinine in the mid 1's Lactic acid elevated NT pro-BNP significantly elevated Impella tomorrow Intake/Output Summary (Last 24 hours) at 5/15/2019 1334 Last data filed at 5/14/2019 1910 Gross per 24 hour Intake 480 ml Output  Net 480 ml Risk of morbidity and mortality - high Medical decision making - high complexity Total critical care time - 30 minutes (CPT 91624)

## 2019-05-15 NOTE — PROGRESS NOTES
TRANSFER - IN REPORT: 
 
Verbal report received from Merlin Shelter, RN(name) on Urbano Mitchell  being received from [facility]) for ordered procedure Report consisted of patients Situation, Background, Assessment and  
Recommendations(SBAR). Information from the following report(s) SBAR, Kardex, Intake/Output, MAR, Recent Results and Cardiac Rhythm a-fib was reviewed with the receiving nurse. Opportunity for questions and clarification was provided. Assessment completed upon patients arrival to unit and care assumed. Primary Nurse Esmer Young and Bonilla Muñoz RN performed a dual skin assessment on this patient No impairment noted Current Bed:  
Total Care SPORT (air with burgundy cover) Terry score is 16 
 
1300 Karol Cazares, with LVAD team at bedside for scheduled meeting but pts caregiver, Maximino Essex, unable to be here. Meeting rescheduled for 1000 tomorrow. 1330 Dr. Isrrael Bill at bedside discussing plan with pt - plan to have impella placed tomorrow AM 
1415 Dr. Balbina Cronin at bedside to place PA catheter/MAC - unable to float swan at this time Dr. Gerson Cosme made aware anesthesia unable to float swan at this time - will try again in a couple hrs 
1500 Order received from Dr. Gerson Cosme for pt to not wear lifevest while on CCU  
1535 Dr. Jamie Polk at bedside - able to float swan  
1700 Spoke w/ Yousuf Mcclure NP - updated on CO, CI, SVR, SvO2, & PAP - orders received to start bumex gtt & milrinone 1930 Bedside and Verbal shift change report given to Diana Sawant RN (oncoming nurse) by Melissa Hernandez RN (offgoing nurse). Report included the following information SBAR, Kardex, Intake/Output, MAR, Recent Results and Cardiac Rhythm a-fib.

## 2019-05-15 NOTE — CONSULTS
Pt seen and examined Cath echo reviewed and pt seen during previous admission to [facility] for severe LV dysfunction, AS and severe CAD See full note by Chandler Don NP Discussed with Dr Laxmi Sommers and Dr Madison Lowry Complex decision making MRI shows non viability in anterior wall so no real benefit re revascularization On balance would favor TAVR to see if symx and LV improve

## 2019-05-15 NOTE — PROGRESS NOTES
Notified CHF team of pt's hypotension and AM meds were held, orders placed, pt will be transferred to CCU

## 2019-05-15 NOTE — PROGRESS NOTES
[facility] Cardiology Progress Note Date of service: 5/15/2019 Subjective: 
Mr Deborah Lopez feels like he's not doing as well today Feels very fatigued Short of breath on quite mild activity BP remains low He is on dobutamine at 7.5mcg/kg/min Objective: 
 
Visit Vitals BP (!) 88/52 Pulse 91 Temp 98.1 °F (36.7 °C) Resp 20 Ht 5' 11\" (1.803 m) Wt 82.9 kg (182 lb 12.2 oz) SpO2 90% BMI 26.22 kg/m² Physical Exam  
Constitutional: He is oriented to person, place, and time. He appears well-developed. HENT:  
Head: Normocephalic and atraumatic. Eyes: Conjunctivae are normal. No scleral icterus. Neck: JVD present. Cardiovascular: Normal rate, regular rhythm and normal heart sounds. Exam reveals no gallop. No murmur heard. Pulmonary/Chest: Effort normal and breath sounds normal. No stridor. No respiratory distress. He has no wheezes. He has no rales. Abdominal: Soft. He exhibits no distension. Musculoskeletal: He exhibits edema. He exhibits no deformity. Neurological: He is alert and oriented to person, place, and time. Skin: Skin is warm and dry. Psychiatric: He has a normal mood and affect. His behavior is normal.  
 
 
Data reviewed: 
Recent Results (from the past 12 hour(s)) MAGNESIUM Collection Time: 05/15/19  4:08 AM  
Result Value Ref Range Magnesium 1.8 1.6 - 2.4 mg/dL NT-PRO BNP Collection Time: 05/15/19  4:08 AM  
Result Value Ref Range NT pro-BNP >35,000 (H) <125 PG/ML  
GLUCOSE, POC Collection Time: 05/15/19  6:45 AM  
Result Value Ref Range Glucose (POC) 147 (H) 65 - 100 mg/dL Performed by Unkown  Assessment: 1. Acute on chronic systolic heart failure NYHA class IV: on inotropes. Some improvement with current therapy but still not doing well. 2. Aortic stenosis: severe, low flow low gradient 3. CAD: severe, with  of the LAD and RCA. No viability and not really any good revascularization options. 4. Type II diabetes with diabetic CKD 5. CKD stage III 6. Anemia of CKD 7. H/o vascular disease Plan: Further discussions were had with Dr Esme Gross, Dr Lidia Rowland and Dr Shamika Perera Given the extent of scar and lack of significant viability on the cMRI yesterday, the concern re TAVR is that the expected benefit would be quite modest at best (not expected to significantly improve his EF most likely) and it would be fairly high risk procedure. Overall the gestalt is that a durable LVAD implant may be his best option at this point. CTA is not needed for TAVR planning at this time Discussed with patient that we are not recommending TAVR but are looking at LVAD as probably his best option. Available to assist as needed. Signed: 
Radha Catherine MD 
Interventional Cardiology 5/15/2019

## 2019-05-15 NOTE — PROGRESS NOTES
arrived in the patient's room at 1:00pm for the scheduled meeting with Sis Dudley. She did not show up -  met with the patient's nurse who shared the patient's IMCU nurse indicated she called and articulated she could not make it.  called BODØ and asked about the meeting. BODØ shared her son had to go to the doctor and she lost 's phone number which was provided to her yesterday. Rescheduled to meet tomorrow at 10:00am with BODØ to complete the caregiver assessment/ contract. Provided her again with 's contact information should she need to touch base. Met with Mr. Marylee Pinks and notified him of the rescheduled meeting. Completed an AMD with Mr. Marylee Pinks. Rosemary Lindo, MSW, LCSW Clinical  [facility]

## 2019-05-15 NOTE — PROGRESS NOTES
[facility] Progress Note DOS:   5/15/2019 NAME:  Radha Park  
MRN:   [id number] REFERRING PROVIDER:  Dr. Misa Faustin PRIMARY CARE PHYSICIAN: Roberto Carlos Vela MD 
 
 
 
Chief Complaint: CHENG 
 
HPI: 77y.o. year old male with a history of HTN, T2DM, PVD, CKD, ICM, Severe AS who presents for further evaluation of chronic systolic heart failure. He developed progressive dyspnea with exacterbation as well as progressive fatigue which prompted further evaluation with a heart cath at [facility] that revealed severe 3 vessel disease with  of his LAD and RCA, severe LV systolic failure (LVEF 41%), and severe As. Has has been unable to walk a block before without limiting dyspnea as well as unable to make a bed without developing dyspnea. Admitted to [facility] for acute chronic HFrEF. Today worsened fatigue with hypotension. 24Hr Events: 
CMRI- no viability 
hypotensive Impression / Plan:  
Heart Failure Status: NYHA Class IV  
 
ICM-Stage D NYHA IV - LVEF 10% Currently on Dobutamine gtt at 7.5 mcg/kg/min Bumex 1mg BID Evaluation for LVAD placement Transfer to CCU for pa cath placement and hemodynamic optimization Daily lactates to eval for perfusion Move forward with LVAD eval 
Will need impella support tomorrow- appreciate Dr. Roma Dakin input Eval for septic source of vasodilation Severe AS Hold off on TAVR due to poor viability and cardiomyopathy Dr. Laxmi Sommers following CAD with  of RAD and RCA Currently on ASA and statin cMRI results from yesterday show severe Cad with  of the LAD and RCA with poor viability of myocardium. SCD high risk Plan to continue to wear life vest  
 
Atrial Fibrillation Hold eliquis in preparation for impella implant Will resume anticoagulation when appropriate HLD Continue on Lipitor 40 mg every night  
 
T2DM Continue glipizide Left leg wounds s/p fall Wound consult Leg is erythematous and warm, pt is afebrile CKD Monitor renal function Adjust medications based on renal function Dispo:  
Currently hemodynamically unstable to remain in IMCU transfer to CCU for further hemodynamic monitoring with swan placement. Draw blood cultures, urine cultures, lactate, give albumin for hypotension. PAUL to be done for LVAD workup. Evaluate CBC and BMP. History: 
Past Medical History:  
Diagnosis Date  3-vessel coronary artery disease  Aortic stenosis, severe  Chronic kidney disease (CKD), stage III (moderate) (HCC)  Chronic total occlusion of coronary artery  Hypertension  Ischemic cardiomyopathy  Peripheral vascular disease ([facility] [address]  Type 2 diabetes mellitus treated without insulin ([facility] [address] Past Surgical History:  
Procedure Laterality Date  HX AMPUTATION TOE Left 2017 Social History Socioeconomic History  Marital status:  Spouse name: Not on file  Number of children: Not on file  Years of education: Not on file  Highest education level: Not on file Occupational History  Not on file Social Needs  Financial resource strain: Not on file  Food insecurity:  
  Worry: Not on file Inability: Not on file  Transportation needs:  
  Medical: Not on file Non-medical: Not on file Tobacco Use  Smoking status: Former Smoker  Smokeless tobacco: Never Used Substance and Sexual Activity  Alcohol use: Not Currently  Drug use: Not on file  Sexual activity: Not on file Lifestyle  Physical activity:  
  Days per week: Not on file Minutes per session: Not on file  Stress: Not on file Relationships  Social connections:  
  Talks on phone: Not on file Gets together: Not on file Attends [employer] service: Not on file Active member of club or organization: Not on file Attends meetings of clubs or organizations: Not on file Relationship status: Not on file  Intimate partner violence:  
  Fear of current or ex partner: Not on file Emotionally abused: Not on file Physically abused: Not on file Forced sexual activity: Not on file Other Topics Concern  Not on file Social History Narrative  Not on file No family history on file. Current Medications:  
Current Facility-Administered Medications Medication Dose Route Frequency Provider Last Rate Last Dose  magnesium oxide (MAG-OX) tablet 400 mg  400 mg Oral DAILY Sandee Montero, NP   400 mg at 05/15/19 0830  potassium chloride SR (KLOR-CON 10) tablet 40 mEq  40 mEq Oral DAILY Sandee Montero, NP   40 mEq at 05/14/19 1707  bumetanide (BUMEX) injection 1 mg  1 mg IntraVENous BID Elda, Colette B, NP   Stopped at 05/15/19 0900  
 insulin lispro (HUMALOG) injection   SubCUTAneous AC&HS Idalia Agreste B, NP   2 Units at 05/15/19 2212  
 glucose chewable tablet 16 g  4 Tab Oral PRN Elda Shiva Don B, NP      
 dextrose (D50W) injection syrg 12.5-25 g  12.5-25 g IntraVENous PRN Elda, Colette B, NP      
 glucagon (GLUCAGEN) injection 1 mg  1 mg IntraMUSCular PRN Elda, Colette B, NP      
 traMADol (ULTRAM) tablet 50 mg  50 mg Oral Q8H PRN Idalia Agreste B, NP   50 mg at 05/13/19 2205  apixaban (ELIQUIS) tablet 5 mg  5 mg Oral BID Elda, Colette B, NP   5 mg at 05/15/19 6953  aspirin chewable tablet 81 mg  81 mg Oral DAILY Elda, Colette B, NP   81 mg at 05/15/19 0837  
 atorvastatin (LIPITOR) tablet 40 mg  40 mg Oral QHS Elda, Oclette B, NP   40 mg at 05/14/19 2235  
 glipiZIDE (GLUCOTROL) tablet 5 mg  5 mg Oral ACB&D Idalia Agreste B, NP   5 mg at 05/15/19 0360  spironolactone (ALDACTONE) tablet 25 mg  25 mg Oral DAILY Elda, Colette B, NP   25 mg at 05/14/19 1233  sodium chloride (NS) flush 5-40 mL  5-40 mL IntraVENous Q8H Colette Schroeder, NP   10 mL at 05/15/19 5865  sodium chloride (NS) flush 5-40 mL  5-40 mL IntraVENous PRN Elda, Colette B, NP      
 ondansetron (ZOFRAN) injection 4 mg  4 mg IntraVENous Q6H PRN Elda, Colette B, NP   4 mg at 05/14/19 2314  acetaminophen (TYLENOL) tablet 650 mg  650 mg Oral Q6H PRN Elda, Colette B, NP   650 mg at 05/12/19 1405  docusate sodium (COLACE) capsule 100 mg  100 mg Oral PRN Elda, Colette B, NP      
 DOBUTamine (DOBUTREX) 500 mg/250 mL (2,000 mcg/mL) infusion  7.5 mcg/kg/min IntraVENous CONTINUOUS Polliard, Hamp Pallas T, NP 19.1 mL/hr at 05/15/19 0800 7.5 mcg/kg/min at 05/15/19 0800  pantoprazole (PROTONIX) tablet 40 mg  40 mg Oral ACB Elda, Colette B, NP   40 mg at 05/15/19 8133 Allergies: Allergies Allergen Reactions  Penicillins Hives ROS:   
Review of Systems Constitutional: Positive for malaise/fatigue. HENT: Negative. Eyes: Negative. Respiratory: Positive for shortness of breath. Cardiovascular: Positive for leg swelling. Gastrointestinal: Positive for nausea. Genitourinary: Positive for dysuria. Musculoskeletal: Positive for falls. Skin:  
     Erythema to left lower leg Neurological: Positive for weakness. Psychiatric/Behavioral: Negative. Physical Exam:  
Physical Exam  
Constitutional: He is oriented to person, place, and time. Appears ill, non-distressed, not diaphoretic HENT:  
Head: Normocephalic and atraumatic. Eyes: EOM are normal.  
Neck: Normal range of motion. Cardiovascular: Murmur heard. Pulmonary/Chest: Effort normal and breath sounds normal.  
Abdominal: Soft. Musculoskeletal: He exhibits edema. Neurological: He is alert and oriented to person, place, and time. Skin: Skin is warm and dry. There is erythema. Vitals:  
Visit Vitals BP (!) 87/64 (BP 1 Location: Right arm, BP Patient Position: At rest) Pulse 96 Temp 98.9 °F (37.2 °C) Resp 18 Ht 5' 11\" (1.803 m) Wt 182 lb 12.2 oz (82.9 kg) SpO2 97% BMI 26.22 kg/m² Temp (24hrs), Av.1 °F (36.7 °C), Min:97.5 °F (36.4 °C), Max:98.9 °F (37.2 °C) Admission Weight: Last Weight Weight: 186 lb 15.2 oz (84.8 kg) Weight: 182 lb 12.2 oz (82.9 kg) Intake / Output / Drain: 
Last 24 hrs.:  
 
Intake/Output Summary (Last 24 hours) at 5/15/2019 1205 Last data filed at 2019 1910 Gross per 24 hour Intake 480 ml Output  Net 480 ml Oxygen Therapy: 
Oxygen Therapy O2 Sat (%): 97 % (05/15/19 1127) O2 Device: Room air (05/15/19 0206) CXR:  
EXAM: XR CHEST PA LAT 
  
INDICATION: Dyspnea on exertion. Hospitalization for acute on chronic CHF. 
  
COMPARISON: None 
  
TECHNIQUE: PA and lateral chest views 
  
FINDINGS: Cardiac monitoring wires overlie the thorax. The cardiomediastinal and 
hilar contours are within normal limits. The pulmonary vasculature is within 
normal limits.  
  
Right lung base opacity has a component of pleural effusion. Left lung is clear. No pneumothorax. Bones are osteopenic. Shoulder arthritis is partially imaged. 
  
IMPRESSION IMPRESSION: 
  
Small right pleural effusion with adjacent atelectasis versus pneumonia. Recommend followup PA and lateral chest views in 8-10 weeks to ensure 
resolution Recent Labs:  
Labs Latest Ref Rng & Units 2019 2019 2019 2019 5/10/2019 2019 5/3/2019 WBC 4.1 - 11.1 K/uL 6.1 - - - 6.1 6.5 6.6  
RBC 4.10 - 5.70 M/uL 4.09(L) - - - 4.19 5.06 4.39 Hemoglobin 12.1 - 17.0 g/dL 10. 9(L) - - - 11. 5(L) 13.7 12. 0(L) Hematocrit 36.6 - 50.3 % 35. 4(L) - - - 36. 0(L) 43.6 37.9 MCV 80.0 - 99.0 FL 86.6 - - - 85.9 86 86.3 Platelets 811 - 915 K/uL 139(L) - - - 173 256 200 Lymphocytes 12 - 49 % - - - - - - 20 Monocytes Not Estab. % - - - - - 6 8 Eosinophils Not Estab. % - - - - - 1 2 Basophils Not Estab. % - - - - - 0 1 Albumin 3.5 - 5.0 g/dL 2. 9(L) 2. 8(L) 2. 9(L) 2. 9(L) 3. 1(L) 4.4 - Calcium 8.5 - 10.1 MG/DL 8.5 9.0 9.3 9.2 9.2 9.7 8.7 SGOT 15 - 37 U/L 12(L) 14(L) 16 10(L) 15 13 - Glucose 65 - 100 mg/dL 127(H) 142(H) 172(H) 99 78 167(H) 117(H) BUN 6 - 20 MG/DL 39(H) 42(H) 43(H) 47(H) 56(H) 42(H) 41(H) Creatinine 0.70 - 1.30 MG/DL 1.78(H) 1.87(H) 1.85(H) 1.91(H) 2.26(H) 2.33(H) 1.84(H) Sodium 136 - 145 mmol/L 133(L) 135(L) 136 137 135(L) 134 137 Potassium 3.5 - 5.1 mmol/L 4.6 4.5 4.1 3.6 3.7 5.1 4.2 TSH 0.450 - 4.500 uIU/mL - - - - - 2.600 -  
 
EKG:  
EKG Results Procedure 720 Value Units Date/Time SCANNED CARDIAC RHYTHM STRIP [[id number]] Collected:  05/13/19 0430 Order Status:  Completed Updated:  05/13/19 8027 SCANNED CARDIAC RHYTHM STRIP [[id number]] Collected:  05/13/19 9412 Order Status:  Completed Updated:  05/13/19 109 [facility] SCANNED CARDIAC RHYTHM STRIP [[id number]] Collected:  05/11/19 0321 Order Status:  Completed Updated:  05/11/19 4764 EKG, 12 LEAD, INITIAL [[id number]] Collected:  05/10/19 1121 Order Status:  Completed Updated:  05/10/19 1207 Ventricular Rate 64 BPM   
  Atrial Rate 77 BPM   
  QRS Duration 104 ms Q-T Interval 460 ms QTC Calculation (Bezet) 474 ms Calculated P Axis 41 degrees Calculated R Axis 35 degrees Calculated T Axis 133 degrees Diagnosis --  
  Sinus rhythm with AV dissociation and Junctional rhythm Low voltage QRS Cannot rule out Anteroseptal infarct (cited on or before 09-MAY-2019) When compared with ECG of 09-MAY-2019 20:01, 
Junctional rhythm has replaced Atrial fibrillation Questionable change in initial forces of Anterior leads Nonspecific T wave abnormality no longer evident in Inferior leads Nonspecific T wave abnormality, improved in Lateral leads Confirmed by Raquel Ross MD, Hector Andrew ([id number]) on 5/10/2019 12:07:00 PM 
  
 EKG, 12 LEAD, INITIAL [[id number]] Collected:  05/09/19 2001 Order Status:  Completed Updated:  05/10/19 7083 Ventricular Rate 60 BPM   
  Atrial Rate 60 BPM   
  QRS Duration 86 ms   
  Q-T Interval 428 ms QTC Calculation (Bezet) 428 ms Calculated R Axis 34 degrees Calculated T Axis 142 degrees Diagnosis -- Atrial fibrillation Low voltage QRS Septal infarct , age undetermined No previous ECGs available Confirmed by Walt Kincaid MD, Obie Guzman ([id number]) on 5/10/2019 8:37:23 AM 
  
 [address] [place] [address] [[id number]] Collected:  05/10/19 0890 Order Status:  Completed Updated:  05/10/19 1701 Echocardiogram:  
Left Ventricle Normal wall thickness. Mildly dilated left ventricle. The muscle mass is normal. The cavity shape is normal. Severe and segmental systolic dysfunction. The estimated ejection fraction is 16 - 20%. Visually measured ejection fraction. Abnormal wall motion as described on the wall scoring diagram below. Unable to assess diastolic function. Wall Scoring The following segments are akinetic: apical anterior, apical septal, apical inferior, apical lateral and apex. The following segments are hypokinetic: mid anterior, mid anteroseptal, mid inferoseptal, mid inferior, mid inferolateral and mid anterolateral. 
All other segments are normal.  
  
  
  
Left Atrium Normal cavity size. No atrial septal defect present. No patent foramen ovale visualized. Right Ventricle Normal cavity size, wall thickness and global systolic function. Right Atrium Normal cavity size. Aortic Valve Mild aortic valve sclerosis with reduced excursion. Aortic valve peak gradient is 45 mmHg. Aortic valve mean gradient is 28 mmHg. Aortic valve area is 0.8 cm2. Aortic valve peak velocity is 3.4 cm/s. There is severe aortic stenosis. Trace aortic valve regurgitation. Mitral Valve Normal valve structure and no stenosis. Mild regurgitation. Tricuspid Valve Normal valve structure and no stenosis. Mild to moderate tricuspid valve regurgitation. Pulmonary arterial systolic pressure is 22.5 mmHg. Moderate pulmonary hypertension. Pulmonic Valve Pulmonic valve not well visualized. Aorta Normal aortic root, ascending aortic, and aortic arch. Pericardium Normal pericardium and no evidence of pericardial effusion. Dimensions Measurement Value (Range) LVIDs 4.45 cm LVIDd 4.77 cm (4.2 - 5.9) IVSd 0.97 cm (0.6 - 1.0) LVPWd 1 cm (0.6 - 1.0) LV Mass .3 g (88 - 224) LV Mass AL Index 95.7 g/m2 (49 - 115) Left Atrium Major Axis 4.7 cm Left Atrium to Aortic Root Ratio 1.37 Tapse 0.65 cm (1.5 - 2.0) RVIDd 4.1 cm Aorta Measurements Aorta Measurement Value (Range) Ao Root D 3.42 cm Aortic Valve Measurements Stenosis Aortic Valve Systolic Peak Velocity 347.58 cm/s AoV PG 45.2 mmHg Aortic Valve Systolic Mean Gradient 60.1 mmHg AoV VTI 67.02 cm Aortic Valve Area by Continuity of VTI 0.8 cm2 Aortic Valve Area by Continuity of Peak Velocity 0.8 cm2 LVOT  
LVOT Peak Velocity 73.61 cm/s LVOT Peak Gradient 2.2 mmHg LVOT VTI 14.66 cm  
  
LVOT d 2.2 cm Mitral Valve Measurements Stenosis Mitral Valve Pressure Half-time 37.5 ms  
  
MVA (PHT) 5.9 cm2 Tricuspid Valve Measurements Stenosis PASP 55 mmHg Regurgitation Triscuspid Valve Regurgitation Peak Gradient 42.1 mmHg  
  
TR Max Velocity 324.38 cm/s Pulmonary Measurements Stenosis/Regurgitation PV peak gradient 1.1 mmHg Pulmonic Valve Max Velocity 51.39 cm/s Diastolic Filling/Shunts Diastolic Filling Mitral Valve E Wave Deceleration Time 129.5 ms  
  
MV E Lazarus 117.02 cm/s  
  
MV A Lazarus 36.78 cm/s  
  
MV E/A 3.18 Shunt LVOT SV 56 ml  
  
  
  
 
 
Cardiac MRI:  
CARDIOVASCULAR MRI 
  
INDICATION: cardiomyopathy 
  
PROCEDURAL DATA: 
  
CPT Codes: 62994 
  
SCANS PERFORMED:  
Spin Echo: Axial. 
FIESTA/SSFP Rest Perfusion LGE 
  
Contrast Agent: Magnevist.  Contrast Dose: 33ml. 
  
 CLINICAL DATA:  HR = 84/min, BP = 101/71mmHg,  HT = 5 foot 10inc, WT = 178lb.   
  
           
IMPRESSION Impression: 
  
1. Markedly dilated left ventricle by 3-D volumetric assessment index to body 
surface area. The LV end-diastolic volume index is 679 mL/sq m. Severe left 
ventricular systolic dysfunction. Severe hypokinesis of the mid to distal 
anterior wall, anteroseptal wall, anterolateral wall. Dyskinesis of the apex, 
apical septal wall, apical lateral wall, inferoapical wall. Severe hypokinesis 
of the base to mid inferoseptal wall. The entire apex, apical septal wall, 
apical lateral wall and anteroapical wall is significantly thinned. 3-D LVEF 15% 
(patient was on dobutamine when this EF was calculated). 2. Mildly dilated right ventricle by 3-D volumetric assessment and index to body 
surface area. Severe right ventricular systolic dysfunction. Severe global 
hypokinesis with regional variation. Akinesis of the mid to distal anterior wall 
of the right ventricle. 3-D RVEF 17% (patient was on dobutamine when this EF was 
calculated). 3. Severe aortic valve stenosis with aortic valve area of 0.7 sq cm by 
planimetry. 4. On LGE study for viability, there is a large infarct involving greater than 
75% thickness of the mid to distal anterior wall, anteroapical wall, apex, 
apical septal wall, mid septal wall, inferoapical wall without any significant 
viable myocardium. There is a medium-size subendocardial infarct involving 50-75% thickness of the base to mid inferolateral and anterolateral wall. The 
only myocardial walls which demonstrate significant viability are mid to distal 
inferior wall, and lateral wall which are in the distribution of the RCA and 
circumflex territories. 5. Normal pleura and pericardium. Small to medium-sized right pleural effusion. 6. Large right hepatic cyst measuring 8.5 x 6 cm. 
  
  
  
  
CARDIAC CHAMBERS: 
  
Left Atrium:  54 mm (<40mm) Left Ventricular Size: LVEDD:  45 mm (Normal 37-57mm) Left Ventricular Size: LVESD:  41 mm (Normal <40mm) LVEF:  15 % (Normal 55-75%) LV Hypertrophy: Moderate septal hypertrophy. LV septal wall = 15 mm, LV 
posterior wall =  13 mm (Normal <11mm)  
  
Right Atrium:  55 mm (<40mm) Right Ventricular Size: RVEDD:  58 mm (Normal 26-45mm) Right Ventricular Size: RVESD:  55 mm (Normal <25mm) RVEF:  17 % (Normal 50-60%) RV Hypertrophy:  None 
  
  
Mass/Tumor/Thrombus:  None. Dissection: None. Atherosclerosis:  None. Inferior Vena Cava:  Normal. 
Inter Atrial Septum:  Normal. 
  
VALVULAR: 
  
Mitral Valve:  Normal mitral valve leaflets. Trace to mild mitral regurgitation. Aortic Valve:  Trileaflet aortic valve. Calcified aortic valve leaflets. Severe 
aortic stenosis with aortic valve area of 0.7 sq cm by planimetry. No 
significant aortic regurgitation. Tricuspid Valve:  Normal tricuspid valve. Mild 1+ tricuspid regurgitation. Pulmonary Valve:  Normal pulmonic valve. 
  
Pericardium:  Normal. (<3.5mm) Pericardial Effusion:  None. Pleural Effusion:  Small to medium-sized right pleural effusion. 
  
VOLUMETRIC DATA: 
  
LVEDVI:  143 ml/m2 LVESVI:  121 ml/m2 LVSVI:  22 ml/m2 LVMI:  36 g/m2 
  
  
RVEDVI:  111 ml/m2 RVESVI:  92 ml/m2 RVSVI:  19 ml/m2 Rafita Lackey [address], [address] [place] [address] Office [phone] Fax [phone] 
24 hour VAD/HF Pager: [phone]

## 2019-05-15 NOTE — PROGRESS NOTES
Problem: Diabetes Self-Management Goal: *Disease process and treatment process Description Define diabetes and identify own type of diabetes; list 3 options for treating diabetes. Outcome: Progressing Towards Goal 
Goal: *Monitoring blood glucose, interpreting and using results Description Identify recommended blood glucose targets  and personal targets. Outcome: Progressing Towards Goal 
  
Problem: Patient Education: Go to Patient Education Activity Goal: Patient/Family Education Outcome: Progressing Towards Goal 
  
Problem: Falls - Risk of 
Goal: *Absence of Falls Description Document Felixzandra Aureliaandres Fall Risk and appropriate interventions in the flowsheet. Outcome: Progressing Towards Goal 
Note:  
Fall Risk Interventions: 
Mobility Interventions: Communicate number of staff needed for ambulation/transfer, PT Consult for mobility concerns Medication Interventions: Evaluate medications/consider consulting pharmacy, Patient to call before getting OOB Elimination Interventions: Call light in reach, Patient to call for help with toileting needs, Toileting schedule/hourly rounds History of Falls Interventions: Evaluate medications/consider consulting pharmacy, Investigate reason for fall Problem: Heart Failure: Day 5 Goal: Activity/Safety Outcome: Progressing Towards Goal 
Goal: Diagnostic Test/Procedures Outcome: Progressing Towards Goal 
Goal: Nutrition/Diet Outcome: Progressing Towards Goal 
  
Bedside and Verbal shift change report given to Kurtis Last (oncoming nurse) by Arianne Holder RN (offgoing nurse). Report included the following information SBAR, Kardex, Intake/Output, MAR and Recent Results.

## 2019-05-15 NOTE — PROGRESS NOTES
Physical therapy: Attempted PT session. Pt currently being prepped to transfer to CCU for closer monitoring and LVAD work up. Will defer and continue to follow.   
 
Dwayne Lopez, PT, DPT

## 2019-05-15 NOTE — PROGRESS NOTES
Bedside shift change report given to Jazmín La RN (oncoming nurse) by Se Morrell (offgoing nurse). Report included the following information SBAR, Kardex, Intake/Output and Recent Results.

## 2019-05-15 NOTE — INTERDISCIPLINARY ROUNDS
IDR/SLIDR Summary Patient: Juanito Meza MRN: [id number]    Age: 77 y.o. YOB: 1950 Room/Bed: [address] [place] [address] Admit Diagnosis: Acute on chronic systolic (congestive) heart failure (HCC) [I50.23]  Principal Diagnosis: <principal problem not specified>  
Goals: LVAD workup, Impella placement in AM 
Readmission: NO  Quality Measure: CHF VTE Prophylaxis: Mechanical 
Influenza Vaccine screening completed? YES Pneumococcal Vaccine screening completed? NO Mobility needs: Yes   Nutrition plan:Yes 
Consults:P.T, O.T., Respiratory and Case Management Financial concerns:Yes  Escalated to CM? YES 
RRAT Score: 24   Interventions:H2H Testing due for pt today? YES 
LOS: 6 days Expected length of stay ? days Discharge plan: tbd   PCP: Luana Jacobo MD 
Transportation needs: Yes Days before discharge:two or more days before discharge Discharge disposition: tbd Signed:  
 
Marie Lazcano RN 
5/15/2019 
7:58 PM

## 2019-05-15 NOTE — PROGRESS NOTES
Spiritual Care Assessment/Progress Note [address] 
 
 
NAME: Abiel Paul      MRN: [id number] AGE: 77 y.o. SEX: male Adventist Affiliation: Drew Fenton Language: [place] 5/15/2019     Total Time (in minutes): 5 Spiritual Assessment begun in [facility] 4 IMCU 2 through conversation with: 
  
    []Patient        [] Family    [] Friend(s) Reason for Consult: Initial/Spiritual assessment, patient floor Spiritual beliefs: (Please include comment if needed) 
   [] Identifies with a chester tradition:     
   [] Supported by a chester community:        
   [] Claims no spiritual orientation:       
   [] Seeking spiritual identity:            
   [] Adheres to an individual form of spirituality:       
   [x] Not able to assess:                   
 
    
Identified resources for coping:  
   [] Prayer                           
   [] Music                  [] Guided Imagery 
   [] Family/friends                 [] Pet visits [] Devotional reading                         [x] Unknown 
   [] Other:                                         
 
 
Interventions offered during this visit: (See comments for more details) Patient Interventions: Spiritual care volunteer support Plan of Care: 
 
 [] Support spiritual and/or cultural needs  
 [] Support AMD and/or advance care planning process    
 [] Support grieving process 
 [] Coordinate Rites and/or Rituals  
 [] Coordination with community clergy [] No spiritual needs identified at this time 
 [] Detailed Plan of Care below (See Comments)  [] Make referral to Music Therapy 
[] Make referral to Pet Therapy    
[] Make referral to Addiction services 
[] Make referral to [facility] 
[] Make referral to Spiritual Care Partner 
[] No future visits requested       
[x] Follow up visits as needed Comments: Staff with patient providing care. Will continue to follow up as needed and upon request as able. Visited by Rev. Viktor Combs, [address] [employer] [facility] paging service: [phone])

## 2019-05-15 NOTE — PROGRESS NOTES
After discussion with Dr Andrea Garnett Would favor LVAD work up This is based on poor biventricular function, un reconstructive CAD and non viability of LV on MRI Unlikely to get relief from TAVR

## 2019-05-15 NOTE — CONSULTS
CSS 
 History and Physical 
 
Subjective:  
  
Rachael Gomes is a 77 y.o. male who was referred for cardiac evaluation by Dr. Awais Terry. The patient has had SOB with exertion [place] he worse as a . This got progressively worse and he was admitted with a CHF exacerbation. He has noticed worsening orthopnea and LE edema. He denies CP, claudication, and dizziness. She has a history CAD, AS, HTN, HLD, DM type II, PVD, and CKD. He has a surgical history of toe amputation. He denies alcohol and smoking. He works as a . He lives with friends. Cardiac Testing Cardiac catheterization: LM: Large caliber vessel without significant stenosis  
  
LAD: Moderate caliber heavily calcified vessel that is occluded in the mid without distal collateralization. D1: Moderate caliber calcified vessel with severe diffuse disease 
  
LCX: Moderate caliber calcified vessel without significant stenosis. OM1: Moderate caliber vessel with luminal irregularities and small vessel severe distal disease OM2: Very small caliber vessel without significant stenosis.  
  
RCA:   Large caliber heavily calcified dominant vessel that is occluded in the mid. A small segment of the PDA is filled faintly by left to right collaterals. PDA: Occluded PLB: Occluded 
  
  
LV angiography:  
EF: 10% Wall motion: severe basal HK. The anterior wall, anteroseptum, anterolateral, apical, and mid to distal inferior wall are akinetic. MR:  mild ECHO: Left Ventricle: Mildly dilated left ventricle. Estimated left ventricular ejection fraction is 16 - 20% Aortic Valve: Severe aortic stenosis, mean gradient is 28 mmHg. Aortic valve area is 0.8 cm2. Tricuspid Valve: Mild to moderate tricuspid valve regurgitation is present with moderate pulmonary HTN, estimated PA of 55 mmHg. Cardiac MRI: 1. Markedly dilated left ventricle by 3-D volumetric assessment index to body surface area. The LV end-diastolic volume index is 030 mL/sq m. Severe left 
ventricular systolic dysfunction. Severe hypokinesis of the mid to distal 
anterior wall, anteroseptal wall, anterolateral wall. Dyskinesis of the apex, 
apical septal wall, apical lateral wall, inferoapical wall. Severe hypokinesis 
of the base to mid inferoseptal wall. The entire apex, apical septal wall, 
apical lateral wall and anteroapical wall is significantly thinned. 3-D LVEF 15% 
(patient was on dobutamine when this EF was calculated). 2. Mildly dilated right ventricle by 3-D volumetric assessment and index to body 
surface area. Severe right ventricular systolic dysfunction. Severe global 
hypokinesis with regional variation. Akinesis of the mid to distal anterior wall 
of the right ventricle. 3-D RVEF 17% (patient was on dobutamine when this EF was 
calculated). 3. Severe aortic valve stenosis with aortic valve area of 0.7 sq cm by 
planimetry. 4. On LGE study for viability, there is a large infarct involving greater than 
75% thickness of the mid to distal anterior wall, anteroapical wall, apex, 
apical septal wall, mid septal wall, inferoapical wall without any significant 
viable myocardium. There is a medium-size subendocardial infarct involving 50-75% thickness of the base to mid inferolateral and anterolateral wall. The 
only myocardial walls which demonstrate significant viability are mid to distal 
inferior wall, and lateral wall which are in the distribution of the RCA and 
circumflex territories. 5. Normal pleura and pericardium. Small to medium-sized right pleural effusion. 6. Large right hepatic cyst measuring 8.5 x 6 cm. 
  
 
Past Medical History:  
Diagnosis Date  3-vessel coronary artery disease  Aortic stenosis, severe  Chronic kidney disease (CKD), stage III (moderate) (HCC)  Chronic total occlusion of coronary artery  Hypertension  Ischemic cardiomyopathy  Peripheral vascular disease ([facility] [address]  Type 2 diabetes mellitus treated without insulin ([facility] [address] Past Surgical History:  
Procedure Laterality Date  HX AMPUTATION TOE Left 2017 Social History Tobacco Use  Smoking status: Former Smoker  Smokeless tobacco: Never Used Substance Use Topics  Alcohol use: Not Currently No family history on file. Prior to Admission medications Medication Sig Start Date End Date Taking? Authorizing Provider  
glimepiride (AMARYL) 4 mg tablet Take 4 mg by mouth two (2) times a day. Yes Provider, Historical  
dulaglutide (TRULICITY) 1.5 UL/5.3 mL sub-q pen 1.5 mg by SubCUTAneous route every Sunday. Yes Provider, Historical  
spironolactone (ALDACTONE) 25 mg tablet TAKE 1 TABLET BY MOUTH EVERY DAY 5/7/19  Yes Josse Miguel MD  
apixaban (ELIQUIS) 5 mg tablet Take 1 Tab by mouth two (2) times a day. 5/7/19  Yes Josse Miguel MD  
sacubitril-valsartan (ENTRESTO) 24 mg/26 mg tablet Take 1 Tab by mouth every twelve (12) hours. 5/4/19  Yes Niharika Gao MD  
canagliflozin (INVOKANA) 300 mg tablet Take 300 mg by mouth Daily (before breakfast). Yes Provider, Historical  
metFORMIN (GLUCOPHAGE) 1,000 mg tablet Take 1,000 mg by mouth two (2) times daily (with meals). Yes Provider, Historical  
esomeprazole (NEXIUM) 20 mg capsule Take 20 mg by mouth daily. Yes Provider, Historical  
aspirin 81 mg chewable tablet Take 81 mg by mouth daily. Yes Provider, Historical  
atorvastatin (LIPITOR) 40 mg tablet Take 1 Tab by mouth nightly. 5/2/19  Yes Ruth Corbin III, DO  
furosemide (LASIX) 40 mg tablet Take one tab daily 5/3/19  Yes Preston Corbin III, DO  
metoprolol succinate (TOPROL-XL) 100 mg tablet Take 1 Tab by mouth daily. 5/3/19  Yes Allison Hidalgo III, DO Allergies Allergen Reactions  Penicillins Hives Review of Systems:  
Consititutional: Denies fever or chills. Eyes:  Denies use of glasses or vision problems(cataracts). ENT:  Denies hearing or swallowing difficulty. CV: Denies CP, claudication, + HTN. Resp: Denies productive cough. +dyspnea : Denies dialysis or kidney problems. GI: Denies ulcers, esophageal strictures, liver problems. M/S: Denies joint or bone problems, or implanted artificial hardware. +toe amputations Skin: Denies varicose veins, + edema. Neuro: Denies strokes, or TIAs. Psych: Denies anxiety or depression. Endocrine: Denies thyroid problems, + diabetes. Heme/Lymphatic: Denies easy bruising or lymphedema. Objective:  
 
VS:  
Visit Vitals BP (!) 81/73 (BP 1 Location: Right arm, BP Patient Position: At rest) Pulse (!) 104 Temp 98.6 °F (37 °C) Resp 23 Ht 5' 11\" (1.803 m) Wt 179 lb 14.3 oz (81.6 kg) SpO2 96% BMI 25.81 kg/m² Physical Exam:   
General appearance: alert, cooperative, no distress Head: normocephalic, without obvious abnormality; atraumatic Eyes: conjunctivae/corneas clear; EOM's intact. Nose: nares normal; no drainage. Neck: no carotid bruit and no JVD Lungs: clear to auscultation bilaterally Heart: regular rate and rhythm; +4/6 murmur Abdomen: soft, non-tender; bowel sounds normal 
Extremities: moves all extremities; no weakness. Skin: Skin color normal; No varicose veins or  2+ LE edema. Scab to left knee Neurologic: Grossly normal   
 
Labs:  
Recent Labs 05/15/19 
1126  05/14/19 
0440 WBC  --   --  6.1 HGB  --   --  10.9* HCT  --   --  35.4* PLT  --   --  139* NA  --   --  133* K  --   --  4.6 BUN  --   --  39* CREA  --   --  1.78* GLU  --   --  127* GLUCPOC 182*   < >  --   
 < > = values in this interval not displayed. Diagnostics:  
PA and lateral: Small right pleural effusion with adjacent atelectasis versus pneumonia. Recommend followup PA and lateral chest views in 8-10 weeks to ensure 
resolution. Carotid doppler: There is mild stenosis in the right ICA (<50%). · The right vertebral is antegrade. · There is mild stenosis in the left ICA (<50%). The left vertebral is antegrade. PFTS-FEV1: pending EKG: Sinus rhythm with AV dissociation and Junctional rhythm Low voltage QRS Cannot rule out Anteroseptal infarct (cited on or before 09-MAY-2019) When compared with ECG of 09-MAY-2019 20:01,  
Junctional rhythm has replaced Atrial fibrillation Questionable change in initial forces of Anterior leads Nonspecific T wave abnormality no longer evident in Inferior leads Nonspecific T wave abnormality, improved in Lateral leads PAUL: Due to bilateral non compressable arteries unable to obtain PAUL · PVR waveform shows no evidence of hemodynamically significant arterial obstruction Left great toe amputation Assessment:  
 
Active Problems: 
  Acute on chronic systolic (congestive) heart failure ([address] (5/9/2019) Plan: 1. Acute on chronic systolic heart failure (NYHA class IV on admission): AHF following, on dobut 7.5, bumex BID, aldactone, LVAD workup ongoing, plan for impella 5.0 placement with Dr. Ross Claude tomorrow. NpBNP >10831, lactate 2.6. Plan for PA line placement today per AHF. 2. Severe AS: no plans for TAVR at this time 3. CAD with  of RAD and RCA: on ASA and statin, no BB dt dobut. Cardiac MRI showed nonviability of LV. 4. Afib: on eliquis -hold for impella 5. HLD: on lipitor 6. DM type II: on amaryl, A1C 6.9 7. CKD: Cr 1.78, monitor. 8. Left leg wounds s/p fall: wound care consulted. 9. Anemia, iron deficiency: Hgb 10.9, start PO iron, monitor 10. Thrombocytopenia: Plt 139, monitor. Signed By: Riya Ruiz NP May 15, 2019

## 2019-05-15 NOTE — PROGRESS NOTES
Bedside and Verbal shift change report given to Stacey (oncoming nurse) by Megan Rojas (offgoing nurse). Report included the following information SBAR, Kardex, Intake/Output, MAR, Accordion, Recent Results, Cardiac Rhythm -A fib and Alarm Parameters . 2000 - Assumed care. Gtts verified. 2100 - Family/visitors at bedside, plan of care and procedure protocol reviewed. 2200 - Pt has not voided. Pt attempted to void w/ urinal sitting on side of bed again w/o results. Bladder scan >800ml. Colon catheter placed, UA obtained, 850ml immediately out. CHG bath given. Pt c/o nausea, zofran given. 0000 - Labs drawn. Pt c/o HA, refusing tylenol at this time. 0400 - AM labs drawn. AM CXR obtained. 0500 - Mixed venous drawn, swan re-calibrated. 0600 - BP 70s/60s. Bumex gtt stopped per Margy Das NP. CHG bath given. 0700 - Dr. Greta Varghese at bedside. Pt moved to first case. Milrinone gtt stopped per [facility]. Bedside and Verbal shift change report given to Divya Saab (oncoming nurse) by Buddy Flanagan (offgoing nurse). Report included the following information SBAR, Kardex, Intake/Output, MAR, Accordion, Recent Results, Cardiac Rhythm -A fib and Alarm Parameters .

## 2019-05-16 NOTE — DIABETES MGMT
DTC Progress Note Recommendations/ Comments: BG's ranged 145 mg/dL - 186 mg/dL past 24 hours. Noticed at times BG > 200 mg/dL Day of surgery (5/16/2019) - impella No recommendations at this time. DTC will continue to follow Current hospital DM medication:  
glipizide 5 mg BID and  
Lispro normal sensitivity correction scale Chart reviewed on Tamika Rudd. Patient is a 77 y.o. male with hx Type 2 Diabetes on Glipizide 5 mg BID and Metformin 1000 mg BID at home. A1c:  
Lab Results Component Value Date/Time Hemoglobin A1c 6.9 (H) 05/02/2019 04:09 PM  
 
 
Recent Glucose Results:  
Lab Results Component Value Date/Time  (H) 05/16/2019 03:57 AM  
 GLUCPOC 116 (H) 05/16/2019 12:22 PM  
 GLUCPOC 145 (H) 05/16/2019 11:12 AM  
 GLUCPOC 169 (H) 05/16/2019 07:10 AM  
  
 
Lab Results Component Value Date/Time Creatinine 2.01 (H) 05/16/2019 03:57 AM  
 
Estimated Creatinine Clearance: 36.4 mL/min (A) (based on SCr of 2.01 mg/dL (H)). Active Orders There are no active orders of the following types: Diet. PO intake:  
Patient Vitals for the past 72 hrs: 
 % Diet Eaten 05/14/19 1800 50 % Will continue to follow as needed. Thank you Osbaldo Khan RN, [phone] Pager: [phone] Time spent: 6 min

## 2019-05-16 NOTE — PROGRESS NOTES
[facility] Progress Note DOS:   5/16/2019 NAME:  Urbano Mitchell  
MRN:   [id number] REFERRING PROVIDER:  Dr. Adriana Diane PRIMARY CARE PHYSICIAN: Shayna Connor MD 
 
 
Chief Complaint: CHENG 
 
 
HPI: 77y.o. year old male with a history of HTN, T2DM, PVD, CKD, ICM, Severe AS who presents for further evaluation of chronic systolic heart failure. He developed progressive dyspnea with exacterbation as well as progressive fatigue which prompted further evaluation with a heart cath at [facility] that revealed severe 3 vessel disease with  of his LAD and RCA, severe LV systolic failure (LVEF 53%), and severe As. Has has been unable to walk a block before without limiting dyspnea as well as unable to make a bed without developing dyspnea. Admitted to [facility] for acute chronic HFrEF. Today worsened fatigue with hypotension. 24Hr Events: Hypotensive Tx from IMCU to CCU  
PA cath placed yesterday afternoon Impella placed this morning Procedure:  Procedure(s): RIGHT AXILLARY IMPELLA/ [id number] POD:  Day of Surgery Impression / Plan:  
Heart Failure Status: NYHA Class IV 
 
ICM-Stage D NYHA IV - LVEF 10% Impella in place Currently  Dobutamine gtt at 7.5 mcg/kg/min Bumex restarted at 0.5 mg Evaluation for LVAD placement Daily lactates to eval for perfusion Milrinone stopped for continued hypotension Eval for septic source of vasodilation current blood cultures are negative  
ordered sed rate and lipase Lactate elevated yesterday improved today   
  
Severe AS Not a candidate for TAVR due to poor viability and cardiomyopathy Dr. Mariana Stephens following  
  
CAD with  of RAD and RCA Currently on ASA and statin cMRI results from yesterday show severe Cad with  of the LAD and RCA with poor viability of myocardium.  
  
RV failure Ordered Digoxin 0.125mg Restarted Bumex Monitor pulmonary pressures with medically changes currently high pulmonary pressures SCD high risk Will need life vest when discharged based on care plan  
  
Atrial Fibrillation Hold eliquis in preparation for impella implant Will resume anticoagulation when appropriate  
  
HLD Continue on Lipitor 40 mg every night  
  
T2DM Hyperglycemic continue to monitor blood glucose may need to consider insulin gtt if continues to be uncontrolled Continue glipizide  
  
Left leg wounds s/p fall Wound consult Leg is erythematous and warm, pt is afebrile  
  
CKD On Bumex gtt Cr. 2.0 Monitor renal function (current close to baseline on admission) Adjust medications based on renal function   
  
Hyponatremia Monitor I/O's and daily Na+ Current Na 130 Dispo:  
Hemodynamically unstable Impella placed this morning pt will remain in CCU. Current pulmonary pressures are significantly elevated restarted Bumex and will ordered Digoxin to start today. Ordered Lipase and Sed Rate for evaluation. Will continue to closely monitor and reevaluate right ventricle for improvement. Evaluation for LVAD placement in process. When stable may need evaluation for sleep apnea. History: 
Past Medical History:  
Diagnosis Date  3-vessel coronary artery disease  Aortic stenosis, severe  Chronic kidney disease (CKD), stage III (moderate) (HCC)  Chronic total occlusion of coronary artery  Hypertension  Ischemic cardiomyopathy  Peripheral vascular disease ([facility] [address]  Type 2 diabetes mellitus treated without insulin ([facility] [address] Past Surgical History:  
Procedure Laterality Date  HX AMPUTATION TOE Left 2017 Social History Socioeconomic History  Marital status:  Spouse name: Not on file  Number of children: Not on file  Years of education: Not on file  Highest education level: Not on file Occupational History  Not on file Social Needs  Financial resource strain: Not on file  Food insecurity:  
  Worry: Not on file Inability: Not on file  Transportation needs:  
  Medical: Not on file Non-medical: Not on file Tobacco Use  Smoking status: Former Smoker  Smokeless tobacco: Never Used Substance and Sexual Activity  Alcohol use: Not Currently  Drug use: Not on file  Sexual activity: Not on file Lifestyle  Physical activity:  
  Days per week: Not on file Minutes per session: Not on file  Stress: Not on file Relationships  Social connections:  
  Talks on phone: Not on file Gets together: Not on file Attends Sabianism service: Not on file Active member of club or organization: Not on file Attends meetings of clubs or organizations: Not on file Relationship status: Not on file  Intimate partner violence:  
  Fear of current or ex partner: Not on file Emotionally abused: Not on file Physically abused: Not on file Forced sexual activity: Not on file Other Topics Concern  Not on file Social History Narrative  Not on file History reviewed. No pertinent family history. Current Medications:  
Current Facility-Administered Medications Medication Dose Route Frequency Provider Last Rate Last Dose  vasopressin (VASOSTRICT) 20 Units in 0.9% sodium chloride 100 mL infusion  0-0.1 Units/min IntraVENous TITRATE Miguel Luna CRNA   Stopped at 05/16/19 5503  
 dextrose 5% infusion  4-20 mL/hr IntraVENous CONTINUOUS Toni HICKS NP 4 mL/hr at 05/16/19 1158 4 mL/hr at 05/16/19 1158  ceFAZolin (ANCEF) 2 g/20 mL in sterile water IV syringe  2 g IntraVENous Q8H Toni HICKS NP      
 ferrous sulfate tablet 325 mg  1 Tab Oral DAILY WITH Alen Moody NP   Stopped at 05/16/19 0800  
 milrinone (PRIMACOR) 20 MG/100 ML D5W infusion  0.125 mcg/kg/min IntraVENous CONTINUOUS Leopold Grates B, NP   Stopped at 05/16/19 3491  bumetanide (BUMEX) 0.25 mg/mL infusion  0.5 mg/hr IntraVENous CONTINUOUS Mayra MC, NP 2 mL/hr at 05/16/19 0608 0.5 mg/hr at 05/16/19 2196  magnesium oxide (MAG-OX) tablet 400 mg  400 mg Oral DAILY Polliard, Marliss Siemens, NP   Stopped at 05/16/19 0900  potassium chloride SR (KLOR-CON 10) tablet 40 mEq  40 mEq Oral DAILY Lissa Montero Siemens, NP   Stopped at 05/16/19 0900  
 insulin lispro (HUMALOG) injection   SubCUTAneous AC&HS Mayra MC NP   2 Units at 05/16/19 0711  
 glucose chewable tablet 16 g  4 Tab Oral PRN Antonietta Schroeder, NP      
 dextrose (D50W) injection syrg 12.5-25 g  12.5-25 g IntraVENous PRN Mir Schroederin JESUSITA, NP      
 glucagon (GLUCAGEN) injection 1 mg  1 mg IntraMUSCular PRN Mir Schroederin B, NP      
 traMADol (ULTRAM) tablet 50 mg  50 mg Oral Q8H PRN Mir Schroederin B, NP   50 mg at 05/16/19 0154  aspirin chewable tablet 81 mg  81 mg Oral DAILY Mir Schroederin B, NP   Stopped at 05/16/19 0900  
 atorvastatin (LIPITOR) tablet 40 mg  40 mg Oral QHS Mir Schroederin B, NP   40 mg at 05/15/19 2111  
 glipiZIDE (GLUCOTROL) tablet 5 mg  5 mg Oral ACB&D Mayra MC, NP   Stopped at 05/16/19 0730  
 spironolactone (ALDACTONE) tablet 25 mg  25 mg Oral DAILY Mir Schroederin JESUSITA, NP   Stopped at 05/15/19 0900  
 sodium chloride (NS) flush 5-40 mL  5-40 mL IntraVENous Q8H Elda, Colette B, NP   10 mL at 05/16/19 5639  sodium chloride (NS) flush 5-40 mL  5-40 mL IntraVENous PRN Elda Colette B, NP      
 ondansetron (ZOFRAN) injection 4 mg  4 mg IntraVENous Q6H PRN Elda Colette B, NP   4 mg at 05/15/19 2226  acetaminophen (TYLENOL) tablet 650 mg  650 mg Oral Q6H PRN Elda Colette B, NP   650 mg at 05/12/19 1405  docusate sodium (COLACE) capsule 100 mg  100 mg Oral PRN Mayra MC, NP      
 DOBUTamine (DOBUTREX) 500 mg/250 mL (2,000 mcg/mL) infusion  7.5 mcg/kg/min IntraVENous CONTINUOUS Adma Montero NP 12.7 mL/hr at 05/16/19 1135 5 mcg/kg/min at 05/16/19 1135  pantoprazole (PROTONIX) tablet 40 mg  40 mg Oral ACB Elda Colette MC NP   Stopped at 19 0730 Allergies: Allergies Allergen Reactions  Penicillins Hives ROS:   
Review of Systems Constitutional:  
     Looks uncomfortable and ill appearing \"feels bad\" HENT: Negative. Eyes: Negative. Respiratory: Negative. Cardiovascular: Negative. Gastrointestinal: Negative. Genitourinary: Negative. Musculoskeletal: Negative. Skin: Positive for rash. Neurological: Positive for weakness. Physical Exam:  
Physical Exam  
Constitutional: He is oriented to person, place, and time. He appears well-developed. No distress. Looks very ill HENT:  
Head: Normocephalic and atraumatic. Eyes: EOM are normal. Right eye exhibits no discharge. Left eye exhibits no discharge. Currently intubated and sedated Neck: Normal range of motion. No tracheal deviation present. Cardiovascular: Murmur heard. Pulmonary/Chest: Effort normal and breath sounds normal. No stridor. No respiratory distress. Abdominal: Soft. There is no tenderness. Musculoskeletal: He exhibits edema and deformity. Neurological: He is alert and oriented to person, place, and time. Skin: Skin is warm and dry. Rash noted. There is erythema. There is pallor. Scab to left knee Nursing note and vitals reviewed. Vitals:  
Visit Vitals /83 Pulse 91 Temp 98.7 °F (37.1 °C) Resp 22 Ht 5' 11\" (1.803 m) Wt 169 lb 12.1 oz (77 kg) SpO2 98% BMI 24.36 kg/m² Temp (24hrs), Av.6 °F (37 °C), Min:98.1 °F (36.7 °C), Max:99.2 °F (37.3 °C) Hemodynamics: 
 CO: CO (l/min): 4.4 l/min CI: CI (l/min/m2): 2.2 l/min/m2 CVP: CVP (mmHg): 10 mmHg (19 0700) SVR: SVR (dyne*sec)/cm5: 1303 (dyne*sec)/cm5 (19 0400) PAP Systolic: PAP Systolic: 49 (59/90/87 7068) PAP Diastolic: PAP Diastolic: 29 (29/59/72 8606) PVR:   
 SV02: SVO2 (%): 44 % (19 0700) SCV02:   
 
 
Admission Weight: Last Weight Weight: 186 lb 15.2 oz (84.8 kg) Weight: 169 lb 12.1 oz (77 kg) Intake / Output / Drain: 
Last 24 hrs.:  
 
Intake/Output Summary (Last 24 hours) at 5/16/2019 1205 Last data filed at 5/16/2019 1027 Gross per 24 hour Intake 1315.05 ml Output 3295 ml Net -1979.95 ml Ventilator: 
Ventilator Volumes Vt Set (ml): 500 ml (05/16/19 1036) Vt Exhaled (Machine Breath) (ml): 516 ml (05/16/19 1036) Ve Observed (l/min): 8.6 l/min (05/16/19 1036) Extubation Date / Time:  
 5/16 at 1127am 
 
Oxygen Therapy: 
Oxygen Therapy O2 Sat (%): 98 % (05/16/19 1044) Pulse via Oximetry: 98 beats per minute (05/16/19 1200) O2 Device: Nasal cannula (05/16/19 1146) O2 Flow Rate (L/min): 4 l/min (05/16/19 1146) FIO2 (%): 36 % (05/16/19 1146) Recent Labs:  
Labs Latest Ref Rng & Units 5/16/2019 5/15/2019 5/14/2019 5/13/2019 5/12/2019 5/11/2019 5/10/2019 WBC 4.1 - 11.1 K/uL 7.1 7.6 6.1 - - - 6.1  
RBC 4.10 - 5.70 M/uL 3.91(L) 4.03(L) 4.09(L) - - - 4.19 Hemoglobin 12.1 - 17.0 g/dL 10. 5(L) 10. 7(L) 10. 9(L) - - - 11. 5(L) Hematocrit 36.6 - 50.3 % 34. 1(L) 34. 8(L) 35. 4(L) - - - 36. 0(L) MCV 80.0 - 99.0 FL 87.2 86.4 86.6 - - - 85.9 Platelets 261 - 642 K/uL 146(L) 150 139(L) - - - 173 Lymphocytes 12 - 49 % 6(L) 8(L) - - - - - Monocytes 5 - 13 % 8 5 - - - - - Eosinophils 0 - 7 % 1 1 - - - - - Basophils 0 - 1 % 0 0 - - - - - Albumin 3.5 - 5.0 g/dL 2. 8(L) - 2. 9(L) 2. 8(L) 2. 9(L) 2. 9(L) 3. 1(L) Calcium 8.5 - 10.1 MG/DL 8.6 - 8.5 9.0 9.3 9.2 9.2 SGOT 15 - 37 U/L 13(L) - 12(L) 14(L) 16 10(L) 15 Glucose 65 - 100 mg/dL 164(H) - 127(H) 142(H) 172(H) 99 78 BUN 6 - 20 MG/DL 42(H) - 39(H) 42(H) 43(H) 47(H) 56(H) Creatinine 0.70 - 1.30 MG/DL 2.01(H) - 1.78(H) 1.87(H) 1.85(H) 1.91(H) 2.26(H) Sodium 136 - 145 mmol/L 130(L) - 133(L) 135(L) 136 137 135(L) Potassium 3.5 - 5.1 mmol/L 4.9 - 4.6 4.5 4.1 3.6 3.7 TSH 0.450 - 4.500 uIU/mL - - - - - - -  
 
EKG:  
 EKG Results Procedure 720 Value Units Date/Time SCANNED CARDIAC RHYTHM STRIP [[id number]] Collected:  05/16/19 4344 Order Status:  Completed Updated:  05/16/19 7096 SCANNED CARDIAC RHYTHM STRIP [[id number]] Collected:  05/13/19 0430 Order Status:  Completed Updated:  05/13/19 3623 SCANNED CARDIAC RHYTHM STRIP [[id number]] Collected:  05/13/19 2931 Order Status:  Completed Updated:  05/13/19 [address] [facility] SCANNED CARDIAC RHYTHM STRIP [[id number]] Collected:  05/11/19 0321 Order Status:  Completed Updated:  05/11/19 1902 EKG, 12 LEAD, INITIAL [[id number]] Collected:  05/10/19 1121 Order Status:  Completed Updated:  05/10/19 1207 Ventricular Rate 64 BPM   
  Atrial Rate 77 BPM   
  QRS Duration 104 ms Q-T Interval 460 ms QTC Calculation (Bezet) 474 ms Calculated P Axis 41 degrees Calculated R Axis 35 degrees Calculated T Axis 133 degrees Diagnosis --  
  Sinus rhythm with AV dissociation and Junctional rhythm Low voltage QRS Cannot rule out Anteroseptal infarct (cited on or before 09-MAY-2019) When compared with ECG of 09-MAY-2019 20:01, 
Junctional rhythm has replaced Atrial fibrillation Questionable change in initial forces of Anterior leads Nonspecific T wave abnormality no longer evident in Inferior leads Nonspecific T wave abnormality, improved in Lateral leads Confirmed by Mainor Marino MD, Graham Navarro ([id number]) on 5/10/2019 12:07:00 PM 
  
 EKG, 12 LEAD, INITIAL [[id number]] Collected:  05/09/19 2001 Order Status:  Completed Updated:  05/10/19 9275 Ventricular Rate 60 BPM   
  Atrial Rate 60 BPM   
  QRS Duration 86 ms   
  Q-T Interval 428 ms QTC Calculation (Bezet) 428 ms Calculated R Axis 34 degrees Calculated T Axis 142 degrees Diagnosis -- Atrial fibrillation Low voltage QRS Septal infarct , age undetermined No previous ECGs available Confirmed by Mainor Marino MD, Graham Navarro ([id number]) on 5/10/2019 8:37:23 AM 
  
 SCANNED CARDIAC RHYTHM STRIP [[id number]] Collected:  05/10/19 1288 Order Status:  Completed Updated:  05/10/19 9725 Echocardiogram: · Left Ventricle: Mildly dilated left ventricle. Estimated left ventricular ejection fraction is 16 - 20%. · Aortic Valve: Severe aortic stenosis, mean gradient is 28 mmHg. Aortic valve area is 0.8 cm2. · Tricuspid Valve: Mild to moderate tricuspid valve regurgitation is present with moderate pulmonary HTN, estimated PA of 55 mmHg. Left Ventricle Normal wall thickness. Mildly dilated left ventricle. The muscle mass is normal. The cavity shape is normal. Severe and segmental systolic dysfunction. The estimated ejection fraction is 16 - 20%. Visually measured ejection fraction. Abnormal wall motion as described on the wall scoring diagram below. Unable to assess diastolic function. Wall Scoring The following segments are akinetic: apical anterior, apical septal, apical inferior, apical lateral and apex. The following segments are hypokinetic: mid anterior, mid anteroseptal, mid inferoseptal, mid inferior, mid inferolateral and mid anterolateral. 
All other segments are normal.  
  
  
  
Left Atrium Normal cavity size. No atrial septal defect present. No patent foramen ovale visualized. Right Ventricle Normal cavity size, wall thickness and global systolic function. Right Atrium Normal cavity size. Aortic Valve Mild aortic valve sclerosis with reduced excursion. Aortic valve peak gradient is 45 mmHg. Aortic valve mean gradient is 28 mmHg. Aortic valve area is 0.8 cm2. Aortic valve peak velocity is 3.4 cm/s. There is severe aortic stenosis. Trace aortic valve regurgitation. Mitral Valve Normal valve structure and no stenosis. Mild regurgitation. Tricuspid Valve Normal valve structure and no stenosis. Mild to moderate tricuspid valve regurgitation. Pulmonary arterial systolic pressure is 86.8 mmHg. Moderate pulmonary hypertension. Pulmonic Valve Pulmonic valve not well visualized. Aorta Normal aortic root, ascending aortic, and aortic arch. Pericardium Normal pericardium and no evidence of pericardial effusion. CT scans:  
 
INDICATION:  Heart failure 
  EXAM: CXR Portable. 
  
FINDINGS: Portable chest shows no significant change including [place] since 
yesterday. There is no apparent pneumothorax. Lungs show low volumes with mild 
bibasilar haziness favoring atelectasis. Heart size is top normal. There is no 
overt pulmonary edema. 
  
IMPRESSION IMPRESSION: No significant change Radha Lackey [address] [place] [address], [address] Office [phone] Fax [phone] 
 
24 hour VAD/HF Pager: [phone]

## 2019-05-16 NOTE — ANESTHESIA PROCEDURE NOTES
Arterial Line Placement Start time: 5/16/2019 8:29 AM 
End time: 5/16/2019 8:34 AM 
Performed by: Marli Melton MD 
Authorized by: Marli Melton MD  
 
Pre-Procedure Indications:  Arterial pressure monitoring and blood sampling Preanesthetic Checklist: patient identified, risks and benefits discussed, anesthesia consent, patient being monitored, timeout performed and patient being monitored Procedure:  
Prep:  Chlorhexidine Seldinger Technique?: Yes Orientation:  Left Location:  Radial artery Catheter size:  20 G Number of attempts:  1 Cont Cardiac Output Sensor: No   
 
Assessment:  
Post-procedure:  Line secured and sterile dressing applied Patient Tolerance:  Patient tolerated the procedure well with no immediate complications

## 2019-05-16 NOTE — PROGRESS NOTES
LVAD/ Transplant Caregiver Assessment 5/16/2019 Juanito Meza 
Relationship to the Patient: Friend Name of Christal Vega Phone Number: Cell # [phone] 
 
1. Have you been a caregiver in the past? Yes 
 
2. What are your expectations as a caregiver? \"Whatever he needs - dressings, transportation, laundry\" 3. Do you work? No; full time mother of six children 4. Do you feel that you are physically and mentally able to care for your love one? Yes 5. Are you willing to commit to meeting with the LVAD NP during your loved ones hospitalization for LVAD training and to be educated on driveline dressing changes? This could include several sessions including meeting at the hospital between 9:00am to 5:00pm.  Yes 6. Are you willing to change your love ones dressing three times a week? Yes 
 
7. Do you have a valid s license and reliable transportation? Yes 
 
8. Do you feel that being a caregiver will be a financial burden? Potentially - might ask her 23year old son to stop working (works at [employer] - his income helps with car insurance bills) but she shared they will work through it 9. In the past, how have you coped with life changing events? Take a trip 10. Do you have reliable friends or family you can call upon when you need a Arvella Maha from being a caregiver, run errands, or attend to personal matters? Yes 11. Do you have any concerns about being a caregiver? No 
 
 met with Arianne Bowman to review the caregiver contract and provide education on the role of the LVAD caregiver. Arianne Bowman was polite yet aloof at times (phone ringing, she shared she's used to losing business cards in her purse). 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 She reports she feels her children will be helpful as well and should learn about the pump.  explicitly informed her that her children under the age of 25 cannot assist with the 24 hour supervision requirement in the caregiver contract to which she verbalized understanding. Noemy Fuchs did not express any major questions/concerns about the contract and signed it on this date. She was given a copy of the home inspection checklist to take home and complete/ return once completed. Aleks Ho, MSW, LCSW Clinical  [facility]key [id number]

## 2019-05-16 NOTE — PROGRESS NOTES
07:45 - 09:30 (105) 09:45 - 11:45 (120) 16:00 - 17:00 (60) NYHA class IV A/C systolic HF Severe AS 
PAD 
A/C kidney disease CAD 
 
07:45 - moving case up due to delay in first case 08:00 - still looks gray this am  
 
08:15 - here for high risk intubation 08:30 - filling pressures look high 08:45 - intubated; up on pressors 09:00 - going over TARAH 
 
09:15 - fair amount of RV dysfunction 09:45 - up on Impella speed 10:00 - back down to P-8 for some suction alarms 10:15 - RV still looks pretty bad 10:30 - tracking down family 10:45 - family updated 11:00 - called to see patient for placement signal 
 
11:15 - working on placement signal; looks ready to extubate 11:30 - extubating now 16:00 - still having issues with placement signal 
 
16:15 - discussed with [employer] rep - placement signal may have been damaged when going through stenotic aortic valve 16:30 - moving everything; wound looks good 16:45 - started on Bumex gtt; hoping CVP starts to improve Impella - flow 3.8 L/min @ P-7 Intake/Output Summary (Last 24 hours) at 5/16/2019 1633 Last data filed at 5/16/2019 1600 Gross per 24 hour Intake 1424.28 ml Output 3625 ml Net -2200.72 ml Risk of morbidity and mortality - high Medical decision making - high complexity Total critical care time - 285 minutes (CPT 83916, 99292 x 8)

## 2019-05-16 NOTE — PROGRESS NOTES
Physical Therapy 5/16/2019 Chart reviewed. Patient just extubated within the hour following impella placement this AM. Hold PT and f/u tomorrow. Thank you.  
 
La Nena Kennedy, PT, DPT

## 2019-05-16 NOTE — BRIEF OP NOTE
BRIEF OP NOTE Pre-Op Diagnosis: Heart Failure Post-Op Diagnosis: Heart Failure Procedure:  RIGHT AXILLARY IMPELLA 5.0 Surgeon:  Margy Carver MD 
 
Assistant(s): Jose Manuel Mckeon PA-C Anesthesia: General  
 
Infusions: Precedex, insulin, dobutamine, lenora Estimated Blood Loss: 20cc Specimens: * No specimens in log * Complications:  none Findings: Heart Failure Implants:  
Implant Name Type Inv. Item Serial No.  Lot No. LRB No. Used Action [employer]AT [id number] --  - [id number]  GRAFT HEMSHLD PLAT [id number] --  [id number] GET[employer]E [employer] MAQUET CARDIOVASCLR [id number] Right 1 Implanted [employer]     [employer] [id number] Right 1 Implanted

## 2019-05-16 NOTE — PROGRESS NOTES
[facility] ICU Progress Note Admit Date: 2019 POD:  Day of Surgery Procedure:  Procedure(s): RIGHT AXILLARY IMPELLA/ [id number] Subjective:  
Pt seen with Dr. Alexi Hart. Tmax 99.2, on 4L NC. Drowsy after impella placement. On dobut 7.5. Off milrinone and bumex. Impella 5.0 placed by Dr. Alexi Hart this morning. Objective:  
Vitals: 
Blood pressure 114/83, pulse 91, temperature 99.2 °F (37.3 °C), resp. rate 22, height 5' 11\" (1.803 m), weight 169 lb 12.1 oz (77 kg), SpO2 98 %. Temp (24hrs), Av.7 °F (37.1 °C), Min:98.1 °F (36.7 °C), Max:99.2 °F (37.3 °C) Hemodynamics: 
 CO: CO (l/min): 3.7 l/min CI: CI (l/min/m2): 1.8 l/min/m2 CVP: CVP (mmHg): 10 mmHg (19 0700) SVR: SVR (dyne*sec)/cm5: 1303 (dyne*sec)/cm5 (19 0400) PAP Systolic: PAP Systolic: 49 (50/76/96 9439) PAP Diastolic: PAP Diastolic: 29 (30/57/01 0853) PVR:   
 SV02: SVO2 (%): 49 % (19 1200) SCV02:   
 
EKG/Rhythm:  Afib Extubation Date / Time: 19 Ventilator: 
Ventilator Volumes Vt Set (ml): 500 ml (19 1036) Vt Exhaled (Machine Breath) (ml): 516 ml (19 1036) Ve Observed (l/min): 8.6 l/min (19 1036) Oxygen Therapy: 
Oxygen Therapy O2 Sat (%): 98 % (19 1044) Pulse via Oximetry: 98 beats per minute (19 1200) O2 Device: Nasal cannula (19 1146) O2 Flow Rate (L/min): 4 l/min (19 1146) FIO2 (%): 36 % (19 1146) CXR:  
CXR Results  (Last 48 hours) 19 0440  XR CHEST PORT Final result Impression:  IMPRESSION: No significant change. Narrative:  INDICATION:  Heart failure EXAM: CXR Portable. FINDINGS: Portable chest shows no significant change including [place] since  
yesterday. There is no apparent pneumothorax. Lungs show low volumes with mild  
bibasilar haziness favoring atelectasis. Heart size is top normal. There is no  
overt pulmonary edema. 05/15/19 1619  XR CHEST PORT Final result Impression:  IMPRESSION: Interval placement of a [place]-Jennifer catheter as described above. Narrative:  Chest single view dated 5/15/2019 Comparison chest dated 5/9/2019 History is placement of PA catheter A single frontal view of the chest was obtained. This film was obtained during a  
less than optimal degree of eventration. It is difficult to accurately assess  
the size of the cardiac silhouette. There has been interval placement of a  
[place]-Jennifer catheter the tip which is situated over the pulmonary artery. There is  
no evidence of active lung disease. No pneumothorax is detected. Admission Weight: Last Weight Weight: 186 lb 15.2 oz (84.8 kg) Weight: 169 lb 12.1 oz (77 kg) Intake / Jillyn Candor / Drain: 
Current Shift: 05/16 0701 - 05/16 1900 In: 497.7 [I.V.:497.7] Out: 620 [Urine:600] Last 24 hrs.:  
 
Intake/Output Summary (Last 24 hours) at 5/16/2019 1238 Last data filed at 5/16/2019 1217 Gross per 24 hour Intake 1412.74 ml Output 3295 ml Net -1882.26 ml EXAM: 
General:   Drowsy Lungs:   Clear upper, diminished bases to auscultation bilaterally. Incision:  impella dsg cdi Heart:  irregular rate and rhythm, S1, S2 normal, no murmur, click, rub or gallop. Abdomen:   Soft, non-tender. Bowel sounds normal. No masses,  No organomegaly. Extremities:  2+ LE edema. PPP. Neurologic:  Gross motor and sensory apparatus intact. Labs:  
Recent Labs 05/16/19 
1222  05/16/19 
0357 WBC  --   --  7.1 HGB  --   --  10.5* HCT  --   --  34.1*  
PLT  --   --  146* NA  --   --  130* K  --   --  4.9 BUN  --   --  42* CREA  --   --  2.01* GLU  --   --  164* GLUCPOC 116*   < >  --   
INR  --   --  1.4*  
 < > = values in this interval not displayed. Assessment:  
 
Active Problems: 
  Acute on chronic systolic (congestive) heart failure ([address] (5/9/2019) Plan/Recommendations/Medical Decision Makin. Acute on chronic systolic heart failure (NYHA class IV on admission): AHF following, on dobut 7.5, off milrinone and bumex gtts, aldactone, LVAD workup ongoing, impella 5.0 placed today -on P7. NpBNP >84994, lactate 1.4 -improved today. 2. Severe AS: no plans for TAVR at this time 3. CAD with  of RAD and RCA: on ASA and statin, no BB dt dobut. Cardiac MRI showed nonviability of LV. 4. Afib: on eliquis -hold for impella 5. HLD: on lipitor 6. DM type II: on amaryl, A1C 6.9 7. CKD: Cr 2.01, on bumex gtt, monitor. 8. Left leg wounds s/p fall: wound care consulted. 9. Anemia, iron deficiency: Hgb 10.9, cont PO iron, monitor 10. Thrombocytopenia: monitor. 11. Hyponatremia: Na 130, monitor 12. Dispo: Cont ICU care, LVAD workup per AHF.  
 
 
 
Signed By: Roderick Palacios NP

## 2019-05-16 NOTE — PROGRESS NOTES
0730:  Report received from [facility]. Pt has been moved up to first case for Impella placement. 0830:  Pt off floor in OR. 
 
1200:  Pt back to floor. Family updated. Impella in place. 2000:  Bedside shift change report given to [address] [place] [address] (oncoming nurse) by Cuca Sheldon (offgoing nurse). Report included the following information SBAR, Kardex, MAR and Recent Results.

## 2019-05-16 NOTE — ANESTHESIA PREPROCEDURE EVALUATION
Relevant Problems No relevant active problems Anesthetic History No history of anesthetic complications Review of Systems / Medical History Patient summary reviewed, nursing notes reviewed and pertinent labs reviewed Pulmonary Within defined limits Neuro/Psych Within defined limits Cardiovascular Hypertension Valvular problems/murmurs: aortic stenosis CHF 
 
CAD Exercise tolerance: <4 METS Comments: EF 15-20% GI/Hepatic/Renal 
Within defined limits Endo/Other Diabetes Other Findings Physical Exam 
 
Airway Mallampati: II 
TM Distance: > 6 cm Neck ROM: normal range of motion Mouth opening: Normal 
 
 Cardiovascular Regular rate and rhythm,  S1 and S2 normal,  no murmur, click, rub, or gallop Dental 
No notable dental hx Pulmonary Breath sounds clear to auscultation Abdominal 
GI exam deferred Other Findings Anesthetic Plan ASA: 5 Anesthesia type: general 
 
Monitoring Plan: Arterial line, BIS, CVP, [place]-Jennifer and TARAH Induction: Intravenous Anesthetic plan and risks discussed with: Patient

## 2019-05-16 NOTE — ANESTHESIA POSTPROCEDURE EVALUATION
Post-Anesthesia Evaluation and Assessment Patient: Victorina Cardoza MRN: [id number]  SSN: [id number] YOB: 1950  Age: 77 y.o. Sex: male Cardiovascular Function/Vital Signs Visit Vitals BP 99/77 Pulse 86 Temp 36.9 °C (98.4 °F) Resp 14 Ht 5' 11\" (1.803 m) Wt 77 kg (169 lb 12.1 oz) SpO2 99% BMI 24.36 kg/m² Patient is status post General anesthesia for Procedure(s): RIGHT AXILLARY IMPELLA/ 4219. Nausea/Vomiting: None Postoperative hydration reviewed and adequate. Pain: 
Pain Scale 1: Numeric (0 - 10) (05/16/19 1600) Pain Intensity 1: 0 (05/16/19 1600) Managed Neurological Status:  
Neuro Neurologic State: Alert (05/16/19 0800) Orientation Level: Oriented X4 (05/16/19 0800) Cognition: Appropriate for age attention/concentration (05/16/19 0800) Speech: Clear (05/16/19 0800) LUE Motor Response: Purposeful (05/16/19 0800) LLE Motor Response: Purposeful (05/16/19 0800) RUE Motor Response: Purposeful (05/16/19 0800) RLE Motor Response: Purposeful (05/16/19 0800) At baseline Mental Status and Level of Consciousness: Alert and oriented to person, place, and time Pulmonary Status:  
O2 Device: Nasal cannula (05/16/19 1600) Adequate oxygenation and airway patent Complications related to anesthesia: None Post-anesthesia assessment completed. No concerns Signed By: Mirza Wesley MD   
 May 16, 2019 Procedure(s): RIGHT AXILLARY IMPELLA/ 4219. general 
 
<BSHSIANPOST> Vitals Value Taken Time BP Temp Pulse 89 5/16/2019  4:33 PM  
Resp 13 5/16/2019  4:33 PM  
SpO2 97 % 5/16/2019  4:33 PM  
Vitals shown include unvalidated device data.

## 2019-05-16 NOTE — ANESTHESIA PROCEDURE NOTES
TARAH 
 
 
 
Procedure Details: probe placement, image aquisition & interpretation Risks and benefits discussed with the patient and plans are to proceed Procedure Note Performed by: Chuy Reveles MD 
Authorized by: [employer], MD  
 
 
Indications: assessment of ascending aorta and assessment of surgical repair Modalities: 2D, CF, CWD, PWD Probe Type: multiplane Insertion: atraumatic Patient Status: intubated and sedated Echocardiographic and Doppler Measurements Aorta  Size  Diam(cm)  Dissection PlaqueThick(mm)  Plaque Mobile Ascending normal  No 0-3 No  
 Arch normal  No 0-3 No  
 Descending normal  No 0-3 No  
 
 
 
 Valves  Annulus  Stenosis  Area/Grad  Regurg  Leaflet Morph  Leaflet Motion Aortic calcified severe  0 calcified restricted Mitral dilated none  2+ normal normal  
 Tricuspid dilated none  1+ normal normal  
 
 
 
 Atria  Size  SEC (smoke)  Thrombus  Tumor  Device Rt Atrium dilated No No No Yes Lt Atrium normal No No No No  
 
Interatrial Septum Morphology: normal 
 
Interventricular Septum Morphology: normal 
 
Ventricle  Cavity Size  Cavity Dimension Hypertrophy  Thrombus  Gloal FXN  EF  
 RV dilated  No no severely impaired LV dilated   No severely impaired 15-20% Regional Function 
(1 = normal, 2 = mildly hypokinetic, 3 = severely hypokinetic, 4 = akinetic, 5 = dyskinetic) LAV - [place] ME LAV = 0  ME LAV = 90  ME LAV = 130 Basal Sept:4 Basal Ant:4 Basal Post:4 Mid Sept:4 Mid Ant:4 Mid Post:4 Apical Sept:4 Apical Ant:4 Basal Ant Sept:4 Basal Lat:3 Basal Inf:4 Mid Ant Sept:4 Mid Lat:3 Mid Inf:4 Apical Lat:3 Apical Inf:4   
 
 
Pericardium: normal 
 
Post Intervention Follow-up Study Ventricular Global Function: unchanged Ventricular Regional Function: unchanged Valve  Function  Regurgitation  Area Aortic no change Mitral no change Tricuspid no change Prosthetic Complications: None Comments: Aortic valve: Mean gradient 29mmhg, peak gradient 47. DAVID via continuity equation 0.45cm3; planimetry 0.629. Mitral annulus mildly calcified and dilated. Severe RV dysfunction. RV free wall akinetic. Post:  [email or web address]. Impella visualized across AV, 4cm into LV with laminar flow.

## 2019-05-16 NOTE — PROGRESS NOTES
CM reviewed case with treatment team during CCU Interdisciplinary Rounds. Patient has returned to CCU intubated following Right Axillary Impella 5.0 procedure this morning. CM to continue to follow for discharge planning needs.

## 2019-05-17 NOTE — PROGRESS NOTES
0730 Bedside and Verbal shift change report given to NDAEGE Harris (oncoming nurse) by NADEGE Wilson (offgoing nurse). Report included the following information SBAR, Kardex, Procedure Summary, Intake/Output, MAR, Recent Results and Cardiac Rhythm a-fib.  
0800 Dr. Andriy Luna at bedside - updated on pts status - orders received to restart angiomax after TPA completed 56 HF team at bedside - updated pt has been refusing to take some medications [address] w/ CT - will plan for 1400 today 1320 Pt c/o nausea - tx with prn zofran Requesting to get back to bed - during transfer from chair pt became very weak, skin color ashen, increased work of breathing & temp of 102. 3 - SvO2 as low as 14, CI 1.7. Placed pt on 6L via NC, prn tylenol given, & RT at bedside to for mixed venous & Tess Noriega NP notified. Mixed venous unable to get sO2 results d/t too low. Tess Noriega NP at bedside to evaluate - orders received for stat echo & chest x-ray, will continue to monitor CT notified will attempt again later today if pts condition continue to improve 
1600 Spoke w/ CT will plan for CT around 1700 
1710 Transport at bedside & pt transported to CT with RN x 2 
1800 Returned to unit 1930 Bedside and Verbal shift change report given to Darrius Kirk RN (oncoming nurse) by Michael Martinez RN (offgoing nurse). Report included the following information SBAR, Kardex, Procedure Summary, Intake/Output, MAR, Recent Results and Cardiac Rhythm a-fib.

## 2019-05-17 NOTE — PROGRESS NOTES
Procedure Impella Reposition Called for alarm malfunction re intermittent outflow obstruction Currently not alarming and flows adequate MAP in 70s Pt comfortable Echo guidance reposition done Suspect sensor malfunction

## 2019-05-17 NOTE — OP NOTES
[address]  
OPERATIVE REPORT Name:  Miguel Tripathi 
MR#:  [id number] :  1950 ACCOUNT #:  [de-identified] DATE OF SERVICE:  2019 PREOPERATIVE DIAGNOSES: 
1.  New York Heart Association class IV acute on chronic systolic heart failure. 2.  Cardiogenic shock. POSTOPERATIVE DIAGNOSES: 
1.  New York Heart Association class IV acute on chronic systolic heart failure. 2.  Cardiogenic shock. PROCEDURES PERFORMED: 
1. Right axillary artery exploration with construction of a 10 mm chimney graft used for introduction of percutaneous left ventricular assist device (Impella 5.0); CPT code 97438. 
2.  Insertion of percutaneous left ventricular assist device (Impella 5.0) through right axillary artery chimney graft (CPT code 73620). SURGEON:  Brendan Damico MD 
 
ASSISTANT:  Crystal Fine PA-C 
 
ANESTHESIA:  General endotracheal anesthesia. COMPLICATIONS:  None. SPECIMENS REMOVED:  None. IMPLANTS:  None. ESTIMATED BLOOD LOSS:  20 mL. PROCEDURE:  The patient is a pleasant 43-year-old gentleman, recently diagnosed with New York Heart Association class IV acute on chronic systolic heart failure, who essentially  cardiogenic shock with an elevated lactate, so at this point, we decided to electively place an Impella 5.0 device to prevent any further decompensation. The patient was brought to the operating room, had a left radial A-line without complications. A [place]-Jennifer catheter was placed without complications and general endotracheal anesthesia without complication. The patient had his chest, abdomen, and lower extremities prepped and draped in usual sterile fashion. A right infraclavicular incision was made. Cautery dissection was used to dissect down to the level of right axillary artery. We then gave an appropriate dose of heparin, placed a side biter clamp.    Hemashield graft to right axillary artery anastomosis using 7-0 Prolene suture. We then released the clamps after gluing the graft, accessed the left ventricular cavity using an AL2 catheter and J-wire. We then exchanged it over to the Impella wire and brought the Impella and we removed the Impella. We then started closing the wound which were excellent . Vicryl suture was used to close the incision as well as staples. I was present for the entire procedure. Billy Guzman MD 
 
 
[id number] 
D:  05/17/2019 6:06 
T:  05/17/2019 10:32 
JOB #:  [id number] CC:

## 2019-05-17 NOTE — PROGRESS NOTES
NYHA class IV A/C systolic HF Severe AS 
PAD 
A/C kidney disease CAD Hgb and platelets look good PTT therapeutic Creatinine in mid 2's Bilirubin and other LFTs look good LDH and lactic acid look good tPA for Impella Impella - flow 3.8 L/min @ P-7 Intake/Output Summary (Last 24 hours) at 5/17/2019 8842 Last data filed at 5/17/2019 0700 Gross per 24 hour Intake 1133.99 ml Output 2645 ml Net -1511.01 ml Lactic acid - 1.0 NT pro-BNP > 35,000 LDH - 230 Risk of morbidity and mortality - high Medical decision making - high complexity Total critical care time - 30 minutes (CPT 21313)

## 2019-05-17 NOTE — PROGRESS NOTES
1930: Bedside shift change report given to Abimael Limon RN (oncoming nurse) by Caroline Arroyo RN (offgoing nurse). Report included the following information SBAR, Kardex, Intake/Output and MAR.  
2000: Family at bedside. 2130: Impella with suction and outflow blocked alarms. Impella rep instructed to get echo and reposition. 2145: Dr. Brian Figueroa informed of impella status. Echo tech paged. 2230: MD at bedside. Orders to switch D5 to bival through impella. Echo tech paged again. 2245: Echo tech at bedside. MD retracted impella ~1.5cm, still no placement signal on impella but MD satisfied with position via echo. 2330: CHG bath refused and requested for AM. Purge fluid bag changed to bival. 
0400: Mixed venous drawn; 53%. [id number] Gains at bedside. 0430: AM labs drawn and sent. 18: Order for Tpa received from Dr Rian Giraldo since purge flow almost less than five. 0450: D5 hung in prep for Tpa. Tennyson recalibrated. 0600: critical ptt called from lab. Dr. Rian Giraldo notified and order received for redraw and to still hang Tpa. 
0615: ptt redrawn and sent. 0730: Bedside shift change report given to NADEGE Harris (oncoming nurse) by Abimael Limon RN (offgoing nurse). Report included the following information SBAR, Kardex, Intake/Output and MAR.

## 2019-05-17 NOTE — INTERDISCIPLINARY ROUNDS
IDR/SLIDR Summary Patient: Carlota Gottron MRN: [id number]    Age: 77 y.o. YOB: 1950 Room/Bed: [address] [place] [address] Admit Diagnosis: Acute on chronic systolic (congestive) heart failure (HCC) [I50.23]  Principal Diagnosis: <principal problem not specified>  
Goals: LVAD workup, Impella placement in AM 
Readmission: NO  Quality Measure: CHF VTE Prophylaxis: Mechanical 
Influenza Vaccine screening completed? YES Pneumococcal Vaccine screening completed? NO Mobility needs: Yes   Nutrition plan:Yes 
Consults:P.T, O.T., Respiratory and Case Management Financial concerns:Yes  Escalated to CM? YES 
RRAT Score: 24   Interventions:H2H Testing due for pt today? YES 
LOS: 8 days Expected length of stay ? days Discharge plan: tbd   PCP: Claritza Soto MD 
Transportation needs: Yes Days before discharge:two or more days before discharge Discharge disposition: tbd Signed:  
 
Abiel Marcelino RN 
5/17/2019 
7:58 PM

## 2019-05-17 NOTE — PROGRESS NOTES
Problem: Mobility Impaired (Adult and Pediatric) Goal: *Acute Goals and Plan of Care (Insert Text) Description Physical Therapy Goals Initiated 5/17/2019 1. Patient will move from supine to sit and sit to supine , scoot up and down and roll side to side in bed with minimal assistance/contact guard assist within 7 day(s). 2.  Patient will transfer from bed to chair and chair to bed with minimal assistance/contact guard assist using the least restrictive device within 7 day(s). 3.  Patient will perform sit to stand with minimal assistance/contact guard assist within 7 day(s). 4.  Patient will ambulate with minimal assistance/contact guard assist for 50 feet with the least restrictive device within 7 day(s). 5.  Patient will ascend/descend 4 stairs with no handrail(s) with minimal assistance/contact guard assist within 7 day(s). 6.  Patient will complete a 6MWT within 48 hours of discharge. Initiated 5/10/2019 1. Patient will move from supine to sit and sit to supine , scoot up and down and roll side to side in bed with independence within 7 day(s). 2.  Patient will transfer from bed to chair and chair to bed with modified independence using the least restrictive device within 7 day(s). 3.  Patient will perform sit to stand with modified independence within 7 day(s). 4.  Patient will ambulate with modified independence for 300 feet with the least restrictive device within 7 day(s). 5.  Patient will ascend/descend 4 stairs with no handrail(s) with modified independence within 7 day(s). 6.  Patient will participate in a six minute walk test within 48 hours prior to discharge. Outcome: Progressing Towards Goal 
 PHYSICAL THERAPY REEVALUATION Patient: Urbano Mitchell (77 y.o. male) Date: 5/17/2019 Primary Diagnosis: Acute on chronic systolic (congestive) heart failure ([facility] [address] [I50.23] Procedure(s) (LRB): 
RIGHT AXILLARY IMPELLA/ 4219 (Right) 1 Day Post-Op Precautions:   Fall(R axillary Impella) Chart, physical therapy assessment, plan of care and goals were reviewed. ASSESSMENT : 
Based on the objective data described below, the patient presents with great decline in functional mobility since last evaluation (5/10/17) and now POD 1 from impella placement. Based on his chart review, he has not formally worked with PT since evaluation due to testing and refusal and is now very weak. Overall min-moderate assist x2 for bed mobility and transfer to chair. Completed transfer with very short shuffled steps and patient expressed his shock at how weak he has become. Encouraged seated exercise in chair and reiterated the importance of OOB mobility as much as possible. He verbally stated understanding but based on his decline and his history of refusal, unsure of application of understanding. Currently he will need rehab at d/c, however this is pending medical plan of care (note LVAD workup) and progression with acute PT. PT to attempt to follow up 1x over the weekend, however highly recommend OOB to chair as much as possible with nursing assist x2 and gait belt. Patient will benefit from skilled intervention to address the above impairments. Patient?s rehabilitation potential is considered to be Good Factors which may influence rehabilitation potential include:  
? None noted ? Mental ability/status ? Medical condition ? Home/family situation and support systems ? Safety awareness 
? Pain tolerance/management 
? Other: PLAN : 
Recommendations and Planned Interventions: ?             Bed Mobility Training             ? Neuromuscular Re-Education ? Transfer Training                   ? Orthotic/Prosthetic Training ? Gait Training                         ? Modalities ? Therapeutic Exercises           ?       Edema Management/Control ? Therapeutic Activities            ? Patient and Family Training/Education ? Other (comment): Frequency/Duration: Patient will be followed by physical therapy 5 times a week to address goals. Discharge Recommendations: Inpatient Rehab Further Equipment Recommendations for Discharge: TBD SUBJECTIVE:  
Patient stated ? Oh wow I'm so much weaker. ? OBJECTIVE DATA SUMMARY:  
 
Past Medical History:  
Diagnosis Date 3-vessel coronary artery disease Aortic stenosis, severe Chronic kidney disease (CKD), stage III (moderate) (HCC) Chronic total occlusion of coronary artery Hypertension Ischemic cardiomyopathy Peripheral vascular disease ([facility] [address] Type 2 diabetes mellitus treated without insulin ([facility] [address] Past Surgical History:  
Procedure Laterality Date HX AMPUTATION TOE Left 2017 Hospital course since last seen and reason for reevaluation: Impella implantation yesterday Critical Behavior: 
Neurologic State: Alert Orientation Level: Oriented X4 Cognition: Appropriate for age attention/concentration Strength:   
Strength: Generally decreased, functional 
  
  
  
  
  
 
  
Tone & Sensation:  
Tone: Normal 
  
  
  
  
Sensation: Intact Range Of Motion: 
AROM: Generally decreased, functional 
  
  
  
  
  
  
  
Coordination: 
Coordination: Generally decreased, functional 
 
Functional Mobility: 
Bed Mobility: 
  
Supine to Sit: Moderate assistance Transfers: 
Sit to Stand: Minimum assistance; Moderate assistance;Assist x2 Stand to Sit: Minimum assistance; Moderate assistance;Assist x2 Bed to Chair: Minimum assistance; Moderate assistance;Assist x2 Balance:  
Sitting: Intact Standing: Impaired Standing - Static: Poor Standing - Dynamic : Poor Therapeutic Exercises:  
Instructed in seated march and LAQs to be performed often throughout the day while seated Functional Measure: Tinetti test: 
 
Sitting Balance: 1 Arises: 0 Attempts to Rise: 0 Immediate Standing Balance: 0 Standing Balance: 0 Nudged: 0 Eyes Closed: 0 Turn 360 Degrees - Continuous/Discontinuous: 0 Turn 360 Degrees - Steady/Unsteady: 0 Sitting Down: 0 Balance Score: 1 Indication of Gait: 0 
R Step Length/Height: 0 
L Step Length/Height: 0 
R Foot Clearance: 0 
L Foot Clearance: 0 Step Symmetry: 0 Step Continuity: 0 Path: 0 Trunk: 0 Walking Time: 0 Gait Score: 0 Total Score: 1 Tinetti Tool Score Risk of Falls 
<19 = High Fall Risk 19-24 = Moderate Fall Risk 25-28 = Low Fall Risk Tinetti ME. Performance-Oriented Assessment of Mobility Problems in Elderly Patients. [address]; [id number]. (Scoring Description: PT Bulletin Feb. 10, 1993) Older adults: Sean Avila et al, 2009; n = [address] [employer] elderly evaluated with ABC, DEXTER, ADL, and IADL) · Mean DEXTER score for males aged 69-68 years = 26.21(3.40) · Mean DEXTER score for females age 69-68 years = 25.16(4.30) · Mean DEXTER score for males over 80 years = 23.29(6.02) · Mean DEXTER score for females over 80 years = 17.20(8.32) Pain: 
Pain Scale 1: Numeric (0 - 10) Pain Intensity 1: 0 Activity Tolerance: VSS with transfer, patient very fatigued with quick onset of CHENG Please refer to the flowsheet for vital signs taken during this treatment. After treatment:  
?  Patient left in no apparent distress sitting up in chair ? Patient left in no apparent distress in bed 
? Call bell left within reach ? Nursing notified ? Caregiver present ? Bed alarm activated COMMUNICATION/EDUCATION:  
The patient?s plan of care was discussed with: Registered Nurse. ?  Fall prevention education was provided and the patient/caregiver indicated understanding. ? Patient/family have participated as able in goal setting and plan of care. ?  Patient/family agree to work toward stated goals and plan of care. ?  Patient understands intent and goals of therapy, but is neutral about his/her participation. ? Patient is unable to participate in goal setting and plan of care. Thank you for this referral. 
Caleb Holt, PT, DPT Time Calculation: 16 mins

## 2019-05-17 NOTE — DIABETES MGMT
DTC Progress Note Recommendations/ Comments:  Chart reviewed for hyperglycemia. Pt has received 10 units of correction insulin over the last 24 hours. If appropriate, consider: 
Increasing Glipizide to 7.5 mg BID Current hospital DM medication:  
glipizide 5 mg BID and  
Lispro normal sensitivity correction scale Chart reviewed on [facility]. Patient is a 77 y.o. male with hx Type 2 Diabetes on Glipizide 5 mg BID and Metformin 1000 mg BID at home. A1c:  
Lab Results Component Value Date/Time Hemoglobin A1c 6.9 (H) 05/02/2019 04:09 PM  
 
 
Recent Glucose Results:  
Lab Results Component Value Date/Time  (H) 05/17/2019 04:30 AM  
 GLUCPOC 196 (H) 05/17/2019 12:07 PM  
 GLUCPOC 166 (H) 05/17/2019 06:16 AM  
 GLUCPOC 220 (H) 05/16/2019 10:15 PM  
  
 
Lab Results Component Value Date/Time Creatinine 2.16 (H) 05/17/2019 04:30 AM  
 
Estimated Creatinine Clearance: 33.9 mL/min (A) (based on SCr of 2.16 mg/dL (H)). Active Orders Diet DIET CARDIAC Regular; Consistent Carb 2000kcal  
  
 
PO intake:  
Patient Vitals for the past 72 hrs: 
 % Diet Eaten 05/14/19 1800 50 % Will continue to follow as needed. Thank you Geneva Bone RD, CDE [facility] Pager: [phone] Time spent: 6 min

## 2019-05-17 NOTE — PROGRESS NOTES
met with Jethro Gary this morning. Reviewed an LVAD contract with him which he reviewed and signed. Dr. Braeden Pope also met with him to discuss his refusal of medications and the need for consistent compliance with medical recommendations for care. Will continue to follow for LVAD work up. Ozzie Merchant, MSW, LCSW Clinical  [facility] [phone]

## 2019-05-17 NOTE — PROGRESS NOTES
[facility] ICU Progress Note Admit Date: 2019 POD:  1 Procedure:  Procedure(s): RIGHT AXILLARY IMPELLA/ [id number] Subjective:  
Pt seen with Dr. Marvel Schaffer. Tmax 100.2, on RA. On dobut 7.5, bumex gtt. Impella 5.0 at P7. Pt has no complaints today. Objective:  
Vitals: 
Blood pressure 91/75, pulse 95, temperature 100.1 °F (37.8 °C), resp. rate 17, height 5' 11\" (1.803 m), weight 175 lb 0.7 oz (79.4 kg), SpO2 96 %. Temp (24hrs), Av.4 °F (37.4 °C), Min:98.4 °F (36.9 °C), Max:100.2 °F (37.9 °C) Hemodynamics: 
 CO: CO (l/min): 4.2 l/min CI: CI (l/min/m2): 2.1 l/min/m2 CVP: CVP (mmHg): 9 mmHg (19 0700) SVR: SVR (dyne*sec)/cm5: 1078 (dyne*sec)/cm5 (19 0000) PAP Systolic: PAP Systolic: 52 (46/67/87 1198) PAP Diastolic: PAP Diastolic: 22 (86/35/24 7276) PVR:   
 SV02: SVO2 (%): 53 % (19 0700) SCV02:   
 
EKG/Rhythm:  Afib Extubation Date / Time: 19 112 Ventilator: 
Ventilator Volumes Vt Set (ml): 500 ml (19 1036) Vt Exhaled (Machine Breath) (ml): 516 ml (19 1036) Ve Observed (l/min): 8.6 l/min (19 1036) Oxygen Therapy: 
Oxygen Therapy O2 Sat (%): 96 % (19 0700) Pulse via Oximetry: 93 beats per minute (19 0700) O2 Device: Room air (19 0400) O2 Flow Rate (L/min): 4 l/min (19 1600) FIO2 (%): 36 % (19 1146) CXR:  
CXR Results  (Last 48 hours) 19 0427  XR CHEST PORT Final result Impression:  IMPRESSION:  
1. No significant change in the appearance of the chest or support hardware. Narrative:  INDICATION: [place] COMPARISON: Previous chest xray, yesterday. LIMITATIONS: Portable technique. Hector Derrek FINDINGS: Single frontal view of the chest.   
.  
Lines/tubes/surgical: No significant change in the appearance of the right IJ  
approach [place]-Jennifer catheter or Impella. Heart/mediastinum: Calcifications in the aortic arch. Lungs/pleura: Low lung volumes. Hazy opacity in the right hemithorax with  
obscured right hemidiaphragm. Minimal opacity in the left base. No visible  
pneumothorax. Additional Comments: Cutaneous staples overlying the right upper chest/axilla. Surgical clips overlying the right axilla/upper chest  
.  
  
 05/16/19 1210  XR CHEST PORT Final result Impression:  Impression: Impella device as above. Narrative: Indication: Impella Placement Comparison to earlier the same day. Portable exam obtained at [phone] demonstrates  
placement of an Impella device with the tip projecting over the left cardiac  
apex. Bibasilar atelectasis persists. 05/16/19 0440  XR CHEST PORT Final result Impression:  IMPRESSION: No significant change. Narrative:  INDICATION:  Heart failure EXAM: CXR Portable. FINDINGS: Portable chest shows no significant change including [place] since  
yesterday. There is no apparent pneumothorax. Lungs show low volumes with mild  
bibasilar haziness favoring atelectasis. Heart size is top normal. There is no  
overt pulmonary edema. 05/15/19 1619  XR CHEST PORT Final result Impression:  IMPRESSION: Interval placement of a [place]-Jennifer catheter as described above. Narrative:  Chest single view dated 5/15/2019 Comparison chest dated 5/9/2019 History is placement of PA catheter A single frontal view of the chest was obtained. This film was obtained during a  
less than optimal degree of eventration. It is difficult to accurately assess  
the size of the cardiac silhouette. There has been interval placement of a  
[place]-Jennifer catheter the tip which is situated over the pulmonary artery. There is  
no evidence of active lung disease. No pneumothorax is detected. Admission Weight: Last Weight Weight: 186 lb 15.2 oz (84.8 kg) Weight: 175 lb 0.7 oz (79.4 kg) Intake / Ramonita Mantel / Drain: Current Shift: No intake/output data recorded. Last 24 hrs.:  
 
Intake/Output Summary (Last 24 hours) at 2019 0845 Last data filed at 2019 0700 Gross per 24 hour Intake 1133.99 ml Output 2645 ml Net -1511.01 ml EXAM: 
General:   Alert, NAD Lungs:   Clear upper, diminished bases to auscultation bilaterally. Incision:  impella dsg cdi Heart:  irregular rate and rhythm, S1, S2 normal, no murmur, click, rub or gallop. Abdomen:   Soft, non-tender. Bowel sounds normal. No masses,  No organomegaly. Extremities:  2+ LE edema. PPP. Neurologic:  Gross motor and sensory apparatus intact. Labs:  
Recent Labs 19 
6004 19 
0430  19 
6786 WBC  --  6.9  --  7.1 HGB  --  10.3*  --  10.5* HCT  --  32.5*  --  34.1*  
PLT  --  137*  --  146* NA  --  130*  --  130* K  --  4.4  --  4.9 BUN  --  46*  --  42* CREA  --  2.16*  --  2.01* GLU  --  168*  --  164* GLUCPOC 166*  --    < >  --   
INR  --   --   --  1.4*  
 < > = values in this interval not displayed. Assessment:  
 
Active Problems: 
  Acute on chronic systolic (congestive) heart failure ([facility] [address] (2019) Plan/Recommendations/Medical Decision Makin. Acute on chronic systolic heart failure (NYHA class IV on admission): AHF following, on dobut 7.5, bumex gtts, aldactone, LVAD workup ongoing, impella 5.0 placed -on P7 had to be repositioned last night, reduce bival dose through purge to 1/2 strength per [place]. NpBNP >90862, lactate 1 -improving. On ancef for impella ppx. 2. Severe AS: no plans for TAVR at this time 3. CAD with  of RAD and RCA: on ASA and statin, no BB dt dobut. Cardiac MRI showed nonviability of LV. 4. Afib: on eliquis -hold for impella 5. HLD: on lipitor 6. DM type II: on amaryl, SSI, A1C 6.9 7. CKD: Cr 2.15, on bumex gtt, monitor. 8. Left leg wounds s/p fall: wound care consulted. 9. Anemia, iron deficiency: Hgb 10.3, cont PO iron, monitor 10. Thrombocytopenia: mild, monitor. 11. Hyponatremia: Na 130, monitor 12. Dispo: Cont ICU care, LVAD workup per AHF.  
 
 
 
Signed By: Roderick Palacios NP

## 2019-05-17 NOTE — PROGRESS NOTES
[facility] Progress Note DOS:   5/17/2019 NAME:  Yusuf Vincent  
MRN:   [id number] REFERRING PROVIDER:  Dr. Alvarado Live PRIMARY CARE PHYSICIAN: Kaykay Holbrook MD 
 
 
Chief Complaint: CHENG 
 
 
HPI: 77y.o. year old male with a history of HTN, T2DM, PVD, CKD, ICM, Severe AS who presents for further evaluation of chronic systolic heart failure. He developed progressive dyspnea with exacterbation as well as progressive fatigue which prompted further evaluation with a heart cath at [facility] that revealed severe 3 vessel disease with  of his LAD and RCA, severe LV systolic failure (LVEF 46%), and severe As. Has has been unable to walk a block before without limiting dyspnea as well as unable to make a bed without developing dyspnea. Admitted to [facility] for acute chronic HFrEF, CMRI showed no viability for LAD, taken for impella placement for bridge to candidacy. 24Hr Events:  
Impella placed Remains on dobutamine Extubated Procedure:  Procedure(s): RIGHT AXILLARY IMPELLA/ [id number] POD:  POD 1 Impression / Plan:  
Heart Failure Status: NYHA Class IV 
 
ICM-Stage D NYHA IV - LVEF 10% Impella in place Currently  Dobutamine gtt at 7.5 mcg/kg/min- will not wean until CI consistently over 2.3 TTE today Bumex 1mg BID, PRN 1mg in addition Evaluation for LVAD placement Daily lactates to eval for perfusion Milrinone stopped for continued hypotension Eval for septic source of vasodilation current blood cultures are negative ESR WNL Lipase WNL Lactate improved  
  
Severe AS Not a candidate for TAVR due to poor viability and cardiomyopathy Dr. Anthony Salazar following  
  
CAD with  of RAD and RCA Currently on ASA and statin No BBrx while on dobutamine cMRI results from yesterday show severe Cad with  of the LAD and RCA with poor viability of myocardium.  
  
RV failure Digoxin 0.125mg Monitor levels Diurese Maintain PA cath RV will need to recover to be a candidate for durable MCS Low dose IVIG today for low immunglobulin on gammopathy Febrile WBC WNL Blood cx NGTD 
UA neg Ancef while impella in place SCD high risk Will need life vest when discharged based on care plan  
  
Atrial Fibrillation On Bival for now  
  
HLD Lipitor 40 mg every night  
  
T2DM  
SSI Increase glipizide  
  
Left leg wounds s/p fall Wound consult Leg is erythematous and warm, pt is afebrile  
  
CKD Creatinine up today Stop bumex gtt Transition to intermittent bumex Monitor renal function 24Hr urine 
  
Hyponatremia Monitor I/O's and daily Na+ Current Na 130 PPX Check prealbumin Encourage PO intake Change potassium to powder Aggressive electrolyte replacement Dispo:  
Remain in CCU for now History: 
Past Medical History:  
Diagnosis Date  3-vessel coronary artery disease  Aortic stenosis, severe  Chronic kidney disease (CKD), stage III (moderate) (HCC)  Chronic total occlusion of coronary artery  Hypertension  Ischemic cardiomyopathy  Peripheral vascular disease ([facility] [facility]  Type 2 diabetes mellitus treated without insulin ([facility] [address] Past Surgical History:  
Procedure Laterality Date  HX AMPUTATION TOE Left 2017 Social History Socioeconomic History  Marital status:  Spouse name: Not on file  Number of children: Not on file  Years of education: Not on file  Highest education level: Not on file Occupational History  Not on file Social Needs  Financial resource strain: Not on file  Food insecurity:  
  Worry: Not on file Inability: Not on file  Transportation needs:  
  Medical: Not on file Non-medical: Not on file Tobacco Use  Smoking status: Former Smoker  Smokeless tobacco: Never Used Substance and Sexual Activity  Alcohol use: Not Currently  Drug use: Not on file  Sexual activity: Not on file Lifestyle  Physical activity:  
  Days per week: Not on file Minutes per session: Not on file  Stress: Not on file Relationships  Social connections:  
  Talks on phone: Not on file Gets together: Not on file Attends Caoda[employer] service: Not on file Active member of club or organization: Not on file Attends meetings of clubs or organizations: Not on file Relationship status: Not on file  Intimate partner violence:  
  Fear of current or ex partner: Not on file Emotionally abused: Not on file Physically abused: Not on file Forced sexual activity: Not on file Other Topics Concern  Not on file Social History Narrative  Not on file History reviewed. No pertinent family history. Current Medications:  
Current Facility-Administered Medications Medication Dose Route Frequency Provider Last Rate Last Dose  bivalirudin (ANGIOMAX) 250 mg in dextrose 5% 500 mL infusion - impella purge soln  4-20 mL/hr Other TITRATE Teer Choi NP 13.8 mL/hr at 05/17/19 1050 13.8 mL/hr at 05/17/19 1050  bumetanide (BUMEX) injection 1 mg  1 mg IntraVENous Q12H Colette Schroeder NP   1 mg at 05/17/19 9020  bumetanide (BUMEX) injection 1 mg  1 mg IntraVENous Q6H PRN Colette Schroeder NP      
 magnesium oxide (MAG-OX) tablet 800 mg  800 mg Oral BID Colette Schroeder NP      
 iron sucrose (VENOFER) 100 mg in 0.9% sodium chloride 100 mL IVPB  100 mg IntraVENous DAILY Colette Schroeder  mL/hr at 05/17/19 1112 100 mg at 05/17/19 1112  
 sodium chloride (OCEAN) 0.65 % nasal squeeze bottle 2 Spray  2 Spray Both Nostrils Q2H PRN Mayra MC NP      
 [START ON 5/18/2019] dronabinol (MARINOL) capsule 2.5 mg  2.5 mg Oral DAILY Colette Schroeder NP      
 [START ON 5/18/2019] potassium chloride (KLOR-CON) packet for solution 40 mEq  40 mEq Oral DAILY Yina Schroeder NP      
  glipiZIDE (GLUCOTROL) tablet 7.5 mg  7.5 mg Oral ACB&D Colette Schroeder B, NP      
 vasopressin (VASOSTRICT) 20 Units in 0.9% sodium chloride 100 mL infusion  0-0.1 Units/min IntraVENous TITRATE Oj Smiley, CRNA   Stopped at 05/16/19 6835  
 dextrose 5% infusion  4-20 mL/hr IntraVENous CONTINUOUS Singh Purchase D, NP 13.1 mL/hr at 05/17/19 0951 13.1 mL/hr at 05/17/19 6181  ceFAZolin (ANCEF) 2 g/20 mL in sterile water IV syringe  2 g IntraVENous Q8H Singh Purchase D, NP   2 g at 05/17/19 7531  digoxin (LANOXIN) tablet 0.125 mg  0.125 mg Oral DAILY Mir Schroederin B, NP   0.125 mg at 05/17/19 0830  
 insulin lispro (HUMALOG) injection   SubCUTAneous AC&HS Mary Lou Tavarez B, NP   2 Units at 05/17/19 1215  
 glucose chewable tablet 16 g  4 Tab Oral PRN Lilli Schroeder B, NP      
 dextrose (D50W) injection syrg 12.5-25 g  12.5-25 g IntraVENous PRN Elda Colette B, NP      
 glucagon (GLUCAGEN) injection 1 mg  1 mg IntraMUSCular PRN Mir Schroederin B, NP      
 traMADol (ULTRAM) tablet 50 mg  50 mg Oral Q8H PRN Elda, Colette B, NP   50 mg at 05/17/19 1215  aspirin chewable tablet 81 mg  81 mg Oral DAILY Elda, Colette B, NP   81 mg at 05/17/19 0830  
 atorvastatin (LIPITOR) tablet 40 mg  40 mg Oral QHS Elda Colette B, NP   40 mg at 05/16/19 2220  spironolactone (ALDACTONE) tablet 25 mg  25 mg Oral DAILY Elda Colette B, NP   25 mg at 05/17/19 0830  
 sodium chloride (NS) flush 5-40 mL  5-40 mL IntraVENous Q8H Elda, Colette B, NP   10 mL at 05/17/19 9795  sodium chloride (NS) flush 5-40 mL  5-40 mL IntraVENous PRN Elda, Colette B, NP      
 ondansetron (ZOFRAN) injection 4 mg  4 mg IntraVENous Q6H PRN Colette Schroeder NP   4 mg at 05/17/19 1328  acetaminophen (TYLENOL) tablet 650 mg  650 mg Oral Q6H PRN Colette Schroeder NP   650 mg at 05/17/19 1351  docusate sodium (COLACE) capsule 100 mg  100 mg Oral PRN Brian Farooq NP      
  DOBUTamine (DOBUTREX) 500 mg/250 mL (2,000 mcg/mL) infusion  7.5 mcg/kg/min IntraVENous CONTINUOUS Munira Montero NP 19.1 mL/hr at 19 1346 7.5 mcg/kg/min at 19 1346  pantoprazole (PROTONIX) tablet 40 mg  40 mg Oral ACB Colette Schroeder NP   40 mg at 19 5236 Allergies: Allergies Allergen Reactions  Penicillins Hives ROS:   
Review of Systems Constitutional: Positive for malaise/fatigue. Looks uncomfortable and ill appearing \"feels bad\" HENT: Negative. Eyes: Negative. Respiratory: Negative. Cardiovascular: Negative. Gastrointestinal: Negative. Genitourinary: Negative. Musculoskeletal: Negative. Skin: Positive for rash. Neurological: Positive for weakness. Physical Exam:  
Physical Exam  
Constitutional: He is oriented to person, place, and time. He appears well-developed. No distress. Looks very ill HENT:  
Head: Normocephalic and atraumatic. Eyes: EOM are normal. Right eye exhibits no discharge. Left eye exhibits no discharge. Neck: Normal range of motion. No JVD present. No tracheal deviation present. Cardiovascular: Normal rate, regular rhythm and intact distal pulses. Murmur heard. Pulmonary/Chest: Effort normal and breath sounds normal. No stridor. No respiratory distress. Abdominal: Soft. There is no tenderness. Musculoskeletal: He exhibits edema and deformity. Neurological: He is alert and oriented to person, place, and time. Skin: Skin is warm and dry. Rash noted. There is erythema. There is pallor. Scab to left knee Nursing note and vitals reviewed. Vitals:  
Visit Vitals BP 93/77 (BP 1 Location: Left arm, BP Patient Position: At rest) Pulse (!) 119 Temp 100.2 °F (37.9 °C) Resp 26 Ht 5' 11\" (1.803 m) Wt 175 lb 0.7 oz (79.4 kg) SpO2 99% BMI 24.41 kg/m² Temp (24hrs), Av.7 °F (37.6 °C), Min:98.4 °F (36.9 °C), Max:100.2 °F (37.9 °C) Hemodynamics: CO: CO (l/min): 3.5 l/min CI: CI (l/min/m2): 1.8 l/min/m2 CVP: CVP (mmHg): 23 mmHg (05/17/19 1400) SVR: SVR (dyne*sec)/cm5: 1120 (dyne*sec)/cm5 (05/17/19 1200) PAP Systolic: PAP Systolic: 65 (79/86/91 8955) PAP Diastolic: PAP Diastolic: 31 (38/05/20 1936) PVR:   
 SV02: SVO2 (%): 19 % (05/17/19 1400) SCV02:   
 
 
Admission Weight: Last Weight Weight: 186 lb 15.2 oz (84.8 kg) Weight: 175 lb 0.7 oz (79.4 kg) Intake / Output / Drain: 
Last 24 hrs.:  
 
Intake/Output Summary (Last 24 hours) at 5/17/2019 1451 Last data filed at 5/17/2019 1200 Gross per 24 hour Intake 905.57 ml Output 2365 ml Net -1459.43 ml Ventilator: 
Ventilator Volumes Vt Set (ml): 500 ml (05/16/19 1036) Vt Exhaled (Machine Breath) (ml): 516 ml (05/16/19 1036) Ve Observed (l/min): 8.6 l/min (05/16/19 1036) Oxygen Therapy: 
Oxygen Therapy O2 Sat (%): 99 % (05/17/19 1400) Pulse via Oximetry: 115 beats per minute (05/17/19 1400) O2 Device: Room air (05/17/19 1200) O2 Flow Rate (L/min): 4 l/min (05/16/19 1600) FIO2 (%): 36 % (05/16/19 1146) Recent Labs:  
Labs Latest Ref Rng & Units 5/17/2019 5/16/2019 5/15/2019 5/14/2019 5/13/2019 5/12/2019 5/11/2019 WBC 4.1 - 11.1 K/uL 6.9 7.1 7.6 6.1 - - -  
RBC 4.10 - 5.70 M/uL 3.83(L) 3.91(L) 4.03(L) 4.09(L) - - - Hemoglobin 12.1 - 17.0 g/dL 10. 3(L) 10. 5(L) 10. 7(L) 10. 9(L) - - - Hematocrit 36.6 - 50.3 % 32. 5(L) 34. 1(L) 34. 8(L) 35. 4(L) - - - MCV 80.0 - 99.0 FL 84.9 87.2 86.4 86.6 - - - Platelets 754 - 096 K/uL 137(L) 146(L) 150 139(L) - - - Lymphocytes 12 - 49 % - 6(L) 8(L) - - - - Monocytes 5 - 13 % - 8 5 - - - - Eosinophils 0 - 7 % - 1 1 - - - - Basophils 0 - 1 % - 0 0 - - - - Albumin 3.5 - 5.0 g/dL 2. 8(L) 2. 8(L) - 2. 9(L) 2. 8(L) 2. 9(L) 2. 9(L) Calcium 8.5 - 10.1 MG/DL 8.6 8.6 - 8.5 9.0 9.3 9.2 SGOT 15 - 37 U/L 17 13(L) - 12(L) 14(L) 16 10(L) Glucose 65 - 100 mg/dL 168(H) 164(H) - 127(H) 142(H) 172(H) 99  
 BUN 6 - 20 MG/DL 46(H) 42(H) - 39(H) 42(H) 43(H) 47(H) Creatinine 0.70 - 1.30 MG/DL 2.16(H) 2.01(H) - 1.78(H) 1.87(H) 1.85(H) 1.91(H) Sodium 136 - 145 mmol/L 130(L) 130(L) - 133(L) 135(L) 136 137 Potassium 3.5 - 5.1 mmol/L 4.4 4.9 - 4.6 4.5 4.1 3.6 TSH 0.450 - 4.500 uIU/mL - - - - - - -  
LDH 85 - 241 U/L 230 - - - - - -  
 
EKG:  
EKG Results Procedure 720 Value Units Date/Time SCANNED CARDIAC RHYTHM STRIP [[id number]] Collected:  05/17/19 4910 Order Status:  Completed Updated:  05/17/19 8031 SCANNED CARDIAC RHYTHM STRIP [[id number]] Collected:  05/16/19 3017 Order Status:  Completed Updated:  05/16/19 8532 SCANNED CARDIAC RHYTHM STRIP [[id number]] Collected:  05/13/19 0430 Order Status:  Completed Updated:  05/13/19 9922 SCANNED CARDIAC RHYTHM STRIP [[id number]] Collected:  05/13/19 6733 Order Status:  Completed Updated:  05/13/19 109 [facility] SCANNED CARDIAC RHYTHM STRIP [[id number]] Collected:  05/11/19 0321 Order Status:  Completed Updated:  05/11/19 1683 EKG, 12 LEAD, INITIAL [[id number]] Collected:  05/10/19 1121 Order Status:  Completed Updated:  05/10/19 1207 Ventricular Rate 64 BPM   
  Atrial Rate 77 BPM   
  QRS Duration 104 ms Q-T Interval 460 ms QTC Calculation (Bezet) 474 ms Calculated P Axis 41 degrees Calculated R Axis 35 degrees Calculated T Axis 133 degrees Diagnosis --  
  Sinus rhythm with AV dissociation and Junctional rhythm Low voltage QRS Cannot rule out Anteroseptal infarct (cited on or before 09-MAY-2019) When compared with ECG of 09-MAY-2019 20:01, 
Junctional rhythm has replaced Atrial fibrillation Questionable change in initial forces of Anterior leads Nonspecific T wave abnormality no longer evident in Inferior leads Nonspecific T wave abnormality, improved in Lateral leads Confirmed by Usman Navarro MD, Ondina Tapia ([id number]) on 5/10/2019 12:07:00 PM 
  
 EKG, 12 LEAD, INITIAL [[id number]] Collected:  05/09/19 2001 Order Status:  Completed Updated:  05/10/19 0636 Ventricular Rate 60 BPM   
  Atrial Rate 60 BPM   
  QRS Duration 86 ms   
  Q-T Interval 428 ms QTC Calculation (Bezet) 428 ms Calculated R Axis 34 degrees Calculated T Axis 142 degrees Diagnosis -- Atrial fibrillation Low voltage QRS Septal infarct , age undetermined No previous ECGs available Confirmed by Walt Kincaid MD, Obie Guzman ([id number]) on 5/10/2019 8:37:23 AM 
  
 [address] [place] [address] [[id number]] Collected:  05/10/19 2951 Order Status:  Completed Updated:  05/10/19 9962 Echocardiogram: · Left Ventricle: Mildly dilated left ventricle. Estimated left ventricular ejection fraction is 16 - 20%. · Aortic Valve: Severe aortic stenosis, mean gradient is 28 mmHg. Aortic valve area is 0.8 cm2. · Tricuspid Valve: Mild to moderate tricuspid valve regurgitation is present with moderate pulmonary HTN, estimated PA of 55 mmHg. Left Ventricle Normal wall thickness. Mildly dilated left ventricle. The muscle mass is normal. The cavity shape is normal. Severe and segmental systolic dysfunction. The estimated ejection fraction is 16 - 20%. Visually measured ejection fraction. Abnormal wall motion as described on the wall scoring diagram below. Unable to assess diastolic function. Wall Scoring The following segments are akinetic: apical anterior, apical septal, apical inferior, apical lateral and apex. The following segments are hypokinetic: mid anterior, mid anteroseptal, mid inferoseptal, mid inferior, mid inferolateral and mid anterolateral. 
All other segments are normal.  
  
  
  
Left Atrium Normal cavity size. No atrial septal defect present. No patent foramen ovale visualized. Right Ventricle Normal cavity size, wall thickness and global systolic function. Right Atrium Normal cavity size. Aortic Valve Mild aortic valve sclerosis with reduced excursion.  Aortic valve peak gradient is 45 mmHg. Aortic valve mean gradient is 28 mmHg. Aortic valve area is 0.8 cm2. Aortic valve peak velocity is 3.4 cm/s. There is severe aortic stenosis. Trace aortic valve regurgitation. Mitral Valve Normal valve structure and no stenosis. Mild regurgitation. Tricuspid Valve Normal valve structure and no stenosis. Mild to moderate tricuspid valve regurgitation. Pulmonary arterial systolic pressure is 21.1 mmHg. Moderate pulmonary hypertension. Pulmonic Valve Pulmonic valve not well visualized. Aorta Normal aortic root, ascending aortic, and aortic arch. Pericardium Normal pericardium and no evidence of pericardial effusion. CT scans:  
 
INDICATION:  Heart failure 
  EXAM: CXR Portable. 
  
FINDINGS: Portable chest shows no significant change including [place] since 
yesterday. There is no apparent pneumothorax. Lungs show low volumes with mild 
bibasilar haziness favoring atelectasis. Heart size is top normal. There is no 
overt pulmonary edema. 
  
IMPRESSION IMPRESSION: No significant change Demarco [place], NP Ned Lackey [address] [place] [address], [address], [address] [place] [address] Office [phone] Fax [phone] 
24 hour VAD/HF Pager: [phone] AHF ATTENDING ADDENDUM Patient was seen and examined in person. Data and notes were reviewed. I have discussed and agree with the plan as noted in the NP note above without further additions. Lizeth Wright MD PhD 
Nedmatt Lackey [address] [place] [address] Total Critical Care time spent: 2400-8612 
30 minutes. There was no overlap with other services Critical care was necessary to treat or prevent imminent or life threatening deterioration of the following conditions: cardiac failure, respiratory failure and CNS failure or compromise 
  
Services Provided: 1. Telemetry review and 12 lead ECG interpretation 2. Hemodynamic interpretation, assessment, and management 3. Review and interpretation of CXR 4. Review and interpretation of lab values 5. Review and interpretation of microbiologic data and culture results 6. Review of medications and administration 7. Review and interpretation of nutrition requirements and management 8. Discussion of management withother consultants and services 9.  Clinical update to family members

## 2019-05-18 NOTE — PROGRESS NOTES
[facility] ICU Progress Note Admit Date: 2019 POD:  2 Procedure:  Procedure(s): RIGHT AXILLARY IMPELLA/ [id number] Subjective:  
Pt seen with Dr. Beverly Peres. Tmax 100.2, on RA. On dobut 7.5, bumex gtt. Impella 5.0 at P7. Pt has no complaints today. Asking when is he getting a LVAD Objective:  
Vitals: 
Blood pressure 93/77, pulse 96, temperature 99.3 °F (37.4 °C), resp. rate 16, height 5' 11\" (1.803 m), weight 169 lb 5 oz (76.8 kg), SpO2 95 %. Temp (24hrs), Av.1 °F (37.3 °C), Min:98 °F (36.7 °C), Max:100.2 °F (37.9 °C) Hemodynamics: 
 CO: CO (l/min): 5.4 l/min CI: CI (l/min/m2): 2.7 l/min/m2 CVP: CVP (mmHg): 10 mmHg (19 1000) SVR: SVR (dyne*sec)/cm5: 1284 (dyne*sec)/cm5 (19 0800) PAP Systolic: PAP Systolic: 58 (/ 7218) PAP Diastolic: PAP Diastolic: 26 (81/59/06 5575) PVR:   
 SV02: SVO2 (%): 50 % (19 1000) SCV02:   
 
EKG/Rhythm:  Afib Oxygen Therapy: 
Oxygen Therapy O2 Sat (%): 95 % (19 1000) Pulse via Oximetry: 112 beats per minute (19 1000) O2 Device: Room air (19 0800) O2 Flow Rate (L/min): 4 l/min (19 1600) FIO2 (%): 36 % (19 1146) CXR:  
CXR Results  (Last 48 hours) 19 0452  XR CHEST PORT Final result Impression:  IMPRESSION:   
Small right pleural effusion. Shallow volumes. Narrative:  PORTABLE CHEST RADIOGRAPH/S: 2019 4:52 AM  
   
INDICATION: LVAD. COMPARISON: 2019, 2019, 5/15/2019, 2019. TECHNIQUE: Portable frontal upright radiograph/s of the chest.  
   
FINDINGS:   
A small right pleural effusion is associated with passive atelectasis behind the  
right hemidiaphragm. The lungs are hypoinflated, but otherwise clear. The  
central airways are patent. No pneumothorax. A catheter-based LVAD device via a  
right subclavian approach and a pulmonary arterial catheter via a right IJ  
sheath are in appropriate position. 05/17/19 1424  XR CHEST PORT Final result Impression:  IMPRESSION:  
Poor inspiratory effort, but improved right pleural effusion, persistent small  
bilateral pleural effusions. Improved bibasilar atelectasis or infiltrate. Stable lines and tubes. .  . Narrative:     
   
INDICATION : pulmonary edema, pac placement EXAM: Chest single view. COMPARISON: Earlier same day at 0408 hours. FINDINGS: A single frontal view of the chest at 1412 hours shows poor  
inspiratory effort and persistent but improved opacity in the right lung base,  
consistent with decreased pleural effusion. Bibasilar atelectasis or minimal  
infiltrate also improved. Small left pleural effusion. Pulmonary artery catheter  
is stable with its tip over the proximal right pulmonary artery. Impella device  
from the right subclavian approach is stable, with overlying skin staples. The  
heart, mediastinum and pulmonary vasculature are stable . The bony thorax is  
unremarkable for age, except for degenerative change in the shoulders. . .  
   
  
 05/17/19 0427  XR CHEST PORT Final result Impression:  IMPRESSION:  
1. No significant change in the appearance of the chest or support hardware. Narrative:  INDICATION: Virl Lowing COMPARISON: Previous chest xray, yesterday. LIMITATIONS: Portable technique. Shalini Tra FINDINGS: Single frontal view of the chest.   
.  
Lines/tubes/surgical: No significant change in the appearance of the right IJ  
approach [place] catheter or Impella. Heart/mediastinum: Calcifications in the aortic arch. Lungs/pleura: Low lung volumes. Hazy opacity in the right hemithorax with  
obscured right hemidiaphragm. Minimal opacity in the left base. No visible  
pneumothorax. Additional Comments: Cutaneous staples overlying the right upper chest/axilla. Surgical clips overlying the right axilla/upper chest  
.  
  
 05/16/19 1210  XR CHEST PORT Final result Impression:  Impression: Impella device as above. Narrative: Indication: Impella Placement Comparison to earlier the same day. Portable exam obtained at [phone] demonstrates  
placement of an Impella device with the tip projecting over the left cardiac  
apex. Bibasilar atelectasis persists. Admission Weight: Last Weight Weight: 186 lb 15.2 oz (84.8 kg) Weight: 169 lb 5 oz (76.8 kg) Intake / Darcia Schneiders / Drain: 
Current Shift:  0701 -  1900 In: 223.4 [I.V.:223.4] Out: 350 [Urine:250] Last 24 hrs.:  
 
Intake/Output Summary (Last 24 hours) at 2019 1041 Last data filed at 2019 1000 Gross per 24 hour Intake 1081.22 ml Output 1330 ml Net -248.78 ml EXAM: 
General:   Alert, NAD Lungs:   Clear upper, diminished bases to auscultation bilaterally. Incision:  impella dsg cdi Heart:  irregular rate and rhythm, S1, S2 normal, no murmur, click, rub or gallop. Abdomen:   Soft, non-tender. Bowel sounds normal. No masses,  No organomegaly. Extremities:  2+ LE edema. PPP. Neurologic:  Gross motor and sensory apparatus intact. Labs:  
Recent Labs 19 
0079 19 
6838 19 
7894  19 
8351 WBC  --   --  4.3   < > 7.1 HGB  --   --  9.8*   < > 10.5* HCT  --   --  31.1*   < > 34.1*  
PLT  --   --  113*   < > 146* NA  --  131*  --    < > 130* K  --  4.0  --    < > 4.9 BUN  --  48*  --    < > 42* CREA  --  2.11*  --    < > 2.01* GLU  --  105*  --    < > 164* GLUCPOC 110*  --   --    < >  --   
INR  --   --   --   --  1.4*  
 < > = values in this interval not displayed. Assessment:  
 
Active Problems: 
  Acute on chronic systolic (congestive) heart failure ([facility] [address] (2019) Plan/Recommendations/Medical Decision Makin.  Acute on chronic systolic heart failure (NYHA class IV on admission): AHF following, on dobut 7.5, bumex gtts, aldactone, LVAD workup ongoing. Purge now D5 dut to high PTT. BNP >35K. On ancef for impella ppx. 2. Severe AS: no plans for TAVR at this time 3. CAD with  of RAD and RCA: on ASA and statin, no BB dt dobut. Cardiac MRI showed nonviability of LV. 4. Afib: on eliquis -hold for impella 5. HLD: on lipitor 6. DM type II: on amaryl, SSI, A1C 6.9 7. CKD: Cr 2.15, on bumex gtt, monitor. 8. Left leg wounds s/p fall: wound care consulted. 9. Anemia, iron deficiency: Hgb 10.3, cont PO iron, monitor 10. Thrombocytopenia: mild, monitor. 11. Hyponatremia: Na 131, monitor 12. Dispo: Cont ICU care, LVAD workup per AHF. Signed By: Joseph Del Castillo PA-C Saw patient, agree with above Risk of morbidity and mortality - high Medical decision making - high complexity 1. Acute on chronic systolic heart failure (NYHA class IV on admission): AHF following, on dobut 2. Severe AS: no plans for TAVR at this time 3. CAD with  of RAD and RCA: on ASA and statin, 4. Afib: has PPM, was on eliquis - hold for impella. 5. HLD:  lipitor 6. DM type II:   glipizide 7. CKD: Cr rising, up to 2.28, monitor 8. Left leg wounds s/p fall: wound care consulted. 9. Anemia, iron deficiency:   monitor 10. Thrombocytopenia: plts cont to decrease, down to 80K 
 
11. Hyponatremia:  Renal on board 12. Urinary retention: cont flomax,  
 
13. Nutrition: cont marinol per AHFS. Nutrition consult 14. Hyperkalemia: 5.7 this am. Renal on board.

## 2019-05-18 NOTE — PROGRESS NOTES
[facility] Progress Note DOS:   5/18/2019 NAME:  Raza Powers  
MRN:   [id number] REFERRING PROVIDER:  Dr. Elizabeth Kellogg PRIMARY CARE PHYSICIAN: Keiko Mata MD 
 
 
Chief Complaint: CHENG 
 
 
HPI: 77y.o. year old male with a history of HTN, T2DM, PVD, CKD, ICM, Severe AS who presents for further evaluation of chronic systolic heart failure. He developed progressive dyspnea with exacterbation as well as progressive fatigue which prompted further evaluation with a heart cath at [facility] that revealed severe 3 vessel disease with  of his LAD and RCA, severe LV systolic failure (LVEF 73%), and severe As. Has has been unable to walk a block before without limiting dyspnea as well as unable to make a bed without developing dyspnea. Admitted to [facility] for acute chronic HFrEF, CMRI showed no viability for LAD, taken for impella placement for bridge to candidacy. 24Hr Events: IVIG given Venofer started Remains on dobutamine, CI marginal  
 
Procedure:  Procedure(s): RIGHT AXILLARY IMPELLA/ [id number] POD:  POD 2 Impression / Plan:  
Heart Failure Status: NYHA Class IV 
 
ICM-Stage D NYHA IV - LVEF 10% Impella in place- adequate flows Remains at P7 
TPA PRN for purge flow < 6 Bivalrudin off for now due to elevated PTT Currently  Dobutamine gtt at 7.5 mcg/kg/min- will not wean until CI consistently over 2.3 Bumex 1mg BID, PRN 1mg in addition Evaluation for LVAD placement Daily lactates to eval for perfusion Lipase WNL 
  
Severe AS Not a candidate for TAVR due to poor viability and cardiomyopathy Dr. Mathews Creed following  
  
CAD with  of RAD and RCA Currently on ASA and statin No BBrx while on dobutamine cMRI results show severe Cad with  of the LAD and RCA with poor viability of myocardium.  
  
RV failure Digoxin 0.125mg Monitor levels, 0.6 today Diurese Maintain PA cath 
RV will need to recover to be a candidate for durable MCS 
 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 
UA neg Ancef while impella in place ESR WNL 
 
SCD high risk Will need life vest when discharged based on care plan  
  
Atrial Fibrillation Bival on hold for elevated PTTs 
  
HLD Lipitor 40 mg every night  
  
T2DM  
SSI 
 glipizide  
  
Left leg wounds s/p fall Wound consult Leg is erythematous and warm, pt is afebrile  
  
CKD Creatinine stable Cont bumex Monitor renal function 24Hr urine pending  
  
Hyponatremia Stable Monitor I/O's and daily Na+ Current Na 131 Cont spironolactone PPX Prealbumin 10 Encourage PO intake Cont Marinol PPI Ancef while impella in place Aggressive electrolyte replacement Dispo:  
Remain in CCU for now History: 
Past Medical History:  
Diagnosis Date  3-vessel coronary artery disease  Aortic stenosis, severe  Chronic kidney disease (CKD), stage III (moderate) (HCC)  Chronic total occlusion of coronary artery  Hypertension  Ischemic cardiomyopathy  Peripheral vascular disease ([facility]  Type 2 diabetes mellitus treated without insulin ([facility] Past Surgical History:  
Procedure Laterality Date  HX AMPUTATION TOE Left 2017 Social History Socioeconomic History  Marital status:  Spouse name: Not on file  Number of children: Not on file  Years of education: Not on file  Highest education level: Not on file Occupational History  Not on file Social Needs  Financial resource strain: Not on file  Food insecurity:  
  Worry: Not on file Inability: Not on file  Transportation needs:  
  Medical: Not on file Non-medical: Not on file Tobacco Use  Smoking status: Former Smoker  Smokeless tobacco: Never Used Substance and Sexual Activity  Alcohol use: Not Currently  Drug use: Not on file  Sexual activity: Not on file Lifestyle  Physical activity:  
  Days per week: Not on file Minutes per session: Not on file  Stress: Not on file Relationships  Social connections:  
  Talks on phone: Not on file Gets together: Not on file Attends Orthodoxy service: Not on file Active member of club or organization: Not on file Attends meetings of clubs or organizations: Not on file Relationship status: Not on file  Intimate partner violence:  
  Fear of current or ex partner: Not on file Emotionally abused: Not on file Physically abused: Not on file Forced sexual activity: Not on file Other Topics Concern  Not on file Social History Narrative  Not on file History reviewed. No pertinent family history. Current Medications:  
Current Facility-Administered Medications Medication Dose Route Frequency Provider Last Rate Last Dose  dextrose 5% infusion  4-20 mL/hr IntraVENous CONTINUOUS Colette Schroeder NP 13.7 mL/hr at 05/18/19 0446 13.7 mL/hr at 05/18/19 0446  bumetanide (BUMEX) injection 1 mg  1 mg IntraVENous Q12H Elda, Erin B, NP   1 mg at 05/18/19 0900  bumetanide (BUMEX) injection 1 mg  1 mg IntraVENous Q6H PRN Elda, Colette B, NP      
 magnesium oxide (MAG-OX) tablet 800 mg  800 mg Oral BID Elda, Erin B, NP   800 mg at 05/18/19 0900  
 iron sucrose (VENOFER) 100 mg in 0.9% sodium chloride 100 mL IVPB  100 mg IntraVENous DAILY Elda, Erin B,  mL/hr at 05/18/19 0900 100 mg at 05/18/19 0900  
 sodium chloride (OCEAN) 0.65 % nasal squeeze bottle 2 Spray  2 Spray Both Nostrils Q2H PRN Nadiya Schroeder B, NP      
 dronabinol (MARINOL) capsule 2.5 mg  2.5 mg Oral DAILY Elda, Colette B, NP   2.5 mg at 05/18/19 0900  potassium chloride (KLOR-CON) packet for solution 40 mEq  40 mEq Oral DAILY Elda, Colette B, NP   40 mEq at 05/18/19 0900  
 glipiZIDE (GLUCOTROL) tablet 7.5 mg  7.5 mg Oral ACB&D Shasha MC NP   7.5 mg at 05/18/19 0396  vasopressin (VASOSTRICT) 20 Units in 0.9% sodium chloride 100 mL infusion  0-0.1 Units/min IntraVENous TITRATE Boneta Rias, CRNA   Stopped at 05/16/19 7351  
 dextrose 5% infusion  4-20 mL/hr IntraVENous CONTINUOUS Katarzyna Cortes NP   Stopped at 05/17/19 1051  ceFAZolin (ANCEF) 2 g/20 mL in sterile water IV syringe  2 g IntraVENous Q8H King Zbigniew HICKS NP   2 g at 05/18/19 0900  digoxin (LANOXIN) tablet 0.125 mg  0.125 mg Oral DAILY Elda Colette B, NP   0.125 mg at 05/18/19 0900  
 insulin lispro (HUMALOG) injection   SubCUTAneous AC&HS Colette Schroeder NP   Stopped at 05/17/19 2200  
 glucose chewable tablet 16 g  4 Tab Oral PRN Dale Schroeder B, NP      
 dextrose (D50W) injection syrg 12.5-25 g  12.5-25 g IntraVENous PRN Colette Schroeder, NP      
 glucagon (GLUCAGEN) injection 1 mg  1 mg IntraMUSCular PRN Mir Schroederin B, NP      
 traMADol (ULTRAM) tablet 50 mg  50 mg Oral Q8H PRN Mir Schroederin B, NP   50 mg at 05/17/19 1215  aspirin chewable tablet 81 mg  81 mg Oral DAILY Elda, Colette B, NP   81 mg at 05/18/19 0900  
 atorvastatin (LIPITOR) tablet 40 mg  40 mg Oral QHS Elda, Colette B, NP   40 mg at 05/17/19 2103  spironolactone (ALDACTONE) tablet 25 mg  25 mg Oral DAILY Elda Colette B, NP   25 mg at 05/18/19 0900  
 sodium chloride (NS) flush 5-40 mL  5-40 mL IntraVENous Q8H Elda, Colette B, NP   10 mL at 05/18/19 9135  sodium chloride (NS) flush 5-40 mL  5-40 mL IntraVENous PRN Elda, Colette B, NP      
 ondansetron (ZOFRAN) injection 4 mg  4 mg IntraVENous Q6H PRN Elda, Colette B, NP   4 mg at 05/18/19 7381  acetaminophen (TYLENOL) tablet 650 mg  650 mg Oral Q6H PRN Elda, Colette B, NP   650 mg at 05/17/19 1351  docusate sodium (COLACE) capsule 100 mg  100 mg Oral PRN Elda, Colette B, NP      
 DOBUTamine (DOBUTREX) 500 mg/250 mL (2,000 mcg/mL) infusion  7.5 mcg/kg/min IntraVENous CONTINUOUS PrudenciodTricia Lala, NP 19.1 mL/hr at 19 0304 7.5 mcg/kg/min at 19 0304  pantoprazole (PROTONIX) tablet 40 mg  40 mg Oral ACB Colette Schroeder NP   40 mg at 19 9683 Allergies: Allergies Allergen Reactions  Penicillins Hives ROS:   
Review of Systems Constitutional: Positive for malaise/fatigue. Looks uncomfortable and ill appearing \"feels bad\" HENT: Negative. Eyes: Negative. Respiratory: Negative. Cardiovascular: Negative. Gastrointestinal: Negative. Genitourinary: Negative. Musculoskeletal: Negative. Skin: Positive for rash. Neurological: Positive for weakness. Physical Exam:  
Physical Exam  
Constitutional: He is oriented to person, place, and time. He appears well-developed. No distress. Looks very ill HENT:  
Head: Normocephalic and atraumatic. Eyes: EOM are normal. Right eye exhibits no discharge. Left eye exhibits no discharge. Neck: Normal range of motion. No JVD present. No tracheal deviation present. Cardiovascular: Normal rate, regular rhythm and intact distal pulses. Murmur heard. Pulmonary/Chest: Effort normal and breath sounds normal. No stridor. No respiratory distress. Abdominal: Soft. There is no tenderness. Musculoskeletal: He exhibits edema and deformity. Neurological: He is alert and oriented to person, place, and time. Skin: Skin is warm and dry. Rash noted. There is erythema. There is pallor. Scab to left knee Nursing note and vitals reviewed. Vitals:  
Visit Vitals BP 93/77 Pulse 96 Temp 99.3 °F (37.4 °C) Resp 16 Ht 5' 11\" (1.803 m) Wt 169 lb 5 oz (76.8 kg) SpO2 95% BMI 23.61 kg/m² Temp (24hrs), Av.1 °F (37.3 °C), Min:98 °F (36.7 °C), Max:100.2 °F (37.9 °C) Hemodynamics: 
 CO: CO (l/min): 5.4 l/min CI: CI (l/min/m2): 2.7 l/min/m2 CVP: CVP (mmHg): 10 mmHg (19 1000) SVR: SVR (dyne*sec)/cm5: 1284 (dyne*sec)/cm5 (05/18/19 0800) PAP Systolic: PAP Systolic: 58 (43/47/75 0155) PAP Diastolic: PAP Diastolic: 26 (98/99/80 9688) PVR:   
 SV02: SVO2 (%): 50 % (05/18/19 1000) SCV02:   
 
 
Admission Weight: Last Weight Weight: 186 lb 15.2 oz (84.8 kg) Weight: 169 lb 5 oz (76.8 kg) Intake / Output / Drain: 
Last 24 hrs.:  
 
Intake/Output Summary (Last 24 hours) at 5/18/2019 1106 Last data filed at 5/18/2019 1000 Gross per 24 hour Intake 1048.68 ml Output 1300 ml Net -251.32 ml Oxygen Therapy: 
Oxygen Therapy O2 Sat (%): 95 % (05/18/19 1000) Pulse via Oximetry: 112 beats per minute (05/18/19 1000) O2 Device: Room air (05/18/19 0800) O2 Flow Rate (L/min): 4 l/min (05/16/19 1600) FIO2 (%): 36 % (05/16/19 1146) Recent Labs:  
Labs Latest Ref Rng & Units 5/18/2019 5/17/2019 5/16/2019 5/15/2019 5/14/2019 5/13/2019 5/12/2019 WBC 4.1 - 11.1 K/uL 4.3 6.9 7.1 7.6 6.1 - -  
RBC 4.10 - 5.70 M/uL 3.67(L) 3.83(L) 3.91(L) 4.03(L) 4.09(L) - - Hemoglobin 12.1 - 17.0 g/dL 9.8(L) 10. 3(L) 10. 5(L) 10. 7(L) 10. 9(L) - - Hematocrit 36.6 - 50.3 % 31. 1(L) 32. 5(L) 34. 1(L) 34. 8(L) 35. 4(L) - - MCV 80.0 - 99.0 FL 84.7 84.9 87.2 86.4 86.6 - - Platelets 790 - 413 K/uL 113(L) 137(L) 146(L) 150 139(L) - - Lymphocytes 12 - 49 % - - 6(L) 8(L) - - - Monocytes 5 - 13 % - - 8 5 - - - Eosinophils 0 - 7 % - - 1 1 - - - Basophils 0 - 1 % - - 0 0 - - - Albumin 3.5 - 5.0 g/dL 2. 4(L) 2. 8(L) 2. 8(L) - 2. 9(L) 2. 8(L) 2. 9(L) Calcium 8.5 - 10.1 MG/DL 8. 3(L) 8.6 8.6 - 8.5 9.0 9.3 SGOT 15 - 37 U/L 20 17 13(L) - 12(L) 14(L) 16 Glucose 65 - 100 mg/dL 105(H) 168(H) 164(H) - 127(H) 142(H) 172(H) BUN 6 - 20 MG/DL 48(H) 46(H) 42(H) - 39(H) 42(H) 43(H) Creatinine 0.70 - 1.30 MG/DL 2.11(H) 2.16(H) 2.01(H) - 1.78(H) 1.87(H) 1.85(H) Sodium 136 - 145 mmol/L 131(L) 130(L) 130(L) - 133(L) 135(L) 136 Potassium 3.5 - 5.1 mmol/L 4.0 4.4 4.9 - 4.6 4.5 4.1 TSH 0.450 - 4.500 uIU/mL - - - - - - -  
LDH 85 - 241 U/L 247(H) 230 - - - - -  
 
EKG:  
EKG Results Procedure 720 Value Units Date/Time SCANNED CARDIAC RHYTHM STRIP [[id number]] Collected:  05/18/19 8935 Order Status:  Completed Updated:  05/18/19 1815 SCANNED CARDIAC RHYTHM STRIP [[id number]] Collected:  05/17/19 2503 Order Status:  Completed Updated:  05/17/19 5041 SCANNED CARDIAC RHYTHM STRIP [[id number]] Collected:  05/16/19 7213 Order Status:  Completed Updated:  05/16/19 3130 SCANNED CARDIAC RHYTHM STRIP [[id number]] Collected:  05/13/19 0430 Order Status:  Completed Updated:  05/13/19 5638 SCANNED CARDIAC RHYTHM STRIP [[id number]] Collected:  05/13/19 9702 Order Status:  Completed Updated:  05/13/19 [address] [facility] SCANNED CARDIAC RHYTHM STRIP [[id number]] Collected:  05/11/19 0321 Order Status:  Completed Updated:  05/11/19 1364 EKG, 12 LEAD, INITIAL [[id number]] Collected:  05/10/19 1121 Order Status:  Completed Updated:  05/10/19 1207 Ventricular Rate 64 BPM   
  Atrial Rate 77 BPM   
  QRS Duration 104 ms Q-T Interval 460 ms QTC Calculation (Bezet) 474 ms Calculated P Axis 41 degrees Calculated R Axis 35 degrees Calculated T Axis 133 degrees Diagnosis --  
  Sinus rhythm with AV dissociation and Junctional rhythm Low voltage QRS Cannot rule out Anteroseptal infarct (cited on or before 09-MAY-2019) When compared with ECG of 09-MAY-2019 20:01, 
Junctional rhythm has replaced Atrial fibrillation Questionable change in initial forces of Anterior leads Nonspecific T wave abnormality no longer evident in Inferior leads Nonspecific T wave abnormality, improved in Lateral leads Confirmed by Geena Lind MD, Sonia Taylor ([id number]) on 5/10/2019 12:07:00 PM 
  
 EKG, 12 LEAD, INITIAL [[id number]] Collected:  05/09/19 2001 Order Status:  Completed Updated:  05/10/19 0901   Ventricular Rate 60 BPM   
  Atrial Rate 60 BPM   
  QRS Duration 86 ms   
 Q-T Interval 428 ms QTC Calculation (Bezet) 428 ms Calculated R Axis 34 degrees Calculated T Axis 142 degrees Diagnosis -- Atrial fibrillation Low voltage QRS Septal infarct , age undetermined No previous ECGs available Confirmed by Rossy Santana MD, Love Huddleston ([id number]) on 5/10/2019 8:37:23 AM 
  
 [address] [place] [address] [[id number]] Collected:  05/10/19 1159 Order Status:  Completed Updated:  05/10/19 6080 Echocardiogram: · Left Ventricle: Mildly dilated left ventricle. Estimated left ventricular ejection fraction is 16 - 20%. · Aortic Valve: Severe aortic stenosis, mean gradient is 28 mmHg. Aortic valve area is 0.8 cm2. · Tricuspid Valve: Mild to moderate tricuspid valve regurgitation is present with moderate pulmonary HTN, estimated PA of 55 mmHg. Left Ventricle Normal wall thickness. Mildly dilated left ventricle. The muscle mass is normal. The cavity shape is normal. Severe and segmental systolic dysfunction. The estimated ejection fraction is 16 - 20%. Visually measured ejection fraction. Abnormal wall motion as described on the wall scoring diagram below. Unable to assess diastolic function. Wall Scoring The following segments are akinetic: apical anterior, apical septal, apical inferior, apical lateral and apex. The following segments are hypokinetic: mid anterior, mid anteroseptal, mid inferoseptal, mid inferior, mid inferolateral and mid anterolateral. 
All other segments are normal.  
  
  
  
Left Atrium Normal cavity size. No atrial septal defect present. No patent foramen ovale visualized. Right Ventricle Normal cavity size, wall thickness and global systolic function. Right Atrium Normal cavity size. Aortic Valve Mild aortic valve sclerosis with reduced excursion. Aortic valve peak gradient is 45 mmHg. Aortic valve mean gradient is 28 mmHg. Aortic valve area is 0.8 cm2. Aortic valve peak velocity is 3.4 cm/s. There is severe aortic stenosis. Trace aortic valve regurgitation. Mitral Valve Normal valve structure and no stenosis. Mild regurgitation. Tricuspid Valve Normal valve structure and no stenosis. Mild to moderate tricuspid valve regurgitation. Pulmonary arterial systolic pressure is 80.1 mmHg. Moderate pulmonary hypertension. Pulmonic Valve Pulmonic valve not well visualized. Aorta Normal aortic root, ascending aortic, and aortic arch. Pericardium Normal pericardium and no evidence of pericardial effusion. CT scans: CXR Results  (Last 48 hours) 05/18/19 0452  XR CHEST PORT Final result Impression:  IMPRESSION:   
Small right pleural effusion. Shallow volumes. Narrative:  PORTABLE CHEST RADIOGRAPH/S: 5/18/2019 4:52 AM  
   
INDICATION: LVAD. COMPARISON: 5/17/2019, 5/16/2019, 5/15/2019, 5/9/2019. TECHNIQUE: Portable frontal upright radiograph/s of the chest.  
   
FINDINGS:   
A small right pleural effusion is associated with passive atelectasis behind the  
right hemidiaphragm. The lungs are hypoinflated, but otherwise clear. The  
central airways are patent. No pneumothorax. A catheter-based LVAD device via a  
right subclavian approach and a pulmonary arterial catheter via a right IJ  
sheath are in appropriate position. 05/17/19 1424  XR CHEST PORT Final result Impression:  IMPRESSION:  
Poor inspiratory effort, but improved right pleural effusion, persistent small  
bilateral pleural effusions. Improved bibasilar atelectasis or infiltrate. Stable lines and tubes. .  . Narrative:     
   
INDICATION : pulmonary edema, pac placement EXAM: Chest single view. COMPARISON: Earlier same day at 0408 hours.   
   
FINDINGS: A single frontal view of the chest at 1412 hours shows poor  
inspiratory effort and persistent but improved opacity in the right lung base,  
 consistent with decreased pleural effusion. Bibasilar atelectasis or minimal  
infiltrate also improved. Small left pleural effusion. Pulmonary artery catheter  
is stable with its tip over the proximal right pulmonary artery. Impella device  
from the right subclavian approach is stable, with overlying skin staples. The  
heart, mediastinum and pulmonary vasculature are stable . The bony thorax is  
unremarkable for age, except for degenerative change in the shoulders. . .  
   
  
 05/17/19 0427  XR CHEST PORT Final result Impression:  IMPRESSION:  
1. No significant change in the appearance of the chest or support hardware. Narrative:  INDICATION: Edith Caban COMPARISON: Previous chest xray, yesterday. LIMITATIONS: Portable technique. Pereyra Jun FINDINGS: Single frontal view of the chest.   
.  
Lines/tubes/surgical: No significant change in the appearance of the right IJ  
approach [place] catheter or Impella. Heart/mediastinum: Calcifications in the aortic arch. Lungs/pleura: Low lung volumes. Hazy opacity in the right hemithorax with  
obscured right hemidiaphragm. Minimal opacity in the left base. No visible  
pneumothorax. Additional Comments: Cutaneous staples overlying the right upper chest/axilla. Surgical clips overlying the right axilla/upper chest  
.  
  
 05/16/19 1210  XR CHEST PORT Final result Impression:  Impression: Impella device as above. Narrative: Indication: Impella Placement Comparison to earlier the same day. Portable exam obtained at [phone] demonstrates  
placement of an Impella device with the tip projecting over the left cardiac  
apex. Bibasilar atelectasis persists. BRENDEN Pérez [id number], [address], [address] [place] [address] Office [phone] Fax [phone] 
24 hour VAD/HF Pager: [phone]

## 2019-05-18 NOTE — PROGRESS NOTES
1400 TRANSFER - IN REPORT: 
 
Verbal report received from Kimberly RN (name) on Jorge Dawson  being received from CCU (unit) for routine progression of care Report consisted of patients Situation, Background, Assessment and  
Recommendations(SBAR). Information from the following report(s) SBAR and Cardiac Rhythm a fib was reviewed with the receiving nurse. Opportunity for questions and clarification was provided. Assessment completed upon patients arrival to unit and care assumed. 1500 Pt PA pressures mid 50-60s. CVP 12-13. Will continue to monitor 1700 Temp 101, Colette P. NP notified. Orders received for paired cultures and urine cultures. Acetaminophen given. 1730 CVP >12, PA pressures low 60s. PRN bumex given. [address] [place] NP updated that pt states he is experiencing an \"upset stomach due to gastric reflux\", orders received for TUMS. 1945 Bedside shift change report given to Will Bass (oncoming nurse) by Meghan Townsend (offgoing nurse). Report included the following information SBAR, MAR and Cardiac Rhythm a fib.

## 2019-05-18 NOTE — PROGRESS NOTES
1930: Report received from [address] [place] [address], RN. Gtts checked and verified. 0330: Patient reported to RN he has been having nosebleeds; slightly bloody tissues noted at bedside. RN educated patient on importance of notifying RN of these issues. Patient stated this is \"normal it's been happening for the last couple days. Definitely not as bad as it has been. \" Urine also noted to be slightly pink. AM labs with lysis labs sent. 7140: Critical aPTT 120.7. BRENDEN Schroeder notified and made aware of nosebleeds and pink urine. NP stated to change purge fluid to 5% dextrose and will re-evaluate on rounds. 0600: Shift Summary: Patient received <2hrs of rest overnight. Stating unable to rest because of \"everything. \" Hemodynamics at patient baseline. 0730: Bedside shift change report given to Kimberly RN (oncoming nurse) by Lizbeth Thomas RN (offgoing nurse). Report included the following information SBAR, Intake/Output, MAR and Recent Results. Problem: Heart Failure: Day 4 Goal: Activity/Safety Outcome: Progressing Towards Goal 
Goal: Nutrition/Diet Outcome: Not Progressing Towards Goal 
Goal: Medications Outcome: Progressing Towards Goal 
Goal: Respiratory Outcome: Progressing Towards Goal 
Goal: Treatments/Interventions/Procedures Outcome: Progressing Towards Goal 
Goal: *Hemodynamically stable Outcome: Progressing Towards Goal

## 2019-05-18 NOTE — PROGRESS NOTES
0730 Bedside and Verbal shift change report given to Kimberly RN (oncoming nurse) by Alex Harrell RN (offgoing nurse). Report included the following information SBAR, Kardex, Intake/Output, MAR, Recent Results and Cardiac Rhythm a-fib. 0930 Pt complaining of nausea - 100mL emesis out - tx with prn zofran 
1100 Pt up in chair position Ned Coronado 3, NP at bedside - updated on pts status - okay to lateral transfer to CVICU 
TRANSFER - OUT REPORT: 
 
Verbal report given to RN(name) on Angelina Marks  being transferred to CVICU(unit) for lateral transfer. Report consisted of patients Situation, Background, Assessment and  
Recommendations(SBAR). Information from the following report(s) SBAR, Kardex, Procedure Summary, Intake/Output, MAR, Recent Results and Cardiac Rhythm a-fib was reviewed with the receiving nurse. Lines:  
Double Lumen 05/15/19 Right Internal jugular (Active) Central Line Being Utilized Yes 5/18/2019  8:00 AM  
Criteria for Appropriate Use Hemodynamically unstable, requiring monitoring lines, vasopressors, or volume resuscitation 5/18/2019  8:00 AM  
Site Assessment Clean, dry, & intact 5/18/2019  8:00 AM  
Infiltration Assessment 0 5/18/2019  8:00 AM  
Affected Extremity/Extremities Color distal to insertion site pink (or appropriate for race) 5/18/2019  8:00 AM  
Date of Last Dressing Change 05/17/19 5/18/2019  8:00 AM  
Dressing Status Clean, dry, & intact 5/18/2019  8:00 AM  
Dressing Type Disk with Chlorhexadine gluconate (CHG); Transparent 5/18/2019  8:00 AM  
Action Taken Open ports on tubing capped 5/18/2019  8:00 AM  
Proximal Hub Color/Line Status White;Flushed;Capped 5/18/2019  8:00 AM  
Positive Blood Return (Medial Site) Yes 5/18/2019  8:00 AM  
Distal Hub Color/Line Status Brown;Flushed;Capped 5/18/2019  8:00 AM  
Positive Blood Return (Lateral Site) Yes 5/18/2019  8:00 AM  
Alcohol Cap Used Yes 5/18/2019  8:00 AM  
   
Kristie Dice Dual 05/15/19 Right Neck (Active) Central Line Being Utilized Yes 5/18/2019  8:00 AM  
Criteria for Appropriate Use Hemodynamically unstable, requiring monitoring lines, vasopressors, or volume resuscitation 5/18/2019  8:00 AM  
Dylon Amadeo Wave Form Appropriate wave forms 5/18/2019  8:00 AM  
Dylon Amadeo Length(cm) 53 centimeters 5/18/2019  8:00 AM  
Site Assessment Clean, dry, & intact 5/18/2019  8:00 AM  
Dressing Status Clean, dry, & intact 5/18/2019  8:00 AM  
Dressing Type Disk with Chlorhexadine gluconate (CHG); Transparent 5/18/2019  8:00 AM  
Date of Last Dressing Change 05/15/19 5/18/2019  8:00 AM  
Proximal Line Status Intact and in place 5/18/2019  8:00 AM  
Distal Line Status Intact and in place 5/18/2019  8:00 AM  
Treatment Zeroed or re-zeroed 5/18/2019  8:00 AM  
   
Peripheral IV 05/15/19 Right Antecubital (Active) Site Assessment Clean, dry, & intact 5/18/2019  8:00 AM  
Phlebitis Assessment 0 5/18/2019  8:00 AM  
Infiltration Assessment 0 5/18/2019  8:00 AM  
Dressing Status Clean, dry, & intact 5/18/2019  8:00 AM  
Dressing Type Transparent 5/18/2019  8:00 AM  
Hub Color/Line Status Pink;Flushed;Capped 5/18/2019  8:00 AM  
Action Taken Open ports on tubing capped 5/18/2019  8:00 AM  
Alcohol Cap Used Yes 5/18/2019  8:00 AM  
   
Arterial Line 05/16/19 Left Radial artery (Active) Site Assessment Clean, dry, & intact 5/18/2019  8:00 AM  
Dressing Status Clean, dry, & intact 5/18/2019  8:00 AM  
Dressing Type Disk with Chlorhexadine gluconate (CHG); Transparent 5/18/2019  8:00 AM  
Line Status Intact and in place 5/18/2019  8:00 AM  
Treatment Zeroed or re-zeroed 5/18/2019  8:00 AM  
Affected Extremity/Extremities Color distal to insertion site pink (or appropriate for race) 5/18/2019  8:00 AM  
  
 
Opportunity for questions and clarification was provided. Patient transported with: 
 Monitor Registered Nurse Tech

## 2019-05-19 NOTE — PROGRESS NOTES
[facility] Progress Note DOS:   5/19/2019 NAME:  Nicolette Barone  
MRN:   [id number] REFERRING PROVIDER:  Dr. Ashley [place]wood PRIMARY CARE PHYSICIAN: Luzma Appiah MD 
 
 
Chief Complaint: CHENG 
 
 
HPI: 77y.o. year old male with a history of HTN, T2DM, PVD, CKD, ICM, Severe AS who presents for further evaluation of chronic systolic heart failure. He developed progressive dyspnea with exacterbation as well as progressive fatigue which prompted further evaluation with a heart cath at [facility] that revealed severe 3 vessel disease with  of his LAD and RCA, severe LV systolic failure (LVEF 53%), and severe As. Has has been unable to walk a block before without limiting dyspnea as well as unable to make a bed without developing dyspnea. Admitted to [facility] for acute chronic HFrEF, CMRI showed no viability for LAD, taken for impella placement for bridge to candidacy. 24Hr Events:  
Remains on dobutamine, CI marginal  
Transferred to CVI Still has poor appetite Procedure:  Procedure(s): RIGHT AXILLARY IMPELLA/ [id number] POD:  POD 3 Impression / Plan:  
Heart Failure Status: NYHA Class IV 
 
ICM-Stage D NYHA IV - LVEF 10% Impella in place- adequate flows, purge flows 13-14 Remains at P7 
TPA PRN for purge flow < 6 Bivalrudin off for now due to elevated PTT Currently  Dobutamine gtt at 7.5 mcg/kg/min- will not wean until CI consistently over 2.3 Bumex 1mg BID, PRN 1mg in addition Evaluation for LVAD placement Daily lactates to eval for perfusion- WNL Lipase WNL 
  
Severe AS Not a candidate for TAVR due to poor viability and cardiomyopathy Dr. Elizabeth De La Paz following  
  
CAD with  of RAD and RCA Currently on ASA and statin No BBrx while on dobutamine cMRI results show severe Cad with  of the LAD and RCA with poor viability of myocardium.  
  
RV failure Digoxin 0.125mg Monitor levels, 0.7 today Diurese Maintain PA cath RV will need to recover to be a candidate for durable MCS 
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 
UA neg Ancef while impella in place Repeat ESR, procalcitonin SCD high risk Will need life vest when discharged based on care plan  
  
Atrial Fibrillation Bival on hold for elevated PTTs Monitor Will resume if PTTs drop 
  
HLD Lipitor 40 mg every night  
  
T2DM  
SSI 
 glipizide  
  
Left leg wounds s/p fall Wound consult Leg is erythematous and warm, pt is afebrile  
  
CKD Creatinine improved today Cont bumex Monitor renal function 24Hr urine pending  
  
Hyponatremia Improving Monitor I/O's and daily Na+ Current Na 132 Cont spironolactone Malnutrition Prealbumin 10 Encourage PO intake Cont Marinol PPX PPI Ancef while impella in place Aggressive electrolyte replacement Dispo:  
Remain in CVI for now History: 
Past Medical History:  
Diagnosis Date  3-vessel coronary artery disease  Aortic stenosis, severe  Chronic kidney disease (CKD), stage III (moderate) (HCC)  Chronic total occlusion of coronary artery  Hypertension  Ischemic cardiomyopathy  Peripheral vascular disease ([facility] [address]  Type 2 diabetes mellitus treated without insulin ([facility] [address] Past Surgical History:  
Procedure Laterality Date  HX AMPUTATION TOE Left 2017 Social History Socioeconomic History  Marital status:  Spouse name: Not on file  Number of children: Not on file  Years of education: Not on file  Highest education level: Not on file Occupational History  Not on file Social Needs  Financial resource strain: Not on file  Food insecurity:  
  Worry: Not on file Inability: Not on file  Transportation needs:  
  Medical: Not on file Non-medical: Not on file Tobacco Use  Smoking status: Former Smoker  Smokeless tobacco: Never Used Substance and Sexual Activity  Alcohol use: Not Currently  Drug use: Not on file  Sexual activity: Not on file Lifestyle  Physical activity:  
  Days per week: Not on file Minutes per session: Not on file  Stress: Not on file Relationships  Social connections:  
  Talks on phone: Not on file Gets together: Not on file Attends [employer] service: Not on file Active member of club or organization: Not on file Attends meetings of clubs or organizations: Not on file Relationship status: Not on file  Intimate partner violence:  
  Fear of current or ex partner: Not on file Emotionally abused: Not on file Physically abused: Not on file Forced sexual activity: Not on file Other Topics Concern  Not on file Social History Narrative  Not on file History reviewed. No pertinent family history. Current Medications:  
Current Facility-Administered Medications Medication Dose Route Frequency Provider Last Rate Last Dose  dextrose 5% infusion  4-20 mL/hr IntraVENous CONTINUOUS EldaMirin B, NP 13.7 mL/hr at 05/18/19 0446 13.7 mL/hr at 05/18/19 0446  calcium carbonate (TUMS) chewable tablet 200 mg [elemental]  200 mg Oral TID PRN Casie MC, NP   200 mg at 05/18/19 1922  bumetanide (BUMEX) injection 1 mg  1 mg IntraVENous Q12H Elda, Colette B, NP   1 mg at 05/18/19 2107  bumetanide (BUMEX) injection 1 mg  1 mg IntraVENous Q6H PRN Elda, Colette B, NP   1 mg at 05/18/19 1730  
 magnesium oxide (MAG-OX) tablet 800 mg  800 mg Oral BID Elda, Colette B, NP   800 mg at 05/18/19 1707  iron sucrose (VENOFER) 100 mg in 0.9% sodium chloride 100 mL IVPB  100 mg IntraVENous DAILY Elda, Colette B,  mL/hr at 05/18/19 0900 100 mg at 05/18/19 0900  
 sodium chloride (OCEAN) 0.65 % nasal squeeze bottle 2 Spray  2 Spray Both Nostrils Q2H PRN Moshe Avendaño NP      
  dronabinol (MARINOL) capsule 2.5 mg  2.5 mg Oral DAILY Colette Schroeder, NP   2.5 mg at 05/18/19 0900  potassium chloride (KLOR-CON) packet for solution 40 mEq  40 mEq Oral DAILY Colette Schroeder, NP   40 mEq at 05/18/19 0900  
 glipiZIDE (GLUCOTROL) tablet 7.5 mg  7.5 mg Oral ACB&D Rocio Kinga MC NP   7.5 mg at 05/19/19 0635  
 vasopressin (VASOSTRICT) 20 Units in 0.9% sodium chloride 100 mL infusion  0-0.1 Units/min IntraVENous TITRATE Gregory Ivory CRNA   Stopped at 05/16/19 3797  
 dextrose 5% infusion  4-20 mL/hr IntraVENous CONTINUOUS Khari Rogers NP   Stopped at 05/17/19 1051  ceFAZolin (ANCEF) 2 g/20 mL in sterile water IV syringe  2 g IntraVENous Q8H Sun HICKS NP   2 g at 05/19/19 0116  digoxin (LANOXIN) tablet 0.125 mg  0.125 mg Oral DAILY Colette Schroeder, NP   0.125 mg at 05/18/19 0900  
 insulin lispro (HUMALOG) injection   SubCUTAneous AC&HS Colette Schroeder NP   Stopped at 05/18/19 2200  
 glucose chewable tablet 16 g  4 Tab Oral PRN Judson Schroeder, NP      
 dextrose (D50W) injection syrg 12.5-25 g  12.5-25 g IntraVENous PRN Colette Schroeder, NP      
 glucagon (GLUCAGEN) injection 1 mg  1 mg IntraMUSCular PRN Colette Schroeder, NP      
 traMADol (ULTRAM) tablet 50 mg  50 mg Oral Q8H PRN Colette Schroeder, NP   50 mg at 05/18/19 1924  aspirin chewable tablet 81 mg  81 mg Oral DAILY Colette Schroeder, NP   81 mg at 05/18/19 0900  
 atorvastatin (LIPITOR) tablet 40 mg  40 mg Oral QHS Elda, Colette B, NP   40 mg at 05/18/19 2210  
 spironolactone (ALDACTONE) tablet 25 mg  25 mg Oral DAILY Mir Schroederin JESUSITA, NP   25 mg at 05/18/19 0900  
 sodium chloride (NS) flush 5-40 mL  5-40 mL IntraVENous Q8H Colette Schroeder, NP   10 mL at 05/18/19 2211  
 sodium chloride (NS) flush 5-40 mL  5-40 mL IntraVENous PRN Colette Schroeder, NP      
 ondansetron (ZOFRAN) injection 4 mg  4 mg IntraVENous Q6H PRN Rolando Pickens B, NP   4 mg at 05/19/19 2987  acetaminophen (TYLENOL) tablet 650 mg  650 mg Oral Q6H PRN Elda, Erin B, NP   650 mg at 05/18/19 1707  docusate sodium (COLACE) capsule 100 mg  100 mg Oral PRN Rolando Pickens B, NP      
 DOBUTamine (DOBUTREX) 500 mg/250 mL (2,000 mcg/mL) infusion  7.5 mcg/kg/min IntraVENous CONTINUOUS Hiram Montero NP 19.1 mL/hr at 05/18/19 1946 7.5 mcg/kg/min at 05/18/19 1946  
 pantoprazole (PROTONIX) tablet 40 mg  40 mg Oral ACB Mir Schroederin JESUSITA, NP   40 mg at 05/19/19 6856 Allergies: Allergies Allergen Reactions  Penicillins Hives ROS:   
Review of Systems Constitutional: Negative for chills, fever and malaise/fatigue. HENT: Negative. Eyes: Negative. Respiratory: Negative. Cardiovascular: Positive for leg swelling. Gastrointestinal: Negative. Genitourinary: Negative. Musculoskeletal: Negative. Skin: Positive for rash. Neurological: Positive for weakness. Endo/Heme/Allergies: Bruises/bleeds easily. Physical Exam:  
Physical Exam  
Constitutional: He is oriented to person, place, and time. He appears well-developed. No distress. HENT:  
Head: Normocephalic and atraumatic. Eyes: EOM are normal. Right eye exhibits no discharge. Left eye exhibits no discharge. Neck: Normal range of motion. No JVD present. No tracheal deviation present. Cardiovascular: Normal rate, regular rhythm and intact distal pulses. Murmur heard. Pulmonary/Chest: Effort normal and breath sounds normal. No stridor. No respiratory distress. Abdominal: Soft. Bowel sounds are normal. He exhibits no distension. There is no tenderness. Musculoskeletal: He exhibits edema and deformity. Neurological: He is alert and oriented to person, place, and time. Skin: Skin is warm and dry. Rash noted. No erythema. There is pallor. Scab to left knee Nursing note and vitals reviewed. Vitals:  
Visit Vitals BP (!) 88/75 Pulse 88 Temp 99.9 °F (37.7 °C) Resp 10 Ht 5' 11\" (1.803 m) Wt 173 lb 15.1 oz (78.9 kg) SpO2 95% BMI 24.26 kg/m² Temp (24hrs), Av.3 °F (37.9 °C), Min:99.9 °F (37.7 °C), Max:101 °F (38.3 °C) Hemodynamics: 
 CO: CO (l/min): 3.8 l/min CI: CI (l/min/m2): 1.9 l/min/m2 CVP: CVP (mmHg): 3 mmHg (19 08) SVR: SVR (dyne*sec)/cm5: 1405 (dyne*sec)/cm5 (19) PAP Systolic: PAP Systolic: 46 (39/57/98 8903) PAP Diastolic: PAP Diastolic: 20 (62/23/79 9148) PVR:   
 SV02: SVO2 (%): 56 % (19 08) SCV02:   
 
 
Admission Weight: Last Weight Weight: 186 lb 15.2 oz (84.8 kg) Weight: 173 lb 15.1 oz (78.9 kg) Intake / Output / Drain: 
Last 24 hrs.:  
 
Intake/Output Summary (Last 24 hours) at 2019 0840 Last data filed at 2019 0800 Gross per 24 hour Intake 946.6 ml Output 2035 ml Net -1088.4 ml Oxygen Therapy: 
Oxygen Therapy O2 Sat (%): 95 % (19 08) Pulse via Oximetry: 87 beats per minute (19 0800) O2 Device: Room air (19 0800) O2 Flow Rate (L/min): 4 l/min (19 1600) FIO2 (%): 36 % (19 1146) Recent Labs:  
Labs Latest Ref Rng & Units 2019 2019 2019 2019 5/15/2019 2019 2019 WBC 4.1 - 11.1 K/uL 3. 1(L) 4.3 6.9 7.1 7.6 6.1 -  
RBC 4.10 - 5.70 M/uL 3.51(L) 3.67(L) 3.83(L) 3.91(L) 4.03(L) 4.09(L) - Hemoglobin 12.1 - 17.0 g/dL 9.3(L) 9.8(L) 10. 3(L) 10. 5(L) 10. 7(L) 10. 9(L) - Hematocrit 36.6 - 50.3 % 29. 5(L) 31. 1(L) 32. 5(L) 34. 1(L) 34. 8(L) 35. 4(L) -  
MCV 80.0 - 99.0 FL 84.0 84.7 84.9 87.2 86.4 86.6 - Platelets 496 - 842 K/uL 107(L) 113(L) 137(L) 146(L) 150 139(L) - Lymphocytes 12 - 49 % - - - 6(L) 8(L) - - Monocytes 5 - 13 % - - - 8 5 - - Eosinophils 0 - 7 % - - - 1 1 - - Basophils 0 - 1 % - - - 0 0 - - Albumin 3.5 - 5.0 g/dL 2. 2(L) 2. 4(L) 2. 8(L) 2. 8(L) - 2. 9(L) 2. 8(L) Calcium 8.5 - 10.1 MG/DL 8. 3(L) 8. 3(L) 8.6 8.6 - 8.5 9.0 SGOT 15 - 37 U/L 23 20 17 13(L) - 12(L) 14(L) Glucose 65 - 100 mg/dL 114(H) 105(H) 168(H) 164(H) - 127(H) 142(H) BUN 6 - 20 MG/DL 48(H) 48(H) 46(H) 42(H) - 39(H) 42(H) Creatinine 0.70 - 1.30 MG/DL 1.90(H) 2.11(H) 2.16(H) 2.01(H) - 1.78(H) 1.87(H) Sodium 136 - 145 mmol/L 132(L) 131(L) 130(L) 130(L) - 133(L) 135(L) Potassium 3.5 - 5.1 mmol/L 4.0 4.0 4.4 4.9 - 4.6 4.5 TSH 0.450 - 4.500 uIU/mL - - - - - - -  
LDH 85 - 241 U/L 260(H) 247(H) 230 - - - -  
 
EKG:  
EKG Results Procedure 720 Value Units Date/Time SCANNED CARDIAC RHYTHM STRIP [[id number]] Collected:  05/19/19 5780 Order Status:  Completed Updated:  05/19/19 3075 SCANNED CARDIAC RHYTHM STRIP [[id number]] Collected:  05/18/19 1039 Order Status:  Completed Updated:  05/18/19 6274 SCANNED CARDIAC RHYTHM STRIP [[id number]] Collected:  05/17/19 5678 Order Status:  Completed Updated:  05/17/19 8987 SCANNED CARDIAC RHYTHM STRIP [[id number]] Collected:  05/16/19 7929 Order Status:  Completed Updated:  05/16/19 6833 SCANNED CARDIAC RHYTHM STRIP [[id number]] Collected:  05/13/19 0430 Order Status:  Completed Updated:  05/13/19 8580 SCANNED CARDIAC RHYTHM STRIP [[id number]] Collected:  05/13/19 3761 Order Status:  Completed Updated:  05/13/19 [address] [facility] SCANNED CARDIAC RHYTHM STRIP [[id number]] Collected:  05/11/19 0321 Order Status:  Completed Updated:  05/11/19 5360 EKG, 12 LEAD, INITIAL [[id number]] Collected:  05/10/19 1121 Order Status:  Completed Updated:  05/10/19 1207 Ventricular Rate 64 BPM   
  Atrial Rate 77 BPM   
  QRS Duration 104 ms Q-T Interval 460 ms QTC Calculation (Bezet) 474 ms Calculated P Axis 41 degrees Calculated R Axis 35 degrees Calculated T Axis 133 degrees Diagnosis --  
  Sinus rhythm with AV dissociation and Junctional rhythm Low voltage QRS Cannot rule out Anteroseptal infarct (cited on or before 09-MAY-2019) When compared with ECG of 09-MAY-2019 20:01, 
 Junctional rhythm has replaced Atrial fibrillation Questionable change in initial forces of Anterior leads Nonspecific T wave abnormality no longer evident in Inferior leads Nonspecific T wave abnormality, improved in Lateral leads Confirmed by Mainor Marino MD, Graham Navarro ([id number]) on 5/10/2019 12:07:00 PM 
  
 EKG, 12 LEAD, INITIAL [[id number]] Collected:  05/09/19 2001 Order Status:  Completed Updated:  05/10/19 1249 Ventricular Rate 60 BPM   
  Atrial Rate 60 BPM   
  QRS Duration 86 ms   
  Q-T Interval 428 ms QTC Calculation (Bezet) 428 ms Calculated R Axis 34 degrees Calculated T Axis 142 degrees Diagnosis -- Atrial fibrillation Low voltage QRS Septal infarct , age undetermined No previous ECGs available Confirmed by Mainor Marino MD, Graham Navarro ([id number]) on 5/10/2019 8:37:23 AM 
  
 [address] [place] [address] [[id number]] Collected:  05/10/19 1738 Order Status:  Completed Updated:  05/10/19 8498 Echocardiogram: · Left Ventricle: Mildly dilated left ventricle. Estimated left ventricular ejection fraction is 16 - 20%. · Aortic Valve: Severe aortic stenosis, mean gradient is 28 mmHg. Aortic valve area is 0.8 cm2. · Tricuspid Valve: Mild to moderate tricuspid valve regurgitation is present with moderate pulmonary HTN, estimated PA of 55 mmHg. Left Ventricle Normal wall thickness. Mildly dilated left ventricle. The muscle mass is normal. The cavity shape is normal. Severe and segmental systolic dysfunction. The estimated ejection fraction is 16 - 20%. Visually measured ejection fraction. Abnormal wall motion as described on the wall scoring diagram below. Unable to assess diastolic function. Wall Scoring The following segments are akinetic: apical anterior, apical septal, apical inferior, apical lateral and apex.  
The following segments are hypokinetic: mid anterior, mid anteroseptal, mid inferoseptal, mid inferior, mid inferolateral and mid anterolateral. 
 All other segments are normal.  
  
  
  
Left Atrium Normal cavity size. No atrial septal defect present. No patent foramen ovale visualized. Right Ventricle Normal cavity size, wall thickness and global systolic function. Right Atrium Normal cavity size. Aortic Valve Mild aortic valve sclerosis with reduced excursion. Aortic valve peak gradient is 45 mmHg. Aortic valve mean gradient is 28 mmHg. Aortic valve area is 0.8 cm2. Aortic valve peak velocity is 3.4 cm/s. There is severe aortic stenosis. Trace aortic valve regurgitation. Mitral Valve Normal valve structure and no stenosis. Mild regurgitation. Tricuspid Valve Normal valve structure and no stenosis. Mild to moderate tricuspid valve regurgitation. Pulmonary arterial systolic pressure is 91.8 mmHg. Moderate pulmonary hypertension. Pulmonic Valve Pulmonic valve not well visualized. Aorta Normal aortic root, ascending aortic, and aortic arch. Pericardium Normal pericardium and no evidence of pericardial effusion. CT scans: CXR Results  (Last 48 hours) 05/19/19 9249  XR CHEST PORT Final result Impression:  IMPRESSION:  
Slightly improved left basilar atelectasis. Otherwise no change. Narrative:  PORTABLE CHEST RADIOGRAPH/S: 5/19/2019 4:29 AM  
   
Clinical history: Impella INDICATION:   Impella COMPARISON: 5/18/2019 FINDINGS:  
AP portable upright view of the chest demonstrates stable  cardiopericardial  
silhouette. The lungs are adequately expanded. More atelectatic change at the  
left lung base. Slightly improved lung volumes on the right pulmonary artery  
catheter unchanged in position. Cardiac assist device unchanged in position. .. The osseous structures are unremarkable. Patient is on a cardiac monitor. 05/18/19 3011  XR CHEST PORT Final result Impression:  IMPRESSION:   
Small right pleural effusion. Shallow volumes. Narrative:  PORTABLE CHEST RADIOGRAPH/S: 5/18/2019 4:52 AM  
   
INDICATION: LVAD. COMPARISON: 5/17/2019, 5/16/2019, 5/15/2019, 5/9/2019. TECHNIQUE: Portable frontal upright radiograph/s of the chest.  
   
FINDINGS:   
A small right pleural effusion is associated with passive atelectasis behind the  
right hemidiaphragm. The lungs are hypoinflated, but otherwise clear. The  
central airways are patent. No pneumothorax. A catheter-based LVAD device via a  
right subclavian approach and a pulmonary arterial catheter via a right IJ  
sheath are in appropriate position. 05/17/19 1424  XR CHEST PORT Final result Impression:  IMPRESSION:  
Poor inspiratory effort, but improved right pleural effusion, persistent small  
bilateral pleural effusions. Improved bibasilar atelectasis or infiltrate. Stable lines and tubes. .  . Narrative:     
   
INDICATION : pulmonary edema, pac placement EXAM: Chest single view. COMPARISON: Earlier same day at 0408 hours. FINDINGS: A single frontal view of the chest at 1412 hours shows poor  
inspiratory effort and persistent but improved opacity in the right lung base,  
consistent with decreased pleural effusion. Bibasilar atelectasis or minimal  
infiltrate also improved. Small left pleural effusion. Pulmonary artery catheter  
is stable with its tip over the proximal right pulmonary artery. Impella device  
from the right subclavian approach is stable, with overlying skin staples. The  
heart, mediastinum and pulmonary vasculature are stable . The bony thorax is  
unremarkable for age, except for degenerative change in the shoulders. . . BRENDEN Barnett [address], [address] Office [phone] Fax [phone] 
24 hour VAD/HF Pager: [phone]

## 2019-05-19 NOTE — PROGRESS NOTES
1945: Report received from [facility]. Gtts checked and verified. Shift Summary: No acute issues overnight. Patient complaint but appears very disgruntled with routine aspects of care, though difficult to tell with patient's flat affect and dry sarcasm. General pre-op education provided and patient did express his appreciation for the explanations. 0745: Bedside shift change report given to [facility] (oncoming nurse) by Nina Mosher (offgoing nurse). Report included the following information SBAR, Intake/Output, MAR and Recent Results. Problem: Heart Failure: Day 4 Goal: Activity/Safety Outcome: Progressing Towards Goal 
Goal: Nutrition/Diet Outcome: Not Progressing Towards Goal 
Goal: Medications Outcome: Progressing Towards Goal 
Goal: Respiratory Outcome: Progressing Towards Goal 
Goal: Psychosocial 
Outcome: Progressing Towards Goal 
Goal: *Hemodynamically stable Outcome: Progressing Towards Goal

## 2019-05-19 NOTE — PROGRESS NOTES
[facility] ICU Progress Note Admit Date: 2019 POD:  3 Procedure:  Procedure(s): RIGHT AXILLARY IMPELLA/ [id number] Subjective:  
Pt seen with Dr. Trace Murray. Tmax 101, on RA. On dobut 7.5, bumex gtt. Impella 5.0 at P7. Pt has no complaints today. Looks better than yesterday. Objective:  
Vitals: 
Blood pressure (!) 88/75, pulse 92, temperature 99.9 °F (37.7 °C), resp. rate 10, height 5' 11\" (1.803 m), weight 173 lb 15.1 oz (78.9 kg), SpO2 97 %. Temp (24hrs), Av.3 °F (37.9 °C), Min:99.9 °F (37.7 °C), Max:101 °F (38.3 °C) Hemodynamics: 
 CO: CO (l/min): 5.2 l/min CI: CI (l/min/m2): 2.6 l/min/m2 CVP: CVP (mmHg): 2 mmHg (19 09) SVR: SVR (dyne*sec)/cm5: 1405 (dyne*sec)/cm5 (19 0800) PAP Systolic: PAP Systolic: 42 (85/73/69 3094) PAP Diastolic: PAP Diastolic: 19 (73/66/10 4639) PVR:   
 SV02: SVO2 (%): 59 % (19 09) SCV02:   
 
EKG/Rhythm:  Afib Oxygen Therapy: 
Oxygen Therapy O2 Sat (%): 97 % (19 0900) Pulse via Oximetry: 88 beats per minute (19 0900) O2 Device: Room air (19 0800) O2 Flow Rate (L/min): 4 l/min (19 1600) FIO2 (%): 36 % (19 1146) CXR:  
CXR Results  (Last 48 hours) 19 8786  XR CHEST PORT Final result Impression:  IMPRESSION:  
Slightly improved left basilar atelectasis. Otherwise no change. Narrative:  PORTABLE CHEST RADIOGRAPH/S: 2019 4:29 AM  
   
Clinical history: Impella INDICATION:   Impella COMPARISON: 2019 FINDINGS:  
AP portable upright view of the chest demonstrates stable  cardiopericardial  
silhouette. The lungs are adequately expanded. More atelectatic change at the  
left lung base. Slightly improved lung volumes on the right pulmonary artery  
catheter unchanged in position. Cardiac assist device unchanged in position. .. The osseous structures are unremarkable. Patient is on a cardiac monitor. 05/18/19 0452  XR CHEST PORT Final result Impression:  IMPRESSION:   
Small right pleural effusion. Shallow volumes. Narrative:  PORTABLE CHEST RADIOGRAPH/S: 5/18/2019 4:52 AM  
   
INDICATION: LVAD. COMPARISON: 5/17/2019, 5/16/2019, 5/15/2019, 5/9/2019. TECHNIQUE: Portable frontal upright radiograph/s of the chest.  
   
FINDINGS:   
A small right pleural effusion is associated with passive atelectasis behind the  
right hemidiaphragm. The lungs are hypoinflated, but otherwise clear. The  
central airways are patent. No pneumothorax. A catheter-based LVAD device via a  
right subclavian approach and a pulmonary arterial catheter via a right IJ  
sheath are in appropriate position. 05/17/19 1424  XR CHEST PORT Final result Impression:  IMPRESSION:  
Poor inspiratory effort, but improved right pleural effusion, persistent small  
bilateral pleural effusions. Improved bibasilar atelectasis or infiltrate. Stable lines and tubes. .  . Narrative:     
   
INDICATION : pulmonary edema, pac placement EXAM: Chest single view. COMPARISON: Earlier same day at 0408 hours. FINDINGS: A single frontal view of the chest at 1412 hours shows poor  
inspiratory effort and persistent but improved opacity in the right lung base,  
consistent with decreased pleural effusion. Bibasilar atelectasis or minimal  
infiltrate also improved. Small left pleural effusion. Pulmonary artery catheter  
is stable with its tip over the proximal right pulmonary artery. Impella device  
from the right subclavian approach is stable, with overlying skin staples. The  
heart, mediastinum and pulmonary vasculature are stable . The bony thorax is  
unremarkable for age, except for degenerative change in the shoulders. . . Admission Weight: Last Weight Weight: 186 lb 15.2 oz (84.8 kg) Weight: 173 lb 15.1 oz (78.9 kg) Intake / Mars Hakim / Drain: Current Shift:  0701 -  1900 In: -  
Out: 80 [Urine:80] Last 24 hrs.:  
 
Intake/Output Summary (Last 24 hours) at 2019 4479 Last data filed at 2019 0800 Gross per 24 hour Intake 788.8 ml Output 1910 ml Net -1121.2 ml EXAM: 
General:   Alert, NAD Lungs:   Clear upper, diminished bases to auscultation bilaterally. Incision:  impella dsg cdi Heart:  irregular rate and rhythm, S1, S2 normal, no murmur, click, rub or gallop. Abdomen:   Soft, non-tender. Bowel sounds normal. No masses,  No organomegaly. Extremities:  2+ LE edema. PPP. Neurologic:  Gross motor and sensory apparatus intact. Labs:  
Recent Labs 19 
0401 19 
0357 19 
2209 WBC 3.1*  --   --   
HGB 9.3*  --   --   
HCT 29.5*  --   --   
*  --   --   
NA  --  132*  --   
K  --  4.0  --   
BUN  --  48*  --   
CREA  --  1.90*  --   
GLU  --  114*  --   
GLUCPOC  --   --  135* Assessment:  
 
Active Problems: 
  Acute on chronic systolic (congestive) heart failure ([facility] [address] (2019) Plan/Recommendations/Medical Decision Makin. Acute on chronic systolic heart failure (NYHA class IV on admission): AHF following, on dobut 7.5, bumex gtts, aldactone, LVAD workup ongoing. Purge now D5 dut to high PTT. BNP >35K. On ancef for impella ppx. 2. Severe AS: no plans for TAVR at this time 3. CAD with  of RAD and RCA: on ASA and statin, no BB dt dobut. Cardiac MRI showed nonviability of LV. 4. Afib: on eliquis -hold for impella 5. HLD: on lipitor 6. DM type II: on amaryl, SSI, A1C 6.9 7. CKD: Cr 2.15, on bumex gtt, monitor. 8. Left leg wounds s/p fall: wound care consulted. 9. Anemia, iron deficiency: Hgb 9.3, cont PO iron, monitor 10. Thrombocytopenia: mild, monitor. 11. Hyponatremia: Na 132, monitor 12. Dispo: Cont ICU care, LVAD workup per AHF. Signed By: Dhaval Washington PA-C Saw patient, agree with above Risk of morbidity and mortality - high Medical decision making - high complexity 1. Acute on chronic systolic heart failure (NYHA class IV on admission): AHF following, on dobut 2. Severe AS: no plans for TAVR at this time 3. CAD with  of RAD and RCA: on ASA and statin, 4. Afib: has PPM, was on eliquis - hold for impella. 5. HLD:  lipitor 6. DM type II:   glipizide 7. CKD: Cr rising, up to 2.28, monitor 8. Left leg wounds s/p fall: wound care consulted. 9. Anemia, iron deficiency:   monitor 10. Thrombocytopenia: plts cont to decrease, down to 80K 
 
11. Hyponatremia:  Renal on board 12. Urinary retention: cont flomax,  
 
13. Nutrition: cont marinol per AHFS. Nutrition consult 14. Hyperkalemia: 5.7 this am. Renal on board.

## 2019-05-19 NOTE — PROGRESS NOTES
0745- bedside report and drips verified. 0830Isela Hoang NP at bedside. Orders to recheck PTT at 1000. 
 
0845- patient refusing to eat breakfast. He states \"they keep sending me crap\". Inquired about what he likes to eat, he then stated \"it doesn't matter they don't listen\". This RN changed his diet preferences, attempted to fill out his menu, and ordered him ensure clears per his request. He refused to fill out his menu, stating his friend will bring him food. Encouraged fluid and food intake, he responded with \"don't lecture me, I know already\". 5036- dietary staff brought him the ensure clears. He drink about a quarter of it stating it \"tastes better then the regular\". 0930- friend/caregiver Susi at bedside. Update given. She inquired about when the LVAD surgery would take place. Informed her there is no set date. She asked when herself and her 6 children (ages 17-25) can start training for the LVAD. She then asked if she could speak with the provider to find out more information regarding when surgery will take place. Mitesh Section, NP notified and will speak with her this morning. 1500- caregiver Mago Cruz brought in premier protien shakes for the patient. They were labeled and placed in the patient fridge. Patient refusing to drink one now. [address] [facility] visitor Mahi Erazoleidy is demanding and hyper-viligant around staff. She will not allow patient to move on his own, even when this RN states he needs to do for himself. Patient has repeatedly refused to ambulate during the day, but has been moving himself and turning with this RN without issues. Repeatedly told him he needs to ambulate in order to get stronger. He says he knows but does not feel like it right now. Mahi Priest repeatedly moves equipment and lines. 2000- Bedside and Verbal shift change report given to Ruthy Rodas RN (oncoming nurse) by Olesya Buenrostro RN (offgoing nurse).  Report included the following information SBAR, Kardex, Recent Results and Cardiac Rhythm A Fib.

## 2019-05-19 NOTE — PROGRESS NOTES
Problem: Mobility Impaired (Adult and Pediatric) Goal: *Acute Goals and Plan of Care (Insert Text) Description Physical Therapy Goals Initiated 5/17/2019 1. Patient will move from supine to sit and sit to supine , scoot up and down and roll side to side in bed with minimal assistance/contact guard assist within 7 day(s). 2.  Patient will transfer from bed to chair and chair to bed with minimal assistance/contact guard assist using the least restrictive device within 7 day(s). 3.  Patient will perform sit to stand with minimal assistance/contact guard assist within 7 day(s). 4.  Patient will ambulate with minimal assistance/contact guard assist for 50 feet with the least restrictive device within 7 day(s). 5.  Patient will ascend/descend 4 stairs with no handrail(s) with minimal assistance/contact guard assist within 7 day(s). 6.  Patient will complete a 6MWT within 48 hours of discharge. Initiated 5/10/2019 1. Patient will move from supine to sit and sit to supine , scoot up and down and roll side to side in bed with independence within 7 day(s). 2.  Patient will transfer from bed to chair and chair to bed with modified independence using the least restrictive device within 7 day(s). 3.  Patient will perform sit to stand with modified independence within 7 day(s). 4.  Patient will ambulate with modified independence for 300 feet with the least restrictive device within 7 day(s). 5.  Patient will ascend/descend 4 stairs with no handrail(s) with modified independence within 7 day(s). 6.  Patient will participate in a six minute walk test within 48 hours prior to discharge. Outcome: Progressing Towards Goal 
  
PHYSICAL THERAPY TREATMENT Patient: Sherman Smith (77 y.o. male) Date: 5/19/2019 Diagnosis: Acute on chronic systolic (congestive) heart failure (HCC) [I50.23] <principal problem not specified> Procedure(s) (LRB): 
RIGHT AXILLARY IMPELLA/ 4219 (Right) 3 Days Post-Op Precautions: Fall(R axillary Impella) Chart, physical therapy assessment, plan of care and goals were reviewed. ASSESSMENT: 
Session drastically limited by patient's nausea. Received patient sitting up in chair on RA, on dobut 7.5, bumex gtt, and Impella 5.0 at P7 level of support. Patient is now fully lined ([place] Racer, arterial line, CVP line). Overall, patient required moderate assist x 1 (+ 2nd person for line management) for repetitive sit <> stand transfers (2 in a succession for functional strengthening) and side-stepping to bedside chair ~ 5 ft. Left foot minimally clearing from floor (hx of toe amputations; foot externally rotated) thus requiring weight-shifting assistance as well. Currently, patient will need rehab at discharge, however, this is pending medical plan of care (note LVAD workup) and functional progression with skilled acute PT services. Progression toward goals: 
?    Improving appropriately and progressing toward goals ? Improving slowly and progressing toward goals ? Not making progress toward goals and plan of care will be adjusted PLAN: 
Patient continues to benefit from skilled intervention to address the above impairments. Continue treatment per established plan of care. Discharge Recommendations:  Rehab Further Equipment Recommendations for Discharge:  TBD SUBJECTIVE:  
Patient stated ? I just feel plain miserable. ? OBJECTIVE DATA SUMMARY:  
Critical Behavior: 
Neurologic State: Alert Orientation Level: Oriented X4 Cognition: Appropriate safety awareness Functional Mobility Training: 
Bed Mobility: 
Supine to Sit: Moderate assistance Transfers: 
Sit to Stand: Assist x1; Moderate assistance(2nd person for line management) Stand to Sit: Assist x1; Moderate assistance Bed to Chair: Assist x1; Moderate assistance(2nd person for line management) Balance: 
Sitting: Intact Standing: Impaired; With support Standing - Static: Fair Standing - Dynamic : Poor Ambulation/Gait Training: 
Distance (ft): 5 Feet (ft) Assistive Device: Gait belt Ambulation - Level of Assistance: Moderate assistance Gait Abnormalities: Decreased step clearance;Trunk sway increased; Step to gait(Side-stepping ~ 5 ft) Base of Support: Widened Pain: 
Pain Scale 1: Numeric (0 - 10) Pain Intensity 1: 0 Activity Tolerance: 
Please refer to the flowsheet for vital signs taken during this treatment. After treatment:  
?    Patient left in no apparent distress sitting up in chair ? Patient left in no apparent distress in bed 
? Call bell left within reach ? Nursing notified ? Caregiver present ? Bed alarm activated COMMUNICATION/COLLABORATION:  
The patient?s plan of care was discussed with: Registered Nurse and Rehabilitation Attendant Jayne Davis PT, DPT Time Calculation: 20 mins

## 2019-05-20 NOTE — PROGRESS NOTES
[facility] Progress Note DOS:   5/20/2019 NAME:  Hung Herrera  
MRN:   [id number] REFERRING PROVIDER:  Dr. Lazarus Griffin PRIMARY CARE PHYSICIAN: Brianna Lund MD 
 
 
Chief Complaint: CHENG 
 
 
HPI: 77y.o. year old male with a history of HTN, T2DM, PVD, CKD, ICM, Severe AS who presents for further evaluation of chronic systolic heart failure. He developed progressive dyspnea with exacterbation as well as progressive fatigue which prompted further evaluation with a heart cath at [facility] that revealed severe 3 vessel disease with  of his LAD and RCA, severe LV systolic failure (LVEF 00%), and severe As. Has has been unable to walk a block before without limiting dyspnea as well as unable to make a bed without developing dyspnea. Admitted to [facility] for acute chronic HFrEF, CMRI showed no viability for LAD, taken for impella placement for bridge to candidacy. 24Hr Events:  
Several suction events overnight Intolerant of dobutamine wean Intermittently cooperative of care High CO concerning for vasodilation +/- sepsis Procedure:  Procedure(s): RIGHT AXILLARY IMPELLA/ [id number] POD:  POD 4 Impression / Plan:  
Heart Failure Status: NYHA Class IV 
 
ICM-Stage D NYHA IV - LVEF 10% Impella in place Remains at P7 
TTE this AM shows Impella placement deep - repositioned by Dr. Eleanor Rodgers TPA PRN for purge flow < 6 Begin bivalirudin 125 mg/250 mL via purge solution Intolerant of dobutamine wean due to low CI/high PAD No BB d/t dobutamine Intolerant of RAAS due to THEODORE Increase spironolactone to 25 mg PO BID Bumex 1mg BID, PRN 1mg in addition Evaluation for LVAD placement ongoing Daily lactates to eval for perfusion- WNL Lipase WNL 
  
Severe AS Not a candidate for TAVR due to poor viability and cardiomyopathy Dr. Nesha Nuñez following  
  
CAD with  of RAD and RCA Currently on ASA and statin No BBrx while on dobutamine cMRI results show severe Cad with  of the LAD and RCA with poor viability of myocardium.  
  
RV failure Digoxin 0.125mg Monitor levels, 0.8 today Diurese Maintain PA cath 
RV will need to recover to be a candidate for durable MCS 
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  
 
Febrile WBC WNL Blood cx NGTD Repeat UA, check sputum cx Ancef while impella in place Daily ESR, lactics, procalcitonin SCD high risk Will need life vest when discharged based on care plan  
  
Atrial Fibrillation Resume bivalirudin - decrease concentration to 125 mg/ 250 mL Monitor 
  
HLD Lipitor 40 mg every night  
  
T2DM  
SSI 
 glipizide  
  
Left leg wounds s/p fall Wound consult appreciated Leg is erythematous and warm, pt is afebrile  
  
CKD Creatinine stable Cont bumex Monitor renal function 24Hr urine pending  
  
Hyponatremia Stable Monitor I/O's and daily Na+ Current Na 131 Cont spironolactone Malnutrition Prealbumin 10 Encourage PO intake Cont Marinol Constipation Begin bowel regimen KUB Altered mental status [address] [facility] [place] PPX 
PPI Ancef while impella in place Aggressive electrolyte replacement Dispo:  
Remain in CVI for now History: 
Past Medical History:  
Diagnosis Date  3-vessel coronary artery disease  Aortic stenosis, severe  Chronic kidney disease (CKD), stage III (moderate) ([facility])  Chronic total occlusion of coronary artery  Hypertension  Ischemic cardiomyopathy  Peripheral vascular disease ([facility] [address]  Type 2 diabetes mellitus treated without insulin ([facility] [address] Past Surgical History:  
Procedure Laterality Date  HX AMPUTATION TOE Left 2017 Social History Socioeconomic History  Marital status:  Spouse name: Not on file  Number of children: Not on file  Years of education: Not on file  Highest education level: Not on file Occupational History  Not on file Social Needs  Financial resource strain: Not on file  Food insecurity:  
  Worry: Not on file Inability: Not on file  Transportation needs:  
  Medical: Not on file Non-medical: Not on file Tobacco Use  Smoking status: Former Smoker  Smokeless tobacco: Never Used Substance and Sexual Activity  Alcohol use: Not Currently  Drug use: Not on file  Sexual activity: Not on file Lifestyle  Physical activity:  
  Days per week: Not on file Minutes per session: Not on file  Stress: Not on file Relationships  Social connections:  
  Talks on phone: Not on file Gets together: Not on file Attends [employer] service: Not on file Active member of club or organization: Not on file Attends meetings of clubs or organizations: Not on file Relationship status: Not on file  Intimate partner violence:  
  Fear of current or ex partner: Not on file Emotionally abused: Not on file Physically abused: Not on file Forced sexual activity: Not on file Other Topics Concern  Not on file Social History Narrative  Not on file History reviewed. No pertinent family history. Current Medications:  
Current Facility-Administered Medications Medication Dose Route Frequency Provider Last Rate Last Dose  potassium chloride (KLOR-CON) packet for solution 40 mEq  40 mEq Oral BID WITH MEALS Adria Montero NP      
 spironolactone (ALDACTONE) tablet 25 mg  25 mg Oral BID Adria Montero NP   25 mg at 05/20/19 8491  senna-docusate (PERICOLACE) 8.6-50 mg per tablet 1 Tab  1 Tab Oral BID Elihue Proper T, NP   1 Tab at 05/20/19 9641  polyethylene glycol (MIRALAX) packet 17 g  17 g Oral BID Adria Montero NP   17 g at 05/20/19 8617  balsam peru-castor oil (VENELEX) ointment   Topical BID Adria Montero NP      
 bivalirudin (ANGIOMAX) 125mg in  dextrose 5 % 250 ml   IntraVENous TITRATE Gilberto Montero, NP 13.5 mL/hr at 05/20/19 1507  dextrose 5% infusion  4-20 mL/hr IntraVENous CONTINUOUS Colette Schroeder NP 13.6 mL/hr at 05/20/19 0800 13.6 mL/hr at 05/20/19 0800  calcium carbonate (TUMS) chewable tablet 200 mg [elemental]  200 mg Oral TID PRN Alley MC NP   200 mg at 05/18/19 1922  bumetanide (BUMEX) injection 1 mg  1 mg IntraVENous Q12H Colette Schroeder, NP   1 mg at 05/20/19 9274  bumetanide (BUMEX) injection 1 mg  1 mg IntraVENous Q6H PRN Colette Schroeder NP   1 mg at 05/18/19 1730  
 magnesium oxide (MAG-OX) tablet 800 mg  800 mg Oral BID Alley MC NP   800 mg at 05/20/19 3509  sodium chloride (OCEAN) 0.65 % nasal squeeze bottle 2 Spray  2 Spray Both Nostrils Q2H PRN Colette Schroeder, NP      
 dronabinol (MARINOL) capsule 2.5 mg  2.5 mg Oral DAILY Colette Schroeder NP   2.5 mg at 05/20/19 0918  
 glipiZIDE (GLUCOTROL) tablet 7.5 mg  7.5 mg Oral ACB&D Alley MC NP   7.5 mg at 05/20/19 2518  vasopressin (VASOSTRICT) 20 Units in 0.9% sodium chloride 100 mL infusion  0-0.1 Units/min IntraVENous TITRATE Cristiane Moscoso, CRNA   Stopped at 05/16/19 9775  
 dextrose 5% infusion  4-20 mL/hr IntraVENous CONTINUOUS Juan A Vergara NP   Stopped at 05/17/19 1051  ceFAZolin (ANCEF) 2 g/20 mL in sterile water IV syringe  2 g IntraVENous Q8H Tree HICKS NP   2 g at 05/20/19 8193  digoxin (LANOXIN) tablet 0.125 mg  0.125 mg Oral DAILY Colette Schroeder, NP   0.125 mg at 05/20/19 2204  insulin lispro (HUMALOG) injection   SubCUTAneous AC&HS Colette Schroeder, NP   Stopped at 05/19/19 2200  
 glucose chewable tablet 16 g  4 Tab Oral PRN Yogesh Schroeder NP      
 dextrose (D50W) injection syrg 12.5-25 g  12.5-25 g IntraVENous PRN Colette Schroeder NP      
 glucagon (GLUCAGEN) injection 1 mg  1 mg IntraMUSCular PRN Gisselle Maldonado NP      
  traMADol (ULTRAM) tablet 50 mg  50 mg Oral Q8H PRN Elda, Colette B, NP   50 mg at 05/18/19 1924  aspirin chewable tablet 81 mg  81 mg Oral DAILY Elda, Colette B, NP   81 mg at 05/20/19 8475  atorvastatin (LIPITOR) tablet 40 mg  40 mg Oral QHS Elda, Colette B, NP   40 mg at 05/19/19 2103  sodium chloride (NS) flush 5-40 mL  5-40 mL IntraVENous Q8H Elda, Colette B, NP   10 mL at 05/20/19 1345  sodium chloride (NS) flush 5-40 mL  5-40 mL IntraVENous PRN Elda, Colette B, NP      
 ondansetron (ZOFRAN) injection 4 mg  4 mg IntraVENous Q6H PRN Elda, Colette B, NP   4 mg at 05/20/19 7717  acetaminophen (TYLENOL) tablet 650 mg  650 mg Oral Q6H PRN Richarda Ahsanling B, NP   650 mg at 05/19/19 2316  docusate sodium (COLACE) capsule 100 mg  100 mg Oral PRN Elda, Colette B, NP      
 DOBUTamine (DOBUTREX) 500 mg/250 mL (2,000 mcg/mL) infusion  7.5 mcg/kg/min IntraVENous CONTINUOUS Tye Almaguer MD 19.1 mL/hr at 05/20/19 1000 7.5 mcg/kg/min at 05/20/19 1000  pantoprazole (PROTONIX) tablet 40 mg  40 mg Oral ACB Elda, Colette B, NP   40 mg at 05/20/19 6125 Allergies: Allergies Allergen Reactions  Penicillins Hives ROS:   
Review of Systems Constitutional: Negative for chills, fever and malaise/fatigue. HENT: Negative. Eyes: Negative. Respiratory: Negative. Cardiovascular: Positive for leg swelling. Gastrointestinal: Positive for nausea. Genitourinary: Negative. Musculoskeletal: Negative. Skin: Positive for rash. Neurological: Positive for weakness. Endo/Heme/Allergies: Bruises/bleeds easily. Physical Exam:  
Physical Exam  
Constitutional: He is oriented to person, place, and time. He appears well-developed. No distress. HENT:  
Head: Normocephalic and atraumatic. Eyes: EOM are normal. Right eye exhibits no discharge. Left eye exhibits no discharge. Neck: Normal range of motion. No JVD present.  No tracheal deviation present. Cardiovascular: Normal rate, regular rhythm and intact distal pulses. Murmur heard. Pulmonary/Chest: Effort normal and breath sounds normal. No stridor. No respiratory distress. Abdominal: Soft. Bowel sounds are normal. He exhibits no distension. There is no tenderness. Musculoskeletal: He exhibits edema and deformity. Neurological: He is alert and oriented to person, place, and time. Skin: Skin is warm and dry. Rash noted. No erythema. There is pallor. Scab to left knee Nursing note and vitals reviewed. Vitals:  
Visit Vitals BP 99/74 (BP 1 Location: Right arm, BP Patient Position: At rest) Pulse 87 Temp 100 °F (37.8 °C) Resp 13 Ht 5' 11\" (1.803 m) Wt 176 lb 2.4 oz (79.9 kg) SpO2 98% BMI 24.57 kg/m² Temp (24hrs), Av.3 °F (37.4 °C), Min:98.2 °F (36.8 °C), Max:100.4 °F (38 °C) Hemodynamics: 
 CO: CO (l/min): 6.1 l/min CI: CI (l/min/m2): 2 l/min/m2 CVP: CVP (mmHg): 6 mmHg (19) SVR: SVR (dyne*sec)/cm5: 1047 (dyne*sec)/cm5 (19 0800) PAP Systolic: PAP Systolic: 48 (66/88/70 8178) PAP Diastolic: PAP Diastolic: 22 (53/63/44 6091) PVR:   
 SV02: SVO2 (%): 56 % (19) SCV02:   
 
 
Admission Weight: Last Weight Weight: 186 lb 15.2 oz (84.8 kg) Weight: 176 lb 2.4 oz (79.9 kg) Intake / Output / Drain: 
Last 24 hrs.:  
 
Intake/Output Summary (Last 24 hours) at 20199 Last data filed at 2019 1400 Gross per 24 hour Intake 1113.81 ml Output 1190 ml Net -76.19 ml Oxygen Therapy: 
Oxygen Therapy O2 Sat (%): 98 % (19) Pulse via Oximetry: 78 beats per minute (19 1530) O2 Device: Room air (19 1400) O2 Flow Rate (L/min): 4 l/min (19 1600) FIO2 (%): 36 % (19 1146) Recent Labs:  
Labs Latest Ref Rng & Units 2019 2019 2019 2019 2019 5/15/2019 2019 WBC 4.1 - 11.1 K/uL 3.5(L) 3. 1(L) 4.3 6.9 7.1 7.6 6.1 RBC 4.10 - 5.70 M/uL 3.36(L) 3.51(L) 3.67(L) 3.83(L) 3.91(L) 4.03(L) 4.09(L) Hemoglobin 12.1 - 17.0 g/dL 9. 0(L) 9.3(L) 9.8(L) 10. 3(L) 10. 5(L) 10. 7(L) 10. 9(L) Hematocrit 36.6 - 50.3 % 28. 2(L) 29. 5(L) 31. 1(L) 32. 5(L) 34. 1(L) 34. 8(L) 35. 4(L) MCV 80.0 - 99.0 FL 83.9 84.0 84.7 84.9 87.2 86.4 86.6 Platelets 609 - 514 K/uL 99(L) 107(L) 113(L) 137(L) 146(L) 150 139(L) Lymphocytes 12 - 49 % - - - - 6(L) 8(L) - Monocytes 5 - 13 % - - - - 8 5 - Eosinophils 0 - 7 % - - - - 1 1 - Basophils 0 - 1 % - - - - 0 0 - Albumin 3.5 - 5.0 g/dL 2. 1(L) 2. 2(L) 2. 4(L) 2. 8(L) 2. 8(L) - 2. 9(L) Calcium 8.5 - 10.1 MG/DL 8. 0(L) 8. 3(L) 8. 3(L) 8.6 8.6 - 8.5 SGOT 15 - 37 U/L 23 23 20 17 13(L) - 12(L) Glucose 65 - 100 mg/dL 92 114(H) 105(H) 168(H) 164(H) - 127(H) BUN 6 - 20 MG/DL 47(H) 48(H) 48(H) 46(H) 42(H) - 39(H) Creatinine 0.70 - 1.30 MG/DL 2.00(H) 1.90(H) 2.11(H) 2.16(H) 2.01(H) - 1.78(H) Sodium 136 - 145 mmol/L 131(L) 132(L) 131(L) 130(L) 130(L) - 133(L) Potassium 3.5 - 5.1 mmol/L 3.8 4.0 4.0 4.4 4.9 - 4.6 TSH 0.450 - 4.500 uIU/mL - - - - - - -  
LDH 85 - 241 U/L 281(H) 260(H) 247(H) 230 - - -  
 
EKG:  
EKG Results Procedure 720 Value Units Date/Time EKG, 12 LEAD, INITIAL [[id number]] Collected:  05/20/19 1048 Order Status:  Completed Updated:  05/20/19 1154 Ventricular Rate 77 BPM   
  Atrial Rate 107 BPM   
  QRS Duration 94 ms Q-T Interval 386 ms QTC Calculation (Bezet) 436 ms Calculated R Axis 48 degrees Calculated T Axis 89 degrees Diagnosis --  
  Sinus tachycardia with 2nd degree AV block (Mobitz I) Low voltage QRS Cannot rule out Anteroseptal infarct (cited on or before 09-MAY-2019) When compared with ECG of 10-MAY-2019 11:21, Sinus rhythm has replaced Junctional rhythm SCANNED CARDIAC RHYTHM STRIP [[id number]] Collected:  05/20/19 0451 Order Status:  Completed Updated:  05/20/19 7297 SCANNED CARDIAC RHYTHM STRIP [[id number]] Collected:  05/19/19 5294 Order Status:  Completed Updated:  05/19/19 8493 SCANNED CARDIAC RHYTHM STRIP [[id number]] Collected:  05/18/19 4460 Order Status:  Completed Updated:  05/18/19 8072 SCANNED CARDIAC RHYTHM STRIP [[id number]] Collected:  05/17/19 6005 Order Status:  Completed Updated:  05/17/19 5817 SCANNED CARDIAC RHYTHM STRIP [[id number]] Collected:  05/16/19 0145 Order Status:  Completed Updated:  05/16/19 3036 SCANNED CARDIAC RHYTHM STRIP [[id number]] Collected:  05/13/19 0430 Order Status:  Completed Updated:  05/13/19 4655 SCANNED CARDIAC RHYTHM STRIP [[id number]] Collected:  05/13/19 2932 Order Status:  Completed Updated:  05/13/19 [address] [facility] SCANNED CARDIAC RHYTHM STRIP [[id number]] Collected:  05/11/19 0321 Order Status:  Completed Updated:  05/11/19 9509 EKG, 12 LEAD, INITIAL [[id number]] Collected:  05/10/19 1121 Order Status:  Completed Updated:  05/10/19 1207 Ventricular Rate 64 BPM   
  Atrial Rate 77 BPM   
  QRS Duration 104 ms Q-T Interval 460 ms QTC Calculation (Bezet) 474 ms Calculated P Axis 41 degrees Calculated R Axis 35 degrees Calculated T Axis 133 degrees Diagnosis --  
  Sinus rhythm with AV dissociation and Junctional rhythm Low voltage QRS Cannot rule out Anteroseptal infarct (cited on or before 09-MAY-2019) When compared with ECG of 09-MAY-2019 20:01, 
Junctional rhythm has replaced Atrial fibrillation Questionable change in initial forces of Anterior leads Nonspecific T wave abnormality no longer evident in Inferior leads Nonspecific T wave abnormality, improved in Lateral leads Confirmed by Mainor Marino MD, Graham Navarro ([id number]) on 5/10/2019 12:07:00 PM 
  
 EKG, 12 LEAD, INITIAL [[id number]] Collected:  05/09/19 2001 Order Status:  Completed Updated:  05/10/19 7074 Ventricular Rate 60 BPM   
  Atrial Rate 60 BPM   
  QRS Duration 86 ms   
  Q-T Interval 428 ms QTC Calculation (Bezet) 428 ms Calculated R Axis 34 degrees Calculated T Axis 142 degrees Diagnosis -- Atrial fibrillation Low voltage QRS Septal infarct , age undetermined No previous ECGs available Confirmed by Davian Alfredo MD, Manjit Nails ([id number]) on 5/10/2019 8:37:23 AM 
  
 [address] [place] [address] [[id number]] Collected:  05/10/19 3417 Order Status:  Completed Updated:  05/10/19 5918 Echocardiogram: · Left Ventricle: Mildly dilated left ventricle. Estimated left ventricular ejection fraction is 16 - 20%. · Aortic Valve: Severe aortic stenosis, mean gradient is 28 mmHg. Aortic valve area is 0.8 cm2. · Tricuspid Valve: Mild to moderate tricuspid valve regurgitation is present with moderate pulmonary HTN, estimated PA of 55 mmHg. Left Ventricle Normal wall thickness. Mildly dilated left ventricle. The muscle mass is normal. The cavity shape is normal. Severe and segmental systolic dysfunction. The estimated ejection fraction is 16 - 20%. Visually measured ejection fraction. Abnormal wall motion as described on the wall scoring diagram below. Unable to assess diastolic function. Wall Scoring The following segments are akinetic: apical anterior, apical septal, apical inferior, apical lateral and apex. The following segments are hypokinetic: mid anterior, mid anteroseptal, mid inferoseptal, mid inferior, mid inferolateral and mid anterolateral. 
All other segments are normal.  
  
  
  
Left Atrium Normal cavity size. No atrial septal defect present. No patent foramen ovale visualized. Right Ventricle Normal cavity size, wall thickness and global systolic function. Right Atrium Normal cavity size. Aortic Valve Mild aortic valve sclerosis with reduced excursion. Aortic valve peak gradient is 45 mmHg. Aortic valve mean gradient is 28 mmHg. Aortic valve area is 0.8 cm2. Aortic valve peak velocity is 3.4 cm/s. There is severe aortic stenosis. Trace aortic valve regurgitation. Mitral Valve Normal valve structure and no stenosis. Mild regurgitation. Tricuspid Valve Normal valve structure and no stenosis. Mild to moderate tricuspid valve regurgitation. Pulmonary arterial systolic pressure is 65.1 mmHg. Moderate pulmonary hypertension. Pulmonic Valve Pulmonic valve not well visualized. Aorta Normal aortic root, ascending aortic, and aortic arch. Pericardium Normal pericardium and no evidence of pericardial effusion. CT scans: CXR Results  (Last 48 hours) 05/20/19 0355  XR CHEST PORT Final result Impression:  IMPRESSION:  
1. Improved aeration with decreasing basilar atelectasis Narrative:  EXAM: XR CHEST PORT INDICATION: Impella COMPARISON: 5/19/2019 FINDINGS: A portable AP radiograph of the chest was obtained at 0342 hours. The  
patient is on a cardiac monitor. The cardiac assist device and right IJ [place] catheter are in satisfactory position. The lungs demonstrate improved aeration with decreasing basilar atelectasis. No  
pulmonary edema no pneumothorax. Small right pleural effusion is suspected. 05/19/19 0429  XR CHEST PORT Final result Impression:  IMPRESSION:  
Slightly improved left basilar atelectasis. Otherwise no change. Narrative:  PORTABLE CHEST RADIOGRAPH/S: 5/19/2019 4:29 AM  
   
Clinical history: Impella INDICATION:   Impella COMPARISON: 5/18/2019 FINDINGS:  
AP portable upright view of the chest demonstrates stable  cardiopericardial  
silhouette. The lungs are adequately expanded. More atelectatic change at the  
left lung base. Slightly improved lung volumes on the right pulmonary artery  
catheter unchanged in position. Cardiac assist device unchanged in position. .. The osseous structures are unremarkable. Patient is on a cardiac monitor. BRENDEN Wooten 1093 9 [facility], [address]  Office [phone] Fax [phone] 
24 hour VAD/HF Pager: [phone] AHF ATTENDING ADDENDUM Patient was seen and examined in person. Data and notes were reviewed. I have discussed and agree with the plan as noted in the NP note above without further additions. MD PhD Le Villegas 9 [facility]

## 2019-05-20 NOTE — CDMP QUERY
Pt admitted with acute on chronic CHF and has malnutrition documented. Please further specify type of malnutrition. ? Malnutrition (mild, moderate, severe) ? Protein calorie malnutrition (mild, moderate, severe) ? Other (please specify) ? Unable to determine The medical record reflects the following: 
 
  Risk Factors: Acute care stay w/impella placed, LVAD workup in progress Clinical Indicators: Per Dietitian consult note (05/20): Patient meets criteria for Severe Acute Protein Calorie Malnutrition as evidenced by:  
ASPEN Malnutrition Criteria Acute Illness, Chronic Illness, or Social/Enviornmental: Acute illness Energy Intake: <75% est energy req for > 7 days Fluid Accumulation: Moderate ASPEN Malnutrition Score - Acute Illness: 7 Acute Illness - Malnutrition Diagnosis: Severe malnutrition. Treatment: Liquid meal replacements, monitor weight, I & O, and labs. Thank you, Cris Mendez, RN, MSN, [facility], [address] 
[phone]

## 2019-05-20 NOTE — PROGRESS NOTES
NYHA class IV A/C systolic HF Severe AS 
PAD 
A/C kidney disease CAD Feels pretty good today Hgb looks good Platelets a little low Creatinine in the 2's Bilirubin and other LFTs look good LDH and lactic acid look good NT pro-BNP remains elevated CXR - clear lung fields Impella - flow 3.8 L/min @ P-9 Intake/Output Summary (Last 24 hours) at 5/20/2019 1623 Last data filed at 5/20/2019 1400 Gross per 24 hour Intake 1080.71 ml Output 1145 ml Net -64.29 ml Risk of morbidity and mortality - high Medical decision making - high complexity Total critical care time - 30 minutes (CPT 25176)

## 2019-05-20 NOTE — PROGRESS NOTES
NUTRITION COMPLETE ASSESSMENT 
 
RECOMMENDATIONS:  
1. Encourage protein intake with meals - pt should be consuming at least 1 protein shake brought in by family ([address] [place] [address]) - document % meals consumed 2. Continue to give zofran prior to meals PRN 3. Follow for BM - adjust bowel regimen PRN Interventions/Plan:  
Food/Nutrient Delivery:    Commercial supplement(see below) Assessment:  
Reason for Assessment: [x]LOS /[x]At Nutrition Risk Diet: Cardiac, Consistent carb Supplements: Ensure Clear TID Nutritionally Significant Medications: [x] Reviewed & Includes: bumex, ancef, digoxin, marinol (started 5/18), glipizide, SSI, mag-ox, mag sulfate, protonix, pericolace, spironolactone, dobutamine drip; PRN: zofran Meal Intake:  
Patient Vitals for the past 100 hrs: 
 % Diet Eaten 05/19/19 1800 0 % 05/19/19 1500 75 % 05/19/19 1300 0 % 05/19/19 0915 25 % Current Hospitalization: Appetite: Poor PO Ability: Independent Average po intake:25-50% Average supplements intake:    
  
Subjective: \"This is good! (premier shake). .. Thank you for asking me (about the food stuff.)\" Objective: 
Pt admitted for heart failure. PMHx: HTN, DM, CAD, ICM, severe AS. Impella placed on 5/16 with LVAD workup still in progress. THEODORE on CKD on admit. LLE venous ulcer with WOCN following. Pt complaining of nausea but managed by zofran. Constipation (last BM 5/15) but bowel regimen added this morning Poor appetite noted, pt reports has been since admit (1.5 weeks). Marinol started on 5/18. Spoke with RN who reports pt declining breakfast and ate only \"stew\" yesterday. Pt visited today and admits to poor appetite with nausea a big barrier. Friend brought in Premier Protein (~130kcal, 30g protein- peach flavor) - brought to pt and drinking well during visit. Not interested in Ensure but agreeable to trial of Magic Cup (290kcal, 9g protein), which is fruit-flavored, and snacks between meals.  Discussed importance of good protein intake for recovery. Significant wt loss over past month but likely d/t fluid status s/p diuresis. Wt Readings from Last 10 Encounters:  
05/20/19 79.9 kg (176 lb 2.4 oz) 05/10/19 84.6 kg (186 lb 8.2 oz) 05/07/19 87.7 kg (193 lb 6.4 oz) 05/03/19 85.2 kg (187 lb 13.3 oz) Patient meets criteria for Severe Acute Protein Calorie Malnutrition as evidenced by:  
ASPEN Malnutrition Criteria Acute Illness, Chronic Illness, or Social/Enviornmental: Acute illness Energy Intake: <75% est energy req for > 7 days Fluid Accumulation: Moderate ASPEN Malnutrition Score - Acute Illness: 7 Acute Illness - Malnutrition Diagnosis: Severe malnutrition. Will continue to follow for PO intake, supplement consumption, wt/fluid trends, and surgery plans. Estimated Nutrition Needs:  
Kcals/day: 1886 Kcals/day(1886-2043kcal) Protein: 96 g(96-111g (1.2-1.4g/kg)) Fluid: 2000 ml(1ml/kcal - or per cardio) Based On: [place] St Jeor(x 1.2-1.3) Weight Used: Actual wt(79.9kg) Pt expected to meet estimated nutrient needs:  []   Yes     []  No [x] Unable to predict at this time Nutrition Diagnosis:  
1. Inadequate protein-energy intake(Malnutrition) related to poor appetite  as evidenced by less than avg 50% meals consumed x 1.5 weeks; edema; pt reports Goals:   
 Consumption of at least 50% meals and 1 premier shake/day in 3-4 days; wt maintenance Monitoring & Evaluation:  
 - Liquid meal replacement, Total energy intake, Protein intake - Weight/weight change, GI 
 
Previous Nutrition Goals Met:   N/A Previous Recommendations:    N/A Education & Discharge Needs: 
 [] None Identified 
 [x] Identified and addressed [x] Participated in care plan, discharge planning, and/or interdisciplinary rounds Cultural, Yarsani and ethnic food preferences identified: None Skin Integrity: []Intact  [x]Other: LLE venous ulcers Edema: []None [x]Other: 2+ BLLE Last BM: 5/15 Food Allergies: []None []Other Diet Restrictions: Cultural/Hindu Preference(s): None Anthropometrics:   
Weight Loss Metrics 5/20/2019 5/9/2019 5/7/2019 5/3/2019 5/2/2019 Today's Wt 176 lb 2.4 oz - 193 lb 6.4 oz 187 lb 13.3 oz -  
BMI - 24.57 kg/m2 27.75 kg/m2 - 26.95 kg/m2 Weight Source: Standing scale (comment) Height: 5' 11\" (180.3 cm), Body mass index is 24.57 kg/m². ,   
 ,   
 
Labs:   
Lab Results Component Value Date/Time Sodium 131 (L) 05/20/2019 03:28 AM  
 Potassium 3.8 05/20/2019 03:28 AM  
 Chloride 97 05/20/2019 03:28 AM  
 CO2 25 05/20/2019 03:28 AM  
 Glucose 92 05/20/2019 03:28 AM  
 BUN 47 (H) 05/20/2019 03:28 AM  
 Creatinine 2.00 (H) 05/20/2019 03:28 AM  
 Calcium 8.0 (L) 05/20/2019 03:28 AM  
 Magnesium 1.6 05/20/2019 03:28 AM  
 Albumin 2.1 (L) 05/20/2019 03:28 AM  
 
Lab Results Component Value Date/Time Hemoglobin A1c 6.9 (H) 05/02/2019 04:09 PM  
 
Neto Little RD Pager #[phone]

## 2019-05-20 NOTE — PROGRESS NOTES
[facility] ICU Progress Note Admit Date: 2019 POD:  4 Procedure:  Procedure(s): RIGHT AXILLARY IMPELLA/ [id number] Subjective:  
Pt seen with Dr. Bismark Bueno. Sitting up in chair, refusing to eat much or take medications occasionally. Remains on dobutamine at 7.5 mcg, off bumex gtt. Impella at P7, D5 as purge fluid Objective:  
Vitals: 
Blood pressure (!) 88/75, pulse 81, temperature 98.8 °F (37.1 °C), resp. rate 12, height 5' 11\" (1.803 m), weight 79.9 kg (176 lb 2.4 oz), SpO2 98 %. Temp (24hrs), Av.1 °F (37.3 °C), Min:98.2 °F (36.8 °C), Max:100.4 °F (38 °C) Hemodynamics: 
 CO: CO (l/min): 5.8 l/min CI: CI (l/min/m2): 3 l/min/m2 CVP: CVP (mmHg): 9 mmHg (19 0900) SVR: SVR (dyne*sec)/cm5: 1047 (dyne*sec)/cm5 (19 0800) PAP Systolic: PAP Systolic: 52 (36/64/29 7711) PAP Diastolic: PAP Diastolic: 23 (22/87/08 3710) PVR:   
 SV02: SVO2 (%): 62 % (19 0800) SCV02:   
 
EKG/Rhythm:  Afib - rate controlled Oxygen Therapy: 
Oxygen Therapy O2 Sat (%): 98 % (19 0900) Pulse via Oximetry: 83 beats per minute (19 0900) O2 Device: Room air (19 0600) O2 Flow Rate (L/min): 4 l/min (19 1600) FIO2 (%): 36 % (19 1146) CXR:  
CXR Results  (Last 48 hours) 19 0355  XR CHEST PORT Final result Impression:  IMPRESSION:  
1. Improved aeration with decreasing basilar atelectasis Narrative:  EXAM: XR CHEST PORT INDICATION: Impella COMPARISON: 2019 FINDINGS: A portable AP radiograph of the chest was obtained at 0342 hours. The  
patient is on a cardiac monitor. The cardiac assist device and right IJ [place]-Jennifer catheter are in satisfactory position. The lungs demonstrate improved aeration with decreasing basilar atelectasis. No  
pulmonary edema no pneumothorax. Small right pleural effusion is suspected. 19 0429  XR CHEST PORT Final result  Impression:  IMPRESSION:  
 Slightly improved left basilar atelectasis. Otherwise no change. Narrative:  PORTABLE CHEST RADIOGRAPH/S: 5/19/2019 4:29 AM  
   
Clinical history: Impella INDICATION:   Impella COMPARISON: 5/18/2019 FINDINGS:  
AP portable upright view of the chest demonstrates stable  cardiopericardial  
silhouette. The lungs are adequately expanded. More atelectatic change at the  
left lung base. Slightly improved lung volumes on the right pulmonary artery  
catheter unchanged in position. Cardiac assist device unchanged in position. .. The osseous structures are unremarkable. Patient is on a cardiac monitor. Admission Weight: Last Weight Weight: 84.8 kg (186 lb 15.2 oz) Weight: 79.9 kg (176 lb 2.4 oz) Intake / Jonita Jarrod / Drain: 
Current Shift: 05/20 0701 - 05/20 1900 In: -  
Out: 60 [Urine:60] Last 24 hrs.:  
 
Intake/Output Summary (Last 24 hours) at 5/20/2019 7027 Last data filed at 5/20/2019 0800 Gross per 24 hour Intake 851.19 ml Output 1450 ml Net -598.81 ml EXAM: 
General:  Alert, NAD Lungs:   Clear upper, diminished bases to auscultation bilaterally. Incision:  impella dsg cdi Heart:  irregular rate and rhythm, S1, S2 normal, no murmur, click, rub or gallop. Abdomen:   Soft, non-tender. Bowel sounds normal. No masses,  No organomegaly. Extremities:  2+ LE edema. PPP. Neurologic:  Gross motor and sensory apparatus intact. Labs:  
Recent Labs  
  05/20/19 
0328 05/19/19 
2150 WBC 3.5*  --   
HGB 9.0*  --   
HCT 28.2*  --   
PLT 99*  --   
*  --   
K 3.8  --   
BUN 47*  --   
CREA 2.00*  --   
GLU 92  --   
GLUCPOC  --  118* Assessment:  
 
Active Problems: 
  Acute on chronic systolic (congestive) heart failure ([address] (5/9/2019) Plan/Recommendations/Medical Decision Making: 1. Acute on chronic systolic heart failure (NYHA class IV on admission): AHF following, on dobut 7.5, bumex gtt switched to scheuled, aldactone, LVAD workup ongoing. Purge now D5 dut to high PTT. BNP >35K. On ancef for impella ppx. Echo repeated today due to frequent suction alarms 2. Severe AS: no plans for TAVR at this time 3. CAD with  of RAD and RCA: on ASA and statin, no BB dt dobut. Cardiac MRI showed nonviability of LV. 4. Afib: on eliquis - hold for impella 5. HLD: on lipitor 6. DM type II: on glipizide, SSI, A1C 6.9 7. CKD: Cr 2.00, monitor with diuretics. 8. Left leg wounds s/p fall: wound care consulted. 9. Anemia, iron deficiency: Hgb 9.0, cont PO iron, monitor 10. Thrombocytopenia: mild, monitor. 11. Hyponatremia: Na 131, monitor 12. Nutrition: starting marinol per AHFS. Nutrition consult 13. Dispo: Cont ICU care, PT/OT, LVAD workup per AHF.  
 
 
 
Signed By: Cheri Alvarado NP

## 2019-05-20 NOTE — WOUND CARE
Wound Consult:  Re-consult Visit. Chart reviewed. Consulted for gluteal cleft/buttocks. Spoke with patients nurse,  [facility] and in to assess/provide care. Patient is resting on a SPORT bed. Awake, alert and oriented, remains on Impella. Assessment: 
Left buttock - linear area of tiny skipped serous blisters with 1 x 1 darker purple abraded area; entire area ~ 5 x 1 x 0 cm. Patient had only draw sheet beneath; when asked about incontinence pad, he tells me he had them remove as it felt like it was sticking to his skin yesterday. Injury noted appears more friction/shear related. Sacrum through gluteal cleft - he was noted to have some dyschromia on previous assessment and today he is pink, blanching with slightly darker skin in distal cleft/coccyx area that does not appear to be pressure injury. Heels intact, no redness. Scattered dry scabs on left knee. Left medial lower leg - 1.2 x 1 x 0.1 cm, dry moist black eschar centrally 80% with 20% surrounding pink, macerated skin, no drainage, no surrounding redness. SHADI. Well healed left great toe amputation. Treatment: 
Mepitel One used to protect left medial lower leg wound. Aloe vesta ointment to buttocks/sacrum/heels/feet. Wound Recommendations: 
Mepitel One to left medial lower leg wound, change as needed. Venelex to buttocks/sacrum. Use Waffle cushion in chair with pillow case, avoid incontinence pads. Skin Care Recommendations: 1. Minimize friction/shear: minimize layers of linen/pads under patient. 2. Off load pressure/reposition: continue to turn and reposition approximately every 2 hours; float heels or use Prevalon boots; waffle cushion for sitting. 3. Manage Moisture - keep skin folds dry; has lowe. 4. Continue to monitor nutrition, pain, and skin risk scale, and skin assessment. Plan: 
Discussed with Kandace Gonzalez NP and updating orders. We will continue to reassess routinely and as needed.  
Digna Randall RN,CWCN 
 Wound Healing Office [phone] Pager [phone]

## 2019-05-20 NOTE — PROGRESS NOTES
Spiritual Care Assessment/Progress Note [address] 
 
 
NAME: Vicente Calvert      MRN: [id number] AGE: 77 y.o. SEX: male Episcopalian Affiliation: Shea Moseley Language: [place] 5/20/2019     Total Time (in minutes): 5 Spiritual Assessment begun in [facility] 4 CV INTNSV CARE through conversation with: 
  
    []Patient        [] Family    [] Friend(s) Reason for Consult: Initial/Spiritual assessment, patient floor Spiritual beliefs: (Please include comment if needed) 
   [] Identifies with a chester tradition:     
   [] Supported by a chester community:        
   [] Claims no spiritual orientation:       
   [] Seeking spiritual identity:            
   [] Adheres to an individual form of spirituality:       
   [x] Not able to assess:                   
 
    
Identified resources for coping:  
   [] Prayer                           
   [] Music                  [] Guided Imagery 
   [] Family/friends                 [] Pet visits [] Devotional reading                         [] Unknown 
   [] Other:                                          
 
 
Interventions offered during this visit: (See comments for more details) Patient Interventions: Spiritual care volunteer support Plan of Care: 
 
 [] Support spiritual and/or cultural needs  
 [] Support AMD and/or advance care planning process    
 [] Support grieving process 
 [] Coordinate Rites and/or Rituals  
 [] Coordination with community clergy [] No spiritual needs identified at this time 
 [] Detailed Plan of Care below (See Comments)  [] Make referral to Music Therapy 
[] Make referral to Pet Therapy    
[] Make referral to Addiction services 
[] Make referral to [facility] 
[] Make referral to Spiritual Care Partner 
[] No future visits requested       
[x] Follow up visits as needed Comments:  visit for initial spiritual assessment.   Staff with patient providing care. Patient sitting up in bed, appears comfortable and without pain. Will continue to follow up as needed and upon request as able. Visited by Rev. Jason Erickson, [facility]  paging service: [phone])

## 2019-05-20 NOTE — PROGRESS NOTES
1945: Report received from Rupesh Power, [facility]. Gtts checked and verified. Shift Summary: No acute issues overnight. Patient affect remains flat and seemingly disengaged from participating in plan of care. RN inquired about patient's perception of previous day after report of issues from dayshift RN. Patient replied it was a \"day like all the other ones here. \" RN inquired about disconnect between patient's refusal to ambulate/eat meals. Patient disgruntled and stated \"I don't know what report you got but I got up all day and I've eaten a ton. \" RN asked what the patient ate; patient replied he ate \"some stew. \" RN explained that was concerning regarding patient's nutrition needs. Patient visibly upset and exclaiming \"That's actually all I need. All I eat at home. \" Patient also stating he has terrible nausea but refuses Zofran. RN asked if it was strictly nausea or GERD. Patient again upset, swearing, and stated \"It's from all these ---- meds you're giving me. \" RN educated patient on different modalities to help and explained if the LVAD happened he would be on many more medications than currently. Patient scoffed and did not answer. 0500: Suction alarm on Impella; insertion marking unchanged, hemodynamics stable, and motor current/purge pressures stable. Resolved with transient decrease to P6. Will continue to monitor. 0700: Marlin Johnson MD notified of x2 suction alarms with transient placement though abnormal placement readings (these go from -1200/-1000 to 700s/400s to ???). MD stated to obtain echo this AM. 0725: BRENDEN Montero notified of several more suction alarms with stable Impella numbers/hemodynamis. NP stated to keep at P7 and wait for echo. 0745: Bedside shift change report given to fina LIGHT (oncoming nurse) by Rogerio Hutchinson RN (offgoing nurse). Report included the following information SBAR, Intake/Output, MAR and Recent Results. Problem: Heart Failure: Day 5 Goal: Activity/Safety Outcome: Not Progressing Towards Goal 
Goal: Nutrition/Diet Outcome: Not Progressing Towards Goal 
Goal: Respiratory Outcome: Progressing Towards Goal 
Goal: Psychosocial 
Outcome: Not Progressing Towards Goal

## 2019-05-20 NOTE — PROGRESS NOTES
0745:Bedside shift change report given to Alisson Jimenez (oncoming nurse) by Merissa Garg (offgoing nurse). Report included the following information SBAR, Kardex, ED Summary, OR Summary, Procedure Summary, Intake/Output, MAR, Accordion, Recent Results, Med Rec Status, Cardiac Rhythm Afib, Alarm Parameters  and Quality Measures. 0800:Echo tech at ; requested Dr Carla Wise be present for positioning verification. Dr Carla Wise paged. 0805:Dr Rowley at bs. Repositioned Impella. Now at 38cm 0900: dr Surinder Griffin at bs- placement signal waveform remains flat; states \"placement signal not reliable\". Flow  shown. Dr Carla Wise stated that issue is sensor malfunction, not placement issue. 1000:dr martinez and laure reyez at bs- ci 2.1 , dobut inc 7.5; addy to be assessed per Anesthesia. 1100: lowe removed. 1145: ECG performed: NSR with 2nd degree heart block Mobitz 1 
 
1330:impella suction event- dr Surinder Griffin aware. Verbal order to monitor at this time. Repositioned patient and impella line. 1340: laure bunch np updated to clinical status via phone, including CI trend , suction events; verbal order to start Bival via purge fluid 1830: Mike Mclain NP updated to clinical status per RN via telephone, including , BUN/Creat; suction events, and bladder scan 100 ml. Nunu Garcia, Su phoned Dr Carla Wise then called RN back with plan of care -  includes: consult nephrology, dc spirolactone, admin 1 mg iv bumex. 1930:Pt ambulated to [facility]- unable to urinate or have bowel movement; pm RN to bladder scan  
 
1945:Bedside shift change report given to Merissa Garg (oncoming nurse) by Alisson Jimenez (offgoing nurse). Report included the following information SBAR, Kardex, ED Summary, OR Summary, Procedure Summary, Intake/Output, MAR, Accordion, Recent Results, Med Rec Status, Cardiac Rhythm NSr with 2nd av block Mobitz 1, Alarm Parameters  and Quality Measures.

## 2019-05-20 NOTE — PROGRESS NOTES
Problem: Mobility Impaired (Adult and Pediatric) Goal: *Acute Goals and Plan of Care (Insert Text) Description Physical Therapy Goals Initiated 5/17/2019 1. Patient will move from supine to sit and sit to supine , scoot up and down and roll side to side in bed with minimal assistance/contact guard assist within 7 day(s). 2.  Patient will transfer from bed to chair and chair to bed with minimal assistance/contact guard assist using the least restrictive device within 7 day(s). 3.  Patient will perform sit to stand with minimal assistance/contact guard assist within 7 day(s). 4.  Patient will ambulate with minimal assistance/contact guard assist for 50 feet with the least restrictive device within 7 day(s). 5.  Patient will ascend/descend 4 stairs with no handrail(s) with minimal assistance/contact guard assist within 7 day(s). 6.  Patient will complete a 6MWT within 48 hours of discharge. Initiated 5/10/2019 1. Patient will move from supine to sit and sit to supine , scoot up and down and roll side to side in bed with independence within 7 day(s). 2.  Patient will transfer from bed to chair and chair to bed with modified independence using the least restrictive device within 7 day(s). 3.  Patient will perform sit to stand with modified independence within 7 day(s). 4.  Patient will ambulate with modified independence for 300 feet with the least restrictive device within 7 day(s). 5.  Patient will ascend/descend 4 stairs with no handrail(s) with modified independence within 7 day(s). 6.  Patient will participate in a six minute walk test within 48 hours prior to discharge. Outcome: Progressing Towards Goal 
 PHYSICAL THERAPY TREATMENT Patient: Sherman Smith (77 y.o. male) Date: 5/20/2019 Diagnosis: Acute on chronic systolic (congestive) heart failure (HCC) [I50.23] <principal problem not specified> Procedure(s) (LRB): 
RIGHT AXILLARY IMPELLA/ 4219 (Right) 4 Days Post-Op Precautions: Fall(R axillary impella) Chart, physical therapy assessment, plan of care and goals were reviewed. ASSESSMENT: 
Patient with improved strength since Friday's session. Able to transfer with min Ax2 for sit<>stand and sidesteps to chair, but requires max cues for set up and UE use. Continues with quad and glut weakness, educated on exercises and frequency as below for strengthening throughout the day. C/o nausea once seated in the chair, RN aware. D/c recommendation pending medical plan and progress during hospital stay. Progression toward goals: 
?    Improving appropriately and progressing toward goals ? Improving slowly and progressing toward goals ? Not making progress toward goals and plan of care will be adjusted PLAN: 
Patient continues to benefit from skilled intervention to address the above impairments. Continue treatment per established plan of care. Discharge Recommendations:  Inpatient Rehab as of now (To Be Determined based on hospital stay and medical plan) Further Equipment Recommendations for Discharge:  TBD SUBJECTIVE:  
Patient stated ? I've been doing some leg exercises in the bed.? OBJECTIVE DATA SUMMARY:  
Critical Behavior: 
Neurologic State: Alert Orientation Level: Oriented X4 Cognition: Follows commands Functional Mobility Training: 
Bed Mobility: 
Supine to Sit: (bed mechanics) Transfers: 
Sit to Stand: Minimum assistance;Assist x2 Stand to Sit: Minimum assistance;Assist x2 Bed to Chair: Minimum assistance;Assist x2 Balance: 
Sitting: Intact Standing: Impaired; Without support Standing - Static: Fair Standing - Dynamic : Poor Ambulation/Gait Training: 
Distance (ft): 5 Feet (ft)(sidesteps bed to chair) Assistive Device: Gait belt(HHA on L) Ambulation - Level of Assistance: Minimal assistance;Assist x2 Gait Abnormalities: Decreased step clearance; Step to gait;Trunk sway increased Base of Support: Widened Therapeutic Exercises: Reviewed LAQ, seated marches, and glut sets to perform 1x10 of one exercise per hour Pain: 
Pain Scale 1: Numeric (0 - 10) Pain Intensity 1: 0 Activity Tolerance:  
SvO2 fairly stable, 54% start, 49% after transfer Please refer to the flowsheet for vital signs taken during this treatment. After treatment:  
?    Patient left in no apparent distress sitting up in chair ? Patient left in no apparent distress in bed 
? Call bell left within reach ? Nursing notified ? Caregiver present ? Bed alarm activated COMMUNICATION/COLLABORATION:  
The patient?s plan of care was discussed with: Registered Nurse La Nena Rao, PT, DPT Time Calculation: 21 mins

## 2019-05-20 NOTE — PROGRESS NOTES
CM reviewed patient chart- CM has consultation to collaborate with Riya Cornejo, the patient's friend, for transitions of care post-op. The patient is here for possible LVAD work-up, currently has impella and on dobutamine. Per previous notes, the patient lives with friend Riya Cornejo and household with teenagers. The patient has AMD on file- brother Jaylen Chapman is listed as primary Pacheco Ringer as secondary. CM attempted to meet with the patient to discuss transitions of care, and patient is working with nursing staff behind [facility] at this time. CM will follow-up later today as able and appropriate and discuss with patient's friend Riya Cornejo. MARNIE Aldrich 
 
15:59 p.m.- The CM met with the patient at bedside along with daughter Scotty Snellen- the patient verified address, confirmed that Riya Cornejo and her children live in the home with him. The patient reports that Riya Cornejo has been [employer] as far as providing support- the patient endorses that he believes Riya Cornejo will provide support home upon discharge. The CM obtained consent from the patient to keep Riya Oliveiraph updated during hospital course. The CM will await medical POC from attending team to assist in determining needs upon discharge. Therapy notes from over the weekend indicate possible needs for rehabilitation, patient has impella and on inotrope currently. CM will await Medical POC to assist in determining disposition.  MARNIE Aldrich

## 2019-05-21 NOTE — PROGRESS NOTES
The CM participated in IDR rounds- the patient remains on dobutamine, impella. PT/OT recommending inpatient rehabilitation, CM awaiting medical POC to assist in determining needs. CM will continue to follow for transitions of care.  MARNIE Lanza

## 2019-05-21 NOTE — CONSULTS
NEPHROLOGY CONSULT NOTE Patient: Ben St MRN: [id number]  PCP: Artem David MD  
:     1950  Age:   77 y.o. Sex:  male Referring physician: Barbara Sauer MD 
Reason for consultation: 77 y.o. male with Acute on chronic systolic (congestive) heart failure ([facility].) [H75.45] complicated by hyponatremia Admission Date: 2019  6:23 PM  LOS: 12 days ASSESSMENT and PLAN :  
Hyponatremia: 
- likely hypervolemic from CHF, may have a component of SIADH 
- increase diuretics - bumex 1mg TID + albumin - FR 1.5 L/d 
- check urine osms 
- may benefit from tolvaptan if not improving w/ diuretics 
- daily Na levels CKD III w/ baseline Cr 2: 
- presumed 2/2 DM and HTN 
- Cr stable, w/ non-nephrotic range proteinuria HFrEF: 
- EF 10% - w/u for LVAD 
- impella and dobutamine per HF service 
- increase diuretics as above RV failure Severe AS Multivessel CAD: 
- poor viability on cardiac MRI Urinary Retention: 
- on flomax 
- bladder scan and SC as needed DM2: 
- on insulin Active Problems / Assessment AAActive  : Active Problems: 
  Acute on chronic systolic (congestive) heart failure ([facility] (2019) Subjective: HPI: Ben St is a 77 y.o.  male who has been admitted to the hospital for worsening HF. He has a hx of CKD, baseline Cr around 2, CHF w/ EF 10%, HTN, DM2, severe AS who presented initially to [facility] on  and underwent LHC showing severe 3 vessel disease, LVEF 10%. He was started on bumex drip, dobutamine and had an Impella placed. His Cr has remained stable over his hospitalization, however his Na down to 126 yesterday and is 128 today. He is still considerably overloaded on exam.  UOP adequate, but has not been great since his lowe was removed. He is failed inotrope and impella weans. He is on bumex 1mg BID now along w/ aldactone and dobutamine. Currently awake, oriented. No cp or sob. Having trouble voiding. No pain, fevers or chills. Past Medical Hx:  
Past Medical History:  
Diagnosis Date  3-vessel coronary artery disease  Aortic stenosis, severe  Chronic kidney disease (CKD), stage III (moderate) (HCC)  Chronic total occlusion of coronary artery  Hypertension  Ischemic cardiomyopathy  Peripheral vascular disease ([facility]  Type 2 diabetes mellitus treated without insulin ([facility] Past Surgical Hx: 
  
Past Surgical History:  
Procedure Laterality Date  HX AMPUTATION TOE Left 2017 Medications: 
Prior to Admission medications Medication Sig Start Date End Date Taking? Authorizing Provider  
glimepiride (AMARYL) 4 mg tablet Take 4 mg by mouth two (2) times a day. Yes Provider, Historical  
dulaglutide (TRULICITY) 1.5 NS/7.8 mL sub-q pen 1.5 mg by SubCUTAneous route every Sunday. Yes Provider, Historical  
spironolactone (ALDACTONE) 25 mg tablet TAKE 1 TABLET BY MOUTH EVERY DAY 5/7/19  Yes India Miguel MD  
apixaban (ELIQUIS) 5 mg tablet Take 1 Tab by mouth two (2) times a day. 5/7/19  Yes India Miguel MD  
sacubitril-valsartan (ENTRESTO) 24 mg/26 mg tablet Take 1 Tab by mouth every twelve (12) hours. 5/4/19  Yes Anna Melgar MD  
canagliflozin (INVOKANA) 300 mg tablet Take 300 mg by mouth Daily (before breakfast). Yes Provider, Historical  
metFORMIN (GLUCOPHAGE) 1,000 mg tablet Take 1,000 mg by mouth two (2) times daily (with meals). Yes Provider, Historical  
esomeprazole (NEXIUM) 20 mg capsule Take 20 mg by mouth daily. Yes Provider, Historical  
aspirin 81 mg chewable tablet Take 81 mg by mouth daily. Yes Provider, Historical  
atorvastatin (LIPITOR) 40 mg tablet Take 1 Tab by mouth nightly. 5/2/19  Yes Octaviano Corbin III, DO  
furosemide (LASIX) 40 mg tablet Take one tab daily 5/3/19  Yes Lin Corbin III, DO  
metoprolol succinate (TOPROL-XL) 100 mg tablet Take 1 Tab by mouth daily. 5/3/19  Yes Cuca Meals III, DO Allergies Allergen Reactions  Penicillins Hives Social Hx:  reports that he has quit smoking. He has never used smokeless tobacco. He reports that he drank alcohol. History reviewed. No pertinent family history. Review of Systems: A twelve point review of system was performed today. Pertinent positives and negatives are mentioned in the HPI. The reminder of the ROS is negative and noncontributory. Objective:  
Vitals:   
Vitals:  
 05/21/19 0600 05/21/19 0700 05/21/19 0800 05/21/19 0900 BP:      
Pulse: 70 74 73 69 Resp: 16 14 (!) 0 11 Temp:    98.4 °F (36.9 °C) SpO2: 97% 100% 100% 97% Weight:      
Height:      
 
I&O's:  05/20 0701 - 05/21 0700 In: 1624.8 [P.O.:560; I.V.:1064.8] Out: 945 [Urine:945] Visit Vitals BP 99/74 (BP 1 Location: Right arm, BP Patient Position: At rest) Pulse 69 Temp 98.4 °F (36.9 °C) Resp 11 Ht 5' 11\" (1.803 m) Wt 79.9 kg (176 lb 2.4 oz) SpO2 97% BMI 24.57 kg/m² Physical Exam: 
General:Alert, No distress, HEENT: Eyes are PERRL. Conjunctiva without pallor ,erythema. The sclerae without icterus. .  
Neck:Supple,no mass palpable Lungs : Clears to auscultation Bilaterally, Normal respiratory effort CVS: RRR, S1 S2 normal, No rub,  3 + b/l LE edema Abdomen: Soft, Non tender, No hepatosplenomegaly, bowel sounds present Extremities: No cyanosis, No clubbing Skin: No rash or lesions. Lymph nodes: No palpable nodes MS: No joint swelling, erythema, warmth Neurologic: non focal, AAO x 3 Psych: normal affect Laboratory Results: 
 
Lab Results Component Value Date BUN 55 (H) 05/21/2019  (L) 05/21/2019  
 K 4.0 05/21/2019 CL 93 (L) 05/21/2019 CO2 25 05/21/2019 Lab Results Component Value Date BUN 55 (H) 05/21/2019 BUN 55 (H) 05/20/2019 BUN 47 (H) 05/20/2019 BUN 48 (H) 05/19/2019 BUN 48 (H) 05/18/2019  
 K 4.0 05/21/2019  
 K 4.0 05/20/2019 K 3.8 05/20/2019  
 K 4.0 05/19/2019  
 K 4.0 05/18/2019 Lab Results Component Value Date WBC 4.9 05/21/2019 RBC 3.43 (L) 05/21/2019 HGB 9.1 (L) 05/21/2019 HCT 28.5 (L) 05/21/2019 MCV 83.1 05/21/2019 MCH 26.5 05/21/2019 RDW 15.9 (H) 05/21/2019 PLT 91 (L) 05/21/2019 No results found for: PTH, PHOS Urine dipstick:  
Lab Results Component Value Date/Time Color YELLOW/STRAW 05/21/2019 01:15 AM  
 Appearance CLOUDY (A) 05/21/2019 01:15 AM  
 Specific gravity 1.017 05/21/2019 01:15 AM  
 pH (UA) 5.0 05/21/2019 01:15 AM  
 Protein 30 (A) 05/21/2019 01:15 AM  
 Glucose NEGATIVE  05/21/2019 01:15 AM  
 Ketone TRACE (A) 05/21/2019 01:15 AM  
 Bilirubin NEGATIVE  05/21/2019 01:15 AM  
 Urobilinogen 0.2 05/21/2019 01:15 AM  
 Nitrites NEGATIVE  05/21/2019 01:15 AM  
 Leukocyte Esterase NEGATIVE  05/21/2019 01:15 AM  
 Epithelial cells FEW 05/21/2019 01:15 AM  
 Bacteria NEGATIVE  05/21/2019 01:15 AM  
 WBC 0-4 05/21/2019 01:15 AM  
 RBC 0-5 05/21/2019 01:15 AM  
 
 
I have reviewed the following: All pertinent labs, microbiology data, radiology imaging for my assessment Thank you for allowing us to participate in the care of this patient. We will follow patient. Please dont hesitate to call with any questions Maximilian Flores MD 
5/21/2019 [address] [place] [address], [address] [place] [facility] Phone - [phone] Fax - [phone] 
[email or web address]

## 2019-05-21 NOTE — PROGRESS NOTES
Day #1 of levaquin Indication:  sepsis Recent Labs  
  19 
0355 19 
1729 19 
0328 19 
0401 WBC 4.9  --  3.5* 3.1*  
CREA 2.13* 2.16* 2.00*  --   
BUN 55* 55* 47*  --   
 
Est CrCl: < 50 ml/min; Temp (24hrs), Av.5 °F (37.5 °C), Min:98.3 °F (36.8 °C), Max:100.4 °F (38 °C) Cultures:  blood NGTD Plan: Will order levaquin 750 mg IV Q 48 hr for CrCl <50 ml/min

## 2019-05-21 NOTE — PROGRESS NOTES
[facility] Progress Note DOS:   5/21/2019 NAME:  Kandee Schwab  
MRN:   [id number] REFERRING PROVIDER:  Dr. Lit Underwood PRIMARY CARE PHYSICIAN: Norma Anand MD 
 
Chief Complaint: CHENG 
 
HPI: 77y.o. year old male with a history of HTN, T2DM, PVD, CKD, ICM, Severe AS who presents for further evaluation of chronic systolic heart failure. He developed progressive dyspnea with exacterbation as well as progressive fatigue which prompted further evaluation with a heart cath at [facility] that revealed severe 3 vessel disease with  of his LAD and RCA, severe LV systolic failure (LVEF 98%), and severe As. Has has been unable to walk a block before without limiting dyspnea as well as unable to make a bed without developing dyspnea. Admitted to [facility] for acute chronic HFrEF, CMRI showed no viability for LAD, taken for impella placement for bridge to candidacy. 24Hr Events:  
Continues to have intermittent suction alarms Impella increased to P-9 d/t worsening renal function Cx pending Supra-therapeutic pTTs Procedure:  Procedure(s): RIGHT AXILLARY IMPELLA/ [id number] POD:  POD 5 Impression / Plan:  
Heart Failure Status: NYHA Class IV 
 
ICM-Stage D NYHA IV - LVEF 10% Impella in place - increased to P9 Cefazolin for cannula ppx TPA PRN for purge flow < 6 Transition purge fluid to D5 Once pTT normalizes, begin systemic bival at 1/2 concentration Intolerant of dobutamine wean due to low CI/high PAD No BB d/t dobutamine Intolerant of RAAS due to THEODORE Continue spironolactone 25 mg PO BID Bumex 1mg TID + 25% albumin prior to each dose Evaluation for LVAD placement ongoing Daily lactates to eval for perfusion- WNL Lipase WNL 
  
Severe AS Not a candidate for TAVR due to poor viability and cardiomyopathy 
  
CAD with  of RAD and RCA Currently on ASA and statin No BBrx while on dobutamine cMRI results show severe Cad with  of the LAD and RCA with poor viability of myocardium.  
  
RV failure D/C dig d/t AV dissociation Dobutamine, diuresis Continue PA cath - but replace existing cath d/t concerns re: infection RV will need to recover to be a candidate for durable MCS 
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  
  
Hx of atrial fibrillation Repeat pTT Once normalized, begin systemic bival with goal aPTT 40-60 Monitor AV dissociation EP consult D/C dig  
  
HLD Lipitor 40 mg every night  
  
T2DM  
SSI Glipizide Hepatic cyst 
GI consult 
  
Left leg wounds s/p fall Wound consult appreciated Leg is erythematous and warm, pt is afebrile  
  
CKD Creatinine trending up Cont bumex Monitor renal function Appreciate renal consult Hyponatremia Likely related to  
Monitor I/O's and daily Na+ Current Na 128 Cont spironolactone Malnutrition Prealbumin 10 Encourage PO intake Cont Marinol Constipation Bowel regimen KUB negative Altered mental status 19/30 on [address] [place] [address], [place] PPX 
PPI Ancef while impella in place Aggressive electrolyte replacement Replace lines Dispo:  
Remain in CVI for now History: 
Past Medical History:  
Diagnosis Date  3-vessel coronary artery disease  Aortic stenosis, severe  Chronic kidney disease (CKD), stage III (moderate) (HCC)  Chronic total occlusion of coronary artery  Hypertension  Ischemic cardiomyopathy  Peripheral vascular disease ([facility]  Type 2 diabetes mellitus treated without insulin ([facility] Past Surgical History:  
Procedure Laterality Date  HX AMPUTATION TOE Left 2017 Social History Socioeconomic History  Marital status:  Spouse name: Not on file  Number of children: Not on file  Years of education: Not on file  Highest education level: Not on file Occupational History  Not on file Social Needs  Financial resource strain: Not on file  Food insecurity:  
  Worry: Not on file Inability: Not on file  Transportation needs:  
  Medical: Not on file Non-medical: Not on file Tobacco Use  Smoking status: Former Smoker  Smokeless tobacco: Never Used Substance and Sexual Activity  Alcohol use: Not Currently  Drug use: Not on file  Sexual activity: Not on file Lifestyle  Physical activity:  
  Days per week: Not on file Minutes per session: Not on file  Stress: Not on file Relationships  Social connections:  
  Talks on phone: Not on file Gets together: Not on file Attends Taoism service: Not on file Active member of club or organization: Not on file Attends meetings of clubs or organizations: Not on file Relationship status: Not on file  Intimate partner violence:  
  Fear of current or ex partner: Not on file Emotionally abused: Not on file Physically abused: Not on file Forced sexual activity: Not on file Other Topics Concern  Not on file Social History Narrative  Not on file History reviewed. No pertinent family history. Current Medications:  
Current Facility-Administered Medications Medication Dose Route Frequency Provider Last Rate Last Dose  glipiZIDE (GLUCOTROL) tablet 5 mg  5 mg Oral ACB&D Gwynneth Bran Edgardo Fruit, NP   5 mg at 05/21/19 1140  tamsulosin (FLOMAX) capsule 0.4 mg  0.4 mg Oral DAILY Tony Montero NP   0.4 mg at 05/21/19 1122  bumetanide (BUMEX) injection 1 mg  1 mg IntraVENous TID Tony Montero NP   1 mg at 05/21/19 1238  albumin human 25% (BUMINATE) solution 12.5 g  12.5 g IntraVENous TID Luis Montero NP   12.5 g at 05/21/19 1122  levoFLOXacin (LEVAQUIN) 750 mg in D5W IVPB  750 mg IntraVENous Q48H Gem Montero  mL/hr at 05/21/19 1140 750 mg at 05/21/19 1140  potassium chloride (KLOR-CON) packet for solution 40 mEq  40 mEq Oral BID WITH MEALS Juanito Montero NP   40 mEq at 05/21/19 1140  
 senna-docusate (PERICOLACE) 8.6-50 mg per tablet 1 Tab  1 Tab Oral BID Juanito Montero Megan NP   1 Tab at 05/21/19 1140  polyethylene glycol (MIRALAX) packet 17 g  17 g Oral BID Juanito Montero Megan, NP   17 g at 05/21/19 1140  
 balsam peru-castor oil (VENELEX) ointment   Topical BID Juanito Montero, NP      
 0.9% sodium chloride infusion  9 mL/hr IntraVENous CONTINUOUS PollJuanito hines NP 9 mL/hr at 05/20/19 1810 9 mL/hr at 05/20/19 1810  
 dextrose 5% infusion  4-20 mL/hr IntraVENous CONTINUOUS Elda, Colette B, NP 14.7 mL/hr at 05/21/19 0753 14.7 mL/hr at 05/21/19 0753  calcium carbonate (TUMS) chewable tablet 200 mg [elemental]  200 mg Oral TID PRN Edward Meneses B, NP   200 mg at 05/18/19 1922  bumetanide (BUMEX) injection 1 mg  1 mg IntraVENous Q6H PRN Elda, Colette B, NP   1 mg at 05/20/19 1924  
 magnesium oxide (MAG-OX) tablet 800 mg  800 mg Oral BID Elda Colette B, NP   800 mg at 05/21/19 1140  
 sodium chloride (OCEAN) 0.65 % nasal squeeze bottle 2 Spray  2 Spray Both Nostrils Q2H PRN Elda, Colette B, NP      
 dronabinol (MARINOL) capsule 2.5 mg  2.5 mg Oral DAILY Elda, Colette B, NP   2.5 mg at 05/21/19 1122  ceFAZolin (ANCEF) 2 g/20 mL in sterile water IV syringe  2 g IntraVENous Q8H Lucy Van Wert D, NP   2 g at 05/21/19 1140  
 insulin lispro (HUMALOG) injection   SubCUTAneous AC&HS Joey Schroeder B, NP   2 Units at 05/21/19 1317  
 glucose chewable tablet 16 g  4 Tab Oral PRN Edward Meneses B, NP      
 dextrose (D50W) injection syrg 12.5-25 g  12.5-25 g IntraVENous PRN Edward Meneses B, NP   25 g at 05/21/19 0590  glucagon (GLUCAGEN) injection 1 mg  1 mg IntraMUSCular PRN Elda, Colette B, NP      
 traMADol (ULTRAM) tablet 50 mg  50 mg Oral Q8H PRN Elda, Colette B, NP   50 mg at 05/18/19 1924  aspirin chewable tablet 81 mg  81 mg Oral DAILY Elda, Colette B, NP   81 mg at 05/21/19 1122  
 atorvastatin (LIPITOR) tablet 40 mg  40 mg Oral QHS Elda, Colette B, NP   40 mg at 05/20/19 2109  
 sodium chloride (NS) flush 5-40 mL  5-40 mL IntraVENous Q8H Elda, Colette B, NP   10 mL at 05/21/19 6324  sodium chloride (NS) flush 5-40 mL  5-40 mL IntraVENous PRN Elda, Colette B, NP      
 ondansetron (ZOFRAN) injection 4 mg  4 mg IntraVENous Q6H PRN Elda, Colette B, NP   4 mg at 05/20/19 7152  acetaminophen (TYLENOL) tablet 650 mg  650 mg Oral Q6H PRN Krysten Anna B, NP   650 mg at 05/19/19 2316  docusate sodium (COLACE) capsule 100 mg  100 mg Oral PRN Elda, Colette B, NP      
 DOBUTamine (DOBUTREX) 500 mg/250 mL (2,000 mcg/mL) infusion  7.5 mcg/kg/min IntraVENous CONTINUOUS Siva Hernandez MD 19.1 mL/hr at 05/21/19 1237 7.5 mcg/kg/min at 05/21/19 1237  pantoprazole (PROTONIX) tablet 40 mg  40 mg Oral ACB Elda, Colette B, NP   40 mg at 05/21/19 1140 Allergies: Allergies Allergen Reactions  Penicillins Hives ROS:   
Review of Systems Constitutional: Negative for chills, fever and malaise/fatigue. HENT: Negative. Eyes: Negative. Respiratory: Negative. Cardiovascular: Positive for leg swelling. Gastrointestinal: Positive for nausea. Genitourinary: Negative. Musculoskeletal: Negative. Skin: Positive for rash. Neurological: Positive for weakness. Endo/Heme/Allergies: Bruises/bleeds easily. Physical Exam:  
Physical Exam  
Constitutional: He is oriented to person, place, and time. He appears well-developed. No distress. HENT:  
Head: Normocephalic and atraumatic. Eyes: EOM are normal. Right eye exhibits no discharge.  Left eye exhibits no discharge. Neck: Normal range of motion. No JVD present. No tracheal deviation present. Cardiovascular: Normal rate, regular rhythm and intact distal pulses. Murmur heard. Pulmonary/Chest: Effort normal and breath sounds normal. No stridor. No respiratory distress. Abdominal: Soft. Bowel sounds are normal. He exhibits no distension. There is no tenderness. Musculoskeletal: He exhibits edema and deformity. Neurological: He is alert and oriented to person, place, and time. Skin: Skin is warm and dry. Rash noted. No erythema. There is pallor. Scab to left knee Nursing note and vitals reviewed. Vitals:  
Visit Vitals BP 99/74 (BP 1 Location: Right arm, BP Patient Position: At rest) Pulse 80 Temp 98.9 °F (37.2 °C) Resp 11 Ht 5' 11\" (1.803 m) Wt 176 lb 2.4 oz (79.9 kg) SpO2 99% BMI 24.57 kg/m² Temp (24hrs), Av.4 °F (37.4 °C), Min:98.3 °F (36.8 °C), Max:100.4 °F (38 °C) Hemodynamics: 
 CO: CO (l/min): 4.6 l/min CI: CI (l/min/m2): 2.4 l/min/m2 CVP: CVP (mmHg): 11 mmHg (19 1300) SVR: SVR (dyne*sec)/cm5: 1411 (dyne*sec)/cm5 (19 0400) PAP Systolic: PAP Systolic: 45 (/ 9287) PAP Diastolic: PAP Diastolic: 24 ( 0418) PVR:   
 SV02: SVO2 (%): 50 % (19 1300) SCV02:   
 
 
Admission Weight: Last Weight Weight: 186 lb 15.2 oz (84.8 kg) Weight: 176 lb 2.4 oz (79.9 kg) Intake / Output / Drain: 
Last 24 hrs.:  
 
Intake/Output Summary (Last 24 hours) at 2019 1353 Last data filed at 2019 1300 Gross per 24 hour Intake 974.03 ml Output 1160 ml Net -185.97 ml Oxygen Therapy: 
Oxygen Therapy O2 Sat (%): 99 % (19 1300) Pulse via Oximetry: 73 beats per minute (19 1300) O2 Device: Room air (19 0600) O2 Flow Rate (L/min): 4 l/min (19 1600) FIO2 (%): 36 % (19 1146) Recent Labs:  
Labs Latest Ref Rng & Units 2019 5/18/2019 5/17/2019 5/16/2019 WBC 4.1 - 11.1 K/uL 4.9 - 3. 5(L) 3. 1(L) 4.3 6.9 7.1  
RBC 4.10 - 5.70 M/uL 3.43(L) - 3.36(L) 3.51(L) 3.67(L) 3.83(L) 3.91(L) Hemoglobin 12.1 - 17.0 g/dL 9.1(L) - 9. 0(L) 9.3(L) 9.8(L) 10. 3(L) 10. 5(L) Hematocrit 36.6 - 50.3 % 28. 5(L) - 28. 2(L) 29. 5(L) 31. 1(L) 32. 5(L) 34. 1(L) MCV 80.0 - 99.0 FL 83.1 - 83.9 84.0 84.7 84.9 87.2 Platelets 271 - 266 K/uL 91(L) - 99(L) 107(L) 113(L) 137(L) 146(L) Lymphocytes 12 - 49 % - - - - - - 6(L) Monocytes 5 - 13 % - - - - - - 8 Eosinophils 0 - 7 % - - - - - - 1 Basophils 0 - 1 % - - - - - - 0 Albumin 3.5 - 5.0 g/dL 2. 2(L) - 2. 1(L) 2. 2(L) 2. 4(L) 2. 8(L) 2. 8(L) Calcium 8.5 - 10.1 MG/DL 8.4(L) 8. 3(L) 8.0(L) 8. 3(L) 8. 3(L) 8.6 8.6 SGOT 15 - 37 U/L 22 - 23 23 20 17 13(L) Glucose 65 - 100 mg/dL 65 110(H) 92 114(H) 105(H) 168(H) 164(H) BUN 6 - 20 MG/DL 55(H) 55(H) 47(H) 48(H) 48(H) 46(H) 42(H) Creatinine 0.70 - 1.30 MG/DL 2.13(H) 2.16(H) 2.00(H) 1.90(H) 2.11(H) 2.16(H) 2.01(H) Sodium 136 - 145 mmol/L 128(L) 126(L) 131(L) 132(L) 131(L) 130(L) 130(L) Potassium 3.5 - 5.1 mmol/L 4.0 4.0 3.8 4.0 4.0 4.4 4.9 TSH 0.450 - 4.500 uIU/mL - - - - - - -  
LDH 85 - 241 U/L - - 281(H) 260(H) 247(H) 230 -  
 
EKG:  
EKG Results Procedure 720 Value Units Date/Time EKG, 12 LEAD, INITIAL [[id number]] Collected:  05/21/19 1011 Order Status:  Completed Updated:  05/21/19 1135 Ventricular Rate 75 BPM   
  Atrial Rate 105 BPM   
  QRS Duration 84 ms Q-T Interval 392 ms QTC Calculation (Bezet) 437 ms Calculated R Axis 49 degrees Calculated T Axis 87 degrees Diagnosis -- Atrial fibrillation with a competing junctional pacemaker Low voltage QRS Possible Anterolateral infarct (cited on or before 10-MAY-2019) When compared with ECG of 20-MAY-2019 10:48, Atrial fibrillation has replaced Sinus rhythm Nonspecific T wave abnormality now evident in Inferior leads EKG, 12 LEAD, INITIAL [[id number]] Collected:  05/20/19 1048 Order Status:  Completed Updated:  05/20/19 1543 Ventricular Rate 77 BPM   
  Atrial Rate 107 BPM   
  QRS Duration 94 ms Q-T Interval 386 ms QTC Calculation (Bezet) 436 ms Calculated R Axis 48 degrees Calculated T Axis 89 degrees Diagnosis --  
  Sinus tachycardia with 2nd degree AV block (Mobitz I) Low voltage QRS Cannot rule out Anteroseptal infarct (cited on or before 09-MAY-2019) When compared with ECG of 10-MAY-2019 11:21, Sinus rhythm has replaced with AV dissociation Confirmed by Teodora Paris MD. ([id number]) on 5/20/2019 3:42:59 PM 
  
 SCANNED CARDIAC RHYTHM STRIP [[id number]] Collected:  05/20/19 0451 Order Status:  Completed Updated:  05/20/19 1453 SCANNED CARDIAC RHYTHM STRIP [[id number]] Collected:  05/19/19 4224 Order Status:  Completed Updated:  05/19/19 1483 SCANNED CARDIAC RHYTHM STRIP [[id number]] Collected:  05/18/19 2201 Order Status:  Completed Updated:  05/18/19 5128 SCANNED CARDIAC RHYTHM STRIP [[id number]] Collected:  05/17/19 0176 Order Status:  Completed Updated:  05/17/19 5526 SCANNED CARDIAC RHYTHM STRIP [[id number]] Collected:  05/16/19 4904 Order Status:  Completed Updated:  05/16/19 5000 SCANNED CARDIAC RHYTHM STRIP [[id number]] Collected:  05/13/19 0430 Order Status:  Completed Updated:  05/13/19 9783 SCANNED CARDIAC RHYTHM STRIP [[id number]] Collected:  05/13/19 2368 Order Status:  Completed Updated:  05/13/19 109 [facility] SCANNED CARDIAC RHYTHM STRIP [[id number]] Collected:  05/11/19 0321 Order Status:  Completed Updated:  05/11/19 8335 EKG, 12 LEAD, INITIAL [[id number]] Collected:  05/10/19 1121 Order Status:  Completed Updated:  05/10/19 1207 Ventricular Rate 64 BPM   
  Atrial Rate 77 BPM   
  QRS Duration 104 ms Q-T Interval 460 ms QTC Calculation (Bezet) 474 ms Calculated P Axis 41 degrees Calculated R Axis 35 degrees Calculated T Axis 133 degrees Diagnosis --  
  Sinus rhythm with AV dissociation and Junctional rhythm Low voltage QRS Cannot rule out Anteroseptal infarct (cited on or before 09-MAY-2019) When compared with ECG of 09-MAY-2019 20:01, 
Junctional rhythm has replaced Atrial fibrillation Questionable change in initial forces of Anterior leads Nonspecific T wave abnormality no longer evident in Inferior leads Nonspecific T wave abnormality, improved in Lateral leads Confirmed by West Hodgkin, MD, Jose Mar ([id number]) on 5/10/2019 12:07:00 PM 
  
 EKG, 12 LEAD, INITIAL [[id number]] Collected:  05/09/19 2001 Order Status:  Completed Updated:  05/10/19 6033 Ventricular Rate 60 BPM   
  Atrial Rate 60 BPM   
  QRS Duration 86 ms   
  Q-T Interval 428 ms QTC Calculation (Bezet) 428 ms Calculated R Axis 34 degrees Calculated T Axis 142 degrees Diagnosis -- Atrial fibrillation Low voltage QRS Septal infarct , age undetermined No previous ECGs available Confirmed by West Hodgkin, MD, Jose Mar ([id number]) on 5/10/2019 8:37:23 AM 
  
 [address] [place] [address] [[id number]] Collected:  05/10/19 9179 Order Status:  Completed Updated:  05/10/19 5292 Echocardiogram: · Left Ventricle: Mildly dilated left ventricle. Estimated left ventricular ejection fraction is 16 - 20%. · Aortic Valve: Severe aortic stenosis, mean gradient is 28 mmHg. Aortic valve area is 0.8 cm2. · Tricuspid Valve: Mild to moderate tricuspid valve regurgitation is present with moderate pulmonary HTN, estimated PA of 55 mmHg. Left Ventricle Normal wall thickness. Mildly dilated left ventricle. The muscle mass is normal. The cavity shape is normal. Severe and segmental systolic dysfunction. The estimated ejection fraction is 16 - 20%. Visually measured ejection fraction. Abnormal wall motion as described on the wall scoring diagram below. Unable to assess diastolic function. Wall Scoring The following segments are akinetic: apical anterior, apical septal, apical inferior, apical lateral and apex. The following segments are hypokinetic: mid anterior, mid anteroseptal, mid inferoseptal, mid inferior, mid inferolateral and mid anterolateral. 
All other segments are normal.  
  
  
  
Left Atrium Normal cavity size. No atrial septal defect present. No patent foramen ovale visualized. Right Ventricle Normal cavity size, wall thickness and global systolic function. Right Atrium Normal cavity size. Aortic Valve Mild aortic valve sclerosis with reduced excursion. Aortic valve peak gradient is 45 mmHg. Aortic valve mean gradient is 28 mmHg. Aortic valve area is 0.8 cm2. Aortic valve peak velocity is 3.4 cm/s. There is severe aortic stenosis. Trace aortic valve regurgitation. Mitral Valve Normal valve structure and no stenosis. Mild regurgitation. Tricuspid Valve Normal valve structure and no stenosis. Mild to moderate tricuspid valve regurgitation. Pulmonary arterial systolic pressure is 61.8 mmHg. Moderate pulmonary hypertension. Pulmonic Valve Pulmonic valve not well visualized. Aorta Normal aortic root, ascending aortic, and aortic arch. Pericardium Normal pericardium and no evidence of pericardial effusion. CT scans: CXR Results  (Last 48 hours) 05/21/19 0448  XR CHEST PORT Final result Impression:  IMPRESSION:  
No interval change. Narrative:  PORTABLE CHEST RADIOGRAPH/S: 5/21/2019 4:48 AM  
   
Clinical history: Impella INDICATION:   Impella COMPARISON: 5/20/2019 FINDINGS:  
AP portable upright view of the chest demonstrates stable  cardiopericardial  
silhouette. The lungs are adequately expanded. Minimal left basilar atelectasis  
and effusion. Pulmonary catheter unchanged cardiac assist device unchanged in  
appearance. . The osseous structures are unremarkable. Patient is on a cardiac  
monitor. 05/20/19 0355  XR CHEST PORT Final result Impression:  IMPRESSION:  
1. Improved aeration with decreasing basilar atelectasis Narrative:  EXAM: XR CHEST PORT INDICATION: Impella COMPARISON: 5/19/2019 FINDINGS: A portable AP radiograph of the chest was obtained at 0342 hours. The  
patient is on a cardiac monitor. The cardiac assist device and right IJ [place]-Jennifer catheter are in satisfactory position. The lungs demonstrate improved aeration with decreasing basilar atelectasis. No  
pulmonary edema no pneumothorax. Small right pleural effusion is suspected. BRENDEN Lord [id number] [place] [address], [address] [place] [address] Office [phone] Fax [phone] 
24 hour VAD/HF Pager: [phone] AHF ATTENDING ADDENDUM Patient was seen and examined in person. Data and notes were reviewed. I have discussed and agree with the plan as noted in the NP note above without further additions. Nik Ordonez MD PhD 
[facility] [address] [place] [address]

## 2019-05-21 NOTE — PROGRESS NOTES
1945: Report received from [place], [place]. Gtts checked and verified. 2000: Patient ambulated to toilet to attempt urination/BM. Unsuccessful x2; patient has not voided since [facility] removal @ 1100. Bladder scanned for 216cc and patient stating no urge to go. 0000: Patient has still not voided and states no urge. Bladder scanned for varying amounts from 200->350cc. Plan to straight cath patient as it has been >12hrs since last void and patient has received x2 Bumex doses without response. Patient very upset and exclaiming \"For Gods sake don't do that. I'll try to go, I just don't understand what's wrong with me. \" Reassurance and education provided by RN; patient replying \"Just one more thing that's gone wrong in here. \" Will continue to bladder scan q2.  
0100: Patient still unable to void. Straight cath'ed for 700cc. UA sent. 0400: Unable to obtain arterial tracing. Dressing removed and several kinks/bends noted at catheter hub. Able to readjust and redress with blood return and appropriate waveform. 0455: Critical . Halley GENAO notified; MD stated to change to D5 only purge fluid. [id number] Meneses MD increased Impella to P9. 
0550: Noted BS on BMP to be 65. POC 63 - 1 amp D50 given. 0630: Recheck BS WNL. Patient finally resting after being up until ~0400. Will refer AM bladder scan to allow for patient rest.  
0745: Bedside shift change report given to Salo Fleming RN  (oncoming nurse) by Charis Aguilar RN (offgoing nurse). Report included the following information SBAR, Intake/Output, MAR and Recent Results. Problem: Heart Failure: Day 4 Goal: Activity/Safety Outcome: Progressing Towards Goal 
Goal: Diagnostic Test/Procedures Outcome: Progressing Towards Goal 
Goal: Nutrition/Diet Outcome: Not Progressing Towards Goal 
Goal: Medications Outcome: Not Progressing Towards Goal

## 2019-05-21 NOTE — PROGRESS NOTES
Problem: Mobility Impaired (Adult and Pediatric) Goal: *Acute Goals and Plan of Care (Insert Text) Description Physical Therapy Goals Initiated 5/17/2019 1. Patient will move from supine to sit and sit to supine , scoot up and down and roll side to side in bed with minimal assistance/contact guard assist within 7 day(s). 2.  Patient will transfer from bed to chair and chair to bed with minimal assistance/contact guard assist using the least restrictive device within 7 day(s). 3.  Patient will perform sit to stand with minimal assistance/contact guard assist within 7 day(s). 4.  Patient will ambulate with minimal assistance/contact guard assist for 50 feet with the least restrictive device within 7 day(s). 5.  Patient will ascend/descend 4 stairs with no handrail(s) with minimal assistance/contact guard assist within 7 day(s). 6.  Patient will complete a 6MWT within 48 hours of discharge. Initiated 5/10/2019 1. Patient will move from supine to sit and sit to supine , scoot up and down and roll side to side in bed with independence within 7 day(s). 2.  Patient will transfer from bed to chair and chair to bed with modified independence using the least restrictive device within 7 day(s). 3.  Patient will perform sit to stand with modified independence within 7 day(s). 4.  Patient will ambulate with modified independence for 300 feet with the least restrictive device within 7 day(s). 5.  Patient will ascend/descend 4 stairs with no handrail(s) with modified independence within 7 day(s). 6.  Patient will participate in a six minute walk test within 48 hours prior to discharge. Outcome: Progressing Towards Goal 
PHYSICAL THERAPY TREATMENT Patient: Danielle Montaño (77 y.o. male) Date: 5/21/2019 Diagnosis: Acute on chronic systolic (congestive) heart failure (HCC) [I50.23] <principal problem not specified> Procedure(s) (LRB): 
RIGHT AXILLARY IMPELLA/ 4219 (Right) 5 Days Post-Op Precautions: Fall(R axillary impella) Chart, physical therapy assessment, plan of care and goals were reviewed. ASSESSMENT: 
Patien received in chair and agreeable to return to bed. Mobility completed with moderate assist x1 for sit to stand tranfers and min to side step to bed. Noted center of gravity shifted posteriorly requiring cues to extend hips/knees. Patient is making slow gains with activity tolerance limited in by the length of lines/leads more than his own ability. Progression toward goals: 
?    Improving appropriately and progressing toward goals ? Improving slowly and progressing toward goals ? Not making progress toward goals and plan of care will be adjusted PLAN: 
Patient continues to benefit from skilled intervention to address the above impairments. Continue treatment per established plan of care. Discharge Recommendations: To Be Determined Further Equipment Recommendations for Discharge:  to be determined SUBJECTIVE:  
Patient stated Tony Hanks gave me kind of a sling but my groin hurts. ? OBJECTIVE DATA SUMMARY:  
Critical Behavior: 
Neurologic State: Alert Orientation Level: Oriented X4 Cognition: Follows commands, Decreased attention/concentration, Memory loss Safety/Judgement: Awareness of environment, Insight into deficits Functional Mobility Training: 
Bed Mobility: 
Returned to bed via chair to bed Transfers: 
Sit to Stand: Minimum assistance Stand to Sit: Minimum assistance Bed to Chair: Moderate assistance; Additional time Balance: 
Sitting: Intact Standing: Impaired Standing - Static: Fair Standing - Dynamic : Poor Ambulation/Gait Training: 
Marching in place x10 reps with minimal assist  
 
Pain: 
Pain Scale 1: Numeric (0 - 10) Pain Intensity 1: 0 Activity Tolerance:  
Please refer to the flowsheet for vital signs taken during this treatment. After treatment: ?    Patient left in no apparent distress sitting up in chair ? Patient left in no apparent distress in bed 
? Call bell left within reach ? Nursing notified ? Caregiver present ? Bed alarm activated COMMUNICATION/COLLABORATION:  
The patient?s plan of care was discussed with: Registered Nurse Kacey Jimenez, PT, DPT Time Calculation: 12 mins

## 2019-05-21 NOTE — PROGRESS NOTES
Problem: Self Care Deficits Care Plan (Adult) Goal: *Acute Goals and Plan of Care (Insert Text) Description Occupational Therapy Goals Initiated 5/21/2019 1. Patient will perform grooming standing at sink with contact guard assist within 7 day(s). 2.  Patient will perform bathing with supervision/set-up within 7 day(s). 3.  Patient will perform lower body dressing with contact guard assist within 7 day(s). 4.  Patient will perform toilet transfers with contact guard assist within 7 day(s). 5.  Patient will perform all aspects of toileting with contact guard assist within 7 day(s). 6.  Patient will participate in upper extremity therapeutic exercise/activities with supervision/set-up for 10 minutes within 7 day(s). 7.  Patient will utilize energy conservation techniques during functional activities with verbal cues within 7 day(s). Outcome: Progressing Towards Goal 
 
OCCUPATIONAL THERAPY EVALUATION Patient: Juanito Meza (77 y.o. male) Date: 5/21/2019 Primary Diagnosis: Acute on chronic systolic (congestive) heart failure ([facility] [address] [I50.23] Procedure(s) (LRB): 
RIGHT AXILLARY IMPELLA/ 4219 (Right) 5 Days Post-Op Precautions:  Fall(R axillary impella) ASSESSMENT : 
Based on the objective data described below, patient presents with Setup upper body ADLs, Moderate Assistance lower body ADLs, and Minimum assistance x2 for sit-stand/ side stepping to chair. Patient is below functional baseline. The following are barriers to ADL independence while in acute care:  
- Cognitive and/or behavioral: [facility] with [facility] to assess cognition in areas of visuospatial, executive, naming, memory, attention, language, abstraction, delayed recall, orientation. Patient scored 19/30. Normal score is greater than or equal to 26/30. Points deducted in all categories.   
- Medical condition: Standing balance, pain (at lowe insertion, RN notified), ROM, strength, activity tolerance, cardiopulmonary endurance. Patient will benefit from skilled acute intervention to address the above impairments. Patient?s rehabilitation potential is considered to be Good Prior level of function: Patient reports he lives with close friends. Independent with ADLs/ IADLs and ambulatory with no AD. Endorses 2-3 falls within past 6 months, all on stairs. 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 PLAN : 
Recommendations and Planned Interventions: self care training, functional mobility training, therapeutic exercise, balance training, therapeutic activities, cognitive retraining, endurance activities, patient education, home safety training and family training/education Frequency/Duration: Patient will be followed by occupational therapy 5 times a week to address goals. Discharge recommendations: TBD, anticipate inpatient rehab pending hospital course/ progress. Noted LVAD workup. SUBJECTIVE:  
Patient stated ? My penis hurts. \" RN notified. OBJECTIVE DATA SUMMARY:  
HISTORY:  
Past Medical History:  
Diagnosis Date 3-vessel coronary artery disease Aortic stenosis, severe Chronic kidney disease (CKD), stage III (moderate) (HCC) Chronic total occlusion of coronary artery Hypertension Ischemic cardiomyopathy Peripheral vascular disease ([facility] [address] Type 2 diabetes mellitus treated without insulin ([facility] [address] Past Surgical History:  
Procedure Laterality Date HX AMPUTATION TOE Left 2017 Expanded or extensive additional review of patient history:  
 
Home Situation Home Environment: Private residence # Steps to Enter: 4 Rails to Enter: Yes Hand Rails : Bilateral 
One/Two Story Residence: Two story, live on 1st floor Living Alone: No 
Support Systems: Friends \ neighbors(live together) Patient Expects to be Discharged to[de-identified] Private residence Current DME Used/Available at Home: Glucometer, Blood pressure cuff EXAMINATION OF PERFORMANCE DEFICITS: 
Cognitive/Behavioral Status: 
Neurologic State: Alert Orientation Level: Oriented X4 Cognition: Follows commands;Decreased attention/concentration;Memory loss Perception: Appears intact Perseveration: No perseveration noted Safety/Judgement: Awareness of environment; Insight into deficits [place] Cognitive Assessment [facility]): 
 
Patient scored 19/30. Points deducted in all categories. Confounding factor(s) include: distracted by pain The [place] Cognitive Assessment [facility]) is designed to assess cognition in areas of visuospatial, executive, naming, memory, attention, language, abstraction, delayed recall, and orientation. Score of greater than or equal to 26/30 is considered normal cognition. Score of less than 26 indicates mild cognitive impairment. Score of 16 or lower indicates severe cognitive impairment. Lilibeth Oneal I., Leeroy Ngo., EUGENE Minor (2005). The [facility] Cognitive Assessment, [place]: A brief screening tool for mild cognitive impairment. Journal of the Scott Matthias, 84, [phone]. Skin: Blood on penis around lowe. RN notified Edema: none noted Hearing: Auditory Auditory Impairment: None Vision/Perceptual:   
    
    
    
  
    
Acuity: Within Defined Limits;Able to read clock/calendar on wall without difficulty; Able to read employee name badge without difficulty Corrective Lenses: Reading glasses Range of Motion: 
AROM: Generally decreased, functional 
  
  
  
  
  
  
  
Strength: 
Strength: Generally decreased, functional 
  
  
  
  
Coordination: 
Coordination: Generally decreased, functional 
Fine Motor Skills-Upper: Left Intact; Right Intact Gross Motor Skills-Upper: Left Intact; Right Intact Tone & Sensation: 
Tone: Normal 
Sensation: Intact Balance: 
Sitting: Intact Standing: Impaired Standing - Static: Fair Standing - Dynamic : Poor Functional Mobility and Transfers for ADLs: 
Bed Mobility: 
  
 
Transfers: 
Stand to Sit: Minimum assistance;Assist x2 Bed to Chair: Minimum assistance;Assist x2 ADL Assessment: 
Feeding: Independent(inferred) Oral Facial Hygiene/Grooming: Setup(inferred) Bathing: Moderate assistance(inferred) Upper Body Dressing: Setup(inferred) Lower Body Dressing: Moderate assistance(inferred for LB access and standing) Toileting: Moderate assistance(inferred) ADL Intervention and task modifications: 
  
 
  
 
  
 
  
 
  
 
  
 
  
 
Cognitive Retraining Safety/Judgement: Awareness of environment; Insight into deficits Functional Measure: 
Barthel Index: 
Bathin Bladder: 0 Bowels: 10 
Groomin Dressin Feeding: 10 Mobility: 0 Stairs: 0 Toilet Use: 5 Transfer (Bed to Chair and Back): 10 Total: 45/100 Percentage of impairment  
0% 1-19% 20-39% 40-59% 60-79% 80-99% 100% Barthel Score 0-100 100 99-80 79-60 59-40 20-39 1-19 
 0 The Barthel ADL Index: Guidelines 1. The index should be used as a record of what a patient does, not as a record of what a patient could do. 2. The main aim is to establish degree of independence from any help, physical or verbal, however minor and for whatever reason. 3. The need for supervision renders the patient not independent. 4. A patient's performance should be established using the best available evidence. Asking the patient, friends/relatives and nurses are the usual sources, but direct observation and common sense are also important. However direct testing is not needed. 5. Usually the patient's performance over the preceding 24-48 hours is important, but occasionally longer periods will be relevant. 6. Middle categories imply that the patient supplies over 50 per cent of the effort. 7. Use of aids to be independent is allowed. Clarita Cruz., Barthel, D.W. (7112). Functional evaluation: the Barthel Index. [address] (14)2. KASSIDY Murray, Tiesha Levin.Manuel, [address] (1999). Measuring the change indisability after inpatient rehabilitation; comparison of the responsiveness of the Barthel Index and Functional [place] Measure. Journal of Neurology, Neurosurgery, and Psychiatry, 66(4), 459-831. CURTIS Park, VÍCTOR Green, & Lisa Hawk MANDRÉS. (2004.) Assessment of post-stroke quality of life in cost-effectiveness studies: The usefulness of the Barthel Index and the EuroQoL-5D. [facility], 13, 143-73 Occupational Therapy Evaluation Charge Determination History Examination Decision-Making LOW Complexity : Brief history review  MEDIUM Complexity : 3-5 performance deficits relating to physical, cognitive , or psychosocial skils that result in activity limitations and / or participation restrictions MEDIUM Complexity : Patient may present with comorbidities that affect occupational performnce. Miniml to moderate modification of tasks or assistance (eg, physical or verbal ) with assesment(s) is necessary to enable patient to complete evaluation Based on the above components, the patient evaluation is determined to be of the following complexity level: LOW Pain: 
Patient reports pain at lowe insertion. RN notified Activity Tolerance: VSS Please refer to the flowsheet for vital signs taken during this treatment. After treatment patient left:  
Up in chair Call light within reach RN notified COMMUNICATION/EDUCATION:  
The patient?s plan of care was discussed with: Physical Therapist and Registered Nurse.  
 
Home safety education was provided and the patient/caregiver indicated understanding., Patient/family have participated as able in goal setting and plan of care. and Patient/family agree to work toward stated goals and plan of care. This patient?s plan of care is appropriate for delegation to SHADI. Thank you for this referral. 
Susi Gordon, OT Time Calculation: 35 mins

## 2019-05-21 NOTE — PROGRESS NOTES
NYHA class IV A/C systolic HF Severe AS 
PAD 
A/C kidney disease CAD LVAD work-up S/P Impella Pre-op Plan Platelets INR Creatinine OR plan Drive-line RCA graft Impella [facility] - severe 3 vessel disease; RCA has severe calcification and blockages; will need to try and place RCA graft ECHO - severely depressed EF with dilated LV cavity (EF 10%; LVIDD 4.77); RV dilated (RVIDD 4.1; TAPSE 0.65, RV/LV ratio 0.85); mild to moderate TR; trace AI 
 
RHC (Impella in place; Dobutamine 7.5) - CVP 5, PA 45/17 (25), CVP/PA ratio 0.2 
 
**overall patient has moderate to severe RV dysfunction Chest/Abdominal CT scan - no significant calcification in aorta, should be good for outflow graft; thin adipose tissue over abdominal wall so need to be careful with drive-line; elevated right billy-diaphragm Cardiac MRI - poor global viability PAUL - no compressible vessels Carotids - no significant disease PFTs - pending Hgb 9, platelets 91, INR 1.4 Creatinine 2.13, BUN 55 (most likely dried him out a bit too much - will back off on diuretics) AST 22, ALT 6, alk phos 73, bilirubin 0.5 Lactic acid - 0.7 NT pro-BNP > 35,000 Impella - flow 5.0 L/min @ P-9 Went over issues with HF team  
 
Intake/Output Summary (Last 24 hours) at 5/21/2019 1119 Last data filed at 5/21/2019 1100 Gross per 24 hour Intake 1279.43 ml Output 1100 ml Net 179.43 ml Risk of morbidity and mortality - high Medical decision making - high complexity Total critical care time - 285 minutes (CPT 10644, 99292 x 8)

## 2019-05-21 NOTE — PROGRESS NOTES
0800 report received from Olga Lidia Martell pt in bed appears comfortable. 1000 labs sent, urine sent to lab, blood cultures sent. Bladder scan showed 580 in his bladder. Regular lowe inserted and met resistance. Coude catheter then placed and drained 400cc cas/tea colored urine. Clots noted in urine also. EKG obtained. 1200  Pt up to chair working with OT 
 
1325 spoke with Alfredo Flowers and updated on pt condition Ana has had 2 suction alarms and then goes back to current screen with flows as low as 1.4-1.5 and then they go back up to 4.9. Also informed Louann Obrien, BRENDEN 
 
8385 Dr Maya Song here to change out lines. He placed a new A line in the left wrist. Old site started to bleed. Held pressure and reinforced dressing. Was getting ready to redress and he asked that we hold off on that for now. He had to go to attend to something else and will return soon. [address] [facility] Dr Maya Song here to change out mac swbrianna 
 
1900 Pulled [address] [place] left Mac cath in for now. Awaiting PCXR for placement of new basim/jud 
 
1930 Dr Bud Duggan here to see patient Ana Laura Hunger xray here to do [facility] for verification of line placement 2015 Bedside shift change report given to Alberto Kingsley (oncoming nurse) by Rosana Allen RN (offgoing nurse). Report included the following information SBAR, Kardex, Intake/Output, MAR and Recent Results.

## 2019-05-21 NOTE — OP NOTES
[address] [place]  
OPERATIVE REPORT Name:  Navarro Lutz 
MR#:  [id number] :  1950 ACCOUNT #:  [de-identified] DATE OF SERVICE:  2019 PREOPERATIVE DIAGNOSES: 
1. Cardiogenic shock. 2.  Previous placement of Impella 5.0 right axillary device. POSTOPERATIVE DIAGNOSES: 
1. Cardiogenic shock. 2.  Previous placement of Impella 5.0 right axillary device. PROCEDURE PERFORMED:  Repositioning of percutaneous ventricular assist device with imaging guidance at separate and distinct session from insertion (CPT code 93867). SURGEON:  Hill Carty MD 
 
ASSISTANT:  None. ANESTHESIA:  None. COMPLICATIONS:  None. SPECIMENS REMOVED:  None. IMPLANTS:  none. ESTIMATED BLOOD LOSS:  none. PROCEDURE:  The patient is a pleasant 70-year-old gentleman who had a right axillary artery Impella placed recently. He was having suction events. With bedside echo, it was noted that his Impella had actually gone a little bit deep. We pulled it back 1 cm. He tolerated the procedure well. I was present for the entire procedure. Marco A Trinh MD 
 
 
SF/ZONIA_JOSE_I/[id number]_P 
D:  2019 11:34 
T:  2019 14:20 
JOB #:  [id number]

## 2019-05-21 NOTE — DIABETES MGMT
DTC Progress Note Recommendations/ Comments: Chart reviewed due to hypoglycemia. Noted FBG of 63 mg/dl this am. Per MAR review, glipizide has been reduced from 7.5 to 5 mg BID. May want to hold or reduce dose if PO is <50%. Current hospital DM medication: glipizide, 5 mg BID Lispro correction scale, normal sensitivity Chart reviewed on Ruthy Corcoran. Patient is a 77 y.o. male with know DM on amaryl, 4 mg BID, Trulicity 1.5 mg weekly, invokana 300 mg daily and metformin, 1000 mg BID at home. A1c:  
Lab Results Component Value Date/Time Hemoglobin A1c 6.9 (H) 05/02/2019 04:09 PM  
 
 
Recent Glucose Results:  
Lab Results Component Value Date/Time GLU 65 05/21/2019 03:55 AM  
  (H) 05/20/2019 05:29 PM  
 GLUCPOC 139 (H) 05/21/2019 06:27 AM  
 GLUCPOC 63 (L) 05/21/2019 05:57 AM  
 GLUCPOC 76 05/20/2019 09:55 PM  
  
 
Lab Results Component Value Date/Time Creatinine 2.13 (H) 05/21/2019 03:55 AM  
 
Estimated Creatinine Clearance: 34.4 mL/min (A) (based on SCr of 2.13 mg/dL (H)). Active Orders Diet DIET DIABETIC WITH OPTIONS Consistent Carb 2000kcal; Regular; 2 GM NA (House Low NA) PO intake:  
Patient Vitals for the past 72 hrs: 
 % Diet Eaten 05/20/19 1300 25 % 05/20/19 0900 0 % 05/19/19 1800 0 % 05/19/19 1500 75 % 05/19/19 1300 0 % 05/19/19 0915 25 % Will continue to follow as needed. Thank you Singh De Dios RD Time spent: 6 minutes

## 2019-05-21 NOTE — CONSULTS
Consult Patient: Nicolette Barone MRN: [id number]  SSN: [id number] YOB: 1950  Age: 77 y.o. Sex: male Subjective: Feeling very fatigued. Date of  Admission: 5/9/2019 Admission type: Urgent Mr. Milly Mensah is a 77y.o. year old male with a history of HTN, T2DM, PVD, CKD, ICM, Severe AS who presents for further evaluation of chronic systolic heart failure. He developed progressive dyspnea with exacterbation as well as progressive fatigue which prompted further evaluation with a heart cath at [facility] that revealed severe 3 vessel disease with  of his LAD and RCA, severe LV systolic failure (LVEF 63%), and severe As. Has has been unable to walk a block before without limiting dyspnea as well as unable to make a bed without developing dyspnea. Admitted to [facility] for acute chronic HFrEF, CMRI showed no viability for LAD, taken for impella placement for bridge to candidacy. On telemetry, he has been found to have irregular rhythm with what concerns for AV dissociation. Therefore, cardiology was called fur further evaluation. Patient denies any lightheadedness or syncope but reports feeling fatigued all the time. Per telemetry, he appears to be in AF with controlled ventricular rates. He is currently on Dobutamine infusion and diuresing well. He also has an Impella in place with good cardiac outputs. 24Hr Events:  
Continues to have intermittent suction alarms Impella increased to P-9 d/t worsening renal function Cx pending Supra-therapeutic pTTs Primary Care Provider: Luzma Appiah MD 
Past Medical History:  
Diagnosis Date  3-vessel coronary artery disease  Aortic stenosis, severe  Chronic kidney disease (CKD), stage III (moderate) ([facility])  Chronic total occlusion of coronary artery  Hypertension  Ischemic cardiomyopathy  Peripheral vascular disease ([facility] [address]  Type 2 diabetes mellitus treated without insulin ([facility] [address] Past Surgical History:  
Procedure Laterality Date  HX AMPUTATION TOE Left 2017 History reviewed. No pertinent family history. Social History Tobacco Use  Smoking status: Former Smoker  Smokeless tobacco: Never Used Substance Use Topics  Alcohol use: Not Currently Current Facility-Administered Medications Medication Dose Route Frequency  glipiZIDE (GLUCOTROL) tablet 5 mg  5 mg Oral ACB&D  
 tamsulosin (FLOMAX) capsule 0.4 mg  0.4 mg Oral DAILY  bumetanide (BUMEX) injection 1 mg  1 mg IntraVENous TID  albumin human 25% (BUMINATE) solution 12.5 g  12.5 g IntraVENous TID  levoFLOXacin (LEVAQUIN) 750 mg in D5W IVPB  750 mg IntraVENous Q48H  potassium chloride (KLOR-CON) packet for solution 40 mEq  40 mEq Oral BID WITH MEALS  senna-docusate (PERICOLACE) 8.6-50 mg per tablet 1 Tab  1 Tab Oral BID  polyethylene glycol (MIRALAX) packet 17 g  17 g Oral BID  
 balsam peru-castor oil (VENELEX) ointment   Topical BID  
 0.9% sodium chloride infusion  9 mL/hr IntraVENous CONTINUOUS  
 dextrose 5% infusion  4-20 mL/hr IntraVENous CONTINUOUS  
 calcium carbonate (TUMS) chewable tablet 200 mg [elemental]  200 mg Oral TID PRN  
 bumetanide (BUMEX) injection 1 mg  1 mg IntraVENous Q6H PRN  
 magnesium oxide (MAG-OX) tablet 800 mg  800 mg Oral BID  sodium chloride (OCEAN) 0.65 % nasal squeeze bottle 2 Spray  2 Spray Both Nostrils Q2H PRN  
 dronabinol (MARINOL) capsule 2.5 mg  2.5 mg Oral DAILY  ceFAZolin (ANCEF) 2 g/20 mL in sterile water IV syringe  2 g IntraVENous Q8H  
 insulin lispro (HUMALOG) injection   SubCUTAneous AC&HS  
 glucose chewable tablet 16 g  4 Tab Oral PRN  
 dextrose (D50W) injection syrg 12.5-25 g  12.5-25 g IntraVENous PRN  
 glucagon (GLUCAGEN) injection 1 mg  1 mg IntraMUSCular PRN  
 traMADol (ULTRAM) tablet 50 mg  50 mg Oral Q8H PRN  
 aspirin chewable tablet 81 mg  81 mg Oral DAILY  atorvastatin (LIPITOR) tablet 40 mg  40 mg Oral QHS  sodium chloride (NS) flush 5-40 mL  5-40 mL IntraVENous Q8H  
 sodium chloride (NS) flush 5-40 mL  5-40 mL IntraVENous PRN  
 ondansetron (ZOFRAN) injection 4 mg  4 mg IntraVENous Q6H PRN  
 acetaminophen (TYLENOL) tablet 650 mg  650 mg Oral Q6H PRN  
 docusate sodium (COLACE) capsule 100 mg  100 mg Oral PRN  
 DOBUTamine (DOBUTREX) 500 mg/250 mL (2,000 mcg/mL) infusion  7.5 mcg/kg/min IntraVENous CONTINUOUS  
 pantoprazole (PROTONIX) tablet 40 mg  40 mg Oral ACB Allergies Allergen Reactions  Penicillins Hives Review of Systems: A comprehensive review of systems was negative except for that written in the History of Present Illness. Subjective:  
 
Visit Vitals BP 99/74 (BP 1 Location: Right arm, BP Patient Position: At rest) Pulse 80 Temp 98.9 °F (37.2 °C) Resp 14 Ht 5' 11\" (1.803 m) Wt 79.9 kg (176 lb 2.4 oz) SpO2 97% BMI 24.57 kg/m² Physical Exam: 
Gen: sitting up in bed, NAD, AAOx3 Lungs: Faint bibasilar rales CV: Impella hum, 2+ bilateral lower extremities. Abd: soft, nt,nd Cardiographics: 
Telemetry: AFIB 
ECG: normal EKG, normal sinus rhythm, unchanged from previous tracings Echocardiogram: Abnormal, and reviewed by myself:  
 
Data Reviewed: All lab results for the last 24 hours reviewed. Assessment:  
  
  
Hospital Problems  Date Reviewed: 5/16/2019 Codes Class Noted POA Acute on chronic systolic (congestive) heart failure (HCC) ICD-10-CM: C92.13 ICD-9-CM: 428.23, 428.0  5/9/2019 Unknown Plan:  
 
 
Per reviewing the telemetry, patient appears to be in AF with controlled ventricular rates as his rhythm is irregular with narrow QRS. As he remains completely asymptomatic from this arrhythmia, there is no urgent indication for aggressive intervention currently.  
He is on Heparin gtt for anticoagulation due to the Impella which would reduce his stroke risk. Continue his ongoing heart failure therapy. Thank you for this consult. Please call me back with questions regarding his management.

## 2019-05-21 NOTE — PROGRESS NOTES
[facility] ICU Progress Note Admit Date: 2019 POD: 5 Procedure:  Procedure(s): RIGHT AXILLARY IMPELLA/ [id number] Subjective:  
Pt seen with Dr. Jeannie Mock. Lying in bed, negative outlook on his situation. Remains on dobutamine 7.5 mcg. Having some issues with urinary retention and low BS overnight. T max 100.4F Objective:  
Vitals: 
Blood pressure 99/74, pulse 69, temperature 98.4 °F (36.9 °C), resp. rate 11, height 5' 11\" (1.803 m), weight 79.9 kg (176 lb 2.4 oz), SpO2 97 %. Temp (24hrs), Av.5 °F (37.5 °C), Min:98.3 °F (36.8 °C), Max:100.4 °F (38 °C) Hemodynamics: 
 CO: CO (l/min): 5 l/min CI: CI (l/min/m2): 2.6 l/min/m2 CVP: CVP (mmHg): 21 mmHg (19 0900) SVR: SVR (dyne*sec)/cm5: 1411 (dyne*sec)/cm5 (19 0400) PAP Systolic: PAP Systolic: 63 (1447/61 5145) PAP Diastolic: PAP Diastolic: 32 (38/24/92 6918) PVR:   
 SV02: SVO2 (%): 54 % (19 0900) SCV02:   
 
EKG/Rhythm:  Afib - rate controlled Oxygen Therapy: 
Oxygen Therapy O2 Sat (%): 97 % (19 0900) Pulse via Oximetry: 68 beats per minute (19 0900) O2 Device: Room air (19 0600) O2 Flow Rate (L/min): 4 l/min (19 1600) FIO2 (%): 36 % (19 1146) CXR:  
CXR Results  (Last 48 hours) 19 2938  XR CHEST PORT Final result Impression:  IMPRESSION:  
No interval change. Narrative:  PORTABLE CHEST RADIOGRAPH/S: 2019 4:48 AM  
   
Clinical history: Impella INDICATION:   Impella COMPARISON: 2019 FINDINGS:  
AP portable upright view of the chest demonstrates stable  cardiopericardial  
silhouette. The lungs are adequately expanded. Minimal left basilar atelectasis  
and effusion. Pulmonary catheter unchanged cardiac assist device unchanged in  
appearance. . The osseous structures are unremarkable. Patient is on a cardiac  
monitor. 19 0355  XR CHEST PORT Final result  Impression:  IMPRESSION:  
 1. Improved aeration with decreasing basilar atelectasis Narrative:  EXAM: XR CHEST PORT INDICATION: Impella COMPARISON: 2019 FINDINGS: A portable AP radiograph of the chest was obtained at 0342 hours. The  
patient is on a cardiac monitor. The cardiac assist device and right IJ [place]-Jennifer catheter are in satisfactory position. The lungs demonstrate improved aeration with decreasing basilar atelectasis. No  
pulmonary edema no pneumothorax. Small right pleural effusion is suspected. Admission Weight: Last Weight Weight: 84.8 kg (186 lb 15.2 oz) Weight: 79.9 kg (176 lb 2.4 oz) Intake / Output / Drain: 
Current Shift: No intake/output data recorded. Last 24 hrs.:  
 
Intake/Output Summary (Last 24 hours) at 2019 2252 Last data filed at 2019 0700 Gross per 24 hour Intake 1340.14 ml Output 810 ml Net 530.14 ml EXAM: 
General:  Alert, NAD Lungs:   Clear upper, diminished bases to auscultation bilaterally. Incision:  impella dsg cdi Heart:  irregular rate and rhythm, S1, S2 normal, no murmur, click, rub or gallop. Abdomen:   Soft, non-tender. Bowel sounds normal. No masses,  No organomegaly. Extremities:  2+ LE edema. PPP. Neurologic:  Gross motor and sensory apparatus intact. Labs:  
Recent Labs  
  19 
8192  19 
0355 WBC  --   --  4.9 HGB  --   --  9.1* HCT  --   --  28.5* PLT  --   --  91* NA  --   --  128* K  --   --  4.0  
BUN  --   --  55* CREA  --   --  2.13* GLU  --   --  65  
GLUCPOC 139*   < >  --   
 < > = values in this interval not displayed. Assessment:  
 
Active Problems: 
  Acute on chronic systolic (congestive) heart failure ([address] (2019) Plan/Recommendations/Medical Decision Makin.  Acute on chronic systolic heart failure (NYHA class IV on admission): AHF following, on dobut 7.5, bumex gtt switched to scheuled to be given with albumin, aldactone stopped, LVAD workup ongoing. Purge now D5 due to high PTT. BNP >35K. On ancef for impella ppx. Echo on 5/20/19 EF 15-20% 2. Severe AS: no plans for TAVR at this time 3. CAD with  of RAD and RCA: on ASA and statin, no BB dt dobut. Cardiac MRI showed nonviability of LV. 4. Afib: on eliquis - hold for impella 5. HLD: on lipitor 6. DM type II: on glipizide - decrease dose due to lower BS this morning, SSI, A1C 6.9 7. CKD: Cr 2.13, monitor with diuretics. AHFS consulting renal 
8. Left leg wounds s/p fall: wound care consulted. 9. Anemia, iron deficiency: Hgb 9.1, cont PO iron, monitor 10. Thrombocytopenia: mild, monitor. 11. Hyponatremia: Na 128, monitor. AHFS consulting renal to assist 
12. Urinary retention: starting flomax, straight cath prn 13. Nutrition: cont marinol per AHFS. Nutrition consult 14. Dispo: Cont ICU care, PT/OT, LVAD workup per AHF.  
 
Signed By: Trenton Delgado NP

## 2019-05-22 NOTE — PROGRESS NOTES
Nephrology Progress Note Juanito Meza 
Date of Admission : 5/9/2019 CC:  Follow up for hyponatremia and CKD Assessment and Plan Hyponatremia: 
- likely hypervolemic from CHF, may have a component of SIADH 
- would increase diuretics as below 
- urine osms to be drawn this AM 
- tolvaptan 15mg x1  
- daily Na levels 
  
CKD III w/ baseline Cr 2: 
- presumed 2/2 DM and HTN 
- Cr stable, w/ non-nephrotic range proteinuria 
  
HFrEF: 
- EF 10% - w/u for LVAD 
- impella and dobutamine per HF service 
- increase diuretics, especially while weaning down dobutamine 
  
RV failure 
  
Severe AS 
  
Multivessel CAD: 
- poor viability on cardiac MRI 
  
Urinary Retention: 
- on flomax 
- bladder scan and SC as needed 
  
DM2: 
- on insulin Interval History: 
Seen and examined. Resting in bed, not talking much. Cr stable, Na down to 126. Not much UOP. Remains on dobutamine per HF service. No cp or sob. C/o fatigue and weakness. Current Medications: all current  Medications have been eviewed in [facility] Review of Systems: Pertinent items are noted in HPI. Objective: 
Vitals:   
Vitals:  
 05/22/19 0600 05/22/19 0700 05/22/19 0800 05/22/19 0900 BP:      
Pulse: 83 79 78 76 Resp: 17 16 15 15 Temp:   98.5 °F (36.9 °C) SpO2: 99% 99% 99% 100% Weight:  82.6 kg (182 lb 1.6 oz) Height:      
 
Intake and Output: 
05/22 0701 - 05/22 1900 In: 240 [P.O.:240] Out: 60 [Urine:60] 
05/20 1901 - 05/22 0700 In: 2158.5 [P.O.:320; I.V.:1838.5] Out: 1830 [id number] Physical Examination:Pt intubated     No 
General: NAD,Conversant Neck:  Supple, no mass Resp:  Lungs CTA B/L, no wheezing , normal respiratory effort CV:  RRR,  no murmur or rub, 3-4+ b/l LE edema GI:  Soft, NT, + Bowel sounds, no hepatosplenomegaly Neurologic:  Non focal 
Psych:             Flat affect Skin:  No Rash 
 
[]    High complexity decision making was performed []    Patient is at high-risk of decompensation with multiple organ involvement Lab Data Personally Reviewed: I have reviewed all the pertinent labs, microbiology data and radiology studies during assessment. Recent Labs  
  05/22/19 
0427 05/21/19 
0355 05/20/19 
1729 05/20/19 
8784 * 128* 126* 131* K 4.2 4.0 4.0 3.8 CL 93* 93* 92* 97  
CO2 24 25 26 25 GLU 87 65 110* 92 BUN 61* 55* 55* 47* CREA 2.28* 2.13* 2.16* 2.00* CA 8.1* 8.4* 8.3* 8.0*  
MG 2.2 2.1 2.2 1.6 ALB 2.3* 2.2*  --  2.1*  
SGOT 37 22  --  23 ALT <6* <6*  --  <6* Recent Labs  
  05/22/19 
0731 05/21/19 
0355 05/20/19 
3207 WBC 5.2 4.9 3.5* HGB 8.8* 9.1* 9.0*  
HCT 27.6* 28.5* 28.2*  
PLT 80* 91* 99* No results found for: SDES Lab Results Component Value Date/Time Culture result: NO GROWTH AFTER 17 HOURS 05/21/2019 10:52 AM  
 Culture result: NO GROWTH 4 DAYS 05/18/2019 05:55 PM  
 Culture result: NO GROWTH 5 DAYS 05/15/2019 11:00 AM  
 
Recent Results (from the past 24 hour(s)) EKG, 12 LEAD, INITIAL Collection Time: 05/21/19 10:11 AM  
Result Value Ref Range Ventricular Rate 75 BPM  
 Atrial Rate 105 BPM  
 QRS Duration 84 ms Q-T Interval 392 ms QTC Calculation (Bezet) 437 ms Calculated R Axis 49 degrees Calculated T Axis 87 degrees Diagnosis    
  probable [place] 1 Nonspecific ST abnormality Confirmed by Shamir Ledesma M.D., Terrell Chiang ([id number]) on 5/21/2019 3:00:34 PM 
  
CULTURE, BLOOD, PAIRED Collection Time: 05/21/19 10:52 AM  
Result Value Ref Range Special Requests: NO SPECIAL REQUESTS Culture result: NO GROWTH AFTER 17 HOURS MICROALBUMIN, UR, RAND W/ MICROALB/CREAT RATIO Collection Time: 05/21/19 10:54 AM  
Result Value Ref Range Microalbumin,urine random 28.30 MG/DL Creatinine, urine 62.00 mg/dL Microalbumin/Creat ratio (mg/g creat) 456 (H) 0 - 30 mg/g GLUCOSE, POC Collection Time: 05/21/19  1:11 PM  
Result Value Ref Range Glucose (POC) 190 (H) 65 - 100 mg/dL Performed by Contreras Ship   
PTT Collection Time: 05/21/19  1:42 PM  
Result Value Ref Range aPTT 60.6 (H) 22.1 - 32.0 sec  
 aPTT, therapeutic range     58.0 - 77.0 SECS  
GLUCOSE, POC Collection Time: 05/21/19  5:41 PM  
Result Value Ref Range Glucose (POC) 87 65 - 100 mg/dL Performed by Contreras Ship   
GLUCOSE, POC Collection Time: 05/21/19  9:18 PM  
Result Value Ref Range Glucose (POC) 86 65 - 100 mg/dL Performed by Shabbir Brown MAGNESIUM Collection Time: 05/22/19  4:27 AM  
Result Value Ref Range Magnesium 2.2 1.6 - 2.4 mg/dL NT-PRO BNP Collection Time: 05/22/19  4:27 AM  
Result Value Ref Range NT pro-BNP >35,000 (H) <125 PG/ML  
LACTIC ACID Collection Time: 05/22/19  4:27 AM  
Result Value Ref Range Lactic acid 0.7 0.4 - 2.0 MMOL/L  
DIGOXIN Collection Time: 05/22/19  4:27 AM  
Result Value Ref Range Digoxin level 0.9 0.90 - 8.01 NG/ML  
METABOLIC PANEL, COMPREHENSIVE Collection Time: 05/22/19  4:27 AM  
Result Value Ref Range Sodium 126 (L) 136 - 145 mmol/L Potassium 4.2 3.5 - 5.1 mmol/L Chloride 93 (L) 97 - 108 mmol/L  
 CO2 24 21 - 32 mmol/L Anion gap 9 5 - 15 mmol/L Glucose 87 65 - 100 mg/dL BUN 61 (H) 6 - 20 MG/DL Creatinine 2.28 (H) 0.70 - 1.30 MG/DL  
 BUN/Creatinine ratio 27 (H) 12 - 20 GFR est AA 35 (L) >60 ml/min/1.73m2 GFR est non-AA 29 (L) >60 ml/min/1.73m2 Calcium 8.1 (L) 8.5 - 10.1 MG/DL Bilirubin, total 1.0 0.2 - 1.0 MG/DL  
 ALT (SGPT) <6 (L) 12 - 78 U/L  
 AST (SGOT) 37 15 - 37 U/L Alk. phosphatase 71 45 - 117 U/L Protein, total 5.1 (L) 6.4 - 8.2 g/dL Albumin 2.3 (L) 3.5 - 5.0 g/dL Globulin 2.8 2.0 - 4.0 g/dL A-G Ratio 0.8 (L) 1.1 - 2.2 SED RATE (ESR) Collection Time: 05/22/19  4:27 AM  
Result Value Ref Range Sed rate, automated 42 (H) 0 - 20 mm/hr PROCALCITONIN  Collection Time: 05/22/19  4:27 AM  
 Result Value Ref Range Procalcitonin 1.3 ng/mL PTT Collection Time: 05/22/19  4:27 AM  
Result Value Ref Range aPTT 56.5 (H) 22.1 - 32.0 sec  
 aPTT, therapeutic range     58.0 - 77.0 SECS  
POC VENOUS BLOOD GAS Collection Time: 05/22/19  5:11 AM  
Result Value Ref Range Device: ROOM AIR    
 pH, venous (POC) 7.453 (H) 7.32 - 7.42    
 pCO2, venous (POC) 37.2 (L) 41 - 51 MMHG  
 pO2, venous (POC) 29 25 - 40 mmHg HCO3, venous (POC) 26.1 23.0 - 28.0 MMOL/L  
 sO2, venous (POC) 58 (L) 65 - 88 % Base excess, venous (POC) 2 mmol/L Allens test (POC) N/A Total resp. rate 18 [facility] Specimen type (POC) MIXED VENOUS    
GLUCOSE, POC Collection Time: 05/22/19  7:03 AM  
Result Value Ref Range Glucose (POC) 107 (H) 65 - 100 mg/dL Performed by Anabel [facility] CBC W/O DIFF Collection Time: 05/22/19  7:31 AM  
Result Value Ref Range WBC 5.2 4.1 - 11.1 K/uL  
 RBC 3.31 (L) 4.10 - 5.70 M/uL HGB 8.8 (L) 12.1 - 17.0 g/dL HCT 27.6 (L) 36.6 - 50.3 % MCV 83.4 80.0 - 99.0 FL  
 MCH 26.6 26.0 - 34.0 PG  
 MCHC 31.9 30.0 - 36.5 g/dL  
 RDW 15.7 (H) 11.5 - 14.5 % PLATELET 80 (L) 278 - 400 K/uL MPV 12.8 8.9 - 12.9 FL  
 NRBC 0.0 0  WBC ABSOLUTE NRBC 0.00 0.00 - 0.01 K/uL Rehana Adhikari MD 
[address] [place] [address]  
[id number] [place] [address], [address] [facility] Phone - [phone] Fax - [phone] 
[email or web address]. [email or web address]

## 2019-05-22 NOTE — PROGRESS NOTES
[facility] ICU Progress Note Admit Date: 2019 POD: 6 Procedure:  Procedure(s): RIGHT AXILLARY IMPELLA/ [id number] Subjective:  
Pt seen with Dr. Alexi Hart. Sitting up in chair eating breakfast, flat affect, no new complaints. Remains on dobutamine 7.5 mcg. Lines changed 19. Cont w/ suction events Objective:  
Vitals: 
Blood pressure 99/74, pulse 78, temperature 98.5 °F (36.9 °C), resp. rate 15, height 5' 11\" (1.803 m), weight 82.6 kg (182 lb 1.6 oz), SpO2 99 %. Temp (24hrs), Av.7 °F (37.1 °C), Min:98.4 °F (36.9 °C), Max:98.9 °F (37.2 °C) Hemodynamics: 
 CO: CO (l/min): 5.6 l/min CI: CI (l/min/m2): 2.9 l/min/m2 CVP: CVP (mmHg): 7 mmHg (19 0800) SVR: SVR (dyne*sec)/cm5: 1014 (dyne*sec)/cm5 (19 0800) PAP Systolic: PAP Systolic: 50 (92/04/96 9013) PAP Diastolic: PAP Diastolic: 21 (89/57/04 1748) PVR:   
 SV02: SVO2 (%): 60 % (19 0800) SCV02:   
 
EKG/Rhythm:  Afib - rate controlled Oxygen Therapy: 
Oxygen Therapy O2 Sat (%): 99 % (19 0700) Pulse via Oximetry: 78 beats per minute (19 0800) O2 Device: Room air (19 0400) O2 Flow Rate (L/min): 4 l/min (19 1600) FIO2 (%): 36 % (19 1146) CXR:  
CXR Results  (Last 48 hours) 19  XR CHEST PORT Final result Impression:  Impression:  
New left IJ [place]-Jennifer catheter terminates in the proximal left pulmonary artery. No pneumothorax. Narrative:  Chest portable AP History: New Nina Yen placement Comparison: 2018 Findings:  A right IJ sheath is in place. A new left IJ [place]-Jennifer catheter has  
been placed which terminates in the proximal left pulmonary artery. A right  
subclavian Impella devices in place. Heart size is unchanged. The lungs are  
underexpanded. No focal consolidation, pleural effusion, or pneumothorax. Surgical clips overlie the right chest.  
   
  
 19 0448  XR CHEST PORT Final result  Impression:  IMPRESSION:  
 No interval change. Narrative:  PORTABLE CHEST RADIOGRAPH/S: 2019 4:48 AM  
   
Clinical history: Impella INDICATION:   Impella COMPARISON: 2019 FINDINGS:  
AP portable upright view of the chest demonstrates stable  cardiopericardial  
silhouette. The lungs are adequately expanded. Minimal left basilar atelectasis  
and effusion. Pulmonary catheter unchanged cardiac assist device unchanged in  
appearance. . The osseous structures are unremarkable. Patient is on a cardiac  
monitor. Admission Weight: Last Weight Weight: 84.8 kg (186 lb 15.2 oz) Weight: 82.6 kg (182 lb 1.6 oz) Intake / Lyndee Barrington / Drain: 
Current Shift:  0701 -  1900 In: -  
Out: 35 [Urine:35] Last 24 hrs.:  
 
Intake/Output Summary (Last 24 hours) at 2019 0422 Last data filed at 2019 0800 Gross per 24 hour Intake 1410.2 ml Output 1165 ml Net 245.2 ml EXAM: 
General:  Alert, NAD Lungs:   Clear upper, diminished bases to auscultation bilaterally - improved. Incision:  impella dsg cdi Heart:  Irregular rate and rhythm, S1, S2 normal, no murmur, click, rub or gallop. Abdomen:   Soft, non-tender. Bowel sounds normal. No masses,  No organomegaly. Extremities:  2+ LE edema. PPP. Neurologic:  Gross motor and sensory apparatus intact. Labs:  
Recent Labs  
  19 
0731 19 
0703 19 
9907 WBC 5.2  --   --   
HGB 8.8*  --   --   
HCT 27.6*  --   --   
PLT 80*  --   --   
NA  --   --  126* K  --   --  4.2 BUN  --   --  61* CREA  --   --  2.28* GLU  --   --  87 GLUCPOC  --  107*  --   
 
 
 Assessment:  
 
Active Problems: 
  Acute on chronic systolic (congestive) heart failure ([address] (2019) Plan/Recommendations/Medical Decision Makin.  Acute on chronic systolic heart failure (NYHA class IV on admission): AHF following, on dobut 7.5, bumex gtt switched to scheuled to be given with albumin, aldactone stopped, LVAD workup ongoing. Purge now D5 due to high PTT. BNP >35K. On ancef for impella ppx. Echo on 5/20/19 EF 15-20% 2. Severe AS: no plans for TAVR at this time 3. CAD with  of RAD and RCA: on ASA and statin, no BB dt dobut. Cardiac MRI showed nonviability of LV. 4. Afib: has PPM, was on eliquis - hold for impella. Not on any anticoagulation, evaluated by EP - no changes for now 5. HLD: on lipitor 6. DM type II: on glipizide - decreased dose due to lower BS this morning, SSI, A1C 6.9 7. CKD: Cr rising, up to 2.28, monitor with diuretics. Renal now following 8. Left leg wounds s/p fall: wound care consulted. 9. Anemia, iron deficiency: Hgb 8.8, cont PO iron, monitor 10. Thrombocytopenia: plts cont to decrease, down to 80K 
11. Hyponatremia: Na 126, monitor. AHFS consulting renal to assist 
12. Urinary retention: cont flomax, Colon reinserted 13. Nutrition: cont marinol per AHFS. Nutrition consult 14. Dispo: Cont ICU care, PT/OT, LVAD and further workup/treatment per AHF.  
 
Signed By: Jesús Biggs NP

## 2019-05-22 NOTE — PROGRESS NOTES
0800: Bedside report received from JACOB Sullivan RN, assumed care of pt. 
 
7701: Pt c/o nausea and pain \"all over, feels like I have the flu. \"   
 Pt shivering. Pt afebrile. PRN zofran and tramadol given. Pt's MAPs (55-60) and CI (1.5) suddenly trending down. Anabella Jason NP notified. Instructed to stop immune globulin infusion. Dr. Gerson Cosme and Anabella Jason NP then came to bedside to assess pt,  
 instructed to go back up on dobutamine gtt and give benadryl. 1400: Echo tech at bedside. 1530: While up to [facility] to attempt to have BM, pt c/o catheter pain. Noted more blood in urine. Dr. Isrrael Bill aware, no new orders. Assisted pt back to bed and lowe irrigated with 20mL sterile water. 1642: Spoke to Dr. Maira Ramirez for urology consult, they will see pt in AM. 
 
1728: Urine output 10-15 mL/hr for last few hours despite diuretics. Anabella Oconnoro NP aware. No new orders. Instructed to monitor closely over night after 3rd dose of bumex. 2000: Bedside and Verbal shift change report given to JAQUELIN Moore (oncoming nurse) by Melissa Mejía (offgoing nurse). Report included the following information SBAR, Kardex, [facility], Recent Results and Cardiac Rhythm Karla .

## 2019-05-22 NOTE — PROGRESS NOTES
NYHA class IV A/C systolic HF Severe AS 
PAD 
A/C kidney disease CAD LVAD work-up S/P Impella 
  
Pre-op Plan Platelets INR Creatinine 
  
OR plan Drive-line RCA graft Impella  
  
LHC - severe 3 vessel disease; RCA has severe calcification and blockages; will need to try and place RCA graft 
  
ECHO - severely depressed EF with dilated LV cavity (EF 10%; LVIDD 4.77); RV dilated (RVIDD 4.1; TAPSE 0.65, RV/LV ratio 0.85); mild to moderate TR; trace AI 
  
RHC (Impella in place; Dobutamine 7.5) - CVP 5, PA 45/17 (25), CVP/PA ratio 0.2 
  
**overall patient has moderate to severe RV dysfunction  
  
Chest/Abdominal CT scan - no significant calcification in aorta, should be good for outflow graft; thin adipose tissue over abdominal wall so need to be careful with drive-line; elevated right billy-diaphragm 
  
Cardiac MRI - poor global viability 
  
PAUL - no compressible vessels  
  
Carotids - no significant disease  
  
PFTs - pending  
  
Hgb 9, platelets 91, INR 1.4  
  
Creatinine 2.13, BUN 55 (most likely dried him out a bit too much - will back off on diuretics)  
  
AST 22, ALT 6, alk phos 73, bilirubin 0.5 
  
Lactic acid - 0.7 
  
NT pro-BNP > 35,000 Mild nausea from time to time Hgb looks good Platelets a little low PTT therapeutic Creatinine remains elevated despite Impella support Bilirubin and other LFTs look good Lactic acid looks good Impella - flow 5.o L/min @ P-9 CXR - mild edema Risk of morbidity and mortality - high Medical decision making - high complexity Total critical care time - 30 minutes (CPT 15447)

## 2019-05-22 NOTE — PROGRESS NOTES
[facility] Progress Note DOS:   5/22/2019 NAME:  Kandee Schwab  
MRN:   [id number] REFERRING PROVIDER:  Dr. Lit Underwood PRIMARY CARE PHYSICIAN: Norma Anand MD 
 
Chief Complaint: CHENG 
 
HPI: 77y.o. year old male with a history of HTN, T2DM, PVD, CKD, ICM, Severe AS who presents for further evaluation of chronic systolic heart failure. He developed progressive dyspnea with exacterbation as well as progressive fatigue which prompted further evaluation with a heart cath at [facility] that revealed severe 3 vessel disease with  of his LAD and RCA, severe LV systolic failure (LVEF 99%), and severe As. Has has been unable to walk a block before without limiting dyspnea as well as unable to make a bed without developing dyspnea. Admitted to [facility] for acute chronic HFrEF, CMRI showed no viability for LAD, taken for impella placement for bridge to candidacy. 24Hr Events:  
Continues to have intermittent suction alarms Inadequate diuresis, Cr trending up Procalcitonin improved pTT elevated despite absence of AC Procedure:  Procedure(s): RIGHT AXILLARY IMPELLA/ [id number] POD:  POD 6 Impression / Plan:  
Heart Failure Status: NYHA Class IV 
 
ICM-Stage D NYHA IV - LVEF 10%, s/p Impella placement in cardiogenic shock Continue PAC for continuous hemodynamic monitoring Impella in place - continue P9 Cefazolin for cannula ppx TPA PRN for purge flow < 6 Purge fluid to D5 Once pTT normalizes, begin systemic bival at 1/2 concentration Intolerant of dobutamine wean - continue at 7.5 mcg/kg/min No BB d/t dobutamine Intolerant of RAAS due to THEODORE Continue spironolactone 25 mg PO BID Bumex 1mg TID + 25% albumin prior to each dose Tolvaptan per renal  
Evaluation for LVAD placement ongoing Daily lactates to eval for perfusion- WNL Lipase WNL 
  
Severe AS Not a candidate for TAVR due to poor viability and cardiomyopathy 
  
CAD with  of RAD and RCA  
 Currently on ASA and statin No BBrx while on dobutamine cMRI results show severe Cad with  of the LAD and RCA with poor viability of myocardium.  
  
RV failure Dobutamine, diuresis Continue PA cath - requires continuous hemodynamic monitoring RV will need to recover to be a candidate for durable MCS Sepsis of unknown etiology WBC WNL Blood cx NGTD - repeat Ancef while impella in place Continue levaquin for broad spectrum coverage Daily ESR, lactics, procalcitonin Replaced all lines 5/21 IVIG given to boost immunity - however, developed reaction to second dose requiring discontinuation of infusion and treatment with IV steroids and benadryl SCD high risk No device in place No LifeVest while inpatient and with Impella in place  
  
Hx of atrial fibrillation Repeat pTT q6h until < 40, then begin systemic bival with goal aPTT 40-60 Monitor AV dissociation EP consult appreciated Intolerant of dig 
  
HLD Lipitor 40 mg every night  
  
T2DM  
SSI Glipizide Hepatic cyst 
Benign per GI Appreciate consult  
  
Left leg wounds s/p fall Wound consult appreciated Leg is erythematous and warm, pt is afebrile  
  
CKD Creatinine trending up Cont bumex + albumin Tolvaptan x 1 today Monitor renal function Appreciate renal consult Hyponatremia Likely related to volume overload Monitor I/O's and daily Na+ Current Na 128 Cont spironolactone Malnutrition Prealbumin 10 Encourage PO intake Cont Marinol RD consult Constipation Bowel regimen KUB negative Altered mental status 19/30 on [address] [place] [address], Ne Hematuria Urology consult pending Remains off anticoagulation PPX PPI Ancef while impella in place Aggressive electrolyte replacement Replaced lines 5/21/19 Dispo:  
Remain in CVI for now. Continues to undergo LVAD evaluation. History: 
Past Medical History:  
Diagnosis Date  3-vessel coronary artery disease  Aortic stenosis, severe  Chronic kidney disease (CKD), stage III (moderate) (HCC)  Chronic total occlusion of coronary artery  Hypertension  Ischemic cardiomyopathy  Peripheral vascular disease ([facility] [address]  Type 2 diabetes mellitus treated without insulin ([facility] [address] Past Surgical History:  
Procedure Laterality Date  HX AMPUTATION TOE Left 2017 Social History Socioeconomic History  Marital status:  Spouse name: Not on file  Number of children: Not on file  Years of education: Not on file  Highest education level: Not on file Occupational History  Not on file Social Needs  Financial resource strain: Not on file  Food insecurity:  
  Worry: Not on file Inability: Not on file  Transportation needs:  
  Medical: Not on file Non-medical: Not on file Tobacco Use  Smoking status: Former Smoker  Smokeless tobacco: Never Used Substance and Sexual Activity  Alcohol use: Not Currently  Drug use: Not on file  Sexual activity: Not on file Lifestyle  Physical activity:  
  Days per week: Not on file Minutes per session: Not on file  Stress: Not on file Relationships  Social connections:  
  Talks on phone: Not on file Gets together: Not on file Attends Sikh service: Not on file Active member of club or organization: Not on file Attends meetings of clubs or organizations: Not on file Relationship status: Not on file  Intimate partner violence:  
  Fear of current or ex partner: Not on file Emotionally abused: Not on file Physically abused: Not on file Forced sexual activity: Not on file Other Topics Concern  Not on file Social History Narrative  Not on file History reviewed. No pertinent family history. Current Medications:  
Current Facility-Administered Medications Medication Dose Route Frequency Provider Last Rate Last Dose  immune globulin 10% (FLEBOGAMMA DIF) infusion 40 g  40 g IntraVENous ONCE TITR Michael Montero, NP      
 glipiZIDE (GLUCOTROL) tablet 5 mg  5 mg Oral ACB&D Adriisabella Nickrochelle Cuh NP   5 mg at 05/22/19 5774  tamsulosin (FLOMAX) capsule 0.4 mg  0.4 mg Oral DAILY Michael Montero NP   0.4 mg at 05/22/19 0628  bumetanide (BUMEX) injection 1 mg  1 mg IntraVENous TID Michael Montero NP   1 mg at 05/22/19 4294  albumin human 25% (BUMINATE) solution 12.5 g  12.5 g IntraVENous TID Nicholas Montero NP   12.5 g at 05/22/19 4857  levoFLOXacin (LEVAQUIN) 750 mg in D5W IVPB  750 mg IntraVENous Q48H Gem Montero  mL/hr at 05/21/19 1140 750 mg at 05/21/19 1140  potassium chloride (KLOR-CON) packet for solution 40 mEq  40 mEq Oral BID WITH MEALS Michael Montero NP   40 mEq at 05/22/19 6732  senna-docusate (PERICOLACE) 8.6-50 mg per tablet 1 Tab  1 Tab Oral BID Haylee JORDAN NP   1 Tab at 05/22/19 7906  polyethylene glycol (MIRALAX) packet 17 g  17 g Oral BID Michael Montero NP   17 g at 05/22/19 5371  balsam peru-castor oil (VENELEX) ointment   Topical BID Michael Montero NP      
 0.9% sodium chloride infusion  9 mL/hr IntraVENous CONTINUOUS Michael Montero NP 9 mL/hr at 05/20/19 1810 9 mL/hr at 05/20/19 1810  
 dextrose 5% infusion  4-20 mL/hr IntraVENous CONTINUOUS EldaColette dangelo NP 12.9 mL/hr at 05/22/19 0855 12.9 mL/hr at 05/22/19 4611  calcium carbonate (TUMS) chewable tablet 200 mg [elemental]  200 mg Oral TID PRN Margretta Maryjane MC NP   200 mg at 05/18/19 1922  bumetanide (BUMEX) injection 1 mg  1 mg IntraVENous Q6H PRN Colette Schroeder NP   1 mg at 05/20/19 1924  
 magnesium oxide (MAG-OX) tablet 800 mg  800 mg Oral BID Natasha MC NP   800 mg at 05/22/19 2127  sodium chloride (OCEAN) 0.65 % nasal squeeze bottle 2 Spray  2 Spray Both Nostrils Q2H PRN Rosy Schroeder, NP      
  dronabinol (MARINOL) capsule 2.5 mg  2.5 mg Oral DAILY Colette Schroeder, NP   2.5 mg at 05/22/19 7027  ceFAZolin (ANCEF) 2 g/20 mL in sterile water IV syringe  2 g IntraVENous Q8H Sonja HICKS NP   2 g at 05/22/19 0835  
 insulin lispro (HUMALOG) injection   SubCUTAneous AC&HS James MC NP   Stopped at 05/21/19 1630  
 glucose chewable tablet 16 g  4 Tab Oral PRN James MC NP      
 dextrose (D50W) injection syrg 12.5-25 g  12.5-25 g IntraVENous PRN Colette Schroeder NP   25 g at 05/21/19 0559  
 glucagon (GLUCAGEN) injection 1 mg  1 mg IntraMUSCular PRN James MC NP      
 traMADol (ULTRAM) tablet 50 mg  50 mg Oral Q8H PRN Janette Schroeder NP   50 mg at 05/18/19 1924  aspirin chewable tablet 81 mg  81 mg Oral DAILY Colette Schroeder NP   81 mg at 05/22/19 0835  
 atorvastatin (LIPITOR) tablet 40 mg  40 mg Oral QHS Colette Schroeder NP   40 mg at 05/21/19 2131  
 sodium chloride (NS) flush 5-40 mL  5-40 mL IntraVENous Q8H Colette Schroeder, NP   10 mL at 05/22/19 6347  sodium chloride (NS) flush 5-40 mL  5-40 mL IntraVENous PRN Colette Schroeder, NP      
 ondansetron (ZOFRAN) injection 4 mg  4 mg IntraVENous Q6H PRN Colette Schroeder NP   4 mg at 05/22/19 0119  acetaminophen (TYLENOL) tablet 650 mg  650 mg Oral Q6H PRN James MC NP   650 mg at 05/21/19 2149  
 docusate sodium (COLACE) capsule 100 mg  100 mg Oral PRN Preethi Zuleta NP      
 DOBUTamine (DOBUTREX) 500 mg/250 mL (2,000 mcg/mL) infusion  5 mcg/kg/min IntraVENous CONTINUOUS Yaw Montero NP 12.7 mL/hr at 05/22/19 1015 5 mcg/kg/min at 05/22/19 1015  pantoprazole (PROTONIX) tablet 40 mg  40 mg Oral ACB EldaColette B, NP   40 mg at 05/22/19 8675 Allergies: Allergies Allergen Reactions  Penicillins Hives ROS:   
Review of Systems Constitutional: Negative for chills, fever and malaise/fatigue. HENT: Negative. Eyes: Negative. Respiratory: Negative. Cardiovascular: Positive for leg swelling. Gastrointestinal: Positive for nausea. Genitourinary: Negative. Musculoskeletal: Negative. Skin: Positive for rash. Neurological: Positive for weakness. Endo/Heme/Allergies: Bruises/bleeds easily. Physical Exam:  
Physical Exam  
Constitutional: He is oriented to person, place, and time. He appears well-developed. No distress. HENT:  
Head: Normocephalic and atraumatic. Eyes: EOM are normal. Right eye exhibits no discharge. Left eye exhibits no discharge. Neck: Normal range of motion. No JVD present. No tracheal deviation present. Cardiovascular: Normal rate, regular rhythm and intact distal pulses. Murmur heard. Pulmonary/Chest: Effort normal and breath sounds normal. No stridor. No respiratory distress. Abdominal: Soft. Bowel sounds are normal. He exhibits no distension. There is no tenderness. Musculoskeletal: He exhibits edema and deformity. Neurological: He is alert and oriented to person, place, and time. Skin: Skin is warm and dry. Rash noted. No erythema. There is pallor. Scab to left knee Nursing note and vitals reviewed. Vitals:  
Visit Vitals BP 99/74 (BP 1 Location: Right arm, BP Patient Position: At rest) Pulse 77 Temp 98.5 °F (36.9 °C) Resp 15 Ht 5' 11\" (1.803 m) Wt 182 lb 1.6 oz (82.6 kg) SpO2 99% BMI 25.40 kg/m² Temp (24hrs), Av.7 °F (37.1 °C), Min:98.4 °F (36.9 °C), Max:98.9 °F (37.2 °C) Hemodynamics: 
 CO: CO (l/min): 4.5 l/min CI: CI (l/min/m2): 2.3 l/min/m2 CVP: CVP (mmHg): 9 mmHg (19 1000) SVR: SVR (dyne*sec)/cm5: 1014 (dyne*sec)/cm5 (19 0800) PAP Systolic: PAP Systolic: 52 (94/15/06 2178) PAP Diastolic: PAP Diastolic: 20 (35/ 2692) PVR:   
 SV02: SVO2 (%): 62 % (19 1000) SCV02:   
 
 
Admission Weight: Last Weight Weight: 186 lb 15.2 oz (84.8 kg) Weight: 182 lb 1.6 oz (82.6 kg) Intake / Output / Drain: 
Last 24 hrs.:  
 
Intake/Output Summary (Last 24 hours) at 5/22/2019 1058 Last data filed at 5/22/2019 1000 Gross per 24 hour Intake 1999.3 ml Output 1215 ml Net 784.3 ml Oxygen Therapy: 
Oxygen Therapy O2 Sat (%): 99 % (05/22/19 1000) Pulse via Oximetry: 81 beats per minute (05/22/19 1000) O2 Device: Room air (05/22/19 0800) O2 Flow Rate (L/min): 4 l/min (05/16/19 1600) FIO2 (%): 36 % (05/16/19 1146) Recent Labs:  
Labs Latest Ref Rng & Units 5/22/2019 5/21/2019 5/20/2019 5/20/2019 5/19/2019 5/18/2019 5/17/2019 WBC 4.1 - 11.1 K/uL 5.2 4.9 - 3. 5(L) 3. 1(L) 4.3 6.9  
RBC 4.10 - 5.70 M/uL 3.31(L) 3.43(L) - 3.36(L) 3.51(L) 3.67(L) 3.83(L) Hemoglobin 12.1 - 17.0 g/dL 8.8(L) 9.1(L) - 9. 0(L) 9.3(L) 9.8(L) 10. 3(L) Hematocrit 36.6 - 50.3 % 27. 6(L) 28. 5(L) - 28. 2(L) 29. 5(L) 31. 1(L) 32. 5(L) MCV 80.0 - 99.0 FL 83.4 83.1 - 83.9 84.0 84.7 84.9 Platelets 333 - 532 K/uL 80(L) 91(L) - 99(L) 107(L) 113(L) 137(L) Lymphocytes 12 - 49 % - - - - - - - Monocytes 5 - 13 % - - - - - - - Eosinophils 0 - 7 % - - - - - - - Basophils 0 - 1 % - - - - - - - Albumin 3.5 - 5.0 g/dL 2. 3(L) 2. 2(L) - 2. 1(L) 2. 2(L) 2. 4(L) 2. 8(L) Calcium 8.5 - 10.1 MG/DL 8. 1(L) 8.4(L) 8. 3(L) 8.0(L) 8. 3(L) 8. 3(L) 8.6 SGOT 15 - 37 U/L 37 22 - 23 23 20 17 Glucose 65 - 100 mg/dL 87 65 110(H) 92 114(H) 105(H) 168(H) BUN 6 - 20 MG/DL 61(H) 55(H) 55(H) 47(H) 48(H) 48(H) 46(H) Creatinine 0.70 - 1.30 MG/DL 2.28(H) 2.13(H) 2.16(H) 2.00(H) 1.90(H) 2.11(H) 2.16(H) Sodium 136 - 145 mmol/L 126(L) 128(L) 126(L) 131(L) 132(L) 131(L) 130(L) Potassium 3.5 - 5.1 mmol/L 4.2 4.0 4.0 3.8 4.0 4.0 4.4 TSH 0.450 - 4.500 uIU/mL - - - - - - -  
LDH 85 - 241 U/L - - - 281(H) 260(H) 247(H) 230 EKG:  
EKG Results Procedure 720 Value Units Date/Time EKG, 12 LEAD, INITIAL [[id number]] Collected:  05/21/19 1011 Order Status:  Completed Updated:  05/21/19 1500   Ventricular Rate 75 BPM   
 Atrial Rate 105 BPM   
  QRS Duration 84 ms Q-T Interval 392 ms QTC Calculation (Bezet) 437 ms Calculated R Axis 49 degrees Calculated T Axis 87 degrees Diagnosis --  
  probable Mobitz 1 Nonspecific ST abnormality Confirmed by Ifeoma Ga M.D., Antoinette Lev ([id number]) on 5/21/2019 3:00:34 PM 
  
 EKG, 12 LEAD, INITIAL [[id number]] Collected:  05/20/19 1048 Order Status:  Completed Updated:  05/20/19 1543 Ventricular Rate 77 BPM   
  Atrial Rate 107 BPM   
  QRS Duration 94 ms Q-T Interval 386 ms QTC Calculation (Bezet) 436 ms Calculated R Axis 48 degrees Calculated T Axis 89 degrees Diagnosis --  
  Sinus tachycardia with 2nd degree AV block (Mobitz I) Low voltage QRS Cannot rule out Anteroseptal infarct (cited on or before 09-MAY-2019) When compared with ECG of 10-MAY-2019 11:21, Sinus rhythm has replaced with AV dissociation Confirmed by Sally Wang MD. ([id number]) on 5/20/2019 3:42:59 PM 
  
 SCANNED CARDIAC RHYTHM STRIP [[id number]] Collected:  05/20/19 0451 Order Status:  Completed Updated:  05/20/19 9395 SCANNED CARDIAC RHYTHM STRIP [[id number]] Collected:  05/19/19 4693 Order Status:  Completed Updated:  05/19/19 9613 SCANNED CARDIAC RHYTHM STRIP [[id number]] Collected:  05/18/19 0593 Order Status:  Completed Updated:  05/18/19 4283 SCANNED CARDIAC RHYTHM STRIP [[id number]] Collected:  05/17/19 2005 Order Status:  Completed Updated:  05/17/19 0313 SCANNED CARDIAC RHYTHM STRIP [[id number]] Collected:  05/16/19 0477 Order Status:  Completed Updated:  05/16/19 7011 SCANNED CARDIAC RHYTHM STRIP [[id number]] Collected:  05/13/19 0430 Order Status:  Completed Updated:  05/13/19 6553 SCANNED CARDIAC RHYTHM STRIP [[id number]] Collected:  05/13/19 6334 Order Status:  Completed Updated:  05/13/19 [address] [facility] SCANNED CARDIAC RHYTHM STRIP [[id number]] Collected:  05/11/19 0321 Order Status:  Completed Updated:  05/11/19 1819 EKG, 12 LEAD, INITIAL [[id number]] Collected:  05/10/19 1121 Order Status:  Completed Updated:  05/10/19 1207 Ventricular Rate 64 BPM   
  Atrial Rate 77 BPM   
  QRS Duration 104 ms Q-T Interval 460 ms QTC Calculation (Bezet) 474 ms Calculated P Axis 41 degrees Calculated R Axis 35 degrees Calculated T Axis 133 degrees Diagnosis --  
  Sinus rhythm with AV dissociation and Junctional rhythm Low voltage QRS Cannot rule out Anteroseptal infarct (cited on or before 09-MAY-2019) When compared with ECG of 09-MAY-2019 20:01, 
Junctional rhythm has replaced Atrial fibrillation Questionable change in initial forces of Anterior leads Nonspecific T wave abnormality no longer evident in Inferior leads Nonspecific T wave abnormality, improved in Lateral leads Confirmed by Mainor Marino MD, Graham Navarro ([id number]) on 5/10/2019 12:07:00 PM 
  
 EKG, 12 LEAD, INITIAL [[id number]] Collected:  05/09/19 2001 Order Status:  Completed Updated:  05/10/19 9488 Ventricular Rate 60 BPM   
  Atrial Rate 60 BPM   
  QRS Duration 86 ms   
  Q-T Interval 428 ms QTC Calculation (Bezet) 428 ms Calculated R Axis 34 degrees Calculated T Axis 142 degrees Diagnosis -- Atrial fibrillation Low voltage QRS Septal infarct , age undetermined No previous ECGs available Confirmed by Mainor Marino MD, Graham Navarro ([id number]) on 5/10/2019 8:37:23 AM 
  
 [address] [place] [address] [[id number]] Collected:  05/10/19 1314 Order Status:  Completed Updated:  05/10/19 6838 Echocardiogram: · Left Ventricle: Mildly dilated left ventricle. Estimated left ventricular ejection fraction is 16 - 20%. · Aortic Valve: Severe aortic stenosis, mean gradient is 28 mmHg. Aortic valve area is 0.8 cm2. · Tricuspid Valve: Mild to moderate tricuspid valve regurgitation is present with moderate pulmonary HTN, estimated PA of 55 mmHg. Left Ventricle Normal wall thickness. Mildly dilated left ventricle.  The muscle mass is normal. The cavity shape is normal. Severe and segmental systolic dysfunction. The estimated ejection fraction is 16 - 20%. Visually measured ejection fraction. Abnormal wall motion as described on the wall scoring diagram below. Unable to assess diastolic function. Wall Scoring The following segments are akinetic: apical anterior, apical septal, apical inferior, apical lateral and apex. The following segments are hypokinetic: mid anterior, mid anteroseptal, mid inferoseptal, mid inferior, mid inferolateral and mid anterolateral. 
All other segments are normal.  
  
  
  
Left Atrium Normal cavity size. No atrial septal defect present. No patent foramen ovale visualized. Right Ventricle Normal cavity size, wall thickness and global systolic function. Right Atrium Normal cavity size. Aortic Valve Mild aortic valve sclerosis with reduced excursion. Aortic valve peak gradient is 45 mmHg. Aortic valve mean gradient is 28 mmHg. Aortic valve area is 0.8 cm2. Aortic valve peak velocity is 3.4 cm/s. There is severe aortic stenosis. Trace aortic valve regurgitation. Mitral Valve Normal valve structure and no stenosis. Mild regurgitation. Tricuspid Valve Normal valve structure and no stenosis. Mild to moderate tricuspid valve regurgitation. Pulmonary arterial systolic pressure is 30.7 mmHg. Moderate pulmonary hypertension. Pulmonic Valve Pulmonic valve not well visualized. Aorta Normal aortic root, ascending aortic, and aortic arch. Pericardium Normal pericardium and no evidence of pericardial effusion. CT scans: CXR Results  (Last 48 hours) 05/21/19 1943  XR CHEST PORT Final result Impression:  Impression:  
New left IJ [place]-Jennifer catheter terminates in the proximal left pulmonary artery. No pneumothorax. Narrative:  Chest portable AP History: [place] Phlegm placement Comparison: 5/21/2018 Findings:  A right IJ sheath is in place. A new left IJ [place]-Jennifer catheter has  
been placed which terminates in the proximal left pulmonary artery. A right  
subclavian Impella devices in place. Heart size is unchanged. The lungs are  
underexpanded. No focal consolidation, pleural effusion, or pneumothorax. Surgical clips overlie the right chest.  
   
  
 05/21/19 0448  XR CHEST PORT Final result Impression:  IMPRESSION:  
No interval change. Narrative:  PORTABLE CHEST RADIOGRAPH/S: 5/21/2019 4:48 AM  
   
Clinical history: Impella INDICATION:   Impella COMPARISON: 5/20/2019 FINDINGS:  
AP portable upright view of the chest demonstrates stable  cardiopericardial  
silhouette. The lungs are adequately expanded. Minimal left basilar atelectasis  
and effusion. Pulmonary catheter unchanged cardiac assist device unchanged in  
appearance. . The osseous structures are unremarkable. Patient is on a cardiac  
monitor. Olga Perry NP [address], [address] Office [phone] Fax [phone] 
24 hour VAD/HF Pager: [phone] F ATTENDING ADDENDUM Patient was seen and examined in person. Data and notes were reviewed. I have discussed and agree with the plan as noted in the NP note above without further additions. Rolando Shannon MD PhD 
[address] Total Critical Care time spent: 0915-0950 
35 minutes. There was no overlap with other services Critical care was necessary to treat or prevent imminent or life threatening deterioration of the following conditions: cardiac failure, respiratory failure and CNS failure or compromise 
  
Services Provided: 1. Telemetry review and 12 lead ECG interpretation 2. Hemodynamic interpretation, assessment, and management 3. Review and interpretation of CXR 4. Review and interpretation of lab values 5. Review and interpretation of microbiologic data and culture results 6. Review of medications and administration 7. Review and interpretation of nutrition requirements and management 8. Discussion of management withother consultants and services 9.  Clinical update to family members

## 2019-05-22 NOTE — PROGRESS NOTES
Problem: Diabetes Self-Management Goal: *Disease process and treatment process Description Define diabetes and identify own type of diabetes; list 3 options for treating diabetes. Outcome: Progressing Towards Goal 
  
Problem: Patient Education: Go to Patient Education Activity Goal: Patient/Family Education Outcome: Progressing Towards Goal 
  
Problem: Heart Failure: Day 5 Goal: Off Pathway (Use only if patient is Off Pathway) Outcome: Progressing Towards Goal 
 -continued LVAD wkup -new central lines Problem: Pressure Injury - Risk of 
Goal: *Prevention of pressure injury Description Document Terry Scale and appropriate interventions in the flowsheet.  
Outcome: Progressing Towards Goal

## 2019-05-22 NOTE — PROGRESS NOTES
1930 - Bedside and Verbal shift change report given to Knute Hodgkins, RN (oncoming nurse) by Octavio Le RN (offgoing nurse). Report included the following information SBAR, Kardex, Intake/Output, MAR, Recent Results, Cardiac Rhythm Wenckebach and Alarm Parameters . 2215 - New left IJ mac/swan placement confirmed via cxr - removed previous right IJ MAC. 
0730 - Bedside and Verbal shift change report given to Nelda Markham RN (oncoming nurse) by Knute Hodgkins, RN (offgoing nurse). Report included the following information SBAR, Kardex, Intake/Output, MAR, Recent Results, Cardiac Rhythm wenckebach and Alarm Parameters .

## 2019-05-22 NOTE — PROGRESS NOTES
NUTRITION Consult received for general nutrition management and supplements. Attempted to see patient x 2 today; will see tomorrow. Full assessment completed 5/20. Noted pt's family bringing Premier Protein shake -continue to encourage. Magic cup and snacks ordered for patient as well. Thank you.  
 
 
Josy Mantilla RD [facility]

## 2019-05-22 NOTE — PROGRESS NOTES
Renal Dosing/Monitoring Medication: Ancef 2 gm IV q8h for impella ppx Recent Labs  
  05/22/19 
0427 05/21/19 
0355 05/20/19 
1729 CREA 2.28* 2.13* 2.16* BUN 61* 55* 55* Estimated CrCl:  <34  ml/min Plan: Change to 1 Gram Q 12 hr per renal dosing protocol

## 2019-05-22 NOTE — PROGRESS NOTES
PHYSICAL THERAPY TREATMENT Patient: Victorina Cardoza (77 y.o. male) Date: 5/22/2019 Diagnosis: Acute on chronic systolic (congestive) heart failure (HCC) [I50.23] <principal problem not specified> Procedure(s) (LRB): 
RIGHT AXILLARY IMPELLA/ 4219 (Right) 6 Days Post-Op Precautions: Fall(R axillary impella) Chart, physical therapy assessment, plan of care and goals were reviewed. ASSESSMENT: 
Patient found sitting up in the bedside chair asking to return to bed. He had been OOB for more than 2 hours. Biggest complaint continues to be groin pain. Patient willing to perform standing exercises consisting of mini squats and marching. Unable to perform standing PF secondary to weakness in the ankles. Patient tolerating standing 2 minutes x 3. Mobility continues to be limited by impella. Discharge disposition TBD pending progress with acute care PT and gait assessment. Progression toward goals: 
?    Improving appropriately and progressing toward goals ? Improving slowly and progressing toward goals ? Not making progress toward goals and plan of care will be adjusted PLAN: 
Patient continues to benefit from skilled intervention to address the above impairments. Continue treatment per established plan of care. Discharge Recommendations: To Be Determined Further Equipment Recommendations for Discharge:  TBD SUBJECTIVE:  
Patient stated ? The pain in my groin is still really bad.? OBJECTIVE DATA SUMMARY:  
Critical Behavior: 
Neurologic State: Alert, Appropriate for age Orientation Level: Oriented X4 Cognition: Follows commands Safety/Judgement: Awareness of environment, Insight into deficits Functional Mobility Training: 
 
Transfers: 
Sit to Stand: Minimum assistance Stand to Sit: Minimum assistance Bed to Chair: Moderate assistance; Additional time Balance: 
Sitting: Intact Standing: Impaired Standing - Static: Fair Standing - Dynamic : Poor Neuro Re-Education: Weight shifting in standing in preparation for forward gait activities. Therapeutic Exercises:  
Standing marching, mini squats 10 x 2. Reviewed bed exercises for patient to perform independently 2 times a day Pain: 
Pain Scale 1: Numeric (0 - 10) Pain Intensity 1: 6 Activity Tolerance:  
Please refer to the flowsheet for vital signs taken during this treatment. After treatment:  
?    Patient left in no apparent distress sitting up in chair ? Patient left in no apparent distress in bed 
? Call bell left within reach ? Nursing notified ? Caregiver present ? Bed alarm activated COMMUNICATION/COLLABORATION:  
The patient?s plan of care was discussed with: Registered Nurse Zoë Fields, PT, DPT Geriatric Clinical Specialist   
 Time Calculation: 16 mins

## 2019-05-23 NOTE — PROGRESS NOTES
2000: Report received from [facility]. Steve dual RN rate verified and care assumed of patient. Chair position in bed, family bedside. 2045: Family left, RIJ dressing changed. Shift summary:  Patient slept on and off, frequent repositioning. Patient bathed, linen changed at [address] patient OOB to chair. 0800: Bedside shift change report given to YOSELIN RN (oncoming nurse) by Cristian Quiles RN (offgoing nurse). Report included the following information SBAR, Kardex, Procedure Summary, Intake/Output, Accordion, Recent Results, Med Rec Status and Cardiac Rhythm De[place].

## 2019-05-23 NOTE — PROGRESS NOTES
Problem: Diabetes Self-Management Goal: *Disease process and treatment process Description Define diabetes and identify own type of diabetes; list 3 options for treating diabetes. Outcome: Progressing Towards Goal 
Goal: *Incorporating nutritional management into lifestyle Description Describe effect of type, amount and timing of food on blood glucose; list 3 methods for planning meals. Outcome: Progressing Towards Goal 
Goal: *Incorporating physical activity into lifestyle Description State effect of exercise on blood glucose levels. Outcome: Progressing Towards Goal 
Goal: *Using medications safely Description State effect of diabetes medications on diabetes; name diabetes medication taking, action and side effects. Outcome: Progressing Towards Goal 
Goal: *Monitoring blood glucose, interpreting and using results Description Identify recommended blood glucose targets  and personal targets. Outcome: Progressing Towards Goal 
  
Problem: Falls - Risk of 
Goal: *Absence of Falls Description Document Tia Manus Fall Risk and appropriate interventions in the flowsheet. Outcome: Progressing Towards Goal 
  
Problem: Heart Failure: Discharge Outcomes Goal: *Demonstrates ability to perform prescribed activity without shortness of breath or discomfort Outcome: Progressing Towards Goal 
Goal: *Left ventricular function assessment completed prior to or during stay, or planned for post-discharge Outcome: Progressing Towards Goal 
Goal: *ACEI prescribed if LVEF less than 40% and no contraindications or ARB prescribed Outcome: Progressing Towards Goal 
Goal: *Verbalizes understanding and describes prescribed diet Outcome: Progressing Towards Goal 
Goal: *Verbalizes understanding/describes prescribed medications Outcome: Progressing Towards Goal 
Goal: *Describes available resources and support systems Description 
(eg: Home Health, Palliative Care, Advanced Medical Directive) Outcome: Progressing Towards Goal 
Goal: *Describes smoking cessation resources Outcome: Progressing Towards Goal 
Goal: *Understands and describes signs and symptoms to report to providers(Stroke Metric) Outcome: Progressing Towards Goal 
Goal: *Describes/verbalizes understanding of follow-up/return appt Description 
(eg: to physicians, diabetes treatment coordinator, and other resources Outcome: Progressing Towards Goal 
Goal: *Describes importance of continuing daily weights and changes to report to physician Outcome: Progressing Towards Goal 
  
Problem: Pressure Injury - Risk of 
Goal: *Prevention of pressure injury Description Document Terry Scale and appropriate interventions in the flowsheet. Outcome: Progressing Towards Goal 
  
Problem: Infection - Risk of, Urinary Catheter-Associated Urinary Tract Infection Goal: *Absence of infection signs and symptoms Outcome: Progressing Towards Goal

## 2019-05-23 NOTE — PROGRESS NOTES
NUTRITION brief Recommendations: 1. Continue to encourage PO intake with meals and foods brought in by family 2. If K+ remains elevated may need to consider diet restriction 3. Follow BG trends with insulin/DM medication adjustment as intake improves Diet: consistent CHO, 2gm Na Supplements: Magic Cup Meal intake: 
Patient Vitals for the past 100 hrs: 
 % Diet Eaten 05/23/19 0800 75 % 05/22/19 0849 50 % 05/20/19 1300 25 % 05/20/19 0900 0 % 05/19/19 1800 0 % 05/19/19 1500 75 % 05/19/19 1300 0 % Pt visited today for PO check. Flat affect. THEODORE likely d/t CRS noted. Cr worsening with low threshold for CRRT per NP notes. K+ trending up and hyponatremia noted. Renal following with tolvaptan given. If K+ remains elevated may need to consider diet restriction. BG high today with reduction of glipizide (hypoglycemia on 5/21). Continue to follow trends with DM medication adjustment PRN. Predict since appetite slowly improving may be related to increase in oral intake. Periodic nausea noted but biggest barrier to PO is pt's food preferences. Family brought oatmeal in from [employer] and pt at 100% this morning. Family planning to bring in food for lunch as well. Discussed foods available that pt may enjoy. Cafe menu  to be provided and liberalized to just HALLEY for more selections. Pt aware of importance of good protein intake, encouraged Premier Protein shake (brought by family) if he skips a meal. Good understanding. Will continue to follow for renal function, PO intake, BG, fluid status. See previous note for details, goals and monitoring/evaluation. Estimated Nutrition Needs:  
Kcals/day: 1886 Kcals/day(1886-2043kcal) Protein: 96 g(96-111g (1.2-1.4g/kg)) Fluid: 2000 ml(1ml/kcal - or per cardio) Based On: Jose Quarles(x 1.2-1.3) Weight Used: Actual wt(79.9kg) Kaci Pena, [address] Pager #[phone] or [phone]

## 2019-05-23 NOTE — PROGRESS NOTES
Talked with Telma Li this morning about his daughter's re-involvement with him. He shared his daughter noticed he was hospitalized as she's an employee and that she came up to visit him. He does not feel her involvement will impact his AMD - he wants to leave his medical agents as his brother and friend. Will continue to follow for potential LVAD candidacy. Roberto Carlos Patterson, MSW, LCSW Clinical  Ned [place] [phone]

## 2019-05-23 NOTE — PROGRESS NOTES
The CM participated in [facility] rounds on the patient- patient remains with impella in place, monitoring kidney function. PT/OT recommendations TBD based upon medical plan of care, possible rehabilitation. CM will continue to follow for transitions of care- patient not medically stable at this time.  MARNIE Conti

## 2019-05-23 NOTE — PROGRESS NOTES
[facility] Consult Note DOS:   5/23/2019 NAME:  Radha Park  
MRN:   [id number] REFERRING PROVIDER:  Dr. Misa Faustin PRIMARY CARE PHYSICIAN: Roberto Carlos Vela MD 
 
 
Chief Complaint: CHENG  
 
HPI: 77y.o. year old male with a history of HTN, T2DM, PVD, CKD, ICM, Severe AS who presents for further evaluation of chronic systolic heart failure. He developed progressive dyspnea with exacterbation as well as progressive fatigue which prompted further evaluation with a heart cath at [facility] that revealed severe 3 vessel disease with  of his LAD and RCA, severe LV systolic failure (LVEF 88%), and severe As. He has been unable to walk a block before without limiting dyspnea as well as unable to make a bed without developing dyspnea. Admitted to [address] [place] [address] for acute chronic HFrEF, CMRI showed no viability for LAD, taken for impella placement for bridge to candidacy on 5/16. 24Hr Events:  
Continues to have intermittent suction alarms Inadequate diuresis, Cr trending up Thrombocytopenia Hyponatremia Na + 123 Procedure:  Procedure(s): RIGHT AXILLARY IMPELLA/ [id number] POD:  7 Days Post-Op Impression / Plan:  
Heart Failure Status: NYHA Class IV 
 
ICM-Stage D NYHA IV - LVEF 10%, s/p Impella placement in cardiogenic shock Continue PAC for continuous hemodynamic monitoring Impella in place - continue P9 Cefazolin for cannula ppx TPA PRN for purge flow < 6 Purge fluid to D5 Once pTT normalizes, begin systemic bival at 1/2 concentration Intolerant of dobutamine wean - continue at 7.5 mcg/kg/min do not titrate down unless order indicates No BB d/t dobutamine Intolerant of RAAS due to THEODORE  
D/C spironolactone hyperkalemic Bumex gtt restarted at 0.5 Tolvaptan per renal  
Evaluation for LVAD placement ongoing Daily lactates to eval for perfusion- WNL Lipase WNL Numerous concerns regarding LVAD candidacy: RV failure failing attempts at inotrope wean daily, leukocytosis of unknown source responding to antibiotics, poor renal function at 100% risk of temporary dialysis and > 50% risk of permanent dialysis after LVAD, hypogammaglobulinemia in the setting of hypoproteinemia and hypoalbuminemia due to proteinuria nearing nephrotic range at high risk of driveline infection, malnutrition, poor balance and severe muscular deconditioning, significant neurocognitive dysfunction with low MOCA score, ongoing concerns regarding psychosocial support as per  evaluation. RV failure and renal failure remain the most challenging issues; we will give patient two weeks total of inotropic conditioning for RV recovery, if fails the trial we plan on family meeting re: weaning inotropes to palliation. This challenging situation was discussed with the patient patient and caregiver, who agreed to consult palliative care team to reflect on their wishes in case patient was not a candidate for LVAD. 
  
Severe AS  
Not a candidate for TAVR due to poor viability and cardiomyopathy 
  
CAD with  of RAD and RCA Currently on ASA and statin No BBrx while on dobutamine cMRI results show severe Cad with  of the LAD and RCA with poor viability of myocardium.  
  
RV failure Dobutamine, diuresis Continue PA cath - requires continuous hemodynamic monitoring RV will need to recover to be a candidate for durable MCS 
  
Sepsis of unknown etiology WBC WNL Blood cx NGTD - repeat Ancef while impella in place Continue levaquin for broad spectrum coverage Daily ESR, lactics, procalcitonin Replaced all lines 5/21 IVIG given to boost immunity - however, developed reaction to second dose requiring discontinuation of infusion and treatment with IV steroids and benadryl  
  
SCD high risk No device in place No LifeVest while inpatient and with Impella in place  
  
Hx of atrial fibrillation  
 Repeat pTT q6h until < 40, then begin systemic bival with goal aPTT 40-60 Monitor 
  
AV dissociation EP consult appreciated Intolerant of dig 
  
HLD Lipitor 40 mg every night  
  
T2DM  
SSI current  closely monitor Glipizide  
  
Hepatic cyst 
Benign per GI Appreciate consult  
  
Left leg wounds s/p fall Wound consult appreciated Leg is erythematous and warm, pt is afebrile  
  
CKD Creatinine trending up Cont bumex + albumin Tolvaptan x 1 today Monitor renal function Appreciate renal consult Low threshold for initiation of CRRT 
  
Hyponatremia Likely related to volume overload Monitor I/O's and daily Na+ Current Na 123 Cont spironolactone  
  
Malnutrition Prealbumin 10 Encourage PO intake Cont Marinol RD consult  
  
Constipation Bowel regimen KUB negative 
  
Altered mental status 19/30 on MOCA  
  
Hematuria Urology consult pending Remains off anticoagulation  
  
PPX PPI Ancef while impella in place Aggressive electrolyte replacement Replaced lines 5/21/19 
  
Dispo:  
Remain in CVI for now. Continues to undergo LVAD optimization. Worsening Cr requiring increase in Dobutamine and restart on Bumex gtt. Discontinued spironolactone d/t hyperkalemia, If unresponsive to changes with volume overload may need to consider dialysis. Patient continues with fatigue will order overnight pulse oximetry may have undiagnosed ROME. Will also consult palliative. History: 
Past Medical History:  
Diagnosis Date  3-vessel coronary artery disease  Aortic stenosis, severe  Chronic kidney disease (CKD), stage III (moderate) (HCC)  Chronic total occlusion of coronary artery  Hypertension  Ischemic cardiomyopathy  Peripheral vascular disease ([facility]  Type 2 diabetes mellitus treated without insulin ([facility] [facility] Past Surgical History:  
Procedure Laterality Date  HX AMPUTATION TOE Left 2017 Social History Socioeconomic History  Marital status:  Spouse name: Not on file  Number of children: Not on file  Years of education: Not on file  Highest education level: Not on file Occupational History  Not on file Social Needs  Financial resource strain: Not on file  Food insecurity:  
  Worry: Not on file Inability: Not on file  Transportation needs:  
  Medical: Not on file Non-medical: Not on file Tobacco Use  Smoking status: Former Smoker  Smokeless tobacco: Never Used Substance and Sexual Activity  Alcohol use: Not Currently  Drug use: Not on file  Sexual activity: Not on file Lifestyle  Physical activity:  
  Days per week: Not on file Minutes per session: Not on file  Stress: Not on file Relationships  Social connections:  
  Talks on phone: Not on file Gets together: Not on file Attends Orthodox service: Not on file Active member of club or organization: Not on file Attends meetings of clubs or organizations: Not on file Relationship status: Not on file  Intimate partner violence:  
  Fear of current or ex partner: Not on file Emotionally abused: Not on file Physically abused: Not on file Forced sexual activity: Not on file Other Topics Concern  Not on file Social History Narrative  Not on file History reviewed. No pertinent family history. Current Medications:  
Current Facility-Administered Medications Medication Dose Route Frequency Provider Last Rate Last Dose  bumetanide (BUMEX) injection 1 mg  1 mg IntraVENous Q6H PRN Polliard, Paddy Holes, NP      
 bumetanide (BUMEX) 0.25 mg/mL infusion  0.5 mg/hr IntraVENous CONTINUOUS Polliard, Paddy Holes, NP 2 mL/hr at 05/23/19 1009 0.5 mg/hr at 05/23/19 1009  [START ON 5/25/2019] levoFLOXacin (LEVAQUIN) tablet 750 mg  750 mg Oral Q48H Polliard, Paddy Holes, NP      
 lidocaine (XYLOCAINE) 2 % jelly   Mucous Membrane PRN Juvenal Soares MD      
  ceFAZolin (ANCEF) 1 g in 0.9% sodium chloride (MBP/ADV) 50 mL  1 g IntraVENous Q12H Shilpi Goldberg  mL/hr at 05/23/19 0802 1 g at 05/23/19 0802  
 glipiZIDE (GLUCOTROL) tablet 5 mg  5 mg Oral ACB&D Everrett Binder, NP   5 mg at 05/23/19 1950  tamsulosin (FLOMAX) capsule 0.4 mg  0.4 mg Oral DAILY Polliard, Everet Ky T, NP   0.4 mg at 05/23/19 0801  
 albumin human 25% (BUMINATE) solution 12.5 g  12.5 g IntraVENous TID Polliard, Everet Ky T, NP   12.5 g at 05/23/19 0801  
 senna-docusate (PERICOLACE) 8.6-50 mg per tablet 1 Tab  1 Tab Oral BID Oro Joni T, NP   1 Tab at 05/23/19 1513  polyethylene glycol (MIRALAX) packet 17 g  17 g Oral BID Polliard, Steven Laughter, NP   17 g at 05/23/19 1663  balsam peru-castor oil (VENELEX) ointment   Topical BID Polliard, Steven Laughter, NP      
 0.9% sodium chloride infusion  9 mL/hr IntraVENous CONTINUOUS Polliard, Steven Laughter, NP 9 mL/hr at 05/23/19 0742 9 mL/hr at 05/23/19 5119  dextrose 5% infusion  4-20 mL/hr IntraVENous CONTINUOUS EldaMirin B, NP 15.3 mL/hr at 05/23/19 0743 15.3 mL/hr at 05/23/19 2743  calcium carbonate (TUMS) chewable tablet 200 mg [elemental]  200 mg Oral TID PRN Everardo Perez B, NP   200 mg at 05/18/19 1922  
 magnesium oxide (MAG-OX) tablet 800 mg  800 mg Oral BID Elda, Colette B, NP   800 mg at 05/23/19 0801  
 sodium chloride (OCEAN) 0.65 % nasal squeeze bottle 2 Spray  2 Spray Both Nostrils Q2H PRN Shane Schroeder B, NP      
 dronabinol (MARINOL) capsule 2.5 mg  2.5 mg Oral DAILY Colette Schroeder NP   2.5 mg at 05/23/19 0801  
 insulin lispro (HUMALOG) injection   SubCUTAneous AC&HS Shane Schroeder NP   9 Units at 05/23/19 1148  
 glucose chewable tablet 16 g  4 Tab Oral PRN Everardo MC NP      
 dextrose (D50W) injection syrg 12.5-25 g  12.5-25 g IntraVENous PRN Colette Schroeder NP   25 g at 05/21/19 0559  
 glucagon (GLUCAGEN) injection 1 mg  1 mg IntraMUSCular PRN Shila Quiles NP      
  traMADol (ULTRAM) tablet 50 mg  50 mg Oral Q8H PRN Elda, Colette B, NP   50 mg at 05/22/19 1332  aspirin chewable tablet 81 mg  81 mg Oral DAILY Elda, Colette B, NP   81 mg at 05/23/19 0801  
 atorvastatin (LIPITOR) tablet 40 mg  40 mg Oral QHS Elda, Colette B, NP   40 mg at 05/22/19 2103  sodium chloride (NS) flush 5-40 mL  5-40 mL IntraVENous Q8H Elda, Colette B, NP   10 mL at 05/23/19 1306  sodium chloride (NS) flush 5-40 mL  5-40 mL IntraVENous PRN Elda, Colette B, NP      
 ondansetron (ZOFRAN) injection 4 mg  4 mg IntraVENous Q6H PRN Elda, Colette B, NP   4 mg at 05/22/19 1332  acetaminophen (TYLENOL) tablet 650 mg  650 mg Oral Q6H PRN Casie MC, NP   650 mg at 05/21/19 2149  
 docusate sodium (COLACE) capsule 100 mg  100 mg Oral PRN Moshe Avendaño NP      
 DOBUTamine (DOBUTREX) 500 mg/250 mL (2,000 mcg/mL) infusion  10 mcg/kg/min IntraVENous CONTINUOUS PollriveradZeinab, NP 25.4 mL/hr at 05/23/19 1009 10 mcg/kg/min at 05/23/19 1009  pantoprazole (PROTONIX) tablet 40 mg  40 mg Oral ACB Elda, Colette B, NP   40 mg at 05/23/19 3733 Allergies: Allergies Allergen Reactions  Penicillins Hives ROS:   
Review of Systems Constitutional: Positive for malaise/fatigue. HENT: Negative. Eyes: Negative. Respiratory: Positive for shortness of breath. Negative for cough. Cardiovascular: Positive for chest pain and leg swelling. Negative for palpitations. Gastrointestinal: Negative. Genitourinary: Negative. Musculoskeletal: Negative. Skin: Negative. Neurological: Positive for weakness. Physical Exam:  
Physical Exam  
Constitutional: He is oriented to person, place, and time. He appears well-developed. No distress. HENT:  
Head: Normocephalic and atraumatic. Eyes: EOM are normal.  
Neck: Normal range of motion. Neck supple. No tracheal deviation present. Cardiovascular: Normal rate. Exam reveals no gallop and no friction rub. Pulmonary/Chest: Effort normal and breath sounds normal. No respiratory distress. Abdominal: Soft. He exhibits distension. There is no guarding. Musculoskeletal: He exhibits deformity. Neurological: He is alert and oriented to person, place, and time. Skin: Skin is warm and dry. There is erythema. Vitals:  
Visit Vitals /88 (BP 1 Location: Left arm, BP Patient Position: Sitting) Pulse 78 Temp 96.9 °F (36.1 °C) Resp 15 Ht 5' 11\" (1.803 m) Wt 185 lb 9.6 oz (84.2 kg) SpO2 98% BMI 25.89 kg/m² Temp (24hrs), Av.2 °F (36.2 °C), Min:96.5 °F (35.8 °C), Max:99.4 °F (37.4 °C) Hemodynamics: 
 CO: CO (l/min): 2.3 l/min CI: CI (l/min/m2): 2.7 l/min/m2 CVP: CVP (mmHg): 5 mmHg (19 1300) SVR: SVR (dyne*sec)/cm5: 1147 (dyne*sec)/cm5 (19 1200) PAP Systolic: PAP Systolic: 50 (22/75/01 2777) PAP Diastolic: PAP Diastolic: 16 (95/38/36 6740) PVR:   
 SV02: SVO2 (%): 56 % (19 1300) SCV02:   
 
 
Admission Weight: Last Weight Weight: 186 lb 15.2 oz (84.8 kg) Weight: 185 lb 9.6 oz (84.2 kg) Intake / Output / Drain: 
Last 24 hrs.:  
 
Intake/Output Summary (Last 24 hours) at 2019 1306 Last data filed at 2019 1300 Gross per 24 hour Intake 2118.12 ml Output 945 ml Net 1173.12 ml Ventilator: 
Ventilator Volumes Vt Set (ml): 500 ml (19 1036) Vt Exhaled (Machine Breath) (ml): 516 ml (19 1036) Ve Observed (l/min): 8.6 l/min (19 1036) Oxygen Therapy: 
Oxygen Therapy O2 Sat (%): 98 % (19 1300) Pulse via Oximetry: 74 beats per minute (19 1300) O2 Device: Room air (19 1200) O2 Flow Rate (L/min): 4 l/min (19 1600) FIO2 (%): 36 % (19 1146) Recent Labs:  
Labs Latest Ref Rng & Units 2019 WBC 4.1 - 11.1 K/uL - 4. 0(L) - 5.2 - 4.9 - RBC 4.10 - 5.70 M/uL - 3.05(L) - 3.31(L) - 3.43(L) - Hemoglobin 12.1 - 17.0 g/dL - 8. 1(L) - 8. 8(L) - 9. 1(L) - Hematocrit 36.6 - 50.3 % - 25. 2(L) - 27. 6(L) - 28. 5(L) -  
MCV 80.0 - 99.0 FL - 82.6 - 83.4 - 83.1 - Platelets 834 - 301 K/uL - 73(L) - 80(L) - 91(L) - Lymphocytes 12 - 49 % - - - - - - - Monocytes 5 - 13 % - - - - - - - Eosinophils 0 - 7 % - - - - - - - Basophils 0 - 1 % - - - - - - - Albumin 3.5 - 5.0 g/dL 2. 7(L) 2. 7(L) - - 2. 3(L) 2. 2(L) -  
Calcium 8.5 - 10.1 MG/DL 8. 0(L) 8. 3(L) - - 8. 1(L) 8.4(L) 8. 3(L) SGOT 15 - 37 U/L - 28 - - 37 22 - Glucose 65 - 100 mg/dL 372(H) 235(H) - - 87 65 110(H) BUN 6 - 20 MG/DL 71(H) 69(H) - - 61(H) 55(H) 55(H) Creatinine 0.70 - 1.30 MG/DL 2.73(H) 2.67(H) - - 2.28(H) 2.13(H) 2.16(H) Sodium 136 - 145 mmol/L 120(L) 123(L) 123(L) - 126(L) 128(L) 126(L) Potassium 3.5 - 5.1 mmol/L 5.3(H) 5.7(H) - - 4.2 4.0 4.0 TSH 0.450 - 4.500 uIU/mL - - - - - - -  
LDH 85 - 241 U/L - - - - - - -  
 
EKG:  
EKG Results Procedure 720 Value Units Date/Time SCANNED CARDIAC RHYTHM STRIP [[id number]] Collected:  05/23/19 7477 Order Status:  Completed Updated:  05/23/19 3796 EKG, 12 LEAD, INITIAL [[id number]] Collected:  05/21/19 1011 Order Status:  Completed Updated:  05/21/19 1500 Ventricular Rate 75 BPM   
  Atrial Rate 105 BPM   
  QRS Duration 84 ms Q-T Interval 392 ms QTC Calculation (Bezet) 437 ms Calculated R Axis 49 degrees Calculated T Axis 87 degrees Diagnosis --  
  probable Mobitz 1 Nonspecific ST abnormality Confirmed by Toby Cristina M.D., Dottie Slaughter [id number]) on 5/21/2019 3:00:34 PM 
  
 EKG, 12 LEAD, INITIAL [[id number]] Collected:  05/20/19 1048 Order Status:  Completed Updated:  05/20/19 1543 Ventricular Rate 77 BPM   
  Atrial Rate 107 BPM   
  QRS Duration 94 ms Q-T Interval 386 ms QTC Calculation (Bezet) 436 ms Calculated R Axis 48 degrees Calculated T Axis 89 degrees   Diagnosis --  
 Sinus tachycardia with 2nd degree AV block (Mobitz I) Low voltage QRS Cannot rule out Anteroseptal infarct (cited on or before 09-MAY-2019) When compared with ECG of 10-MAY-2019 11:21, Sinus rhythm has replaced with AV dissociation Confirmed by Loretta Dahl MD. ([id number]) on 5/20/2019 3:42:59 PM 
  
 SCANNED CARDIAC RHYTHM STRIP [[id number]] Collected:  05/20/19 0451 Order Status:  Completed Updated:  05/20/19 6896 SCANNED CARDIAC RHYTHM STRIP [[id number]] Collected:  05/19/19 6154 Order Status:  Completed Updated:  05/19/19 6981 SCANNED CARDIAC RHYTHM STRIP [[id number]] Collected:  05/18/19 5841 Order Status:  Completed Updated:  05/18/19 6781 SCANNED CARDIAC RHYTHM STRIP [[id number]] Collected:  05/17/19 7684 Order Status:  Completed Updated:  05/17/19 3011 SCANNED CARDIAC RHYTHM STRIP [[id number]] Collected:  05/16/19 2609 Order Status:  Completed Updated:  05/16/19 3385 SCANNED CARDIAC RHYTHM STRIP [[id number]] Collected:  05/13/19 0430 Order Status:  Completed Updated:  05/13/19 8180 SCANNED CARDIAC RHYTHM STRIP [[id number]] Collected:  05/13/19 1953 Order Status:  Completed Updated:  05/13/19 [address] [facility] SCANNED CARDIAC RHYTHM STRIP [[id number]] Collected:  05/11/19 0321 Order Status:  Completed Updated:  05/11/19 9624 EKG, 12 LEAD, INITIAL [[id number]] Collected:  05/10/19 1121 Order Status:  Completed Updated:  05/10/19 1207 Ventricular Rate 64 BPM   
  Atrial Rate 77 BPM   
  QRS Duration 104 ms Q-T Interval 460 ms QTC Calculation (Bezet) 474 ms Calculated P Axis 41 degrees Calculated R Axis 35 degrees Calculated T Axis 133 degrees Diagnosis --  
  Sinus rhythm with AV dissociation and Junctional rhythm Low voltage QRS Cannot rule out Anteroseptal infarct (cited on or before 09-MAY-2019) When compared with ECG of 09-MAY-2019 20:01, 
Junctional rhythm has replaced Atrial fibrillation Questionable change in initial forces of Anterior leads Nonspecific T wave abnormality no longer evident in Inferior leads Nonspecific T wave abnormality, improved in Lateral leads Confirmed by [place] Hodgkin, MD, Jose Mar ([id number]) on 5/10/2019 12:07:00 PM 
  
 EKG, 12 LEAD, INITIAL [[id number]] Collected:  05/09/19 2001 Order Status:  Completed Updated:  05/10/19 6315 Ventricular Rate 60 BPM   
  Atrial Rate 60 BPM   
  QRS Duration 86 ms   
  Q-T Interval 428 ms QTC Calculation (Bezet) 428 ms Calculated R Axis 34 degrees Calculated T Axis 142 degrees Diagnosis -- Atrial fibrillation Low voltage QRS Septal infarct , age undetermined No previous ECGs available Confirmed by [place] Hodgkin, MD, Jose Mar ([id number]) on 5/10/2019 8:37:23 AM 
  
 [address] [place] [address] [[id number]] Collected:  05/10/19 3506 Order Status:  Completed Updated:  05/10/19 6614 Echocardiogram: · Left Ventricle: Moderately dilated left ventricle. Severe systolic dysfunction. Estimated left ventricular ejection fraction is 16 - 20%. Visually measured ejection fraction. · Right Ventricle: Catheter present · Aortic Valve: Aortic valve leaflet calcification present. Severe aortic valve stenosis is present. Impella distal part is 4.4 cm from aortic valve 
  
Left Ventricle Moderately dilated left ventricle. Severe systolic dysfunction. The estimated ejection fraction is 16 - 20%. Visually measured ejection fraction. Impella distal part is 4.4 cm from aortic valve Right Ventricle Catheter present . Aortic Valve There is leaflet calcification. There is severe aortic stenosis. Cardiac Catheterizations:  
Findings/PostOp Diagnosis: 1. Severe two vessel CAD 2. Elevated LVEDP 3. Severely reduced LVEF w/ WMA described below 4. Severe aortic stenosis 
  
Recommendations: 1. Continue medical therapy. CTS consult; doubt CABG candidate. ? High-risk TAVR? 2. Routine post procedure & access site care 3. Continue aggressive medical management and risk factor modification  
  
I have explained the nature of cardiac catheterization and possible percutaneous coronary intervention including risks and benefits of the procedure with the patient which include at least a 1:1000 risk for diagnostic procedure and 1/100 risk for percutaneous intervention.  
  
Risks include but are not limited to risk of heart attack, stroke, vascular trauma requiring surgical repair or transfusion, abnormal heart rhythm requiring defibrillation or pacemaker, need for intraaortic balloon pump support, renal dysfunction requiring dialysis, exacerbated gastrointestinal bleeding, allergic response to medications requiring ventilatory support, emergent cardiac surgery and even death. They also understand the need for medical compliance - particularly if stenting is required - mandating continued daily consumption of aspirin and plavix or other antiplatelet therapy. Differences between medicated stent versus bare metal stent reviewed with patient.  
  
The patient expresses an understanding and verbally consents. They also understand plans for either radial or femoral access - with unique risks to both vascular beds including arterial occlusion, vascular trauma, hematoma and need for vascular surgery.  I have answered all of their questions regarding the procedure and they are willing to proceed.  
  
Procedures: LHC, Cors, Cineflouroscopy, LVgram, AoV study  
  
Indication:  As above 
  
Procedure status: [x]  Elective  [] Urgent  [] Emergent 
  
Operators:  Jason Gibbs DO 
  
Assistants: None 
  
Access:   [x]  RIGHT Radial  []  LEFT Radial  [] RCFA  []  LCFA  []  RCFV  []  LCFV 
  
Catheters: 6Fr JR4, JL3.5  
  
Closure: [x]  TR Band  []  Angioseal  []  Perclose  []  Manual Compression 
  
Tubes/Drains:  [x] No tubes or drains remain from this procedure  [] Other:  
  
 Estimated Blood Loss: Minimal  
  
Specimens: None  
  
Sedation: Moderate conscious sedation with IV fentany & versed, local anesthesia with 1% lidocaine. This was performed by non-anesthesia personnel and I provided direct supervision to a trained independent observer.  
  
Time under moderate sedation: 40  Min 
  
Patient age:  77 y.o. 
  
Contrast:   113  cc  []  Isovue   [x]  Visipaque 
  
Fluoro Time:    4.5 Min 
  
Radiation Dose:   6675  mGy 
  
Complications:  [x] None  [] Other:  
  
Patient Condition at the end of the procedure:  [x] Stable  [] Other:   
  
Hemodynamics: Ao: 96/68/81 LV: 137/20 
  
AoV mean gradient by dual lumen Real:  41mmHg 
  
Cors:  
  
Dominance: [x] Right  [] Left  [] Mixed 
  
LM: Large caliber vessel without significant stenosis  
  
LAD: Moderate caliber heavily calcified vessel that is occluded in the mid without distal collateralization. D1: Moderate caliber calcified vessel with severe diffuse disease 
  
LCX: Moderate caliber calcified vessel without significant stenosis. OM1: Moderate caliber vessel with luminal irregularities and small vessel severe distal disease OM2: Very small caliber vessel without significant stenosis.  
  
RCA:   Large caliber heavily calcified dominant vessel that is occluded in the mid. A small segment of the PDA is filled faintly by left to right collaterals. PDA: Occluded PLB: Occluded 
  
  
LV angiography:  
EF: 10% Wall motion: severe basal HK. The anterior wall, anteroseptum, anterolateral, apical, and mid to distal inferior wall are akinetic. MR:  mild 
  
Ira Field III, DO Cardiac MRI:  
INDICATION: cardiomyopathy 
  
PROCEDURAL DATA: 
  
CPT Codes: 84572 
  
SCANS PERFORMED:  
Spin Echo: Axial. 
FIESTA/SSFP Rest Perfusion LGE 
  
Contrast Agent: Magnevist.  Contrast Dose: 33ml. 
  
CLINICAL DATA:  HR = 84/min, BP = 101/71mmHg,  HT = 5 foot 10inc, WT = 178lb.   
  
           
IMPRESSION Impression: 
  
 1. Markedly dilated left ventricle by 3-D volumetric assessment index to body 
surface area. The LV end-diastolic volume index is 911 mL/sq m. Severe left 
ventricular systolic dysfunction. Severe hypokinesis of the mid to distal 
anterior wall, anteroseptal wall, anterolateral wall. Dyskinesis of the apex, 
apical septal wall, apical lateral wall, inferoapical wall. Severe hypokinesis 
of the base to mid inferoseptal wall. The entire apex, apical septal wall, 
apical lateral wall and anteroapical wall is significantly thinned. 3-D LVEF 15% 
(patient was on dobutamine when this EF was calculated). 2. Mildly dilated right ventricle by 3-D volumetric assessment and index to body 
surface area. Severe right ventricular systolic dysfunction. Severe global 
hypokinesis with regional variation. Akinesis of the mid to distal anterior wall 
of the right ventricle. 3-D RVEF 17% (patient was on dobutamine when this EF was 
calculated). 3. Severe aortic valve stenosis with aortic valve area of 0.7 sq cm by 
planimetry. 4. On LGE study for viability, there is a large infarct involving greater than 
75% thickness of the mid to distal anterior wall, anteroapical wall, apex, 
apical septal wall, mid septal wall, inferoapical wall without any significant 
viable myocardium. There is a medium-size subendocardial infarct involving 50-75% thickness of the base to mid inferolateral and anterolateral wall. The 
only myocardial walls which demonstrate significant viability are mid to distal 
inferior wall, and lateral wall which are in the distribution of the RCA and 
circumflex territories. 5. Normal pleura and pericardium. Small to medium-sized right pleural effusion. 6. Large right hepatic cyst measuring 8.5 x 6 cm. 
  
  
  
  
CARDIAC CHAMBERS: 
  
Left Atrium:  54 mm (<40mm) Left Ventricular Size: LVEDD:  45 mm (Normal 37-57mm) Left Ventricular Size: LVESD:  41 mm (Normal <40mm) LVEF:  15 % (Normal 55-75%) LV Hypertrophy: Moderate septal hypertrophy. LV septal wall = 15 mm, LV 
posterior wall =  13 mm (Normal <11mm)  
  
Right Atrium:  55 mm (<40mm) Right Ventricular Size: RVEDD:  58 mm (Normal 26-45mm) Right Ventricular Size: RVESD:  55 mm (Normal <25mm) RVEF:  17 % (Normal 50-60%) RV Hypertrophy:  None 
  
  
Mass/Tumor/Thrombus:  None. Dissection: None. Atherosclerosis:  None. Inferior Vena Cava:  Normal. 
Inter Atrial Septum:  Normal. 
  
VALVULAR: 
  
Mitral Valve:  Normal mitral valve leaflets. Trace to mild mitral regurgitation. Aortic Valve:  Trileaflet aortic valve. Calcified aortic valve leaflets. Severe 
aortic stenosis with aortic valve area of 0.7 sq cm by planimetry. No 
significant aortic regurgitation. Tricuspid Valve:  Normal tricuspid valve. Mild 1+ tricuspid regurgitation. Pulmonary Valve:  Normal pulmonic valve. 
  
Pericardium:  Normal. (<3.5mm) Pericardial Effusion:  None. Pleural Effusion:  Small to medium-sized right pleural effusion. 
  
VOLUMETRIC DATA: 
  
LVEDVI:  143 ml/m2 LVESVI:  121 ml/m2 LVSVI:  22 ml/m2 LVMI:  36 g/m2 
  
  
RVEDVI:  111 ml/m2 RVESVI:  92 ml/m2 RVSVI:  19 ml/m2 Radiology (CXR, CT scans): EXAM: XR CHEST PORT 
  
INDICATION: PAC, cardiac assist device, Lodi-Jennifer catheter 
  
COMPARISON: 5/21/2019 
  
FINDINGS: A portable AP radiograph of the chest was obtained at 0907 hours. The 
patient is on a cardiac monitor. The cardiac assist device and left IJ Lodi-Jennifer 
catheter are in satisfactory position. Lungs demonstrate persistent atelectasis 
medially in the right lower lobe. Left lung reveals improved aeration. No 
pulmonary edema no pneumothorax. 
  
IMPRESSION 1. Persistent areas of atelectasis are noted medially at the right lung base.  
 
EXAM: US ABD COMP  
  
INDICATION: Hepatic cyst. 
  
COMPARISON: CT 5/17/2019. 
  
TECHNIQUE:  
Real-time sonography of the abdomen was performed with multiple static images of 
 the liver, gallbladder, pancreas, spleen, kidneys and retroperitoneum obtained. 
  
FINDINGS: 
LIVER:  
The liver is normal in echotexture with no mass or other focal abnormality.  
  
LIVER VASCULATURE: The portal vein flow is within normal limits. The diameter measures 0.9 cm. The 
velocity measures 23 cm/s. The right and left portal veins are patent. The 
hepatic veins are patent. . 
  
GALLBLADDER: 
The gallbladder is mildly distended with sludge. There is no wall thickening or 
fluid around the gallbladder.  
  
COMMON BILE DUCT: 
There is no biliary duct dilatation and the common duct measures mm in diameter. 
  
  
PANCREAS: 
Not well visualized secondary to bowel gas and body habitus. 
  
SPLEEN: 
The spleen measures 13.0 x 5.2 x 13.4 cm for a volume of 473 mL. This is on the 
upper limits of normal for size. 
  
RIGHT KIDNEY: 
The right kidney demonstrates normal echogenicity with no mass, stone or 
hydronephrosis. The right kidney measures 10.6 cm in length. 
  
LEFT KIDNEY: 
The left kidney demonstrates normal echogenicity with no mass, stone or 
hydronephrosis. The left kidney measures 11.1 cm in length.  
  
RETROPERITONEUM: 
The aorta is not well visualized secondary to bowel gas and body habitus. The IVC is normal. 
No retroperitoneal mass is identified. 
  
A right pleural effusion is present. 
  
IMPRESSION 1. Unremarkable liver. No evidence of a hepatic cyst. 
2. Gallbladder distention is likely related to fasting. Biliary sludge is 
present. 3. Right pleural effusion again seen. 4. Spleen on the upper limits of normal for size. 
  
F ATTENDING ADDENDUM Patient was seen and examined in person. Data and notes were reviewed. I have discussed and agree with the plan as noted in the NP note above without further additions. Ridge Servin MD PhD 
Ned Lackey [phone] [facility] Total Critical Care time spent: [phone] 40 minutes. There was no overlap with other services Critical care was necessary to treat or prevent imminent or life threatening deterioration of the following conditions: cardiac failure, respiratory failure and CNS failure or compromise 
  
Services Provided: 1. Telemetry review and 12 lead ECG interpretation 2. Hemodynamic interpretation, assessment, and management 3. Review and interpretation of CXR 4. Review and interpretation of lab values 5. Review and interpretation of microbiologic data and culture results 6. Review of medications and administration 7. Review and interpretation of nutrition requirements and management 8. Discussion of management withother consultants and services 9.  Clinical update to family members

## 2019-05-23 NOTE — PROGRESS NOTES
Clinical Pharmacy Note: Re: IV to PO Automatic Conversion - Antibiotic Please note: Glory Lopez Jr?s medication(s) (levaquin has/have been changed from IV to PO based on the following criteria: 
 
The patient: 
1. Has received IV therapy for at least 48 hours 2. Has a functioning GI tract - Taking scheduled oral medications - Tolerating tube feeds at goal rate or a full liquid, soft, or regular diet 3. Is clinically stable - Temperature < 100.4F for at least 24 hours - WBC is trending down This IV to PO conversion is based on the P&T approved automatic conversion policy for eligible patients. Please call with questions.

## 2019-05-23 NOTE — PROGRESS NOTES
Spiritual Care Assessment/Progress Note [address] 
 
 
NAME: Ryley Freire      MRN: [id number] AGE: 77 y.o. SEX: male Orthodoxy Affiliation: Bunny Lu Language: Max Noe 5/23/2019     Total Time (in minutes): 15 Spiritual Assessment begun in [facility] 4 CV INTNSV CARE through conversation with: 
  
    [x]Patient        [] Family    [] Friend(s) Reason for Consult: Palliative Care, Initial/Spiritual Assessment Spiritual beliefs: (Please include comment if needed) [x] Identifies with a chester tradition:  Bunny Lu   
   [] Supported by a chester community:        
   [] Claims no spiritual orientation:       
   [] Seeking spiritual identity:            
   [] Adheres to an individual form of spirituality:       
   [] Not able to assess:                   
 
    
Identified resources for coping:  
   [] Prayer                           
   [] Music                  [] Guided Imagery [x] Family/friends                 [] Pet visits [] Devotional reading                         [] Unknown 
   [] Other:                                          
 
 
Interventions offered during this visit: (See comments for more details) Patient Interventions: Affirmation of emotions/emotional suffering, Affirmation of chester, Catharsis/review of pertinent events in supportive environment, Coping skills reviewed/reinforced, Iconic (affirming the presence of God/Higher Power) Plan of Care: 
 
 [] Support spiritual and/or cultural needs  
 [] Support AMD and/or advance care planning process    
 [] Support grieving process 
 [] Coordinate Rites and/or Rituals  
 [] Coordination with community clergy [] No spiritual needs identified at this time 
 [] Detailed Plan of Care below (See Comments)  [] Make referral to Music Therapy 
[] Make referral to Pet Therapy    
[] Make referral to Addiction services 
[] Make referral to [facility] 
[] Make referral to Spiritual Care Partner [] No future visits requested       
[x] Follow up visits as needed Comments:  visit for initial spiritual assessment, palliative care consult. Patient sitting in chair at bedside,  Some eye contact, friendly, smiling at times. Provided spiritual presence and listening as he spoke of his present thoughts, feelings, and concerns. Says he feels ready to go home and looks forward to the day when God takes him. When asked whether that is what he is preparing for with his providers, he looked up, smiled, saying no, of course not. He said he is looking forward to regaining his health enough to be discharged to home eventually and is discussing the possibility of rehab [facility] soon. He complimented the staff for the care he has been receiving and said he was grateful to a  who had come to see him earlier during this hospitalization. He was very appreciative of the care and concern. He said he was feeling fine today and could not identify a single need he had at this time. He appeared comforted and encouraged as a result of this visit and expressed gratitude for this visit. Visited by Rev. Jill Lemus, [facility]  paging service: [phone])

## 2019-05-23 NOTE — PROGRESS NOTES
Nephrology Progress Note Carlota Gottron 
Date of Admission : 5/9/2019 CC:  Follow up for hyponatremia and CKD Assessment and Plan THEODORE on CKD III: 
- likely 2/2 CRS 
- rising Cr after weaning dobutamine - would increase dobutamine 
- agree with bumex drip 
- will dose again w/ tolvaptan 30mg 
- place lowe catheter 
- strict I/Os Hyponatremia: 
- likely hypervolemic from CHF, may have a component of SIADH 
- would increase diuretics 
- tolvaptan again today Hyperkalemia: 
- d/c K supplements and aldactone 
- repeat labs this PM 
  
CKD III w/ baseline Cr 2: 
- presumed 2/2 DM and HTN 
- Cr stable, w/ non-nephrotic range proteinuria 
  
HFrEF: 
- EF 10% - w/u for LVAD 
- impella and dobutamine per HF service 
  
RV failure 
  
Severe AS 
  
Multivessel CAD: 
- poor viability on cardiac MRI 
  
Urinary Retention: 
- on flomax - place lowe today 
  
DM2: 
- on insulin Interval History: 
Seen and examined. Cr and K up, Na down today. Poor response to tolvaptan. CVP around 10 this AM.  Remains on dobutamine. Poor UOP over the past 24 hours. Current Medications: all current  Medications have been eviewed in [facility] Review of Systems: Pertinent items are noted in HPI. Objective: 
Vitals:   
Vitals:  
 05/23/19 0700 05/23/19 0800 05/23/19 0845 05/23/19 0900 BP:   122/88 Pulse: 61 69  62 Resp: 14 15  16 Temp: 96.5 °F (35.8 °C) 97 °F (36.1 °C)  97.2 °F (36.2 °C) SpO2: 100% 100%  100% Weight: 84.2 kg (185 lb 9.6 oz) Height:      
 
Intake and Output: 
05/23 0701 - 05/23 1900 In: 173.1 [I.V.:173.1] Out: 85 [Urine:85] 
05/21 1901 - 05/23 0700 In: 3055.1 [P.O.:840; I.V.:2175.1] Out: 1185 [QMWEK:6854] Physical Examination:Pt intubated     No 
General: NAD,Conversant Neck:  Supple, no mass Resp:  Lungs CTA B/L, no wheezing , normal respiratory effort CV:  RRR,  no murmur or rub, 3-4+ b/l LE edema GI:  Soft, NT, + Bowel sounds, no hepatosplenomegaly Neurologic:  Non focal 
Psych:             Flat affect Skin:  No Rash 
 
[]    High complexity decision making was performed 
[]    Patient is at high-risk of decompensation with multiple organ involvement Lab Data Personally Reviewed: I have reviewed all the pertinent labs, microbiology data and radiology studies during assessment. Recent Labs  
  05/23/19 
0307 05/22/19 
1805 05/22/19 
0427 05/21/19 
0355 * 123* 126* 128*  
K 5.7*  --  4.2 4.0  
CL 90*  --  93* 93* CO2 22  --  24 25 *  --  87 65 BUN 69*  --  61* 55* CREA 2.67*  --  2.28* 2.13* CA 8.3*  --  8.1* 8.4* MG 2.4  --  2.2 2.1 ALB 2.7*  --  2.3* 2.2*  
SGOT 28  --  37 22 ALT <6*  --  <6* <6* Recent Labs  
  05/23/19 
0307 05/22/19 
0731 05/21/19 
0355 WBC 4.0* 5.2 4.9 HGB 8.1* 8.8* 9.1*  
HCT 25.2* 27.6* 28.5*  
PLT 73* 80* 91* No results found for: SDES Lab Results Component Value Date/Time Culture result: NO GROWTH 2 DAYS 05/21/2019 10:52 AM  
 Culture result: NO GROWTH 5 DAYS 05/18/2019 05:55 PM  
 Culture result: NO GROWTH 5 DAYS 05/15/2019 11:00 AM  
 
Recent Results (from the past 24 hour(s)) GLUCOSE, POC Collection Time: 05/22/19 11:49 AM  
Result Value Ref Range Glucose (POC) 147 (H) 65 - 100 mg/dL Performed by Elise Section PTT Collection Time: 05/22/19 11:50 AM  
Result Value Ref Range aPTT 52.3 (H) 22.1 - 32.0 sec  
 aPTT, therapeutic range     58.0 - 77.0 SECS  
GLUCOSE, POC Collection Time: 05/22/19  4:19 PM  
Result Value Ref Range Glucose (POC) 150 (H) 65 - 100 mg/dL Performed by Elise Section PTT Collection Time: 05/22/19  6:05 PM  
Result Value Ref Range aPTT 60.4 (H) 22.1 - 32.0 sec  
 aPTT, therapeutic range     58.0 - 77.0 SECS  
SODIUM Collection Time: 05/22/19  6:05 PM  
Result Value Ref Range Sodium 123 (L) 136 - 145 mmol/L  
GLUCOSE, POC Collection Time: 05/22/19 11:25 PM  
Result Value Ref Range Glucose (POC) 249 (H) 65 - 100 mg/dL Performed by Kiko Squires MAGNESIUM Collection Time: 05/23/19  3:07 AM  
Result Value Ref Range Magnesium 2.4 1.6 - 2.4 mg/dL NT-PRO BNP Collection Time: 05/23/19  3:07 AM  
Result Value Ref Range NT pro-BNP >35,000 (H) <125 PG/ML  
LACTIC ACID Collection Time: 05/23/19  3:07 AM  
Result Value Ref Range Lactic acid 0.8 0.4 - 2.0 MMOL/L  
DIGOXIN Collection Time: 05/23/19  3:07 AM  
Result Value Ref Range Digoxin level 1.0 0.90 - 7.62 NG/ML  
METABOLIC PANEL, COMPREHENSIVE Collection Time: 05/23/19  3:07 AM  
Result Value Ref Range Sodium 123 (L) 136 - 145 mmol/L Potassium 5.7 (H) 3.5 - 5.1 mmol/L Chloride 90 (L) 97 - 108 mmol/L  
 CO2 22 21 - 32 mmol/L Anion gap 11 5 - 15 mmol/L Glucose 235 (H) 65 - 100 mg/dL BUN 69 (H) 6 - 20 MG/DL Creatinine 2.67 (H) 0.70 - 1.30 MG/DL  
 BUN/Creatinine ratio 26 (H) 12 - 20 GFR est AA 29 (L) >60 ml/min/1.73m2 GFR est non-AA 24 (L) >60 ml/min/1.73m2 Calcium 8.3 (L) 8.5 - 10.1 MG/DL Bilirubin, total 0.9 0.2 - 1.0 MG/DL  
 ALT (SGPT) <6 (L) 12 - 78 U/L  
 AST (SGOT) 28 15 - 37 U/L Alk. phosphatase 77 45 - 117 U/L Protein, total 5.8 (L) 6.4 - 8.2 g/dL Albumin 2.7 (L) 3.5 - 5.0 g/dL Globulin 3.1 2.0 - 4.0 g/dL A-G Ratio 0.9 (L) 1.1 - 2.2    
CBC W/O DIFF Collection Time: 05/23/19  3:07 AM  
Result Value Ref Range WBC 4.0 (L) 4.1 - 11.1 K/uL  
 RBC 3.05 (L) 4.10 - 5.70 M/uL HGB 8.1 (L) 12.1 - 17.0 g/dL HCT 25.2 (L) 36.6 - 50.3 % MCV 82.6 80.0 - 99.0 FL  
 MCH 26.6 26.0 - 34.0 PG  
 MCHC 32.1 30.0 - 36.5 g/dL  
 RDW 16.0 (H) 11.5 - 14.5 % PLATELET 73 (L) 923 - 400 K/uL MPV 13.1 (H) 8.9 - 12.9 FL  
 NRBC 0.0 0  WBC ABSOLUTE NRBC 0.00 0.00 - 0.01 K/uL PTT Collection Time: 05/23/19  3:07 AM  
Result Value Ref Range  aPTT 54.8 (H) 22.1 - 32.0 sec  
 aPTT, therapeutic range     58.0 - 77.0 SECS  
TYPE & SCREEN  
 Collection Time: 05/23/19  3:07 AM  
Result Value Ref Range Crossmatch Expiration 05/26/2019 ABO/Rh(D) O POSITIVE Antibody screen NEG   
POC VENOUS BLOOD GAS Collection Time: 05/23/19  5:32 AM  
Result Value Ref Range Device: ROOM AIR    
 FIO2 (POC) 21 %  
 pH, venous (POC) 7.381 7.32 - 7.42    
 pCO2, venous (POC) 31.6 (L) 41 - 51 MMHG  
 pO2, venous (POC) 30 25 - 40 mmHg HCO3, venous (POC) 18.7 (L) 23.0 - 28.0 MMOL/L  
 sO2, venous (POC) 57 (L) 65 - 88 % Base deficit, venous (POC) 6 mmol/L Allens test (POC) N/A Total resp. rate 16 Site OSIEL [facility] Specimen type (POC) MIXED VENOUS    
PTT Collection Time: 05/23/19  7:44 AM  
Result Value Ref Range aPTT 53.2 (H) 22.1 - 32.0 sec  
 aPTT, therapeutic range     58.0 - 77.0 SECS  
GLUCOSE, POC Collection Time: 05/23/19  7:51 AM  
Result Value Ref Range Glucose (POC) 277 (H) 65 - 100 mg/dL Performed by Jinny Myers MD 
[address] [place] [address], [address] [facility] Phone - [phone] Fax - [phone] 
[email or web address]

## 2019-05-23 NOTE — PROGRESS NOTES
Problem: Mobility Impaired (Adult and Pediatric) Goal: *Acute Goals and Plan of Care (Insert Text) Description Physical Therapy Goals Initiated 5/17/2019 1. Patient will move from supine to sit and sit to supine , scoot up and down and roll side to side in bed with minimal assistance/contact guard assist within 7 day(s). 2.  Patient will transfer from bed to chair and chair to bed with minimal assistance/contact guard assist using the least restrictive device within 7 day(s). 3.  Patient will perform sit to stand with minimal assistance/contact guard assist within 7 day(s). 4.  Patient will ambulate with minimal assistance/contact guard assist for 50 feet with the least restrictive device within 7 day(s). 5.  Patient will ascend/descend 4 stairs with no handrail(s) with minimal assistance/contact guard assist within 7 day(s). 6.  Patient will complete a 6MWT within 48 hours of discharge. Initiated 5/10/2019 1. Patient will move from supine to sit and sit to supine , scoot up and down and roll side to side in bed with independence within 7 day(s). 2.  Patient will transfer from bed to chair and chair to bed with modified independence using the least restrictive device within 7 day(s). 3.  Patient will perform sit to stand with modified independence within 7 day(s). 4.  Patient will ambulate with modified independence for 300 feet with the least restrictive device within 7 day(s). 5.  Patient will ascend/descend 4 stairs with no handrail(s) with modified independence within 7 day(s). 6.  Patient will participate in a six minute walk test within 48 hours prior to discharge. Outcome: Progressing Towards Goal 
PHYSICAL THERAPY TREATMENT Patient: Eli Webb (77 y.o. male) Date: 5/23/2019 Diagnosis: Acute on chronic systolic (congestive) heart failure (HCC) [I50.23] <principal problem not specified> Procedure(s) (LRB): 
RIGHT AXILLARY IMPELLA/ 4219 (Right) 7 Days Post-Op Precautions: Fall(R axillary impella) Chart, physical therapy assessment, plan of care and goals were reviewed. ASSESSMENT: 
Patient participating well. Lines prevent gait beyond bed to chair and c/o \"general malaise\" in standing limited marching in place after 10 reps. Anticipate improved progress as lines are removed. Progression toward goals: 
?    Improving appropriately and progressing toward goals ? Improving slowly and progressing toward goals ? Not making progress toward goals and plan of care will be adjusted PLAN: 
Patient continues to benefit from skilled intervention to address the above impairments. Continue treatment per established plan of care. Discharge Recommendations: To Be Determined Further Equipment Recommendations for Discharge:  to be determined SUBJECTIVE:  
Patient stated ? I feel like I need to sit. ? OBJECTIVE DATA SUMMARY:  
Critical Behavior: 
Neurologic State: Alert, Appropriate for age Orientation Level: Oriented X4 Cognition: Appropriate decision making, Appropriate for age attention/concentration, Appropriate safety awareness, Follows commands Safety/Judgement: Awareness of environment, Insight into deficits Functional Mobility Training: 
Bed Mobility: 
  
  
  
  
  
  
Transfers: 
Sit to Stand: Minimum assistance Stand to Sit: Minimum assistance Bed to Chair: (2nd person for line management) Balance: 
Sitting: Intact Standing: Impaired Standing - Static: Fair Standing - Dynamic : Poor Ambulation/Gait Training: 
Distance (ft): 3 Feet (ft) Assistive Device: Gait belt Ambulation - Level of Assistance: Moderate assistance Gait Abnormalities: Decreased step clearance Base of Support: Widened Neuro Re-Education: 
Therapeutic Exercises:  
 
Pain: 
Pain Scale 1: Numeric (0 - 10) Pain Intensity 1: 0 Pain Location 1: Generalized Pain Orientation 1: Anterior Pain Description 1: Aching Pain Intervention(s) 1: Repositioned; Rest 
Activity Tolerance:  
Please refer to the flowsheet for vital signs taken during this treatment. After treatment:  
?    Patient left in no apparent distress sitting up in chair ? Patient left in no apparent distress in bed 
? Call bell left within reach ? Nursing notified ? Caregiver present ? Bed alarm activated COMMUNICATION/COLLABORATION:  
The patient?s plan of care was discussed with: Registered Nurse Jose Braun PT, DPT Time Calculation: 18 mins

## 2019-05-23 NOTE — PROGRESS NOTES
NYHA class IV A/C systolic HF Severe AS 
PAD 
A/C kidney disease CAD LVAD work-up S/P Impella 
  
Pre-op Plan Platelets INR Creatinine 
  
OR plan Drive-line RCA graft Impella  
  
LHC - severe 3 vessel disease; RCA has severe calcification and blockages; will need to try and place RCA graft 
  
ECHO - severely depressed EF with dilated LV cavity (EF 10%; LVIDD 4.77); RV dilated (RVIDD 4.1; TAPSE 0.65, RV/LV ratio 0.85); mild to moderate TR; trace AI 
  
RHC (Impella in place; Dobutamine 7.5) - CVP 5, PA 45/17 (25), CVP/PA ratio 0.2 
  
**overall patient has moderate to severe RV dysfunction  
  
Chest/Abdominal CT scan - no significant calcification in aorta, should be good for outflow graft; thin adipose tissue over abdominal wall so need to be careful with drive-line; elevated right billy-diaphragm 
  
Cardiac MRI - poor global viability 
  
PAUL - no compressible vessels  
  
Carotids - no significant disease  
  
PFTs - pending  
  
Hgb 9, platelets 91, INR 1.4  
  
Creatinine 2.13, BUN 55 (most likely dried him out a bit too much - will back off on diuretics)  
  
AST 22, ALT 6, alk phos 73, bilirubin 0.5 
  
Lactic acid - 0.7 
  
NT pro-BNP > 35,000 Dyspnea, fatigue, and weakness today Creatinine elevated May need to start HD Impella flow good Hgb looks good Platelets a little low Potassium elevated Bilirubin and other LFTs look good Discussed with HF team  
 
Impella - flow 5 L/min @ P-9 Risk of morbidity and mortality - high Medical decision making - high complexity Total critical care time - 30 minutes (CPT 65744)

## 2019-05-23 NOTE — CONSULTS
Urology Consult Patient: Libra Guillermo MRN: [id number]  SSN: [id number] YOB: 1950  Age: 77 y.o. Sex: male Date of Consultation:  May 23, 2019 Requesting Physician: Macr Del Castillo MD 
Reason for Consultation:   
Pre-existing [facility] Patient:   Physician: Dr. Harjinder Good: 
Nasim Veliz after lowe catheter placement for urinary retention- hematuria has resolved. Lowe was placed for a residual of 400cc. He has been started on flomax. Hematuria can be be evaluated as an outpatient - this was likely due to the catheter placement. They lowe was placed for a volume of 400cc and pt has been started on flomax - this can be removed when no longer medically indicated but would continue the flomax. History of Present Illness:  Patient is a 77 y.o. male admitted 5/9/2019 to the hospital for Acute on chronic systolic (congestive) heart failure ([facility] [address] [I50.23]. He is being treated for HF (with Impella), AS, CAD, Afib, DM and CKD. Past Medical History: Allergies Allergen Reactions  Penicillins Hives Prior to Admission medications Medication Sig Start Date End Date Taking? Authorizing Provider  
glimepiride (AMARYL) 4 mg tablet Take 4 mg by mouth two (2) times a day. Yes Provider, Historical  
dulaglutide (TRULICITY) 1.5 SX/0.0 mL sub-q pen 1.5 mg by SubCUTAneous route every Sunday. Yes Provider, Historical  
spironolactone (ALDACTONE) 25 mg tablet TAKE 1 TABLET BY MOUTH EVERY DAY 5/7/19  Yes Gracy Miguel MD  
apixaban (ELIQUIS) 5 mg tablet Take 1 Tab by mouth two (2) times a day. 5/7/19  Yes Gracy Miguel MD  
sacubitril-valsartan (ENTRESTO) 24 mg/26 mg tablet Take 1 Tab by mouth every twelve (12) hours. 5/4/19  Yes Niru Hahn MD  
canagliflozin (INVOKANA) 300 mg tablet Take 300 mg by mouth Daily (before breakfast).    Yes Provider, Historical  
 metFORMIN (GLUCOPHAGE) 1,000 mg tablet Take 1,000 mg by mouth two (2) times daily (with meals). Yes Provider, Historical  
esomeprazole (NEXIUM) 20 mg capsule Take 20 mg by mouth daily. Yes Provider, Historical  
aspirin 81 mg chewable tablet Take 81 mg by mouth daily. Yes Provider, Historical  
atorvastatin (LIPITOR) 40 mg tablet Take 1 Tab by mouth nightly. 19  Yes Octaviano Corbin III, DO  
furosemide (LASIX) 40 mg tablet Take one tab daily 5/3/19  Yes Lin Corbin III, DO  
metoprolol succinate (TOPROL-XL) 100 mg tablet Take 1 Tab by mouth daily. 5/3/19  Yes Lesly Bhatt, DO  
  
PMHx:  has a past medical history of 3-vessel coronary artery disease, Aortic stenosis, severe, Chronic kidney disease (CKD), stage III (moderate) (HCC), Chronic total occlusion of coronary artery, Hypertension, Ischemic cardiomyopathy, Peripheral vascular disease ([facility] [address] and Type 2 diabetes mellitus treated without insulin ([facility] [address] PSurgHx:  has a past surgical history that includes hx amputation toe (Left, 2017). PSocHx:  reports that he has quit smoking. He has never used smokeless tobacco. He reports that he drank alcohol. ROS:  Admission ROS by Joseph Gonzalez MD from 2019 were reviewed with the patient and changes (other than per HPI) include: none. All 12 systems were reviewed and were otherwise negative. Physical Exam: 
          Vitals[de-identified]    Temp (24hrs), Av.2 °F (36.2 °C), Min:96.5 °F (35.8 °C), Max:99.4 °F (37.4 °C) Blood pressure 122/88, pulse 78, temperature 96.9 °F (36.1 °C), resp. rate 15, height 5' 11\" (1.803 m), weight 84.2 kg (185 lb 9.6 oz), SpO2 98 %. I&O's:    701 -  1900 In: 1 [P.O.:180; I.V.:513] Out: 375 [Urine:375] General Well developed, NAD Conjunctiva/Lids Normal without gross defects Pupil/Iris Pupils equal, round, reactive External Ears/Nose No lesions or deformities Hearing  Grossly intact Neck Supple without masses Thyroid No nodules, masses, tenderness, or enlargement Respiratory Effort Breathing - slightly increased effort Chest Palpation No tactile fremitus Lower extremity Bilateral LE pitting edema Abdomen / Flank Soft, non tender, without masses, no CVA tenderness Liver / Spleen No organomegaly Hernia  No ventral hernia Lymph No adenopathy Digits/ Nails No clubbing, cyanosis, petechiae Skin Inspection Warm and dry Skin Palpation No subcutaneous nodularity Senesation Grossly without deficits Judgement / Insight intact Mood / Affect normal  
 
Lab Results Component Value Date/Time WBC 4.0 (L) 05/23/2019 03:07 AM  
 HCT 25.2 (L) 05/23/2019 03:07 AM  
 PLATELET 73 (L) 11/91/3249 03:07 AM  
 Sodium 120 (L) 05/23/2019 12:06 PM  
 Potassium 5.3 (H) 05/23/2019 12:06 PM  
 Chloride 87 (L) 05/23/2019 12:06 PM  
 CO2 21 05/23/2019 12:06 PM  
 BUN 71 (H) 05/23/2019 12:06 PM  
 Creatinine 2.73 (H) 05/23/2019 12:06 PM  
 Glucose 372 (H) 05/23/2019 12:06 PM  
 Calcium 8.0 (L) 05/23/2019 12:06 PM  
 Magnesium 2.4 05/23/2019 03:07 AM  
 INR 1.4 (H) 05/16/2019 03:57 AM  
 
 
UA:  
Lab Results Component Value Date/Time Color YELLOW/STRAW 05/21/2019 01:15 AM  
 Appearance CLOUDY (A) 05/21/2019 01:15 AM  
 Specific gravity 1.017 05/21/2019 01:15 AM  
 pH (UA) 5.0 05/21/2019 01:15 AM  
 Protein 30 (A) 05/21/2019 01:15 AM  
 Glucose NEGATIVE  05/21/2019 01:15 AM  
 Ketone TRACE (A) 05/21/2019 01:15 AM  
 Bilirubin NEGATIVE  05/21/2019 01:15 AM  
 Urobilinogen 0.2 05/21/2019 01:15 AM  
 Nitrites NEGATIVE  05/21/2019 01:15 AM  
 Leukocyte Esterase NEGATIVE  05/21/2019 01:15 AM  
 Epithelial cells FEW 05/21/2019 01:15 AM  
 Bacteria NEGATIVE  05/21/2019 01:15 AM  
 WBC 0-4 05/21/2019 01:15 AM  
 RBC 0-5 05/21/2019 01:15 AM  
 
 
Cultures:  
Xrays:  
 
Signed By: Melquiades Valverde MD  - May 23, 2019

## 2019-05-23 NOTE — PROGRESS NOTES
0730: Bedside and Verbal shift change report given to YOSELIN RN (oncoming nurse) by Cristian Quiles RN (offgoing nurse). Report included the following information SBAR, Kardex, Intake/Output, MAR, Recent Results, Med Rec Status and Cardiac Rhythm Wenkebach. 0920: CXR at bedside. 1015: Bumex gtt started at 0.5 mg/hr and Dobutamine gtt increased to 10 mcg/min per Janyth Mary Ann, MD d/t worsening kidney function. 1240: Urology MD at bedside. Orders received for 2% Lidocaine jelly for urethra to ease pain. No other orders received. 1400: PT/OT at bedside. Pt repositioned up into chair. 1800: R axilla impella dressing changed. 1930: Bedside and Verbal shift change report given to Emmanuelle Addison RN (oncoming nurse) by NADEGE WYATT (offgoing nurse). Report included the following information SBAR, Kardex, Intake/Output, MAR, Recent Results, Med Rec Status and Cardiac Rhythm Wenkebach.

## 2019-05-24 NOTE — PROGRESS NOTES
[facility] Progress Note DOS:   5/24/2019 NAME:  Jerome Morris  
MRN:   [id number] REFERRING PROVIDER:  Dr. Jeaneth Holcomb PRIMARY CARE PHYSICIAN: Jennifer Stokes MD 
 
 
Chief Complaint: CHENG  
 
HPI: 77y.o. year old male with a history of HTN, T2DM, PVD, CKD, ICM, Severe AS who presents for further evaluation of chronic systolic heart failure. He developed progressive dyspnea with exacterbation as well as progressive fatigue which prompted further evaluation with a heart cath at [facility] that revealed severe 3 vessel disease with  of his LAD and RCA, severe LV systolic failure (LVEF 44%), and severe As. He has been unable to walk a block before without limiting dyspnea as well as unable to make a bed without developing dyspnea. Admitted to [facility] for acute chronic HFrEF, CMRI showed no viability for LAD, taken for impella placement for bridge to candidacy on 5/16. 24Hr Events:  
Inadequate diuresis, creatinine improved Thrombocytopenia Hyponatremia Na + 123 Procedure:  Procedure(s): RIGHT AXILLARY IMPELLA/ [id number] POD:  8 Days Post-Op Impression / Plan:  
Heart Failure Status: NYHA Class IV 
 
ICM-Stage D NYHA IV - LVEF 10%, s/p Impella placement in cardiogenic shock Continue PAC for continuous hemodynamic monitoring Impella in place - continue P9 Cefazolin for cannula ppx TPA PRN for purge flow < 6 Purge fluid to D5 Once pTT normalizes, begin systemic bival at 1/2 concentration Intolerant of dobutamine wean - continue at 10mcg/kg/min do not titrate down unless order indicates No BB d/t dobutamine Intolerant of RAAS due to THEODORE  
D/C spironolactone hyperkalemic Increase Bumex gtt to 1mg/hr Tolvaptan per renal- hold for now Evaluation for LVAD placement ongoing Daily lactates to eval for perfusion- WNL Lipase WNL Numerous concerns regarding LVAD candidacy: RV failure failing attempts at inotrope wean daily, leukocytosis of unknown source responding to antibiotics, poor renal function at 100% risk of temporary dialysis and > 50% risk of permanent dialysis after LVAD, hypogammaglobulinemia in the setting of hypoproteinemia and hypoalbuminemia due to proteinuria nearing nephrotic range at high risk of driveline infection, malnutrition, poor balance and severe muscular deconditioning, significant neurocognitive dysfunction with low MOCA score, ongoing concerns regarding psychosocial support as per  evaluation. RV failure and renal failure remain the most challenging issues; we will give patient two weeks total of inotropic conditioning for RV recovery, if fails the trial we plan on family meeting re: weaning inotropes to palliation. This challenging situation was discussed with the patient patient and caregiver, who agreed to consult palliative care team to reflect on their wishes in case patient was not a candidate for LVAD. 
  
Severe AS  
Not a candidate for TAVR due to poor viability and cardiomyopathy 
  
CAD with  of RAD and RCA Currently on ASA and statin No BBrx while on dobutamine cMRI results show severe Cad with  of the LAD and RCA with poor viability of myocardium.  
  
RV failure Dobutamine, diuresis Continue PA cath - requires continuous hemodynamic monitoring RV will need to recover to be a candidate for durable MCS 
  
Sepsis of unknown etiology Procalcitonin elevated to 8 today WBC WNL Blood cx NGTD - repeat Ancef while impella in place Continue levaquin for broad spectrum coverage Daily ESR, lactics, procalcitonin Replaced all lines 5/21 ID consult today IVIG given to boost immunity - however, developed reaction to second dose requiring discontinuation of infusion and treatment with IV steroids and benadryl  
  
SCD high risk No device in place No LifeVest while inpatient and with Impella in place  
  
Hx of atrial fibrillation  
 Repeat pTT q6h until < 40, then begin systemic bival with goal aPTT 40-60 Monitor 
  
AV dissociation EP consult appreciated Intolerant of dig 
  
HLD Lipitor 40 mg every night  
  
T2DM Start lantus 10units Glipizide SSI 
  
Hepatic cyst 
Benign per GI Appreciate consult  
  
Left leg wounds s/p fall Wound consult appreciated Leg is erythematous and warm, pt is afebrile  
  
CKD Creatinine down today Cont bumex Monitor renal function Appreciate renal consult Low threshold for initiation of CRRT 
  
Hyponatremia Likely related to volume overload Monitor I/O's and daily Na+ Current Na 123 Cont spironolactone  
  
Malnutrition Prealbumin 10 Encourage PO intake Cont Marinol RD consult  
  
Constipation Bowel regimen KUB negative 
  
Altered mental status 19/30 on MOCA  
  
Hematuria Urology consult appreciated Will leave lowe in for now Remains off anticoagulation  
  
PPX PPI Ancef while impella in place Aggressive electrolyte replacement Replaced lines 5/21/19 
  
Dispo:  
Remain in CVI for now. Continues to undergo LVAD optimization. Worsening Cr requiring increase in Dobutamine and restart on Bumex gtt. Discontinued spironolactone d/t hyperkalemia, If unresponsive to changes with volume overload may need to consider dialysis. Patient continues with fatigue will order overnight pulse oximetry may have undiagnosed ROME. Will also consult palliative. History: 
Past Medical History:  
Diagnosis Date  3-vessel coronary artery disease  Aortic stenosis, severe  Chronic kidney disease (CKD), stage III (moderate) (HCC)  Chronic total occlusion of coronary artery  Hypertension  Ischemic cardiomyopathy  Peripheral vascular disease ([facility]  Type 2 diabetes mellitus treated without insulin ([facility] Past Surgical History:  
Procedure Laterality Date  HX AMPUTATION TOE Left 2017 Social History Socioeconomic History  Marital status:  Spouse name: Not on file  Number of children: Not on file  Years of education: Not on file  Highest education level: Not on file Occupational History  Not on file Social Needs  Financial resource strain: Not on file  Food insecurity:  
  Worry: Not on file Inability: Not on file  Transportation needs:  
  Medical: Not on file Non-medical: Not on file Tobacco Use  Smoking status: Former Smoker  Smokeless tobacco: Never Used Substance and Sexual Activity  Alcohol use: Not Currently  Drug use: Not on file  Sexual activity: Not on file Lifestyle  Physical activity:  
  Days per week: Not on file Minutes per session: Not on file  Stress: Not on file Relationships  Social connections:  
  Talks on phone: Not on file Gets together: Not on file Attends Baptism service: Not on file Active member of club or organization: Not on file Attends meetings of clubs or organizations: Not on file Relationship status: Not on file  Intimate partner violence:  
  Fear of current or ex partner: Not on file Emotionally abused: Not on file Physically abused: Not on file Forced sexual activity: Not on file Other Topics Concern  Not on file Social History Narrative  Not on file History reviewed. No pertinent family history. Current Medications:  
Current Facility-Administered Medications Medication Dose Route Frequency Provider Last Rate Last Dose  bumetanide (BUMEX) injection 1 mg  1 mg IntraVENous Q6H PRN Daniela Montero NP      
 bumetanide (BUMEX) 0.25 mg/mL infusion  1 mg/hr IntraVENous CONTINUOUS Colette Schroeder NP 2 mL/hr at 05/24/19 0809 0.5 mg/hr at 05/24/19 4425  [START ON 5/25/2019] levoFLOXacin (LEVAQUIN) tablet 750 mg  750 mg Oral Q48H Daniela Montero NP      
 lidocaine (XYLOCAINE) 2 % jelly   Mucous Membrane PRN Domenico Goetz MD      
  ceFAZolin (ANCEF) 1 g in 0.9% sodium chloride (MBP/ADV) 50 mL  1 g IntraVENous Q12H Reese Dillard  mL/hr at 05/23/19 2124 1 g at 05/23/19 2124  
 glipiZIDE (GLUCOTROL) tablet 5 mg  5 mg Oral ACB&D Jacklyn Bryan NP   5 mg at 05/24/19 4990  tamsulosin (FLOMAX) capsule 0.4 mg  0.4 mg Oral DAILY Jamila Montero NP   0.4 mg at 05/23/19 0801  
 albumin human 25% (BUMINATE) solution 12.5 g  12.5 g IntraVENous TID Katja Montero NP   12.5 g at 05/23/19 2124  senna-docusate (PERICOLACE) 8.6-50 mg per tablet 1 Tab  1 Tab Oral BID Cocoaleks JORDAN NP   1 Tab at 05/23/19 1729  polyethylene glycol (MIRALAX) packet 17 g  17 g Oral BID Katja Montero NP   17 g at 05/23/19 1729  
 balsam peru-castor oil (VENELEX) ointment   Topical BID Katja Montero NP      
 0.9% sodium chloride infusion  9 mL/hr IntraVENous CONTINUOUS Katja Montero NP 9 mL/hr at 05/24/19 0808 9 mL/hr at 05/24/19 6214  dextrose 5% infusion  4-20 mL/hr IntraVENous CONTINUOUS Colette Schroeder NP 15.9 mL/hr at 05/24/19 0809 15.9 mL/hr at 05/24/19 4699  calcium carbonate (TUMS) chewable tablet 200 mg [elemental]  200 mg Oral TID PRN Lam MC NP   200 mg at 05/18/19 1922  
 magnesium oxide (MAG-OX) tablet 800 mg  800 mg Oral BID Colette Schroeder NP   800 mg at 05/23/19 1729  
 sodium chloride (OCEAN) 0.65 % nasal squeeze bottle 2 Spray  2 Spray Both Nostrils Q2H PRN Dale Schroeder NP      
 dronabinol (MARINOL) capsule 2.5 mg  2.5 mg Oral DAILY Colette Schroeder NP   2.5 mg at 05/23/19 0801  
 insulin lispro (HUMALOG) injection   SubCUTAneous AC&HS Dale Schroeder NP   3 Units at 05/24/19 2873  
 glucose chewable tablet 16 g  4 Tab Oral PRN Lam MC NP      
 dextrose (D50W) injection syrg 12.5-25 g  12.5-25 g IntraVENous PRN Colette Schroeder NP   25 g at 05/21/19 0559  
 glucagon (GLUCAGEN) injection 1 mg  1 mg IntraMUSCular PRN Bello Amador NP      
  traMADol (ULTRAM) tablet 50 mg  50 mg Oral Q8H PRN Elda, Colette B, NP   50 mg at 05/24/19 0100  aspirin chewable tablet 81 mg  81 mg Oral DAILY Elda, Colette B, NP   81 mg at 05/23/19 0801  
 atorvastatin (LIPITOR) tablet 40 mg  40 mg Oral QHS Elda, Colette B, NP   40 mg at 05/23/19 2124  sodium chloride (NS) flush 5-40 mL  5-40 mL IntraVENous Q8H Elda, Colette B, NP   10 mL at 05/24/19 2936  sodium chloride (NS) flush 5-40 mL  5-40 mL IntraVENous PRN Elda, Colette B, NP      
 ondansetron (ZOFRAN) injection 4 mg  4 mg IntraVENous Q6H PRN Rocio Curd B, NP   4 mg at 05/23/19 2124  acetaminophen (TYLENOL) tablet 650 mg  650 mg Oral Q6H PRN Rocio Curd B, NP   650 mg at 05/21/19 2149  
 docusate sodium (COLACE) capsule 100 mg  100 mg Oral PRN Elbert La, BRENDEN      
 DOBUTamine (DOBUTREX) 500 mg/250 mL (2,000 mcg/mL) infusion  10 mcg/kg/min IntraVENous CONTINUOUS Elisabeth Montero NP 25.4 mL/hr at 05/24/19 0809 10 mcg/kg/min at 05/24/19 0809  pantoprazole (PROTONIX) tablet 40 mg  40 mg Oral ACB Elda, Colette B, NP   40 mg at 05/24/19 0715 Allergies: Allergies Allergen Reactions  Penicillins Hives ROS:   
Review of Systems Constitutional: Positive for malaise/fatigue. HENT: Negative. Eyes: Negative. Respiratory: Positive for shortness of breath. Negative for cough. Cardiovascular: Positive for leg swelling. Negative for chest pain and palpitations. Gastrointestinal: Negative. Genitourinary: Negative. Musculoskeletal: Negative. Skin: Negative. Neurological: Positive for weakness. Physical Exam:  
Physical Exam  
Constitutional: He is oriented to person, place, and time. He appears well-developed. No distress. HENT:  
Head: Normocephalic and atraumatic. Eyes: EOM are normal.  
Neck: Normal range of motion. Neck supple. No tracheal deviation present. Cardiovascular: Normal rate. Exam reveals no gallop and no friction rub. Pulmonary/Chest: Effort normal and breath sounds normal. No respiratory distress. Abdominal: Soft. He exhibits distension. There is no guarding. Musculoskeletal: He exhibits deformity. Neurological: He is alert and oriented to person, place, and time. Skin: Skin is warm and dry. There is erythema. Vitals:  
Visit Vitals /88 (BP 1 Location: Left arm, BP Patient Position: Sitting) Pulse 83 Temp 97.4 °F (36.3 °C) Resp (!) 6 Ht 5' 11\" (1.803 m) Wt 186 lb 1.1 oz (84.4 kg) SpO2 95% BMI 25.95 kg/m² Temp (24hrs), Av.5 °F (36.4 °C), Min:96.9 °F (36.1 °C), Max:97.9 °F (36.6 °C) Hemodynamics: 
 CO: CO (l/min): 5.3 l/min CI: CI (l/min/m2): 2.7 l/min/m2 CVP: CVP (mmHg): 7 mmHg (19) SVR: SVR (dyne*sec)/cm5: 1026 (dyne*sec)/cm5 (19) PAP Systolic: PAP Systolic: 39 (43/91/59 6279) PAP Diastolic: PAP Diastolic: 16 (63/82/56 5571) PVR:   
 SV02: SVO2 (%): 46 % (19) SCV02:   
 
 
Admission Weight: Last Weight Weight: 186 lb 15.2 oz (84.8 kg) Weight: 186 lb 1.1 oz (84.4 kg) Intake / Output / Drain: 
Last 24 hrs.:  
 
Intake/Output Summary (Last 24 hours) at 2019 4363 Last data filed at 2019 0800 Gross per 24 hour Intake 1936.42 ml Output 2225 ml Net -288.58 ml Oxygen Therapy: 
Oxygen Therapy O2 Sat (%): 95 % (19) Pulse via Oximetry: 64 beats per minute (19) O2 Device: Nasal cannula (19 07) O2 Flow Rate (L/min): 6 l/min (19 0700) FIO2 (%): 36 % (19 1146) Recent Labs:  
Labs Latest Ref Rng & Units 2019 WBC 4.1 - 11.1 K/uL 6.6 - - 4. 0(L) - 5.2 -  
RBC 4.10 - 5.70 M/uL 2.94(L) - - 3.05(L) - 3.31(L) - Hemoglobin 12.1 - 17.0 g/dL 7. 8(L) - - 8. 1(L) - 8. 8(L) - Hematocrit 36.6 - 50.3 % 24. 7(L) - - 25. 2(L) - 27. 6(L) -  
 MCV 80.0 - 99.0 FL 84.0 - - 82.6 - 83.4 - Platelets 721 - 506 K/uL 77(L) - - 73(L) - 80(L) - Lymphocytes 12 - 49 % - - - - - - - Monocytes 5 - 13 % - - - - - - - Eosinophils 0 - 7 % - - - - - - - Basophils 0 - 1 % - - - - - - - Albumin 3.5 - 5.0 g/dL 2. 7(L) - 2. 7(L) 2. 7(L) - - 2. 3(L) Calcium 8.5 - 10.1 MG/DL 8.4(L) - 8. 0(L) 8. 3(L) - - 8. 1(L) SGOT 15 - 37 U/L 19 - - 28 - - 37 Glucose 65 - 100 mg/dL 242(H) - 372(H) 235(H) - - 87 BUN 6 - 20 MG/DL 73(H) - 71(H) 69(H) - - 61(H) Creatinine 0.70 - 1.30 MG/DL 2.62(H) - 2.73(H) 2.67(H) - - 2.28(H) Sodium 136 - 145 mmol/L 123(L) 123(L) 120(L) 123(L) 123(L) - 126(L) Potassium 3.5 - 5.1 mmol/L 4.8 - 5. 3(H) 5.7(H) - - 4.2 TSH 0.450 - 4.500 uIU/mL - - - - - - -  
LDH 85 - 241 U/L - - - - - - -  
 
EKG:  
EKG Results Procedure 720 Value Units Date/Time SCANNED CARDIAC RHYTHM STRIP [[id number]] Collected:  05/23/19 0498 Order Status:  Completed Updated:  05/23/19 7113 EKG, 12 LEAD, INITIAL [[id number]] Collected:  05/21/19 1011 Order Status:  Completed Updated:  05/21/19 1500 Ventricular Rate 75 BPM   
  Atrial Rate 105 BPM   
  QRS Duration 84 ms Q-T Interval 392 ms QTC Calculation (Bezet) 437 ms Calculated R Axis 49 degrees Calculated T Axis 87 degrees Diagnosis --  
  probable Mobitz 1 Nonspecific ST abnormality Confirmed by Fredi Houston M.D., Terri Spurling ([id number]) on 5/21/2019 3:00:34 PM 
  
 EKG, 12 LEAD, INITIAL [[id number]] Collected:  05/20/19 1048 Order Status:  Completed Updated:  05/20/19 1543 Ventricular Rate 77 BPM   
  Atrial Rate 107 BPM   
  QRS Duration 94 ms Q-T Interval 386 ms QTC Calculation (Bezet) 436 ms Calculated R Axis 48 degrees Calculated T Axis 89 degrees Diagnosis --  
  Sinus tachycardia with 2nd degree AV block (Mobitz I) Low voltage QRS Cannot rule out Anteroseptal infarct (cited on or before 09-MAY-2019) When compared with ECG of 10-MAY-2019 11:21, 
 Sinus rhythm has replaced with AV dissociation Confirmed by Ella Justice MD. ([id number]) on 5/20/2019 3:42:59 PM 
  
 SCANNED CARDIAC RHYTHM STRIP [[id number]] Collected:  05/20/19 0451 Order Status:  Completed Updated:  05/20/19 7558 SCANNED CARDIAC RHYTHM STRIP [[id number]] Collected:  05/19/19 7289 Order Status:  Completed Updated:  05/19/19 6231 SCANNED CARDIAC RHYTHM STRIP [[id number]] Collected:  05/18/19 8259 Order Status:  Completed Updated:  05/18/19 9994 SCANNED CARDIAC RHYTHM STRIP [[id number]] Collected:  05/17/19 6590 Order Status:  Completed Updated:  05/17/19 6775 SCANNED CARDIAC RHYTHM STRIP [[id number]] Collected:  05/16/19 3488 Order Status:  Completed Updated:  05/16/19 4283 SCANNED CARDIAC RHYTHM STRIP [[id number]] Collected:  05/13/19 0430 Order Status:  Completed Updated:  05/13/19 2822 SCANNED CARDIAC RHYTHM STRIP [[id number]] Collected:  05/13/19 1984 Order Status:  Completed Updated:  05/13/19 [address] [facility] SCANNED CARDIAC RHYTHM STRIP [[id number]] Collected:  05/11/19 0321 Order Status:  Completed Updated:  05/11/19 3950 EKG, 12 LEAD, INITIAL [[id number]] Collected:  05/10/19 1121 Order Status:  Completed Updated:  05/10/19 1207 Ventricular Rate 64 BPM   
  Atrial Rate 77 BPM   
  QRS Duration 104 ms Q-T Interval 460 ms QTC Calculation (Bezet) 474 ms Calculated P Axis 41 degrees Calculated R Axis 35 degrees Calculated T Axis 133 degrees Diagnosis --  
  Sinus rhythm with AV dissociation and Junctional rhythm Low voltage QRS Cannot rule out Anteroseptal infarct (cited on or before 09-MAY-2019) When compared with ECG of 09-MAY-2019 20:01, 
Junctional rhythm has replaced Atrial fibrillation Questionable change in initial forces of Anterior leads Nonspecific T wave abnormality no longer evident in Inferior leads Nonspecific T wave abnormality, improved in Lateral leads Confirmed by Rossy Santana MD, Richardean Bolus ([id number]) on 5/10/2019 12:07:00 PM 
  
 EKG, 12 LEAD, INITIAL [[id number]] Collected:  05/09/19 2001 Order Status:  Completed Updated:  05/10/19 9653 Ventricular Rate 60 BPM   
  Atrial Rate 60 BPM   
  QRS Duration 86 ms   
  Q-T Interval 428 ms QTC Calculation (Bezet) 428 ms Calculated R Axis 34 degrees Calculated T Axis 142 degrees Diagnosis -- Atrial fibrillation Low voltage QRS Septal infarct , age undetermined No previous ECGs available Confirmed by Rossy Santana MD, Richardean Bolus ([id number]) on 5/10/2019 8:37:23 AM 
  
 [address] [place] [address] [[id number]] Collected:  05/10/19 4575 Order Status:  Completed Updated:  05/10/19 6105 Echocardiogram: · Left Ventricle: Moderately dilated left ventricle. Severe systolic dysfunction. Estimated left ventricular ejection fraction is 16 - 20%. Visually measured ejection fraction. · Right Ventricle: Catheter present · Aortic Valve: Aortic valve leaflet calcification present. Severe aortic valve stenosis is present. Impella distal part is 4.4 cm from aortic valve 
  
Left Ventricle Moderately dilated left ventricle. Severe systolic dysfunction. The estimated ejection fraction is 16 - 20%. Visually measured ejection fraction. Impella distal part is 4.4 cm from aortic valve Right Ventricle Catheter present . Aortic Valve There is leaflet calcification. There is severe aortic stenosis. Cardiac Catheterizations:  
Findings/PostOp Diagnosis: 1. Severe two vessel CAD 2. Elevated LVEDP 3. Severely reduced LVEF w/ WMA described below 4. Severe aortic stenosis 
  
Recommendations: 1. Continue medical therapy. CTS consult; doubt CABG candidate. ? High-risk TAVR? 2. Routine post procedure & access site care 3. Continue aggressive medical management and risk factor modification  
  
 
  
Hemodynamics: Ao: 96/68/81 LV: 137/20 
  
AoV mean gradient by dual lumen Real:  41mmHg 
  
 Cors:  
  
Dominance: [x] Right  [] Left  [] Mixed 
  
LM: Large caliber vessel without significant stenosis  
  
LAD: Moderate caliber heavily calcified vessel that is occluded in the mid without distal collateralization. D1: Moderate caliber calcified vessel with severe diffuse disease 
  
LCX: Moderate caliber calcified vessel without significant stenosis. OM1: Moderate caliber vessel with luminal irregularities and small vessel severe distal disease OM2: Very small caliber vessel without significant stenosis.  
  
RCA:   Large caliber heavily calcified dominant vessel that is occluded in the mid. A small segment of the PDA is filled faintly by left to right collaterals. PDA: Occluded PLB: Occluded 
  
  
LV angiography:  
EF: 10% Wall motion: severe basal HK. The anterior wall, anteroseptum, anterolateral, apical, and mid to distal inferior wall are akinetic. MR:  mild 
  
Cooper Mitch III, DO Cardiac MRI:  
INDICATION: cardiomyopathy 
  
PROCEDURAL DATA: 
  
CPT Codes: 83812 
  
SCANS PERFORMED:  
Spin Echo: Axial. 
FIESTA/SSFP Rest Perfusion LGE 
  
Contrast Agent: Magnevist.  Contrast Dose: 33ml. 
  
CLINICAL DATA:  HR = 84/min, BP = 101/71mmHg,  HT = 5 foot 10inc, WT = 178lb.   
  
           
IMPRESSION Impression: 
  
1. Markedly dilated left ventricle by 3-D volumetric assessment index to body 
surface area. The LV end-diastolic volume index is 582 mL/sq m. Severe left 
ventricular systolic dysfunction. Severe hypokinesis of the mid to distal 
anterior wall, anteroseptal wall, anterolateral wall. Dyskinesis of the apex, 
apical septal wall, apical lateral wall, inferoapical wall. Severe hypokinesis 
of the base to mid inferoseptal wall. The entire apex, apical septal wall, 
apical lateral wall and anteroapical wall is significantly thinned. 3-D LVEF 15% 
(patient was on dobutamine when this EF was calculated).  
2. Mildly dilated right ventricle by 3-D volumetric assessment and index to body 
surface area. Severe right ventricular systolic dysfunction. Severe global 
hypokinesis with regional variation. Akinesis of the mid to distal anterior wall 
of the right ventricle. 3-D RVEF 17% (patient was on dobutamine when this EF was 
calculated). 3. Severe aortic valve stenosis with aortic valve area of 0.7 sq cm by 
planimetry. 4. On LGE study for viability, there is a large infarct involving greater than 
75% thickness of the mid to distal anterior wall, anteroapical wall, apex, 
apical septal wall, mid septal wall, inferoapical wall without any significant 
viable myocardium. There is a medium-size subendocardial infarct involving 50-75% thickness of the base to mid inferolateral and anterolateral wall. The 
only myocardial walls which demonstrate significant viability are mid to distal 
inferior wall, and lateral wall which are in the distribution of the RCA and 
circumflex territories. 5. Normal pleura and pericardium. Small to medium-sized right pleural effusion. 6. Large right hepatic cyst measuring 8.5 x 6 cm. 
  
  
  
  
CARDIAC CHAMBERS: 
  
Left Atrium:  54 mm (<40mm) Left Ventricular Size: LVEDD:  45 mm (Normal 37-57mm) Left Ventricular Size: LVESD:  41 mm (Normal <40mm) LVEF:  15 % (Normal 55-75%) LV Hypertrophy: Moderate septal hypertrophy. LV septal wall = 15 mm, LV 
posterior wall =  13 mm (Normal <11mm)  
  
Right Atrium:  55 mm (<40mm) Right Ventricular Size: RVEDD:  58 mm (Normal 26-45mm) Right Ventricular Size: RVESD:  55 mm (Normal <25mm) RVEF:  17 % (Normal 50-60%) RV Hypertrophy:  None 
  
  
Mass/Tumor/Thrombus:  None. Dissection: None. Atherosclerosis:  None. Inferior Vena Cava:  Normal. 
Inter Atrial Septum:  Normal. 
  
VALVULAR: 
  
Mitral Valve:  Normal mitral valve leaflets. Trace to mild mitral regurgitation. Aortic Valve:  Trileaflet aortic valve. Calcified aortic valve leaflets. Severe aortic stenosis with aortic valve area of 0.7 sq cm by planimetry. No 
significant aortic regurgitation. Tricuspid Valve:  Normal tricuspid valve. Mild 1+ tricuspid regurgitation. Pulmonary Valve:  Normal pulmonic valve. 
  
Pericardium:  Normal. (<3.5mm) Pericardial Effusion:  None. Pleural Effusion:  Small to medium-sized right pleural effusion. 
  
VOLUMETRIC DATA: 
  
LVEDVI:  143 ml/m2 LVESVI:  121 ml/m2 LVSVI:  22 ml/m2 LVMI:  36 g/m2 
  
  
RVEDVI:  111 ml/m2 RVESVI:  92 ml/m2 RVSVI:  19 ml/m2 Radiology (CXR, CT scans): EXAM: XR CHEST PORT 
  
INDICATION: PAC, cardiac assist device, Joiner-Jennifer catheter 
  
COMPARISON: 5/21/2019 
  
FINDINGS: A portable AP radiograph of the chest was obtained at 0907 hours. The 
patient is on a cardiac monitor. The cardiac assist device and left IJ Joiner-Jennifer 
catheter are in satisfactory position. Lungs demonstrate persistent atelectasis 
medially in the right lower lobe. Left lung reveals improved aeration. No 
pulmonary edema no pneumothorax. 
  
IMPRESSION 1. Persistent areas of atelectasis are noted medially at the right lung base. EXAM: US ABD COMP  
  
INDICATION: Hepatic cyst. 
  
COMPARISON: CT 5/17/2019. 
  
TECHNIQUE:  
Real-time sonography of the abdomen was performed with multiple static images of 
the liver, gallbladder, pancreas, spleen, kidneys and retroperitoneum obtained. 
  
FINDINGS: 
LIVER:  
The liver is normal in echotexture with no mass or other focal abnormality.  
  
LIVER VASCULATURE: The portal vein flow is within normal limits. The diameter measures 0.9 cm. The 
velocity measures 23 cm/s. The right and left portal veins are patent. The 
hepatic veins are patent. . 
  
GALLBLADDER: 
The gallbladder is mildly distended with sludge.  There is no wall thickening or 
fluid around the gallbladder.  
  
COMMON BILE DUCT: 
There is no biliary duct dilatation and the common duct measures mm in diameter. 
  
  
PANCREAS: 
 Not well visualized secondary to bowel gas and body habitus. 
  
SPLEEN: 
The spleen measures 13.0 x 5.2 x 13.4 cm for a volume of 473 mL. This is on the 
upper limits of normal for size. 
  
RIGHT KIDNEY: 
The right kidney demonstrates normal echogenicity with no mass, stone or 
hydronephrosis. The right kidney measures 10.6 cm in length. 
  
LEFT KIDNEY: 
The left kidney demonstrates normal echogenicity with no mass, stone or 
hydronephrosis. The left kidney measures 11.1 cm in length.  
  
RETROPERITONEUM: 
The aorta is not well visualized secondary to bowel gas and body habitus. The IVC is normal. 
No retroperitoneal mass is identified. 
  
A right pleural effusion is present. 
  
IMPRESSION 1. Unremarkable liver. No evidence of a hepatic cyst. 
2. Gallbladder distention is likely related to fasting. Biliary sludge is 
present. 3. Right pleural effusion again seen. 4. Spleen on the upper limits of normal for size. Ne Morejon NP 
  
 
 
AHF ATTENDING ADDENDUM Patient was seen and examined in person. Data and notes were reviewed. I have discussed and agree with the plan as noted in the NP note above without further additions. Lesli Edwards MD PhD 
Ned Squires [address] [address] [place] [address] Total Critical Care time spent: [id number] 
30 minutes. There was no overlap with other services Critical care was necessary to treat or prevent imminent or life threatening deterioration of the following conditions: cardiac failure, respiratory failure and CNS failure or compromise 
  
Services Provided: 1. Telemetry review and 12 lead ECG interpretation 2. Hemodynamic interpretation, assessment, and management 3. Review and interpretation of CXR 4. Review and interpretation of lab values 5. Review and interpretation of microbiologic data and culture results 6. Review of medications and administration 7. Review and interpretation of nutrition requirements and management 8. Discussion of management withother consultants and services 9.  Clinical update to family members

## 2019-05-24 NOTE — DIABETES MGMT
DTC Progress Note Recommendations/ Comments: Chart reviewed due to hyerglycemia. Noted PO intake improving and family bringing food from home. Pt has received 27 units of correction insulin over the last 24 hours. Of note, pt had a one time dose of solumedrol 125 mg on 5/22/19 at 1414 (solumedrol half life 18-36  Hours). If appropriate,  Consider: 
Increasing glipizide to 7.5 mg BID (hold if PO intake<50%) Current hospital DM medication: glipizide, 5 mg BID Lispro correction scale, normal sensitivity Chart reviewed on [address] POD 8 right axillary impella Patient is a 77 y.o. male with know DM on amaryl, 4 mg BID, Trulicity 1.5 mg weekly, invokana 300 mg daily and metformin, 1000 mg BID at home. A1c:  
Lab Results Component Value Date/Time Hemoglobin A1c 6.9 (H) 05/02/2019 04:09 PM  
 
 
Recent Glucose Results:  
Lab Results Component Value Date/Time  (H) 05/24/2019 04:28 AM  
  (H) 05/23/2019 12:06 PM  
 GLUCPOC 265 (H) 05/24/2019 07:03 AM  
 GLUCPOC 298 (H) 05/23/2019 11:10 PM  
 GLUCPOC 315 (H) 05/23/2019 05:22 PM  
  
 
Lab Results Component Value Date/Time Creatinine 2.62 (H) 05/24/2019 04:28 AM  
 
Estimated Creatinine Clearance: 27.9 mL/min (A) (based on SCr of 2.62 mg/dL (H)). Active Orders Diet DIET DIABETIC WITH OPTIONS Consistent Carb 2000kcal; Regular; 3-4 GM (HALLEY) PO intake:  
Patient Vitals for the past 72 hrs: 
 % Diet Eaten 05/23/19 1200 0 % 05/23/19 0800 75 % 05/22/19 0849 50 % Will continue to follow as needed. Thank you Jd Eli RD, CDE [facility] Pager: [phone] Time spent: 6 minutes

## 2019-05-24 NOTE — PROGRESS NOTES
CM participated in IDR rounds- patient remains on impella, dobutamine drip, renal function continues to be monitored closely by nephrology. CM noted that patient is anticipated to have two weeks total of inotropic conditioning and then consider palliative consultation if there is no RV recovery. CM will continue to follow. Disposition TBD at this time pending medical plan of care.  MARNIE Cueto

## 2019-05-24 NOTE — PROGRESS NOTES
[facility] ICU Progress Note Admit Date: 2019 POD: 8 Procedure:  Procedure(s): RIGHT AXILLARY IMPELLA/ [id number] Subjective:  
[facility]  [facility], bumex gtt at 1 mg. Pt sitting up in chair, no specific complaints. Afebrile on RA 
 
 Objective:  
Vitals: 
Blood pressure 122/88, pulse (!) 107, temperature 97.6 °F (36.4 °C), resp. rate 22, height 5' 11\" (1.803 m), weight 186 lb 1.1 oz (84.4 kg), SpO2 95 %. Temp (24hrs), Av.5 °F (36.4 °C), Min:96.9 °F (36.1 °C), Max:97.9 °F (36.6 °C) Hemodynamics: 
 CO: CO (l/min): 3.8 l/min CI: CI (l/min/m2): 2 l/min/m2 CVP: CVP (mmHg): 43 mmHg (19 09) SVR: SVR (dyne*sec)/cm5: 1026 (dyne*sec)/cm5 (19 0800) PAP Systolic: PAP Systolic: 76 (15/45/11 4799) PAP Diastolic: PAP Diastolic: 57 ( 8165) PVR:   
 SV02: SVO2 (%): 13 % (19 09) SCV02:   
 
EKG/Rhythm:  Afib - rate controlled Oxygen Therapy: 
Oxygen Therapy O2 Sat (%): 95 % (19 08) Pulse via Oximetry: 107 beats per minute (19 0900) O2 Device: Nasal cannula (19 08) O2 Flow Rate (L/min): 6 l/min (19 0800) FIO2 (%): 36 % (19 1146) CXR:  
CXR Results  (Last 48 hours) 19 0531  XR CHEST PORT Final result Impression:  Mild bibasilar atelectasis. Narrative:  INDICATION: Line placement. Portable AP semiupright view of the chest.  
   
Direct comparison made to prior chest x-ray dated May 23, 2019. Cardiomediastinal silhouette is stable. Tubes and lines are unchanged in  
position. Lungs are hypoinflated. There is mild bibasilar patchy airspace  
disease. No pleural fluid is visualized. There is no pneumothorax. 19 0919  XR CHEST PORT Final result Impression:  1. Persistent areas of atelectasis are noted medially at the right lung base. Narrative:  EXAM: XR CHEST PORT INDICATION: PAC, cardiac assist device, Brea-Jennifer catheter COMPARISON: 2019 FINDINGS: A portable AP radiograph of the chest was obtained at 0907 hours. The  
patient is on a cardiac monitor. The cardiac assist device and left IJ [place]-Jennifer  
catheter are in satisfactory position. Lungs demonstrate persistent atelectasis  
medially in the right lower lobe. Left lung reveals improved aeration. No  
pulmonary edema no pneumothorax. Admission Weight: Last Weight Weight: 186 lb 15.2 oz (84.8 kg) Weight: 186 lb 1.1 oz (84.4 kg) Intake / Output / Drain: 
Current Shift:  0701 -  1900 In: 212.5 [I.V.:212.5] Out: 135 [Urine:135] Last 24 hrs.:  
 
Intake/Output Summary (Last 24 hours) at 20194 Last data filed at 2019 0900 Gross per 24 hour Intake 1988.71 ml Output 2300 ml Net -311.29 ml EXAM: 
General:  Alert, NAD Lungs:   Clear upper, diminished bases to auscultation bilaterally - improved. Incision:  impella dsg cdi Heart:  Irregular rate and rhythm, S1, S2 normal, no murmur, click, rub or gallop. Abdomen:   Soft, non-tender. Bowel sounds normal. No masses,  No organomegaly. Extremities:  2+ LE edema. PPP. Neurologic:  Gross motor and sensory apparatus intact. Labs:  
Recent Labs  
  19 
0703 19 
9040 WBC  --  6.6 HGB  --  7.8* HCT  --  24.7*  
PLT  --  77* NA  --  123* K  --  4.8 BUN  --  73* CREA  --  2.62* GLU  --  242* GLUCPOC 265*  --   
 
 
 Assessment:  
 
Active Problems: 
  Acute on chronic systolic (congestive) heart failure ([facility] [address] (2019) Plan/Recommendations/Medical Decision Makin. Acute on chronic systolic heart failure (NYHA class IV on admission): AHF following, on dobut 10, bumex gtt. LVAD workup ongoing. Purge now D5 due to high PTT. BNP >35K. On ancef for impella ppx. Echo on 19 EF 15-20% 2. Severe AS: no plans for TAVR at this time 3. CAD with  of RAD and RCA: on ASA and statin, no BB dt dobut. Cardiac MRI showed nonviability of LV. 4. Afib: has PPM, was on eliquis - hold for impella. Not on any anticoagulation, evaluated by EP - no changes for now 5. HLD: on lipitor 6. DM type II: on glipizide, SSI, A1C 6.9. BS much higher as pt eating more, will start Lantus per AHFS 7. CKD: Cr holding, 2.62 today, monitor with diuretics. Renal following 8. Left leg wounds s/p fall: wound care consulted. 9. Anemia, iron deficiency: Hgb 7.8, cont PO iron, monitor. Transfuse prn 10. Thrombocytopenia: plts 77K, monior 11. Hyponatremia: Na 123, monitor. Renal on board - tolvaptan prn 12. Urinary retention: cont flomax, Colon reinserted 13. Nutrition: cont marinol per AHFS. Nutrition consult 14. Hyperkalemia: K better, monitor 15. Dispo: Cont ICU care, PT/OT, LVAD and further workup/treatment per AHF Signed By: Trenton Delgado NP

## 2019-05-24 NOTE — PALLIATIVE CARE
Palliative Medicine Social Work Mr. Dickson Mcpherson is a 77year old gentleman with a history significant for HTN, DM2, PVD, CAD, ICM, severe AS who presented on 5/9 with worsening dyspnea and fatigue with exertion; admitted for further evaluation of his heart failure. Work-up revealed severe LV failure (EF 10%) and severe AS; moderate to severe RV failure. Patient is on inotropic support with diuresis; being treated for new onset Afib; was being considered for TAVR, but now on hold. Impella placed on 5/16; developed worsening renal failure with weaning of dobutamine and being considered for LVAD. Of concern is worsening renal failure and RV failure. Palliative consulted to further discuss his wishes if LVAD is not an option. He has an AMD that designates his brother, Rox Munguia ([phone]); friend, Arsh Alanis ([phone]) as primary and secondary agents respectively. He has outlined his wishes in Living Will for no life-prolonging efforts in setting of imminent death and permanent loss of awareness. Dr. Sydney Vuong and I met with him to introduce our services and role. He has a good understanding of his condition; the concerns for RV and kidney function; aware that he may not be a candidate for LVAD. He has given thought to his potential limited life expectancy and tells us that he is not afraid of death; states he will actually welcome it, as he will anticipate no improvement to his function or quality of life. He is not interested in pursuing LVAD if it is going to create other problems. Offered assurance that F would not offer if they felt risk outweighed the benefits. Discussed as well, that there are never guarantees and many unforseen complications can arise. Discussed reality that he may not be a candidate, and assured that we could focus on symptom management to make sure he is not suffering; could talk more about making sure we set up services to support his goals.   He was appreciative of information. He confirms his AMD is still valid. He is grateful for support from family and BODØ. Will continue to support as needed. Thank you for the opportunity to be involved in the care of Mr. Glenna Rojo. Wendie Adams, DANNY, [facility]- Palliative Medicine  Respecting Choices ® ACP Facilitator [phone]

## 2019-05-24 NOTE — WOUND CARE
Wound Consult: Follow Up Visit. Chart reviewed. Consulted for buttocks - L. Patient able to turn towards side with only standby assist to ensure line/tubes do not pull. Patient is resting on a SPORT bed; up in chair good part of day earlier. Assessment: 
Left inner buttock - linear fragile skin with small intact raised areas without discoloration and at most distal end a resurfaced pink/violet area of fragile new skin ~ 1 cm. No discoloration surrounding. Appeared to have been friction/shear injury that is resolving. Sacrum, right buttock and heels without discoloration. Dry brown/tan eschar area on left medial lower leg SHADI - less than 1 cm. Dry scabs on knees mostly resolved with pink intact areas of new skin present. Skin Care Recommendations: 1. Minimize friction/shear: minimize layers of linen/pads under patient. 2. Off load pressure/reposition: continue to turn and reposition approximately every 2 hours; float heels  or use Prevalon boots; waffle cushion for sitting. 3. Manage Moisture - keep skin folds dry; incontinence skin care as needed; lowe in place. 4. Continue to monitor nutrition, pain, and skin risk scale, and skin assessment. Plan: 
Continue Venelex. We will continue to reassess routinely and as needed. Andrea Fan RN,[facility] Office [phone] Pager [phone]

## 2019-05-24 NOTE — PROGRESS NOTES
Problem: Self Care Deficits Care Plan (Adult) Goal: *Acute Goals and Plan of Care (Insert Text) Description Occupational Therapy Goals Initiated 5/21/2019 1. Patient will perform grooming standing at sink with contact guard assist within 7 day(s). 2.  Patient will perform bathing with supervision/set-up within 7 day(s). 3.  Patient will perform lower body dressing with contact guard assist within 7 day(s). 4.  Patient will perform toilet transfers with contact guard assist within 7 day(s). 5.  Patient will perform all aspects of toileting with contact guard assist within 7 day(s). 6.  Patient will participate in upper extremity therapeutic exercise/activities with supervision/set-up for 10 minutes within 7 day(s). 7.  Patient will utilize energy conservation techniques during functional activities with verbal cues within 7 day(s). Outcome: Progressing Towards Goal 
  
OCCUPATIONAL THERAPY TREATMENT Patient: Liam Bhardwaj (77 y.o. male) Date: 5/24/2019 Diagnosis: Acute on chronic systolic (congestive) heart failure (HCC) [I50.23] <principal problem not specified> Procedure(s) (LRB): 
RIGHT AXILLARY IMPELLA/ 4219 (Right) 8 Days Post-Op Precautions: Fall(R axillary impella) Chart, occupational therapy assessment, plan of care, and goals were reviewed. ASSESSMENT: 
Patient with slow, steady progress, agreeable to therapy. Completed grooming tasks standing x5 minutes, Min A for sit<>stand & standing balance with intermittent posterior lean. Min A required for bimanual tasks of grooming, cues for sequencing provided for maximal independence. With standing, noted O2 desats to mid-80s on 6L NC, improved with max cues provided for PLB and sitting upon task completion. Encouraged engagement in ADLs and functional mobility with staff, patient and family acknowledging understanding. At this time, recommend rehab upon d/c, however further recommendations to follow pending medical course. Recommend with nursing patient to complete as able in order to maintain strength, endurance and independence: ADLs with supervision/setup, OOB to chair 3x/day and mobilizing to the bathroom for toileting with 2 assist. Thank you for your assistance. Progression toward goals: 
?       Improving appropriately and progressing toward goals ? Improving slowly and progressing toward goals ? Not making progress toward goals and plan of care will be adjusted PLAN: 
Patient continues to benefit from skilled intervention to address the above impairments. Continue treatment per established plan of care. Discharge Recommendations:  Rehab vs TBD Further Equipment Recommendations for Discharge:  TBD SUBJECTIVE:  
Patient stated I didn't stand that long yesterday.  OBJECTIVE DATA SUMMARY:  
Cognitive/Behavioral Status: 
Neurologic State: Alert Orientation Level: Oriented X4 Cognition: Appropriate for age attention/concentration; Follows commands Perception: Appears intact Perseveration: No perseveration noted Safety/Judgement: Awareness of environment; Fall prevention Functional Mobility and Transfers for ADLs: 
Bed Mobility: 
Scooting: Contact guard assistance(cues for technique with seated scooting) Transfers: 
Sit to Stand: Minimum assistance; Moderate assistance(RN assiting with lines) Balance: 
Sitting: Intact Standing: Impaired Standing - Static: Fair(intermittent posterior lean) ADL Intervention: 
  
 
Grooming Grooming Assistance: Minimum assistance(standing x5 minutes, Min A for balance & cues for sequencing) Brushing Teeth: Minimum assistance(Min A for balance & opening containers) Toileting Toileting Assistance: Total assistance(dependent) Bladder Hygiene: Total assistance (dependent)(lowe) Cognitive Retraining Safety/Judgement: Awareness of environment; Fall prevention Therapeutic Exercises: Standing x5 minutes for grooming task with Min-Mod A for balance Pain: 
Pain Scale 1: Numeric (0 - 10) Pain Intensity 1: 0 Activity Tolerance:  
Good-Fair Please refer to the flowsheet for vital signs taken during this treatment. After treatment:  
? Patient left in no apparent distress sitting up in chair ? Patient left in no apparent distress in bed 
? Call bell left within reach ? Nursing notified ? Caregiver present ? Bed alarm activated COMMUNICATION/COLLABORATION:  
The patients plan of care was discussed with: Physical Therapist and Registered Nurse DAMIEN Negrete, OTR/L Time Calculation: 15 mins

## 2019-05-24 NOTE — PROGRESS NOTES
0800 Bedside shift change report given to brooke (oncoming nurse) by Stoney Mclaughlin (offgoing nurse). Report included the following information SBAR, MAR and Cardiac Rhythm 2nd heart block type I. 
 
0900 Dr. Maximilian Merlos at bedside. Updates given. 1000 Dr. Pily Enriquez at bedside. Updates given. 1100 PTT sent. 43.3. Continue to hold Bival. 
 
1530 Palliative at bedside 18 Spoke with family about the use of essential oils and supplements that are not approved from pharmacy. Caretaker took supplement bottles home but continues to bring briefcase full of oils to bedside and applies them without verifying with staff. 2000 Bedside shift change report given to  Kelly Méndez (oncoming nurse) by Princess Monroe (offgoing nurse). Report included the following information SBAR, MAR and Cardiac Rhythm 2nd heart block type I.

## 2019-05-24 NOTE — PROGRESS NOTES
Problem: Mobility Impaired (Adult and Pediatric) Goal: *Acute Goals and Plan of Care (Insert Text) Description Physical Therapy Goals Initiated 5/17/2019- Goals remain appropriate on weekly reassessment 5/24/2019- Progressing slowly towards all goals. 1.  Patient will move from supine to sit and sit to supine , scoot up and down and roll side to side in bed with minimal assistance/contact guard assist within 7 day(s). 2.  Patient will transfer from bed to chair and chair to bed with minimal assistance/contact guard assist using the least restrictive device within 7 day(s). 3.  Patient will perform sit to stand with minimal assistance/contact guard assist within 7 day(s). 4.  Patient will ambulate with minimal assistance/contact guard assist for 50 feet with the least restrictive device within 7 day(s). 5.  Patient will ascend/descend 4 stairs with no handrail(s) with minimal assistance/contact guard assist within 7 day(s). 6.  Patient will complete a 6MWT within 48 hours of discharge. Initiated 5/10/2019 1. Patient will move from supine to sit and sit to supine , scoot up and down and roll side to side in bed with independence within 7 day(s). 2.  Patient will transfer from bed to chair and chair to bed with modified independence using the least restrictive device within 7 day(s). 3.  Patient will perform sit to stand with modified independence within 7 day(s). 4.  Patient will ambulate with modified independence for 300 feet with the least restrictive device within 7 day(s). 5.  Patient will ascend/descend 4 stairs with no handrail(s) with modified independence within 7 day(s). 6.  Patient will participate in a six minute walk test within 48 hours prior to discharge. Outcome: Progressing Towards Goal 
 PHYSICAL THERAPY TREATMENT: WEEKLY REASSESSMENT Patient: Vega Blandon (77 y.o. male) Date: 5/24/2019 Diagnosis: Acute on chronic systolic (congestive) heart failure ([facility] [address] [facility] [I50.23] <principal problem not specified> Procedure(s) (LRB): 
RIGHT AXILLARY IMPELLA/ 4219 (Right) 8 Days Post-Op Precautions: Fall(R axillary impella) Chart, physical therapy assessment, plan of care and goals were reviewed. ASSESSMENT: 
Patient continues to progress, although slowly towards goals. Received after OT session where he worked on standing tolerance and ADLs. Fatigued and ready for return to bed. Able to stand with min-mod Ax2 but did demonstrate intermittent retropulsion, requiring tactile and verbal cues to correct. Difficult with weightshifting to take sidesteps and pivot back to bed, min A to weightshift to the R in order to clear L foot and reposition. Patient appears with charcot/club foot (?) and would greatly benefit from wearing tennis shoes/supportive shoes when in standing or performing OOB mobility. Also, noted increased LE edema today (no pitting). Recommend OOB to chair with nursing assist x2 over the weekend. PT to follow up after the holiday weekend as patient remains fully lined and is safe to mobilize OOB with nursing staff. Patient's progression toward goals since last assessment: Progressing towards all goals slowly. Goals remain appropriate. Progress with balance and gait limited by patient being fully lined and limited to bed<>chair transfers only. PLAN: 
Goals have been updated based on progression since last assessment. Patient continues to benefit from skilled intervention to address the above impairments. Continue to follow the patient 5 times a week to address goals. Planned Interventions: 
?              Bed Mobility Training             ? Neuromuscular Re-Education ? Transfer Training                   ? Orthotic/Prosthetic Training 
? Gait Training                         ? Modalities ? Therapeutic Exercises           ? Edema Management/Control ?              Therapeutic Activities            ? Patient and Family Training/Education ? Other (comment): 
Discharge Recommendations: Inpatient Rehab Further Equipment Recommendations for Discharge: TBD at rehab SUBJECTIVE:  
Patient stated ? I'm ready to get back in that bed.? OBJECTIVE DATA SUMMARY:  
Critical Behavior: 
Neurologic State: Alert Orientation Level: Oriented X4 Cognition: Appropriate for age attention/concentration, Follows commands Safety/Judgement: Awareness of environment, Fall prevention Strength:  
Strength: Generally decreased, functional 
Functional Mobility Training: 
Bed Mobility: 
Sit to Supine: Moderate assistance;Assist x2 Scooting: Moderate assistance;Assist x1 Transfers: 
Sit to Stand: Minimum assistance; Moderate assistance;Assist x2 Stand to Sit: Moderate assistance;Assist x1 Stand Pivot Transfers: Assist x2 Bed to Chair: Minimum assistance; Moderate assistance;Assist x2 Balance: 
Sitting: Intact Standing: Impaired; With support Standing - Static: Fair Standing - Dynamic : Poor(retropulsion, poor weightshift) Pain: 
Pain Scale 1: Numeric (0 - 10) Pain Intensity 1: 0 Activity Tolerance:  
SvO2 dropped to 24 with transfer and recovered with pursed lip breathing x 3 minutes; patient with mild light headedness during transfer Please refer to the flowsheet for vital signs taken during this treatment. After treatment:  
?  Patient left in no apparent distress sitting up in chair ? Patient left in no apparent distress in bed 
? Call bell left within reach ? Nursing notified ? Caregiver present ? Bed alarm activated COMMUNICATION/COLLABORATION:  
The patient?s plan of care was discussed with: Registered Nurse and OT [place]ena [place], PT, DPT Time Calculation: 14 mins

## 2019-05-24 NOTE — CDMP QUERY
A diagnosis of Sepsis is documented in PN 05/21-05/23 Current documentation:   Pt noted to develop fever 05/21. Per PN 5/22-05/23. Peggyann Gang Peggyann Gang Sepsis of unknown etiology WBC WNL. Blood cx NGTD - repeat, lactic acid 0.9 & Procalcitonin 8.1 Clinical indicators for SIRS include 2 or more of the following with suspected or documented infection: 
Temp < 96.8°F/36°C or > 100.9°F/38.3°C Resps > 20 Pulse > 90 WBC > 12,000 or <4000 or >10% bands Based on the noted clinical findings, the diagnosis of sepsis may be challenged by an external reviewer. Please provide current documentation to support the diagnosis of SIRS or can the diagnosis be ruled out? Thank you, Chao Romero, RN, MSN, [facility], [address] [place] 
[phone]

## 2019-05-24 NOTE — CONSULTS
Palliative Medicine Consult Terrence: [id number] ([id number]) Patient Name: Julia Baez 
YOB: 1950 Date of Initial Consult: 5/24/19 Reason for Consult: Care decisions Requesting Provider: Carri Templeton Primary Care Physician: Rashawn Gracia MD 
 
 SUMMARY:  
Julia Baez is a 77 y.o. with a past history of CAD (3 vessel disease, recent cath at [facility]), ICM w/ EF 10%, severe AS, DM who was admitted on 5/9/2019 from home after being unable to walk more than a block due to SOB. Started on dobutamine, cardiac MRI completed w/ poor viability, impella placed 5/16, work up for LVAD being done but concern about RHF. Other issues incl THEODORE on CKD likely from CRS, not candidate for TAVR. Current medical issues leading to Palliative Medicine involvement include:  Care decisions as  LVAD work up completed. Social- This admission completed an AMD, naming his brother and friend. Mcdonald Rahat Sueroy has completed the caregiver assessment. PALLIATIVE DIAGNOSES:  
1. Shortness of breath 2. Fatigue 3. Debility 4. Care goals PLAN:  
1. Along w/ Andres Cardona LCSW visit w/ pt who is alert and engaging. 2. Plan clear for now to cont LVAD work up, but he is very aware that there is concern that he may not be a candidate due to his R heart failure. Aware that if this is the case, that life would be quite limited and he would want to focus on comfort at that time- he has already experienced a decr quality of life and function and would not want to experience more if no chance for good recovery. 3. Appreciates our team as extra support as we find out more about knowns and unknowns. 4. Following w/ you. AMD completed by [facility] LCSW. Would not want aggressive interventions at end of life. 5. Initial consult note routed to primary continuity provider and/or primary health care team members 6.  Communicated plan of care with: Palliative David CASTLE Team incl AHF team  
 
 GOALS OF CARE / TREATMENT PREFERENCES:  
 
GOALS OF CARE: 
Patient/Health Care Proxy Stated Goals: Other (comment)(To proceed w/ LVAD work up) TREATMENT PREFERENCES:  
Code Status: Full Code Advance Care Planning: 
[] [facility] Interdisciplinary Team has updated the ACP Navigator with [place] and Patient Capacity Primary Decision Maker: Rowdy Villanueva (Leticia) - Brother - [phone] Secondary Decision Maker: Cristela Araiza) - Friend - [phone] Advance Care Planning 5/9/2019 Confirm Advance Directive Yes, not on file Medical Interventions: Full interventions Other: As far as possible, the palliative care team has discussed with patient / health care proxy about goals of care / treatment preferences for patient. HISTORY:  
 
History obtained from: Pt, chart, staff CHIEF COMPLAINT: SOB 
 
HPI/SUBJECTIVE: The patient is:  
[x] Verbal and participatory [] Non-participatory due to:  
 
Pt sitting up, engaging, in NAD. Clinical Pain Assessment (nonverbal scale for severity on nonverbal patients):  
Clinical Pain Assessment Severity: 0 Duration: for how long has pt been experiencing pain (e.g., 2 days, 1 month, years) Frequency: how often pain is an issue (e.g., several times per day, once every few days, constant) FUNCTIONAL ASSESSMENT:  
 
Palliative Performance Scale (PPS): PPS: 40 PSYCHOSOCIAL/SPIRITUAL SCREENING:  
 
Palliative IDT has assessed this patient for cultural preferences / practices and a referral made as appropriate to needs (Cultural Services, Patient Advocacy, Ethics, etc.) Any spiritual / Samaritan concerns: 
[] Yes /  [x] No 
 
Caregiver Burnout: 
[] Yes /  [x] No /  [] No Caregiver Present Anticipatory grief assessment:  
[x] Normal  / [] Maladaptive ESAS Anxiety: Anxiety: 0 
 
ESAS Depression: Depression: 0  REVIEW OF SYSTEMS:  
 
 Positive and pertinent negative findings in ROS are noted above in HPI. The following systems were [x] reviewed / [] unable to be reviewed as noted in HPI Other findings are noted below. Systems: constitutional, ears/nose/mouth/throat, respiratory, gastrointestinal, genitourinary, musculoskeletal, integumentary, neurologic, psychiatric, endocrine. Positive findings noted below. Modified ESAS Completed by: provider Fatigue: 5 Drowsiness: 0 Depression: 0 Pain: 0 Anxiety: 0 Nausea: 0 Anorexia: 0 Dyspnea: 3 Stool Occurrence(s): 1(pt reported) PHYSICAL EXAM:  
 
From RN flowsheet: 
Wt Readings from Last 3 Encounters:  
05/24/19 186 lb 1.1 oz (84.4 kg) 05/10/19 186 lb 8.2 oz (84.6 kg) 05/07/19 193 lb 6.4 oz (87.7 kg) Blood pressure 122/88, pulse 87, temperature 98.3 °F (36.8 °C), resp. rate 29, height 5' 11\" (1.803 m), weight 186 lb 1.1 oz (84.4 kg), SpO2 95 %. Pain Scale 1: Numeric (0 - 10) Pain Intensity 1: 0 Pain Onset 1: acute Pain Location 1: Generalized Pain Orientation 1: Anterior Pain Description 1: Aching Pain Intervention(s) 1: Medication (see MAR) Last bowel movement, if known:  
 
Constitutional: awake, alert, oriented, pleasant Eyes: pupils equal, anicteric ENMT: no nasal discharge, moist mucous membranes Respiratory: breathing not labored Musculoskeletal: no deformity Skin: warm, dry Neurologic: following commands, moving all extremities Psychiatric: full affect, no hallucinations HISTORY:  
 
Active Problems: 
  Acute on chronic systolic (congestive) heart failure ([facility] [address] (5/9/2019) Past Medical History:  
Diagnosis Date  3-vessel coronary artery disease  Aortic stenosis, severe  Chronic kidney disease (CKD), stage III (moderate) (HCC)  Chronic total occlusion of coronary artery  Hypertension  Ischemic cardiomyopathy  Peripheral vascular disease ([facility] [address]  Type 2 diabetes mellitus treated without insulin ([facility] [address] Past Surgical History:  
Procedure Laterality Date  HX AMPUTATION TOE Left 2017 History reviewed. No pertinent family history. History reviewed, no pertinent family history. Social History Tobacco Use  Smoking status: Former Smoker  Smokeless tobacco: Never Used Substance Use Topics  Alcohol use: Not Currently Allergies Allergen Reactions  Penicillins Hives Current Facility-Administered Medications Medication Dose Route Frequency  insulin glargine (LANTUS) injection 10 Units  10 Units SubCUTAneous QHS  bumetanide (BUMEX) injection 1 mg  1 mg IntraVENous Q6H PRN  
 bumetanide (BUMEX) 0.25 mg/mL infusion  1 mg/hr IntraVENous CONTINUOUS  
 [START ON 5/25/2019] levoFLOXacin (LEVAQUIN) tablet 750 mg  750 mg Oral Q48H  
 lidocaine (XYLOCAINE) 2 % jelly   Mucous Membrane PRN  
 ceFAZolin (ANCEF) 1 g in 0.9% sodium chloride (MBP/ADV) 50 mL  1 g IntraVENous Q12H  
 glipiZIDE (GLUCOTROL) tablet 5 mg  5 mg Oral ACB&D  
 tamsulosin (FLOMAX) capsule 0.4 mg  0.4 mg Oral DAILY  albumin human 25% (BUMINATE) solution 12.5 g  12.5 g IntraVENous TID  senna-docusate (PERICOLACE) 8.6-50 mg per tablet 1 Tab  1 Tab Oral BID  polyethylene glycol (MIRALAX) packet 17 g  17 g Oral BID  
 balsam peru-castor oil (VENELEX) ointment   Topical BID  
 0.9% sodium chloride infusion  9 mL/hr IntraVENous CONTINUOUS  
 dextrose 5% infusion  4-20 mL/hr IntraVENous CONTINUOUS  
 calcium carbonate (TUMS) chewable tablet 200 mg [elemental]  200 mg Oral TID PRN  
 magnesium oxide (MAG-OX) tablet 800 mg  800 mg Oral BID  sodium chloride (OCEAN) 0.65 % nasal squeeze bottle 2 Spray  2 Spray Both Nostrils Q2H PRN  
 dronabinol (MARINOL) capsule 2.5 mg  2.5 mg Oral DAILY  insulin lispro (HUMALOG) injection   SubCUTAneous AC&HS  
 glucose chewable tablet 16 g  4 Tab Oral PRN  
 dextrose (D50W) injection syrg 12.5-25 g  12.5-25 g IntraVENous PRN  
  glucagon (GLUCAGEN) injection 1 mg  1 mg IntraMUSCular PRN  
 traMADol (ULTRAM) tablet 50 mg  50 mg Oral Q8H PRN  
 aspirin chewable tablet 81 mg  81 mg Oral DAILY  atorvastatin (LIPITOR) tablet 40 mg  40 mg Oral QHS  sodium chloride (NS) flush 5-40 mL  5-40 mL IntraVENous Q8H  
 sodium chloride (NS) flush 5-40 mL  5-40 mL IntraVENous PRN  
 ondansetron (ZOFRAN) injection 4 mg  4 mg IntraVENous Q6H PRN  
 acetaminophen (TYLENOL) tablet 650 mg  650 mg Oral Q6H PRN  
 docusate sodium (COLACE) capsule 100 mg  100 mg Oral PRN  
 DOBUTamine (DOBUTREX) 500 mg/250 mL (2,000 mcg/mL) infusion  10 mcg/kg/min IntraVENous CONTINUOUS  
 pantoprazole (PROTONIX) tablet 40 mg  40 mg Oral ACB  
 
 
 
 LAB AND IMAGING FINDINGS:  
 
Lab Results Component Value Date/Time WBC 6.6 05/24/2019 04:28 AM  
 HGB 7.8 (L) 05/24/2019 04:28 AM  
 PLATELET 77 (L) 78/71/3023 04:28 AM  
 
Lab Results Component Value Date/Time Sodium 124 (L) 05/24/2019 02:47 PM  
 Potassium 4.7 05/24/2019 02:47 PM  
 Chloride 92 (L) 05/24/2019 02:47 PM  
 CO2 24 05/24/2019 02:47 PM  
 BUN 73 (H) 05/24/2019 02:47 PM  
 Creatinine 2.59 (H) 05/24/2019 02:47 PM  
 Calcium 8.3 (L) 05/24/2019 02:47 PM  
 Magnesium 2.3 05/24/2019 04:28 AM  
 Phosphorus 2.3 (L) 05/23/2019 12:06 PM  
  
Lab Results Component Value Date/Time AST (SGOT) 17 05/24/2019 02:47 PM  
 Alk. phosphatase 63 05/24/2019 02:47 PM  
 Protein, total 5.4 (L) 05/24/2019 02:47 PM  
 Albumin 2.8 (L) 05/24/2019 02:47 PM  
 Globulin 2.6 05/24/2019 02:47 PM  
 
Lab Results Component Value Date/Time INR 1.4 (H) 05/16/2019 03:57 AM  
 Prothrombin time 14.1 (H) 05/16/2019 03:57 AM  
 aPTT 43.3 (H) 05/24/2019 10:59 AM  
  
Lab Results Component Value Date/Time Iron 80 05/22/2019 09:34 AM  
 TIBC 199 (L) 05/22/2019 09:34 AM  
 Iron % saturation 40 05/22/2019 09:34 AM  
 Ferritin 885 (H) 05/22/2019 09:34 AM  
  
No results found for: PH, PCO2, PO2 No components found for: [place] No results found for: CPK, CKMB Total time:  
Counseling / coordination time, spent as noted above:  
> 50% counseling / coordination?:  
 
Prolonged service was provided for  []30 min   []75 min in face to face time in the presence of the patient, spent as noted above. Time Start:  
Time End:  
Note: this can only be billed with 64504 (initial) or 14653 (follow up). If multiple start / stop times, list each separately.

## 2019-05-24 NOTE — REHAB NOTE
NYHA class IV A/C systolic HF Severe AS 
PAD 
A/C kidney disease CAD LVAD work-up S/P Impella 
  
Pre-op Plan Platelets INR Creatinine 
  
OR plan Drive-line RCA graft Impella  
  
LHC - severe 3 vessel disease; RCA has severe calcification and blockages; will need to try and place RCA graft 
  
ECHO - severely depressed EF with dilated LV cavity (EF 10%; LVIDD 4.77); RV dilated (RVIDD 4.1; TAPSE 0.65, RV/LV ratio 0.85); mild to moderate TR; trace AI 
  
RHC (Impella in place; Dobutamine 7.5) - CVP 5, PA 45/17 (25), CVP/PA ratio 0.2 
  
**overall patient has moderate to severe RV dysfunction  
  
Chest/Abdominal CT scan - no significant calcification in aorta, should be good for outflow graft; thin adipose tissue over abdominal wall so need to be careful with drive-line; elevated right billy-diaphragm 
  
Cardiac MRI - poor global viability 
  
PAUL - no compressible vessels  
  
Carotids - no significant disease  
  
PFTs - pending  
  
Hgb 9, platelets 91, INR 1.4  
  
Creatinine 2.13, BUN 55 (most likely dried him out a bit too much - will back off on diuretics)  
  
AST 22, ALT 6, alk phos 73, bilirubin 0.5 
  
Lactic acid - 0.7 
  
NT pro-BNP > 35,000 Creatinine remains in the mid 2's Remains on Dobutamine 10 for RV dysfunction Bilirubin and other LFTs look good Lactic acid normal 
 
LDH reasonable Pro-calcitonin elevated Cultures are NGTD WBCs normal 
 
Remains on Ancef and Levofloxacin Clearly not an LVAD candidate at this point Risk of morbidity and mortality - high Medical decision making - high complexity Total critical care time - 30 minutes (CPT 14864)

## 2019-05-24 NOTE — PROGRESS NOTES
Problem: Falls - Risk of 
Goal: *Absence of Falls Description Document Ivania Garcia Fall Risk and appropriate interventions in the flowsheet. Outcome: Progressing Towards Goal 
  
Problem: Pressure Injury - Risk of 
Goal: *Prevention of pressure injury Description Document Terry Scale and appropriate interventions in the flowsheet. Outcome: Progressing Towards Goal 
  
Problem: Infection - Risk of, Urinary Catheter-Associated Urinary Tract Infection Goal: *Absence of infection signs and symptoms Outcome: Progressing Towards Goal 
Note:  
No S/S of infection Problem: Nutrition Deficit Goal: *Optimize nutritional status Outcome: Progressing Towards Goal 
Note:  
Pt ate 80% of his dinner

## 2019-05-24 NOTE — PROGRESS NOTES
Nephrology Progress Note Eli Webb 
Date of Admission : 5/9/2019 CC:  Follow up for hyponatremia and CKD Assessment and Plan THEODORE on CKD III: 
- likely 2/2 CRS 
- rising Cr after weaning dobutamine 
- increase bumex drip, cont dobutamine 
- repeat labs this PM 
- strict I/Os Hyponatremia: 
- likely hypervolemic from CHF 
- did not respond well to tolvaptan 
- increase bumex drip, avoid thiazides 
- repeat Na this afternoon 
  
CKD III w/ baseline Cr 2: 
- presumed 2/2 DM and HTN 
- Cr stable, w/ non-nephrotic range proteinuria 
  
HFrEF: 
- EF 10% - w/u for LVAD 
- impella and dobutamine per HF service 
  
RV failure 
  
Severe AS 
  
Multivessel CAD: 
- poor viability on cardiac MRI 
  
Urinary Retention: 
- on flomax + lowe 
  
DM2: 
- on insulin Interval History: 
Seen and examined. Cr stable, UOP picking up, still net + in the past several days. Breathing stable. BP stable. Remains on dobutamine. Current Medications: all current  Medications have been eviewed in [facility] Review of Systems: Pertinent items are noted in HPI. Objective: 
Vitals:   
Vitals:  
 05/24/19 0600 05/24/19 0700 05/24/19 0800 05/24/19 0900 BP:      
Pulse: 79 72 83 (!) 107 Resp: 15 (!) 6  22 Temp:   97.6 °F (36.4 °C) SpO2: 92% 97% 95% Weight: 84.4 kg (186 lb 1.1 oz) Height:      
 
Intake and Output: 
05/24 0701 - 05/24 1900 In: 212.5 [I.V.:212.5] Out: 135 [Urine:135] 05/22 1901 - 05/24 0700 In: 2931.5 [P.O.:820; I.V.:2091.5] Out: 2700 [Urine:2700] Physical Examination:Pt intubated     No 
General: NAD,Conversant Neck:  Supple, no mass Resp:  Lungs CTA B/L, no wheezing , normal respiratory effort CV:  RRR,  no murmur or rub, 3-4+ b/l LE edema GI:  Soft, NT, + Bowel sounds, no hepatosplenomegaly Neurologic:  Non focal 
Psych:             Flat affect Skin:  No Rash 
 
[]    High complexity decision making was performed []    Patient is at high-risk of decompensation with multiple organ involvement Lab Data Personally Reviewed: I have reviewed all the pertinent labs, microbiology data and radiology studies during assessment. Recent Labs  
  05/24/19 
3186 05/23/19 
1906 05/23/19 
1206 05/23/19 
5646  05/22/19 
1093 * 123* 120* 123*   < > 126*  
K 4.8  --  5.3* 5.7*  --  4.2 CL 91*  --  87* 90*  --  93* CO2 24  --  21 22  --  24  
*  --  372* 235*  --  87 BUN 73*  --  71* 69*  --  61* CREA 2.62*  --  2.73* 2.67*  --  2.28* CA 8.4*  --  8.0* 8.3*  --  8.1*  
MG 2.3  --   --  2.4  --  2.2 PHOS  --   --  2.3*  --   --   --   
ALB 2.7*  --  2.7* 2.7*  --  2.3*  
SGOT 19  --   --  28  --  37  
ALT <6*  --   --  <6*  --  <6*  
 < > = values in this interval not displayed. Recent Labs  
  05/24/19 
0428 05/23/19 
0307 05/22/19 
1134 WBC 6.6 4.0* 5.2 HGB 7.8* 8.1* 8.8* HCT 24.7* 25.2* 27.6* PLT 77* 73* 80* No results found for: [facility], [place], [facility] Lab Results Component Value Date/Time Culture result: NO GROWTH 3 DAYS 05/21/2019 10:52 AM  
 Culture result: NO GROWTH 5 DAYS 05/18/2019 05:55 PM  
 Culture result: NO GROWTH 5 DAYS 05/15/2019 11:00 AM  
 
Recent Results (from the past 24 hour(s)) GLUCOSE, POC Collection Time: 05/23/19 11:19 AM  
Result Value Ref Range Glucose (POC) 407 (H) 65 - 100 mg/dL Performed by Kiana Jackson   
PTT Collection Time: 05/23/19 12:06 PM  
Result Value Ref Range aPTT 53.5 (H) 22.1 - 32.0 sec  
 aPTT, therapeutic range     58.0 - 77.0 SECS RENAL FUNCTION PANEL Collection Time: 05/23/19 12:06 PM  
Result Value Ref Range Sodium 120 (L) 136 - 145 mmol/L Potassium 5.3 (H) 3.5 - 5.1 mmol/L Chloride 87 (L) 97 - 108 mmol/L  
 CO2 21 21 - 32 mmol/L Anion gap 12 5 - 15 mmol/L Glucose 372 (H) 65 - 100 mg/dL BUN 71 (H) 6 - 20 MG/DL  Creatinine 2.73 (H) 0.70 - 1.30 MG/DL  
 BUN/Creatinine ratio 26 (H) 12 - 20    
 GFR est AA 28 (L) >60 ml/min/1.73m2 GFR est non-AA 23 (L) >60 ml/min/1.73m2 Calcium 8.0 (L) 8.5 - 10.1 MG/DL Phosphorus 2.3 (L) 2.6 - 4.7 MG/DL Albumin 2.7 (L) 3.5 - 5.0 g/dL GLUCOSE, POC Collection Time: 05/23/19  5:22 PM  
Result Value Ref Range Glucose (POC) 315 (H) 65 - 100 mg/dL Performed by Leo Wood   
PTT Collection Time: 05/23/19  7:06 PM  
Result Value Ref Range aPTT 51.8 (H) 22.1 - 32.0 sec  
 aPTT, therapeutic range     58.0 - 77.0 SECS  
SODIUM Collection Time: 05/23/19  7:06 PM  
Result Value Ref Range Sodium 123 (L) 136 - 145 mmol/L  
GLUCOSE, POC Collection Time: 05/23/19 11:10 PM  
Result Value Ref Range Glucose (POC) 298 (H) 65 - 100 mg/dL Performed by Louretta Dakins PTT Collection Time: 05/24/19 12:26 AM  
Result Value Ref Range aPTT 51.0 (H) 22.1 - 32.0 sec  
 aPTT, therapeutic range     58.0 - 77.0 SECS  
PTT Collection Time: 05/24/19  1:24 AM  
Result Value Ref Range aPTT 51.0 (H) 22.1 - 32.0 sec  
 aPTT, therapeutic range     58.0 - 77.0 SECS  
LD Collection Time: 05/24/19  4:26 AM  
Result Value Ref Range  (H) 85 - 241 U/L MAGNESIUM Collection Time: 05/24/19  4:28 AM  
Result Value Ref Range Magnesium 2.3 1.6 - 2.4 mg/dL NT-PRO BNP Collection Time: 05/24/19  4:28 AM  
Result Value Ref Range NT pro-BNP >35,000 (H) <125 PG/ML  
LACTIC ACID Collection Time: 05/24/19  4:28 AM  
Result Value Ref Range Lactic acid 0.9 0.4 - 2.0 MMOL/L  
DIGOXIN Collection Time: 05/24/19  4:28 AM  
Result Value Ref Range Digoxin level 0.7 (L) 0.90 - 2.00 NG/ML  
PTT Collection Time: 05/24/19  4:28 AM  
Result Value Ref Range aPTT 49.8 (H) 22.1 - 32.0 sec  
 aPTT, therapeutic range     58.0 - 37.2 SECS  
METABOLIC PANEL, COMPREHENSIVE Collection Time: 05/24/19  4:28 AM  
Result Value Ref Range Sodium 123 (L) 136 - 145 mmol/L Potassium 4.8 3.5 - 5.1 mmol/L  Chloride 91 (L) 97 - 108 mmol/L  
 CO2 24 21 - 32 mmol/L Anion gap 8 5 - 15 mmol/L Glucose 242 (H) 65 - 100 mg/dL BUN 73 (H) 6 - 20 MG/DL Creatinine 2.62 (H) 0.70 - 1.30 MG/DL  
 BUN/Creatinine ratio 28 (H) 12 - 20 GFR est AA 29 (L) >60 ml/min/1.73m2 GFR est non-AA 24 (L) >60 ml/min/1.73m2 Calcium 8.4 (L) 8.5 - 10.1 MG/DL Bilirubin, total 1.0 0.2 - 1.0 MG/DL  
 ALT (SGPT) <6 (L) 12 - 78 U/L  
 AST (SGOT) 19 15 - 37 U/L Alk. phosphatase 70 45 - 117 U/L Protein, total 5.4 (L) 6.4 - 8.2 g/dL Albumin 2.7 (L) 3.5 - 5.0 g/dL Globulin 2.7 2.0 - 4.0 g/dL A-G Ratio 1.0 (L) 1.1 - 2.2 SED RATE (ESR) Collection Time: 05/24/19  4:28 AM  
Result Value Ref Range Sed rate, automated 28 (H) 0 - 20 mm/hr PROCALCITONIN Collection Time: 05/24/19  4:28 AM  
Result Value Ref Range Procalcitonin 8.1 ng/mL CBC W/O DIFF Collection Time: 05/24/19  4:28 AM  
Result Value Ref Range WBC 6.6 4.1 - 11.1 K/uL  
 RBC 2.94 (L) 4.10 - 5.70 M/uL HGB 7.8 (L) 12.1 - 17.0 g/dL HCT 24.7 (L) 36.6 - 50.3 % MCV 84.0 80.0 - 99.0 FL  
 MCH 26.5 26.0 - 34.0 PG  
 MCHC 31.6 30.0 - 36.5 g/dL  
 RDW 15.8 (H) 11.5 - 14.5 % PLATELET 77 (L) 406 - 400 K/uL MPV 12.8 8.9 - 12.9 FL  
 NRBC 0.0 0  WBC ABSOLUTE NRBC 0.00 0.00 - 0.01 K/uL POC VENOUS BLOOD GAS Collection Time: 05/24/19  5:04 AM  
Result Value Ref Range Device: ROOM AIR    
 FIO2 (POC) 21 %  
 pH, venous (POC) 7.414 7.32 - 7.42    
 pCO2, venous (POC) 35.7 (L) 41 - 51 MMHG  
 pO2, venous (POC) 25 25 - 40 mmHg HCO3, venous (POC) 22.9 (L) 23.0 - 28.0 MMOL/L  
 sO2, venous (POC) 46 (L) 65 - 88 % Base deficit, venous (POC) 2 mmol/L Allens test (POC) N/A Total resp. rate 16 Site [facility] Specimen type (POC) MIXED VENOUS    
GLUCOSE, POC Collection Time: 05/24/19  7:03 AM  
Result Value Ref Range Glucose (POC) 265 (H) 65 - 100 mg/dL Performed by Rubio Garcia MD 
 [facility]  
[address] [address], [address] [facility], [facility] Phone - [phone] Fax - [phone] 
[email or web address]

## 2019-05-24 NOTE — PROGRESS NOTES
1930- Bedside and Verbal shift change report given to [address] (oncoming nurse) by YOSELIN RN (offgoing nurse). Report included the following information SBAR, Procedure Summary, Intake/Output, Recent Results and Cardiac Rhythm Mobitz Type 1.  
 
0700- Got patient up from bed to scale and chair. He felt dizzy, B? MAP went down to 54 and SVO2 went down to 12. Got patient to chair. Placed him on 6 L NC and leaned him back until MAP returned to 65. Pt feels better with oxygen on. Will continue to monitor. 0800- Bedside and Verbal shift change report given to [facility] RN(oncoming nurse) by Peggy Sosa RN (offgoing nurse). Report included the following information SBAR, Procedure Summary, Intake/Output, Recent Results and Cardiac Rhythm Mobitz Type 1.

## 2019-05-25 NOTE — PROGRESS NOTES
Infectious Diseases Consultation Please see dictated note Impression 1. Elevated procalcitonin level: The procalcitonin level was <0.1 on 5/15, 3.0 on 5/19, 1.6 on 5/21, 1.3 on 5/22, but then 8.1 on 5/24. He had low grade fevers from 5/17 thru 5/20 but recent temps since then have been normal and WBC normal. The Impella has now been in place x 8 days since 5/16. The [facility] SG and left radial A-line have been in place since 5/21. His portable CXR is unremarkable. No focal site of infection is apparent so far. 2. OHD -- ischemic CM and AS 3. NIDDM 4. HTN 5. Periph arterial disease 6. S/P left great toe and right 5th toe amputations Recommend: 1. Serial cultures 2. Currently on Ancef + Levaquin Thanks,  
Chelo Boogie MD 
5/24/2019 
8:44 PM

## 2019-05-25 NOTE — PROGRESS NOTES
1930 - Bedside and Verbal shift change report given to Marco Pinon RN (oncoming nurse) by An Lambert RN (offgoing nurse). Report included the following information SBAR, Kardex, Intake/Output, MAR, Recent Results, Cardiac Rhythm Wenckebach and Alarm Parameters . 2100 - Dr. Ancelmo Gonzalez at bedside. Updated on pt condition. No new orders received. 0500 - RT at bedside to perform mixed venous blood gas. 0600 - Pt CHG bath performed. 0730 - Bedside and Verbal shift change report given to An Lambert RN (oncoming nurse) by Marco Pinon RN (offgoing nurse). Report included the following information SBAR, Kardex, Intake/Output, MAR, Recent Results, Cardiac Rhythm Wenckebach and Alarm Parameters .

## 2019-05-25 NOTE — PROGRESS NOTES
[facility] ICU Progress Note Admit Date: 2019 POD: 9 Procedure:  Procedure(s): RIGHT AXILLARY IMPELLA/ [id number] Subjective:  
Pt seen with Dr. Shaun Wise  at 10 mcg, bumex gtt at 1 mg. Pt sitting up in chair, no specific complaints. Afebrile on RA Impella P-9 CI 2.3 60% Cr 2.55/BUN 75 UOP 1200 +1400 HF management Objective:  
Vitals: 
Blood pressure 122/88, pulse 90, temperature 98.3 °F (36.8 °C), resp. rate 9, height 5' 11\" (1.803 m), weight 187 lb 13.3 oz (85.2 kg), SpO2 (!) 81 %. Temp (24hrs), Av.2 °F (36.8 °C), Min:97.5 °F (36.4 °C), Max:98.5 °F (36.9 °C) Hemodynamics: 
 CO: CO (l/min): 5.5 l/min CI: CI (l/min/m2): 2.8 l/min/m2 CVP: CVP (mmHg): 3 mmHg (19 09) SVR: SVR (dyne*sec)/cm5: 1068 (dyne*sec)/cm5 (19 0800) PAP Systolic: PAP Systolic: 35 (58/66/88 7742) PAP Diastolic: PAP Diastolic: 18 (59/52/25 3421) PVR:   
 SV02: SVO2 (%): 64 % (19 09) SCV02:   
 
EKG/Rhythm:  Afib - rate controlled Oxygen Therapy: 
Oxygen Therapy O2 Sat (%): (!) 81 % (19 09) Pulse via Oximetry: 83 beats per minute (19 0900) O2 Device: Nasal cannula (19 040) O2 Flow Rate (L/min): 4 l/min (19 0400) FIO2 (%): 36 % (19 1146) CXR:  
CXR Results  (Last 48 hours) 19 0531  XR CHEST PORT Final result Impression:  Mild bibasilar atelectasis. Narrative:  INDICATION: Line placement. Portable AP semiupright view of the chest.  
   
Direct comparison made to prior chest x-ray dated May 23, 2019. Cardiomediastinal silhouette is stable. Tubes and lines are unchanged in  
position. Lungs are hypoinflated. There is mild bibasilar patchy airspace  
disease. No pleural fluid is visualized. There is no pneumothorax. Admission Weight: Last Weight Weight: 186 lb 15.2 oz (84.8 kg) Weight: 187 lb 13.3 oz (85.2 kg) Intake / Output / Drain: 
Current Shift: No intake/output data recorded. Last 24 hrs.:  
 
Intake/Output Summary (Last 24 hours) at 2019 1023 Last data filed at 2019 0700 Gross per 24 hour Intake 1905.6 ml Output 1040 ml Net 865.6 ml EXAM: 
General:  Alert, NAD Lungs:   Clear upper, diminished bases to auscultation bilaterally - improved. Incision:  impella dsg cdi Heart:  Irregular rate and rhythm, S1, S2 normal, no murmur, click, rub or gallop. Abdomen:   Soft, non-tender. Bowel sounds normal. No masses,  No organomegaly. Extremities:  2+ LE edema. PPP. Neurologic:  Gross motor and sensory apparatus intact. Labs:  
Recent Labs  
  19 
0724 19 
4703 WBC  --  6.9 HGB  --  7.8* HCT  --  24.6* PLT  --  66* NA  --  124* K  --  4.5 BUN  --  75* CREA  --  2.55* GLU  --  187* GLUCPOC 233*  --   
 
 
 Assessment:  
 
Active Problems: 
  Acute on chronic systolic (congestive) heart failure ([facility] [address] (2019) Plan/Recommendations/Medical Decision Makin. Acute on chronic systolic heart failure (NYHA class IV on admission): AHF following, on dobut 10, bumex gtt. LVAD workup ongoing. Purge now D5 due to high PTT. BNP >35K. On ancef for impella ppx. Echo on 19 EF 15-20% 2. Severe AS: no plans for TAVR at this time 3. CAD with  of RAD and RCA: on ASA and statin, no BB dt dobut. Cardiac MRI showed nonviability of LV. 4. Afib: has PPM, was on eliquis - hold for impella. Not on any anticoagulation, evaluated by EP - no changes for now 5. HLD: on lipitor 6. DM type II: on glipizide, SSI, A1C 6.9. BS much higher as pt eating more, will start Lantus per AHFS 7. CKD: Cr holding, 2.62 today, monitor with diuretics. Renal following 8. Left leg wounds s/p fall: wound care consulted. 9. Anemia, iron deficiency: Hgb 7.8, cont PO iron, monitor. Transfuse prn 10. Thrombocytopenia: plts 77K, monior 11. Hyponatremia: Na 123, monitor. Renal on board - tolvaptan prn 12. Urinary retention: cont flomax, Colon reinserted 13. Nutrition: cont marinol per AHFS. Nutrition consult 14. Hyperkalemia: K better, monitor 15. Dispo: Cont ICU care, PT/OT, LVAD and further workup/treatment per AHF Signed By: OBED Garza

## 2019-05-25 NOTE — CONSULTS
Uriel Palafox Name:  Adan Porter 
MR#:  [id number] :  1950 ACCOUNT #:  [de-identified] DATE OF SERVICE:  2019 LOCATION:  The patient is in bed 33 at the Cardiac Surgery ICU. ATTENDING PHYSICIAN:  Jennifer Bonilla MD 
 
CHIEF COMPLAINT:  Weakness and fatigue. REASON FOR CONSULTATION:  Elevated procalcitonin level. The consultation was requested by Dr. Derick Cali. The history is obtained by interview of the patient and review of medical records. HISTORY OF PRESENT ILLNESS:  This patient is a 75-year-old white male with a history of an ischemic cardiomyopathy, hypertension, NIDDM, peripheral arterial disease, and aortic stenosis. The patient presented to the hospital on this occasion on 2019 with progressive shortness of breath. The patient required placement of an Impella device on 2019. He remains in the intensive care unit and the Impella remains in place. He now has persistent fatigue and lack of any energy or strength. He does have shortness of breath at times. Otherwise, he is resting comfortably. The patient is currently undergoing evaluation for a possible LVAD, but this is complicated by his general debility and elevated creatinine. PAST MEDICAL HISTORY:  Tobacco, none. Alcohol; he drank socially in the past, but no longer does. MEDICATIONS PRIOR TO ADMISSION: 
1. Eliquis 5 mg p.o. b.i.d. 2.  Aspirin 81 mg p.o. daily. 3.  Atorvastatin 40 mg p.o. nightly. 4.  Invokana 300 mg p.o. at breakfast. 
5.  Trulicity 1.5 mg subcutaneously every . 6.  Nexium 20 mg p.o. daily. 7.  Lasix 40 mg p.o. daily. 8.  Glimepiride (Amaryl) 4 mg p.o. b.i.d. 9.  Metformin 1000 mg p.o. b.i.d. 10.  Metoprolol  mg p.o. daily. 11.  Entresto 24/26 mg - one p.o. q.12 hours. 12.  Spironolactone 25 mg p.o. daily. ALLERGIES:  PENICILLIN - HE APPARENTLY HAD A RASH WITH PENICILLIN MANY YEARS AGO.  
 
ILLNESSES: 
 1.  Organic heart disease - ischemic heart disease and valvular heart disease with aortic stenosis. 2.  NIDDM. 3.  Hypertension. 4.  Peripheral vascular disease. SURGERIES: 
1. Cataract surgery. 2.  Placement of an Impella device on 05/16/2019. 
3.  Amputation of the right fifth toe. 4.  Amputation of the left great toe. SOCIAL HISTORY:  Otherwise negative. FAMILY HISTORY:  Negative for early heart disease. REVIEW OF SYSTEMS: 
CONSTITUTIONAL:  The patent had low-grade fevers from 05/17/2019 until 05/20/2019, but has been afebrile since then. HEENT:  No headache, ear ache, visual change, sore throat. LYMPHATIC:  No history of adenopathy. DERMATOLOGIC:  No rashes. RESPIRATORY:  No cough or hemoptysis. CARDIOVASCULAR:  He has had shortness of breath. GASTROINTESTINAL:  Appetite has been diminished. No nausea, vomiting, diarrhea. GENITOURINARY:  No dysuria. MUSCULOSKELETAL:  No myalgias or arthralgias. NEUROLOGIC:  No recent seizure or stroke. PHYSICAL EXAMINATION: 
GENERAL:  A chronically ill-appearing male in no acute distress. VITAL SIGNS:  Blood pressure is 90/60, heart rate 92, respiratory rate 20. He is afebrile. HEENT:  Sclerae and conjunctivae benign without petechiae, EOMI, oropharynx benign. NECK:  Supple. NODES:  No adenopathy. SKIN:  No rashes. LUNGS:  Basilar rales. CARDIOVASCULAR:  Irregular rhythm. Soft systolic murmur. Intravascular lines - the patient has a left internal jugular [place]ennifer catheter and left radial arterial lines. They were both placed on 05/21/2019. He has a right-sided Impella device, which was inserted on 05/16/2019. ABDOMEN:  Soft, nondistended, nontender, no masses, bowel sounds are present. BACK:  No CVA tenderness. EXTREMITIES:  No cellulitis. MUSCULOSKELETAL:  Muscles are nontender and joints benign. NEUROLOGIC:  Alert and oriented.  
 
LABORATORY DATA:  CBC reveals hemoglobin 7.8, white count of 6600, and platelets 75,896. Sedimentation rate is 28. C-reactive protein (high sensitivity) was greater than 9.5. Fungitell is pending. The patient has had serial procalcitonin levels. The procalcitonin level was less than 0.1 on 05/15/2019, 3.0 on 05/19/2019, 1.6 on 05/21/2019, 1.3 on 05/22/2019, and then 8.1 on 05/24/2019. MICROBIOLOGY DATA:  Blood cultures on 05/21/2019 have been negative so far. RADIOLOGY DATA:  Chest x-ray reveals mild basilar atelectasis. IMPRESSION: 
1. Elevated procalcitonin level - the procalcitonin level increased significantly today. The patient did have low grade fevers from 05/17/2019 through 05/20/2019, but has had no fever since then and white count is normal.  Line related infection is possible. The Impella has been in for 8 days. The left IJ [place]-Jennifer catheter and left radial arterial lines have been in place since 05/21/2019. The portable chest x-ray is unremarkable. No infection is identified so far. 2.  Organic heart disease - ischemic cardiomyopathy and aortic stenosis. 3.  Non-insulin dependent diabetes mellitus. 4.  Hypertension. 5.  Peripheral arterial disease. 6.  Status post left great toe and right fifth toe amputations. PLAN: 
1. Serial cultures. 2.  I will leave the patient on Ancef and Levaquin for now. Ancef was started on 05/16/2019 and Levaquin on 05/21/2019. Thank you very much for allowing us to see this patient with you. Shae Gabriel MD 
 
 
[id number] 
D:  05/25/2019 2:09 
T:  05/25/2019 4:05 JOB #:  [id number] CC:  MD Nadya Vail MD

## 2019-05-25 NOTE — PROGRESS NOTES
0800 Bedside shift change report given to An Lambert (oncoming nurse) by Marco Pinon (offgoing nurse). Report included the following information SBAR, MAR and Cardiac Rhythm 2nd degree heart block type 1. 
 
1100 Dr. Vinicius Mosley at bedside. Family updated. Orders received to increase bumex gtt to 2mg 
 
1200 Dr. Buddy Ferreira at bedside. Updates given orders received to start tolvaptan 
 
1400 Colon discontinued. 1600 Impella dressing changed. 2000 Bedside shift change report given to Marco Pinon (oncoming nurse) by An Lambert (offgoing nurse). Report included the following information SBAR, MAR and Cardiac Rhythm NSR.

## 2019-05-25 NOTE — PROGRESS NOTES
Nephrology Progress Note Carlota Gottron 
Date of Admission : 5/9/2019 CC:  Follow up for hyponatremia and CKD Assessment and Plan THEODORE on CKD III: 
- likely 2/2 CRS 
- creat stable, U/O decreasing 
-may need CRRT if no response to below Hyponatremia: 
- likely hypervolemic from CHF 
- try tolvaptan at high dose; did not respond well previously - bumex drip increased to 2 mg/hour 
  
CKD III w/ baseline Cr 2: 
- presumed 2/2 DM and HTN 
- Cr stable, w/ non-nephrotic range proteinuria 
  
HFrEF: 
- EF 10% - w/u for LVAD 
- impella and dobutamine per HF service 
  
RV failure 
  
Severe AS 
  
Multivessel CAD: 
- poor viability on cardiac MRI 
  
Urinary Retention: 
- on flomax + lowe 
  
DM2: 
- on insulin Interval History: 
Seen and examined. Cr stable, UOP decreased last few hors - bumex increased; awake, ongoing Dobutamine Current Medications: all current  Medications have been eviewed in [facility] Review of Systems: Pertinent items are noted in HPI. Objective: 
Vitals:   
Vitals:  
 05/25/19 0900 05/25/19 1000 05/25/19 1100 05/25/19 1200 BP:      
Pulse: 90 83 92 90 Resp: 9 19 13 Temp:    98.5 °F (36.9 °C) SpO2: (!) 81% Weight:      
Height:      
 
Intake and Output: 
05/25 0701 - 05/25 1900 In: 214.6 [I.V.:214.6] Out: 100 [Urine:100] 05/23 1901 - 05/25 0700 In: 3286.2 [P.O.:1040; I.V.:2211.2] Out: 7861 [Urine:2435] Physical Examination:Pt intubated     No 
General: NAD,Conversant Neck:  Supple, no mass Resp:  Lungs CTA B/L, no wheezing , normal respiratory effort CV:  Impella, 4+ b/l LE edema GI:  Soft, NT, + Bowel sounds, no hepatosplenomegaly Neurologic:  Non focal 
Psych:             Flat affect Skin:  No Rash 
 
[]    High complexity decision making was performed 
[]    Patient is at high-risk of decompensation with multiple organ involvement Lab Data Personally Reviewed: I have reviewed all the pertinent labs, microbiology data and radiology studies during assessment. Recent Labs  
  05/25/19 
1417 05/24/19 
1447 05/24/19 
0428  05/23/19 
1206 05/23/19 
4062 * 124* 123*   < > 120* 123* K 4.5 4.7 4.8  --  5.3* 5.7* CL 92* 92* 91*  --  87* 90* CO2 25 24 24  --  21 22 * 244* 242*  --  372* 235* BUN 75* 73* 73*  --  71* 69* CREA 2.55* 2.59* 2.62*  --  2.73* 2.67* CA 8.2* 8.3* 8.4*  --  8.0* 8.3*  
MG 2.2  --  2.3  --   --  2.4 PHOS  --   --   --   --  2.3*  --   
ALB 2.7* 2.8* 2.7*  --  2.7* 2.7* SGOT 10* 17 19  --   --  28 ALT <6* <6* <6*  --   --  <6*  
 < > = values in this interval not displayed. Recent Labs  
  05/25/19 
8355 05/24/19 
0428 05/23/19 
4981 WBC 6.9 6.6 4.0* HGB 7.8* 7.8* 8.1* HCT 24.6* 24.7* 25.2*  
PLT 66* 77* 73* No results found for: [facility] Lab Results Component Value Date/Time Culture result: NO GROWTH 4 DAYS 05/21/2019 10:52 AM  
 Culture result: NO GROWTH 5 DAYS 05/18/2019 05:55 PM  
 Culture result: NO GROWTH 5 DAYS 05/15/2019 11:00 AM  
 
Recent Results (from the past 24 hour(s)) GLUCOSE, POC Collection Time: 05/24/19 12:53 PM  
Result Value Ref Range Glucose (POC) 251 (H) 65 - 100 mg/dL Performed by Lisa [facility] METABOLIC PANEL, COMPREHENSIVE Collection Time: 05/24/19  2:47 PM  
Result Value Ref Range Sodium 124 (L) 136 - 145 mmol/L Potassium 4.7 3.5 - 5.1 mmol/L Chloride 92 (L) 97 - 108 mmol/L  
 CO2 24 21 - 32 mmol/L Anion gap 8 5 - 15 mmol/L Glucose 244 (H) 65 - 100 mg/dL BUN 73 (H) 6 - 20 MG/DL Creatinine 2.59 (H) 0.70 - 1.30 MG/DL  
 BUN/Creatinine ratio 28 (H) 12 - 20 GFR est AA 30 (L) >60 ml/min/1.73m2 GFR est non-AA 25 (L) >60 ml/min/1.73m2 Calcium 8.3 (L) 8.5 - 10.1 MG/DL Bilirubin, total 1.0 0.2 - 1.0 MG/DL  
 ALT (SGPT) <6 (L) 12 - 78 U/L  
 AST (SGOT) 17 15 - 37 U/L Alk. phosphatase 63 45 - 117 U/L Protein, total 5.4 (L) 6.4 - 8.2 g/dL Albumin 2.8 (L) 3.5 - 5.0 g/dL Globulin 2.6 2.0 - 4.0 g/dL A-G Ratio 1.1 1.1 - 2.2 GLUCOSE, POC Collection Time: 05/24/19  5:15 PM  
Result Value Ref Range Glucose (POC) 241 (H) 65 - 100 mg/dL Performed by Bryant Arreola   
PTT Collection Time: 05/24/19  5:16 PM  
Result Value Ref Range aPTT 44.3 (H) 22.1 - 32.0 sec  
 aPTT, therapeutic range     58.0 - 77.0 SECS  
GLUCOSE, POC Collection Time: 05/24/19  9:28 PM  
Result Value Ref Range Glucose (POC) 224 (H) 65 - 100 mg/dL Performed by Roberto King PTT Collection Time: 05/25/19 12:05 AM  
Result Value Ref Range aPTT 48.9 (H) 22.1 - 32.0 sec  
 aPTT, therapeutic range     58.0 - 77.0 SECS  
MAGNESIUM Collection Time: 05/25/19  3:38 AM  
Result Value Ref Range Magnesium 2.2 1.6 - 2.4 mg/dL NT-PRO BNP Collection Time: 05/25/19  3:38 AM  
Result Value Ref Range NT pro-BNP >35,000 (H) <125 PG/ML  
LACTIC ACID Collection Time: 05/25/19  3:38 AM  
Result Value Ref Range Lactic acid 0.7 0.4 - 2.0 MMOL/L  
DIGOXIN Collection Time: 05/25/19  3:38 AM  
Result Value Ref Range Digoxin level 0.8 (L) 0.90 - 0.95 NG/ML  
METABOLIC PANEL, COMPREHENSIVE Collection Time: 05/25/19  3:38 AM  
Result Value Ref Range Sodium 124 (L) 136 - 145 mmol/L Potassium 4.5 3.5 - 5.1 mmol/L Chloride 92 (L) 97 - 108 mmol/L  
 CO2 25 21 - 32 mmol/L Anion gap 7 5 - 15 mmol/L Glucose 187 (H) 65 - 100 mg/dL BUN 75 (H) 6 - 20 MG/DL Creatinine 2.55 (H) 0.70 - 1.30 MG/DL  
 BUN/Creatinine ratio 29 (H) 12 - 20 GFR est AA 30 (L) >60 ml/min/1.73m2 GFR est non-AA 25 (L) >60 ml/min/1.73m2 Calcium 8.2 (L) 8.5 - 10.1 MG/DL Bilirubin, total 0.9 0.2 - 1.0 MG/DL  
 ALT (SGPT) <6 (L) 12 - 78 U/L  
 AST (SGOT) 10 (L) 15 - 37 U/L Alk. phosphatase 56 45 - 117 U/L Protein, total 5.0 (L) 6.4 - 8.2 g/dL Albumin 2.7 (L) 3.5 - 5.0 g/dL Globulin 2.3 2.0 - 4.0 g/dL A-G Ratio 1.2 1.1 - 2.2 PROCALCITONIN  Collection Time: 05/25/19  3:38 AM  
 Result Value Ref Range Procalcitonin 5.2 ng/mL LD Collection Time: 05/25/19  3:38 AM  
Result Value Ref Range  (H) 85 - 241 U/L  
CBC W/O DIFF Collection Time: 05/25/19  3:38 AM  
Result Value Ref Range WBC 6.9 4.1 - 11.1 K/uL  
 RBC 2.94 (L) 4.10 - 5.70 M/uL HGB 7.8 (L) 12.1 - 17.0 g/dL HCT 24.6 (L) 36.6 - 50.3 % MCV 83.7 80.0 - 99.0 FL  
 MCH 26.5 26.0 - 34.0 PG  
 MCHC 31.7 30.0 - 36.5 g/dL  
 RDW 15.9 (H) 11.5 - 14.5 % PLATELET 66 (L) 167 - 400 K/uL NRBC 0.0 0  WBC ABSOLUTE NRBC 0.00 0.00 - 0.01 K/uL SED RATE (ESR) Collection Time: 05/25/19  3:38 AM  
Result Value Ref Range Sed rate, automated 28 (H) 0 - 20 mm/hr PTT Collection Time: 05/25/19  3:38 AM  
Result Value Ref Range aPTT 51.5 (H) 22.1 - 32.0 sec  
 aPTT, therapeutic range     58.0 - 77.0 SECS  
POC VENOUS BLOOD GAS Collection Time: 05/25/19  5:03 AM  
Result Value Ref Range Device: NASAL CANNULA Flow rate (POC) 4 L/M  
 pH, venous (POC) 7.372 7.32 - 7.42    
 pCO2, venous (POC) 40.7 (L) 41 - 51 MMHG  
 pO2, venous (POC) 35 25 - 40 mmHg HCO3, venous (POC) 23.7 23.0 - 28.0 MMOL/L  
 sO2, venous (POC) 65 65 - 88 % Base deficit, venous (POC) 2 mmol/L Allens test (POC) N/A Total resp. rate 31 Site COLE [place] Specimen type (POC) MIXED VENOUS    
GLUCOSE, POC Collection Time: 05/25/19  7:24 AM  
Result Value Ref Range Glucose (POC) 233 (H) 65 - 100 mg/dL Performed by Gabe Scott GLUCOSE, POC Collection Time: 05/25/19 11:58 AM  
Result Value Ref Range Glucose (POC) 217 (H) 65 - 100 mg/dL Performed by MD Yusuf Hector Nephrology [facility]  
[id number] [place] [address], [facility] Phone - [phone] Fax - [phone] 
[email or web address]

## 2019-05-25 NOTE — PROGRESS NOTES
Problem: Falls - Risk of 
Goal: *Absence of Falls Description Document Kori Jenkins Fall Risk and appropriate interventions in the flowsheet. Outcome: Progressing Towards Goal 
  
Problem: Heart Failure: Day 5 Goal: Off Pathway (Use only if patient is Off Pathway) Outcome: Progressing Towards Goal 
 Pt continues to be LVAD workup Problem: Pressure Injury - Risk of 
Goal: *Prevention of pressure injury Description Document Terry Scale and appropriate interventions in the flowsheet. Outcome: Progressing Towards Goal 
  
Problem: Infection - Risk of, Urinary Catheter-Associated Urinary Tract Infection Goal: *Absence of infection signs and symptoms Outcome: Progressing Towards Goal

## 2019-05-25 NOTE — PROGRESS NOTES
[facility] Progress Note DOS:   5/25/2019 NAME:  Angelina Marks  
MRN:   [id number] REFERRING PROVIDER:  Dr. Roxie Lesch PRIMARY CARE PHYSICIAN: Diogo Chau MD 
 
 
Chief Complaint: CHENG  
 
HPI: 77y.o. year old male with a history of HTN, T2DM, PVD, CKD, ICM, Severe AS who presents for further evaluation of chronic systolic heart failure. He developed progressive dyspnea with exacterbation as well as progressive fatigue which prompted further evaluation with a heart cath at [facility] that revealed severe 3 vessel disease with  of his LAD and RCA, severe LV systolic failure (LVEF 20%), and severe As. He has been unable to walk a block before without limiting dyspnea as well as unable to make a bed without developing dyspnea. Admitted to [facility] for acute chronic HFrEF, CMRI showed no viability for LAD, taken for impella placement for bridge to candidacy on 5/16. 24Hr Events:  
Inadequate diuresis, creatinine stable Hyponatremia Na + 124 Procedure:  Procedure(s): RIGHT AXILLARY IMPELLA/ [id number] POD:  10 Days Post-Op Impression / Plan:  
Heart Failure Status: NYHA Class IV 
 
ICM-Stage D NYHA IV - LVEF 10%, s/p Impella placement in cardiogenic shock Continue PAC for continuous hemodynamic monitoring Impella in place - continue P9 Cefazolin for cannula ppx TPA PRN for purge flow < 6 Purge fluid to D5 Once pTT normalizes, begin systemic bival at 1/2 concentration Intolerant of dobutamine wean - continue at 10mcg/kg/min do not titrate down unless order indicates No BB d/t dobutamine Intolerant of RAAS due to THEODORE Intolerant of AA d/t hyperkalemia and hyponatremia Increase Bumex gtt to 2 mg/hr Tolvaptan per renal  
Evaluation for LVAD placement ongoing Daily lactates to eval for perfusion- WNL Lipase WNL Numerous concerns regarding LVAD candidacy: RV failure failing attempts at inotrope wean daily, leukocytosis of unknown source responding to antibiotics, poor renal function at 100% risk of temporary dialysis and > 50% risk of permanent dialysis after LVAD, hypogammaglobulinemia in the setting of hypoproteinemia and hypoalbuminemia due to proteinuria nearing nephrotic range at high risk of driveline infection, malnutrition, poor balance and severe muscular deconditioning, significant neurocognitive dysfunction with low MOCA score, ongoing concerns regarding psychosocial support as per  evaluation. RV failure and renal failure remain the most challenging issues; we will give patient two weeks total of inotropic conditioning for RV recovery, if fails the trial we plan on family meeting re: palliation.  
  
Severe AS  
Not a candidate for TAVR due to poor viability and cardiomyopathy 
  
CAD with  of RAD and RCA Currently on ASA and statin No BBrx while on dobutamine cMRI results show severe CAD with  of the LAD and RCA with poor viability of myocardium.  
  
RV failure Dobutamine, diuresis Continue PA cath - requires continuous hemodynamic monitoring RV will need to recover to be a candidate for durable MCS 
  
Sepsis of unknown etiology Procalcitonin elevated to 8 today WBC WNL Blood cx NGTD - repeat Ancef while impella in place Continue levaquin for broad spectrum coverage Daily ESR, lactics, procalcitonin Replaced all lines 5/21 ID consult today IVIG given to boost immunity - however, developed reaction to second dose requiring discontinuation of infusion and treatment with IV steroids and benadryl  
  
SCD high risk No device in place No LifeVest while inpatient and with Impella in place  
  
Hx of atrial fibrillation  
Repeat pTT q6h until < 40, then begin systemic bival with goal aPTT 40-60 Monitor 
  
AV dissociation EP consult appreciated Intolerant of dig 
  
HLD Lipitor 40 mg every night  
  
T2DM Start lantus 10units Glipizide SSI 
  
Hepatic cyst 
Benign per GI 
 Appreciate consult  
  
Left leg wounds s/p fall Wound consult appreciated Leg is erythematous and warm, pt is afebrile  
  
CKD4 Creatinine stable Increase bumex infusion to 2 mg/hour Monitor renal function Appreciate renal consult Low threshold for initiation of CRRT 
  
Hyponatremia Likely related to volume overload Monitor I/O's and daily Na+ Current Na 124 Cont spironolactone  
  
Malnutrition Prealbumin 10 Encourage PO intake Cont Marinol RD consult  
  
Constipation Bowel regimen KUB negative 
  
Altered mental status 19/30 on MOCA  
  
Hematuria Urology consult appreciated Will leave lowe in for now Remains off anticoagulation  
  
PPX PPI Ancef while impella in place Aggressive electrolyte replacement Replaced lines 5/21/19 
  
Dispo:  
Remain in CVI for now. Continues to undergo LVAD optimization. Worsening Cr requiring increase in Dobutamine and restart on Bumex gtt. Discontinued spironolactone d/t hyperkalemia, If unresponsive to changes with volume overload may need to consider dialysis. Patient continues with fatigue will order overnight pulse oximetry may have undiagnosed ROME. Will also consult palliative. History: 
Past Medical History:  
Diagnosis Date  3-vessel coronary artery disease  Aortic stenosis, severe  Chronic kidney disease (CKD), stage III (moderate) ([facility])  Chronic total occlusion of coronary artery  Hypertension  Ischemic cardiomyopathy  Peripheral vascular disease ([facility] [address]  Type 2 diabetes mellitus treated without insulin ([facility] [address] Past Surgical History:  
Procedure Laterality Date  HX AMPUTATION TOE Left 2017 Social History Socioeconomic History  Marital status:  Spouse name: Not on file  Number of children: Not on file  Years of education: Not on file  Highest education level: Not on file Occupational History  Not on file Social Needs  Financial resource strain: Not on file  Food insecurity:  
  Worry: Not on file Inability: Not on file  Transportation needs:  
  Medical: Not on file Non-medical: Not on file Tobacco Use  Smoking status: Former Smoker  Smokeless tobacco: Never Used Substance and Sexual Activity  Alcohol use: Not Currently  Drug use: Not on file  Sexual activity: Not on file Lifestyle  Physical activity:  
  Days per week: Not on file Minutes per session: Not on file  Stress: Not on file Relationships  Social connections:  
  Talks on phone: Not on file Gets together: Not on file Attends Taoism service: Not on file Active member of club or organization: Not on file Attends meetings of clubs or organizations: Not on file Relationship status: Not on file  Intimate partner violence:  
  Fear of current or ex partner: Not on file Emotionally abused: Not on file Physically abused: Not on file Forced sexual activity: Not on file Other Topics Concern  Not on file Social History Narrative  Not on file History reviewed. No pertinent family history. Current Medications:  
Current Facility-Administered Medications Medication Dose Route Frequency Provider Last Rate Last Dose  insulin glargine (LANTUS) injection 10 Units  10 Units SubCUTAneous QHS Tony MC NP   10 Units at 05/24/19 2138  bumetanide (BUMEX) injection 1 mg  1 mg IntraVENous Q6H PRN Loreto Montero NP      
 bumetanide (BUMEX) 0.25 mg/mL infusion  2 mg/hr IntraVENous CONTINUOUS Marly Gamboa MD 4 mL/hr at 05/25/19 0858 1 mg/hr at 05/25/19 0210  levoFLOXacin (LEVAQUIN) tablet 750 mg  750 mg Oral Q48H Loreto Montero NP   750 mg at 05/25/19 0848  lidocaine (XYLOCAINE) 2 % jelly   Mucous Membrane PRN Hyun Gomez MD      
 ceFAZolin (ANCEF) 1 g in 0.9% sodium chloride (MBP/ADV) 50 mL  1 g IntraVENous Q12H Obey Gomez  mL/hr at 05/25/19 0850 1 g at 05/25/19 0850  
 glipiZIDE (GLUCOTROL) tablet 5 mg  5 mg Oral ACB&D Guerita Brooks NP   5 mg at 05/25/19 1788  tamsulosin (FLOMAX) capsule 0.4 mg  0.4 mg Oral DAILY Elisha Montero NP   0.4 mg at 05/25/19 0850  
 albumin human 25% (BUMINATE) solution 12.5 g  12.5 g IntraVENous TID ChariarWilder dillon NP   12.5 g at 05/25/19 0900  
 senna-docusate (PERICOLACE) 8.6-50 mg per tablet 1 Tab  1 Tab Oral BID Lucia Wyatt JORDAN NP   1 Tab at 05/25/19 4343  polyethylene glycol (MIRALAX) packet 17 g  17 g Oral BID ChariardElisha NP   17 g at 05/25/19 9375  balsam peru-castor oil (VENELEX) ointment   Topical BID Elisha Montero NP      
 0.9% sodium chloride infusion  9 mL/hr IntraVENous CONTINUOUS Elisha Montero NP 9 mL/hr at 05/25/19 0756 9 mL/hr at 05/25/19 0756  dextrose 5% infusion  4-20 mL/hr IntraVENous CONTINUOUS Colette Schroeder NP 15.3 mL/hr at 05/25/19 0755 15.3 mL/hr at 05/25/19 0755  calcium carbonate (TUMS) chewable tablet 200 mg [elemental]  200 mg Oral TID PRN Richarda Spurling B, NP   200 mg at 05/25/19 8604  magnesium oxide (MAG-OX) tablet 800 mg  800 mg Oral BID Elda, Colette B, NP   800 mg at 05/25/19 8992  sodium chloride (OCEAN) 0.65 % nasal squeeze bottle 2 Spray  2 Spray Both Nostrils Q2H PRN Elda, Colette B, NP      
 dronabinol (MARINOL) capsule 2.5 mg  2.5 mg Oral DAILY EldaColette zafar NP   2.5 mg at 05/25/19 0850  
 insulin lispro (HUMALOG) injection   SubCUTAneous AC&HS Richarda Spurling B, NP   3 Units at 05/25/19 0727  
 glucose chewable tablet 16 g  4 Tab Oral PRN Richarda Spurling B, NP      
 dextrose (D50W) injection syrg 12.5-25 g  12.5-25 g IntraVENous PRN Colette Schroeder NP   25 g at 05/21/19 0559  
 glucagon (GLUCAGEN) injection 1 mg  1 mg IntraMUSCular PRN Denisse Schroeder NP      
 traMADol (ULTRAM) tablet 50 mg  50 mg Oral Q8H PRN Colette Schroeder, NP   50 mg at 05/24/19 2242  aspirin chewable tablet 81 mg  81 mg Oral DAILY Elda, Colette B, NP   81 mg at 05/25/19 8000  atorvastatin (LIPITOR) tablet 40 mg  40 mg Oral QHS Elda, Colette B, NP   40 mg at 05/24/19 2106  sodium chloride (NS) flush 5-40 mL  5-40 mL IntraVENous Q8H Elda, Colette B, NP   10 mL at 05/24/19 4797  sodium chloride (NS) flush 5-40 mL  5-40 mL IntraVENous PRN Elda, Colette B, NP      
 ondansetron (ZOFRAN) injection 4 mg  4 mg IntraVENous Q6H PRN Elda, Colette B, NP   4 mg at 05/24/19 1128  acetaminophen (TYLENOL) tablet 650 mg  650 mg Oral Q6H PRN Diana Sorrel B, NP   650 mg at 05/21/19 2149  
 docusate sodium (COLACE) capsule 100 mg  100 mg Oral PRN Jim Ha, NP      
 DOBUTamine (DOBUTREX) 500 mg/250 mL (2,000 mcg/mL) infusion  10 mcg/kg/min IntraVENous CONTINUOUS PolliardMarianne, NP 25.4 mL/hr at 05/25/19 0858 10 mcg/kg/min at 05/25/19 4787  pantoprazole (PROTONIX) tablet 40 mg  40 mg Oral ACB Elda, Colette B, NP   40 mg at 05/25/19 2805 Allergies: Allergies Allergen Reactions  Penicillins Hives ROS:   
Review of Systems Constitutional: Positive for malaise/fatigue. HENT: Negative. Eyes: Negative. Respiratory: Positive for shortness of breath. Negative for cough. Cardiovascular: Positive for leg swelling. Negative for chest pain and palpitations. Gastrointestinal: Negative. Genitourinary: Negative. Musculoskeletal: Negative. Skin: Negative. Neurological: Positive for weakness. Physical Exam:  
Physical Exam  
Constitutional: He is oriented to person, place, and time. He appears well-developed. No distress. HENT:  
Head: Normocephalic and atraumatic. Eyes: EOM are normal.  
Neck: Normal range of motion. Neck supple. No tracheal deviation present. Cardiovascular: Normal rate. Exam reveals no gallop and no friction rub. Pulmonary/Chest: Effort normal and breath sounds normal. No respiratory distress. Abdominal: Soft. He exhibits distension. There is no guarding. Musculoskeletal: He exhibits deformity. Neurological: He is alert and oriented to person, place, and time. Skin: Skin is warm and dry. There is erythema. Vitals:  
Visit Vitals /88 (BP 1 Location: Left arm, BP Patient Position: Sitting) Pulse 92 Temp 98.3 °F (36.8 °C) Resp 13 Ht 5' 11\" (1.803 m) Wt 187 lb 13.3 oz (85.2 kg) SpO2 (!) 81% BMI 26.20 kg/m² Temp (24hrs), Av.2 °F (36.8 °C), Min:97.5 °F (36.4 °C), Max:98.5 °F (36.9 °C) Hemodynamics: 
 CO: CO (l/min): 5.4 l/min CI: CI (l/min/m2): 2.8 l/min/m2 CVP: CVP (mmHg): 14 mmHg (19 1100) SVR: SVR (dyne*sec)/cm5: 1068 (dyne*sec)/cm5 (19 0800) PAP Systolic: PAP Systolic: 46 (17 1511) PAP Diastolic: PAP Diastolic: 22 (81/93/91 9849) PVR:   
 SV02: SVO2 (%): 69 % (19 1100) SCV02:   
 
 
Impella 5.0 P 9 Flow 4.9 L/min Purge Flow 14.6 mL/hour Purge Pressure 454 mmHg Motor Current 758 mA Admission Weight: Last Weight Weight: 186 lb 15.2 oz (84.8 kg) Weight: 187 lb 13.3 oz (85.2 kg) Intake / Output / Drain: 
Last 24 hrs.:  
 
Intake/Output Summary (Last 24 hours) at 2019 1127 Last data filed at 2019 1100 Gross per 24 hour Intake 2065.92 ml Output 1070 ml Net 995.92 ml Oxygen Therapy: 
Oxygen Therapy O2 Sat (%): (!) 81 % (19 0900) Pulse via Oximetry: 92 beats per minute (19 1100) O2 Device: Nasal cannula (19 0400) O2 Flow Rate (L/min): 4 l/min (19 0400) FIO2 (%): 36 % (19 1146) Recent Labs:  
Labs Latest Ref Rng & Units 2019 WBC 4.1 - 11.1 K/uL 6.9 - 6.6 - - - 4. 0(L) RBC 4.10 - 5.70 M/uL 2.94(L) - 2.94(L) - - - 3.05(L) Hemoglobin 12.1 - 17.0 g/dL 7. 8(L) - 7. 8(L) - - - 8. 1(L) Hematocrit 36.6 - 50.3 % 24. 6(L) - 24. 7(L) - - - 25. 2(L) MCV 80.0 - 99.0 FL 83.7 - 84.0 - - - 82.6 Platelets 127 - 204 K/uL 66(L) - 77(L) - - - 73(L) Lymphocytes 12 - 49 % - - - - - - - Monocytes 5 - 13 % - - - - - - - Eosinophils 0 - 7 % - - - - - - - Basophils 0 - 1 % - - - - - - - Albumin 3.5 - 5.0 g/dL 2. 7(L) 2. 8(L) 2. 7(L) - - 2. 7(L) 2. 7(L) Calcium 8.5 - 10.1 MG/DL 8. 2(L) 8. 3(L) 8.4(L) - - 8. 0(L) 8. 3(L) SGOT 15 - 37 U/L 10(L) 17 19 - - - 28 Glucose 65 - 100 mg/dL 187(H) 244(H) 242(H) - - 372(H) 235(H) BUN 6 - 20 MG/DL 75(H) 73(H) 73(H) - - 71(H) 69(H) Creatinine 0.70 - 1.30 MG/DL 2.55(H) 2.59(H) 2.62(H) - - 2.73(H) 2.67(H) Sodium 136 - 145 mmol/L 124(L) 124(L) 123(L) - 123(L) 120(L) 123(L) Potassium 3.5 - 5.1 mmol/L 4.5 4.7 4.8 - - 5. 3(H) 5.7(H) TSH 0.450 - 4.500 uIU/mL - - - - - - -  
LDH 85 - 241 U/L 485(H) - - 616(H) - - -  
 
EKG:  
EKG Results Procedure 720 Value Units Date/Time SCANNED CARDIAC RHYTHM STRIP [[id number]] Collected:  05/25/19 4464 Order Status:  Completed Updated:  05/25/19 6514 SCANNED CARDIAC RHYTHM STRIP [[id number]] Collected:  05/23/19 3872 Order Status:  Completed Updated:  05/23/19 3102 EKG, 12 LEAD, INITIAL [[id number]] Collected:  05/21/19 1011 Order Status:  Completed Updated:  05/21/19 1500 Ventricular Rate 75 BPM   
  Atrial Rate 105 BPM   
  QRS Duration 84 ms Q-T Interval 392 ms QTC Calculation (Bezet) 437 ms Calculated R Axis 49 degrees Calculated T Axis 87 degrees Diagnosis --  
  probable Mobitz 1 Nonspecific ST abnormality Confirmed by Adonis Barry M.D., Ivett Del Angel ([id number]) on 5/21/2019 3:00:34 PM 
  
 EKG, 12 LEAD, INITIAL [[id number]] Collected:  05/20/19 1048 Order Status:  Completed Updated:  05/20/19 1543 Ventricular Rate 77 BPM   
  Atrial Rate 107 BPM   
  QRS Duration 94 ms Q-T Interval 386 ms QTC Calculation (Bezet) 436 ms Calculated R Axis 48 degrees Calculated T Axis 89 degrees Diagnosis --  
  Sinus tachycardia with 2nd degree AV block (Mobitz I) Low voltage QRS Cannot rule out Anteroseptal infarct (cited on or before 09-MAY-2019) When compared with ECG of 10-MAY-2019 11:21, Sinus rhythm has replaced with AV dissociation Confirmed by Kiesha Nieves MD. ([id number]) on 5/20/2019 3:42:59 PM 
  
 SCANNED CARDIAC RHYTHM STRIP [[id number]] Collected:  05/20/19 0451 Order Status:  Completed Updated:  05/20/19 9778 SCANNED CARDIAC RHYTHM STRIP [[id number]] Collected:  05/19/19 0634 Order Status:  Completed Updated:  05/19/19 8174 SCANNED CARDIAC RHYTHM STRIP [[id number]] Collected:  05/18/19 2235 Order Status:  Completed Updated:  05/18/19 6008 SCANNED CARDIAC RHYTHM STRIP [[id number]] Collected:  05/17/19 0925 Order Status:  Completed Updated:  05/17/19 1336 SCANNED CARDIAC RHYTHM STRIP [[id number]] Collected:  05/16/19 2979 Order Status:  Completed Updated:  05/16/19 9808 SCANNED CARDIAC RHYTHM STRIP [[id number]] Collected:  05/13/19 0430 Order Status:  Completed Updated:  05/13/19 3102 SCANNED CARDIAC RHYTHM STRIP [[id number]] Collected:  05/13/19 0548 Order Status:  Completed Updated:  05/13/19 109 [facility] SCANNED CARDIAC RHYTHM STRIP [[id number]] Collected:  05/11/19 0321 Order Status:  Completed Updated:  05/11/19 1281 EKG, 12 LEAD, INITIAL [[id number]] Collected:  05/10/19 1121 Order Status:  Completed Updated:  05/10/19 1207 Ventricular Rate 64 BPM   
  Atrial Rate 77 BPM   
  QRS Duration 104 ms Q-T Interval 460 ms QTC Calculation (Bezet) 474 ms Calculated P Axis 41 degrees Calculated R Axis 35 degrees Calculated T Axis 133 degrees Diagnosis --  
  Sinus rhythm with AV dissociation and Junctional rhythm Low voltage QRS Cannot rule out Anteroseptal infarct (cited on or before 09-MAY-2019) When compared with ECG of 09-MAY-2019 20:01, 
 Junctional rhythm has replaced Atrial fibrillation Questionable change in initial forces of Anterior leads Nonspecific T wave abnormality no longer evident in Inferior leads Nonspecific T wave abnormality, improved in Lateral leads Confirmed by Adeola Adhikari MD, Mercedes Garner ([id number]) on 5/10/2019 12:07:00 PM 
  
 EKG, 12 LEAD, INITIAL [[id number]] Collected:  05/09/19 2001 Order Status:  Completed Updated:  05/10/19 7815 Ventricular Rate 60 BPM   
  Atrial Rate 60 BPM   
  QRS Duration 86 ms   
  Q-T Interval 428 ms QTC Calculation (Bezet) 428 ms Calculated R Axis 34 degrees Calculated T Axis 142 degrees Diagnosis -- Atrial fibrillation Low voltage QRS Septal infarct , age undetermined No previous ECGs available Confirmed by Adeola Adhikari MD, Mercedes Garner ([id number]) on 5/10/2019 8:37:23 AM 
  
 [address] [place] [address] [[id number]] Collected:  05/10/19 3705 Order Status:  Completed Updated:  05/10/19 4866 Echocardiogram: · Left Ventricle: Moderately dilated left ventricle. Severe systolic dysfunction. Estimated left ventricular ejection fraction is 16 - 20%. Visually measured ejection fraction. · Right Ventricle: Catheter present · Aortic Valve: Aortic valve leaflet calcification present. Severe aortic valve stenosis is present. Impella distal part is 4.4 cm from aortic valve 
  
Left Ventricle Moderately dilated left ventricle. Severe systolic dysfunction. The estimated ejection fraction is 16 - 20%. Visually measured ejection fraction. Impella distal part is 4.4 cm from aortic valve Right Ventricle Catheter present . Aortic Valve There is leaflet calcification. There is severe aortic stenosis. Cardiac Catheterizations:  
Findings/PostOp Diagnosis: 1. Severe two vessel CAD 2. Elevated LVEDP 3. Severely reduced LVEF w/ WMA described below 4. Severe aortic stenosis 
  
Recommendations: 1. Continue medical therapy. CTS consult; doubt CABG candidate. ?High-risk TAVR? 2. Routine post procedure & access site care 3. Continue aggressive medical management and risk factor modification  
  
 
  
Hemodynamics: Ao: 96/68/81 LV: 137/20 
  
AoV mean gradient by dual lumen Real:  41mmHg 
  
Cors:  
  
Dominance: [x] Right  [] Left  [] Mixed 
  
LM: Large caliber vessel without significant stenosis  
  
LAD: Moderate caliber heavily calcified vessel that is occluded in the mid without distal collateralization. D1: Moderate caliber calcified vessel with severe diffuse disease 
  
LCX: Moderate caliber calcified vessel without significant stenosis. OM1: Moderate caliber vessel with luminal irregularities and small vessel severe distal disease OM2: Very small caliber vessel without significant stenosis.  
  
RCA:   Large caliber heavily calcified dominant vessel that is occluded in the mid. A small segment of the PDA is filled faintly by left to right collaterals. PDA: Occluded PLB: Occluded 
  
  
LV angiography:  
EF: 10% Wall motion: severe basal HK. The anterior wall, anteroseptum, anterolateral, apical, and mid to distal inferior wall are akinetic. MR:  mild 
  
Hermila [place] III, DO Cardiac MRI:  
INDICATION: cardiomyopathy 
  
PROCEDURAL DATA: 
  
CPT Codes: 83010 
  
SCANS PERFORMED:  
Spin Echo: Axial. 
FIESTA/SSFP Rest Perfusion LGE 
  
Contrast Agent: Magnevist.  Contrast Dose: 33ml. 
  
CLINICAL DATA:  HR = 84/min, BP = 101/71mmHg,  HT = 5 foot 10inc, WT = 178lb.   
  
           
IMPRESSION Impression: 
  
1. Markedly dilated left ventricle by 3-D volumetric assessment index to body 
surface area. The LV end-diastolic volume index is 125 mL/sq m. Severe left 
ventricular systolic dysfunction. Severe hypokinesis of the mid to distal 
anterior wall, anteroseptal wall, anterolateral wall. Dyskinesis of the apex, 
apical septal wall, apical lateral wall, inferoapical wall. Severe hypokinesis of the base to mid inferoseptal wall. The entire apex, apical septal wall, 
apical lateral wall and anteroapical wall is significantly thinned. 3-D LVEF 15% 
(patient was on dobutamine when this EF was calculated). 2. Mildly dilated right ventricle by 3-D volumetric assessment and index to body 
surface area. Severe right ventricular systolic dysfunction. Severe global 
hypokinesis with regional variation. Akinesis of the mid to distal anterior wall 
of the right ventricle. 3-D RVEF 17% (patient was on dobutamine when this EF was 
calculated). 3. Severe aortic valve stenosis with aortic valve area of 0.7 sq cm by 
planimetry. 4. On LGE study for viability, there is a large infarct involving greater than 
75% thickness of the mid to distal anterior wall, anteroapical wall, apex, 
apical septal wall, mid septal wall, inferoapical wall without any significant 
viable myocardium. There is a medium-size subendocardial infarct involving 50-75% thickness of the base to mid inferolateral and anterolateral wall. The 
only myocardial walls which demonstrate significant viability are mid to distal 
inferior wall, and lateral wall which are in the distribution of the RCA and 
circumflex territories. 5. Normal pleura and pericardium. Small to medium-sized right pleural effusion. 6. Large right hepatic cyst measuring 8.5 x 6 cm. 
  
  
  
  
CARDIAC CHAMBERS: 
  
Left Atrium:  54 mm (<40mm) Left Ventricular Size: LVEDD:  45 mm (Normal 37-57mm) Left Ventricular Size: LVESD:  41 mm (Normal <40mm) LVEF:  15 % (Normal 55-75%) LV Hypertrophy: Moderate septal hypertrophy. LV septal wall = 15 mm, LV 
posterior wall =  13 mm (Normal <11mm)  
  
Right Atrium:  55 mm (<40mm) Right Ventricular Size: RVEDD:  58 mm (Normal 26-45mm) Right Ventricular Size: RVESD:  55 mm (Normal <25mm) RVEF:  17 % (Normal 50-60%) RV Hypertrophy:  None 
  
  
Mass/Tumor/Thrombus:  None. Dissection: None. Atherosclerosis:  None. Inferior Vena Cava:  Normal. 
Inter Atrial Septum:  Normal. 
  
VALVULAR: 
  
Mitral Valve:  Normal mitral valve leaflets. Trace to mild mitral regurgitation. Aortic Valve:  Trileaflet aortic valve. Calcified aortic valve leaflets. Severe 
aortic stenosis with aortic valve area of 0.7 sq cm by planimetry. No 
significant aortic regurgitation. Tricuspid Valve:  Normal tricuspid valve. Mild 1+ tricuspid regurgitation. Pulmonary Valve:  Normal pulmonic valve. 
  
Pericardium:  Normal. (<3.5mm) Pericardial Effusion:  None. Pleural Effusion:  Small to medium-sized right pleural effusion. 
  
VOLUMETRIC DATA: 
  
LVEDVI:  143 ml/m2 LVESVI:  121 ml/m2 LVSVI:  22 ml/m2 LVMI:  36 g/m2 
  
  
RVEDVI:  111 ml/m2 RVESVI:  92 ml/m2 RVSVI:  19 ml/m2 Radiology (CXR, CT scans): EXAM: XR CHEST PORT 
  
INDICATION: PAC, cardiac assist device, Shoshone-Jennifer catheter 
  
COMPARISON: 5/21/2019 
  
FINDINGS: A portable AP radiograph of the chest was obtained at 0907 hours. The 
patient is on a cardiac monitor. The cardiac assist device and left IJ Shoshone-Jennifer 
catheter are in satisfactory position. Lungs demonstrate persistent atelectasis 
medially in the right lower lobe. Left lung reveals improved aeration. No 
pulmonary edema no pneumothorax. 
  
IMPRESSION 1. Persistent areas of atelectasis are noted medially at the right lung base. EXAM: US ABD COMP  
  
INDICATION: Hepatic cyst. 
  
COMPARISON: CT 5/17/2019. 
  
TECHNIQUE:  
Real-time sonography of the abdomen was performed with multiple static images of 
the liver, gallbladder, pancreas, spleen, kidneys and retroperitoneum obtained. 
  
FINDINGS: 
LIVER:  
The liver is normal in echotexture with no mass or other focal abnormality.  
  
LIVER VASCULATURE: The portal vein flow is within normal limits. The diameter measures 0.9 cm. The 
velocity measures 23 cm/s. The right and left portal veins are patent. The 
hepatic veins are patent. . 
  
GALLBLADDER: 
 The gallbladder is mildly distended with sludge. There is no wall thickening or 
fluid around the gallbladder.  
  
COMMON BILE DUCT: 
There is no biliary duct dilatation and the common duct measures mm in diameter. 
  
  
PANCREAS: 
Not well visualized secondary to bowel gas and body habitus. 
  
SPLEEN: 
The spleen measures 13.0 x 5.2 x 13.4 cm for a volume of 473 mL. This is on the 
upper limits of normal for size. 
  
RIGHT KIDNEY: 
The right kidney demonstrates normal echogenicity with no mass, stone or 
hydronephrosis. The right kidney measures 10.6 cm in length. 
  
LEFT KIDNEY: 
The left kidney demonstrates normal echogenicity with no mass, stone or 
hydronephrosis. The left kidney measures 11.1 cm in length.  
  
RETROPERITONEUM: 
The aorta is not well visualized secondary to bowel gas and body habitus. The IVC is normal. 
No retroperitoneal mass is identified. 
  
A right pleural effusion is present. 
  
IMPRESSION 1. Unremarkable liver. No evidence of a hepatic cyst. 
2. Gallbladder distention is likely related to fasting. Biliary sludge is 
present. 3. Right pleural effusion again seen. 4. Spleen on the upper limits of normal for size. 
 
  
 
Critical care was necessary to treat or prevent imminent or life threatening deterioration of the following conditions: cardiac failure, respiratory failure and CNS failure or compromise Total Critical Care time spent: 11:00-11:30 
30 minutes. There was no overlap with other services Services Provided: 1. Telemetry review and 12 lead ECG interpretation 2. Hemodynamic interpretation, assessment, and management 3. Review and interpretation of CXR 4. Review and interpretation of lab values 5. Review and interpretation of microbiologic data and culture results 6. Review of medications and administration 7. Review and interpretation of nutrition requirements and management 8. Discussion of management withother consultants and services 9. Clinical update to family members Cris Lucas MD, [facility] Medical Director Martin Ramirez [address], [address] Office [phone] Fax [phone]

## 2019-05-26 NOTE — PROGRESS NOTES
Nephrology Progress Note Liam Bhardwaj 
Date of Admission : 5/9/2019 CC:  Follow up for hyponatremia and CKD Assessment and Plan THEODORE on CKD III: 
- likely 2/2 CRS 
- high dose diuretics and tovaptan not working 
-consider CRRT - will discuss with team 
 
Hyponatremia: 
- no improvement wth diuretics and Tolvaptan 
-possible CRRT as above 
  
CKD III w/ baseline Cr 2: 
- presumed 2/2 DM and HTN 
- Cr stable, w/ non-nephrotic range proteinuria 
  
HFrEF: 
- EF 10% - w/u for LVAD 
- impella and dobutamine per HF service 
  
RV failure 
  
Severe AS 
  
Multivessel CAD: 
- poor viability on cardiac MRI 
  
Urinary Retention: 
- on flomax + lowe 
  
DM2: 
- on insulin Interval History: 
Seen and examined. Not responding to high dose diuretics and Tovaptan; may need CRRT if he agrees Current Medications: all current  Medications have been eviewed in [facility] Review of Systems: Pertinent items are noted in HPI. Objective: 
Vitals:   
Vitals:  
 05/26/19 0800 05/26/19 0900 05/26/19 1000 05/26/19 1100 BP:      
Pulse: 77 76 65 71 Resp: 18 13 12 23 Temp: 97.7 °F (36.5 °C) SpO2: 100% Weight:      
Height:      
 
Intake and Output: 
05/26 0701 - 05/26 1900 In: 672.5 [P.O.:240; I.V.:432.5] Out: -  
05/24 1901 - 05/26 0700 In: 3083.2 [P.O.:740; I.V.:2343.2] Out: 1220 [[id number]] Physical Examination:Pt intubated     No 
General: NAD,Conversant Neck:  Supple, no mass Resp:  Lungs CTA B/L, no wheezing , normal respiratory effort CV:  Impella, 4+ b/l LE edema GI:  Soft, NT, + Bowel sounds, no hepatosplenomegaly Neurologic:  Non focal 
Psych:             Flat affect Skin:  No Rash 
 
[x]    High complexity decision making was performed 
[x]    Patient is at high-risk of decompensation with multiple organ involvement Lab Data Personally Reviewed: I have reviewed all the pertinent labs, microbiology data and radiology studies during assessment. Recent Labs 05/26/19 
1726 05/25/19 
1978 05/24/19 
1447 05/24/19 
6034 * 124* 124* 123* K 4.6 4.5 4.7 4.8  
CL 90* 92* 92* 91* CO2 23 25 24 24 * 187* 244* 242* BUN 76* 75* 73* 73* CREA 2.61* 2.55* 2.59* 2.62* CA 8.1* 8.2* 8.3* 8.4* MG 2.3 2.2  --  2.3 ALB 2.8* 2.7* 2.8* 2.7* SGOT 13* 10* 17 19 ALT <6* <6* <6* <6* Recent Labs  
  05/26/19 
0345 05/25/19 
1190 05/24/19 
1249 WBC 7.9 6.9 6.6 HGB 7.1* 7.8* 7.8* HCT 22.5* 24.6* 24.7*  
PLT 69* 66* 77* No results found for: SDES Lab Results Component Value Date/Time Culture result: NO GROWTH 5 DAYS 05/21/2019 10:52 AM  
 Culture result: NO GROWTH 5 DAYS 05/18/2019 05:55 PM  
 Culture result: NO GROWTH 5 DAYS 05/15/2019 11:00 AM  
 
Recent Results (from the past 24 hour(s)) PTT Collection Time: 05/25/19  2:04 PM  
Result Value Ref Range aPTT 48.0 (H) 22.1 - 32.0 sec  
 aPTT, therapeutic range     58.0 - 77.0 SECS  
GLUCOSE, POC Collection Time: 05/25/19  5:46 PM  
Result Value Ref Range Glucose (POC) 154 (H) 65 - 100 mg/dL Performed by Lele Cruz   
PTT Collection Time: 05/25/19  8:36 PM  
Result Value Ref Range aPTT 51.1 (H) 22.1 - 32.0 sec  
 aPTT, therapeutic range     58.0 - 77.0 SECS  
GLUCOSE, POC Collection Time: 05/25/19  9:17 PM  
Result Value Ref Range Glucose (POC) 158 (H) 65 - 100 mg/dL Performed by Megan Lopez LACTIC ACID Collection Time: 05/26/19  3:45 AM  
Result Value Ref Range Lactic acid 0.7 0.4 - 2.0 MMOL/L  
PTT Collection Time: 05/26/19  3:45 AM  
Result Value Ref Range aPTT 50.8 (H) 22.1 - 32.0 sec  
 aPTT, therapeutic range     58.0 - 77.0 SECS  
CBC W/O DIFF Collection Time: 05/26/19  3:45 AM  
Result Value Ref Range WBC 7.9 4.1 - 11.1 K/uL  
 RBC 2.69 (L) 4.10 - 5.70 M/uL HGB 7.1 (L) 12.1 - 17.0 g/dL HCT 22.5 (L) 36.6 - 50.3 % MCV 83.6 80.0 - 99.0 FL  
 MCH 26.4 26.0 - 34.0 PG  
 MCHC 31.6 30.0 - 36.5 g/dL  
 RDW 15.9 (H) 11.5 - 14.5 % PLATELET 69 (L) 001 - 400 K/uL NRBC 0.0 0  WBC ABSOLUTE NRBC 0.00 0.00 - 0.01 K/uL MAGNESIUM Collection Time: 05/26/19  3:49 AM  
Result Value Ref Range Magnesium 2.3 1.6 - 2.4 mg/dL NT-PRO BNP Collection Time: 05/26/19  3:49 AM  
Result Value Ref Range NT pro-BNP >35,000 (H) <125 PG/ML  
DIGOXIN Collection Time: 05/26/19  3:49 AM  
Result Value Ref Range Digoxin level 0.7 (L) 0.90 - 3.93 NG/ML  
METABOLIC PANEL, COMPREHENSIVE Collection Time: 05/26/19  3:49 AM  
Result Value Ref Range Sodium 123 (L) 136 - 145 mmol/L Potassium 4.6 3.5 - 5.1 mmol/L Chloride 90 (L) 97 - 108 mmol/L  
 CO2 23 21 - 32 mmol/L Anion gap 10 5 - 15 mmol/L Glucose 176 (H) 65 - 100 mg/dL BUN 76 (H) 6 - 20 MG/DL Creatinine 2.61 (H) 0.70 - 1.30 MG/DL  
 BUN/Creatinine ratio 29 (H) 12 - 20 GFR est AA 30 (L) >60 ml/min/1.73m2 GFR est non-AA 24 (L) >60 ml/min/1.73m2 Calcium 8.1 (L) 8.5 - 10.1 MG/DL Bilirubin, total 1.1 (H) 0.2 - 1.0 MG/DL  
 ALT (SGPT) <6 (L) 12 - 78 U/L  
 AST (SGOT) 13 (L) 15 - 37 U/L Alk. phosphatase 55 45 - 117 U/L Protein, total 5.2 (L) 6.4 - 8.2 g/dL Albumin 2.8 (L) 3.5 - 5.0 g/dL Globulin 2.4 2.0 - 4.0 g/dL A-G Ratio 1.2 1.1 - 2.2 SED RATE (ESR) Collection Time: 05/26/19  3:49 AM  
Result Value Ref Range Sed rate, automated 114 (H) 0 - 20 mm/hr PROCALCITONIN Collection Time: 05/26/19  3:49 AM  
Result Value Ref Range Procalcitonin 3.2 ng/mL LD Collection Time: 05/26/19  3:49 AM  
Result Value Ref Range  (H) 85 - 241 U/L  
TYPE & SCREEN Collection Time: 05/26/19  3:49 AM  
Result Value Ref Range Crossmatch Expiration 05/29/2019 ABO/Rh(D) O POSITIVE Antibody screen NEG   
POC VENOUS BLOOD GAS Collection Time: 05/26/19  4:47 AM  
Result Value Ref Range Device: NASAL CANNULA Flow rate (POC) 4 L/M  
 pH, venous (POC) 7.430 (H) 7.32 - 7.42    
 pCO2, venous (POC) 33.2 (L) 41 - 51 MMHG pO2, venous (POC) 33 25 - 40 mmHg HCO3, venous (POC) 22.1 (L) 23.0 - 28.0 MMOL/L  
 sO2, venous (POC) 67 65 - 88 % Base deficit, venous (POC) 2 mmol/L Allens test (POC) N/A Total resp. rate 14 [facility] Specimen type (POC) MIXED VENOUS    
GLUCOSE, POC Collection Time: 05/26/19  7:05 AM  
Result Value Ref Range Glucose (POC) 201 (H) 65 - 100 mg/dL Performed by Haroldo Wadsworth PTT Collection Time: 05/26/19 10:39 AM  
Result Value Ref Range aPTT 50.4 (H) 22.1 - 32.0 sec  
 aPTT, therapeutic range     58.0 - 77.0 SECS Yinka Gage MD 
[facility] [address], [address] [facility] Phone - [phone] Fax - [phone] 
[email or web address]

## 2019-05-26 NOTE — PROGRESS NOTES
[facility] Progress Note DOS:   5/26/2019 NAME:  Danielle Montaño  
MRN:   [id number] REFERRING PROVIDER:  Dr. Baron Obregon PRIMARY CARE PHYSICIAN: Felipe Staples MD 
 
 
Chief Complaint: CHENG  
 
HPI: 77y.o. year old male with a history of HTN, T2DM, PVD, CKD, ICM, Severe AS who presents for further evaluation of chronic systolic heart failure. He developed progressive dyspnea with exacterbation as well as progressive fatigue which prompted further evaluation with a heart cath at [facility] that revealed severe 3 vessel disease with  of his LAD and RCA, severe LV systolic failure (LVEF 19%), and severe As. He has been unable to walk a block before without limiting dyspnea as well as unable to make a bed without developing dyspnea. Admitted to [facility] for acute chronic HFrEF, CMRI showed no viability for LAD, taken for impella placement for bridge to candidacy on 5/16. 24Hr Events:  
Inadequate diuresis, creatinine stable Hyponatremia Na + 123 Patient requests withdrawal of support, change to DNR status Procedure:  Procedure(s): RIGHT AXILLARY IMPELLA/ [id number] POD:  10 Days Post-Op Impression / Plan:  
Heart Failure Status: NYHA Class IV 
 
ICM-Stage D NYHA IV - LVEF 10%, s/p Impella placement in cardiogenic shock Poor candidate for LVAD due to renal failure and severe RV failure Impella in place - wean to P-3, plan removal tomorrow morning Intolerant of dobutamine wean - continue at 10mcg/kg/min No BB d/t dobutamine Intolerant of RAAS due to THEODORE Intolerant of AA d/t hyperkalemia and hyponatremia Continue Bumex gtt to 2 mg/hr Iv morphine 2 mg q 2hours prn dyspnea or pain Numerous concerns regarding LVAD candidacy: RV failure failing attempts at inotrope wean daily, leukocytosis of unknown source responding to antibiotics, poor renal function at 100% risk of temporary dialysis and > 50% risk of permanent dialysis after LVAD, hypogammaglobulinemia in the setting of hypoproteinemia and hypoalbuminemia due to proteinuria nearing nephrotic range at high risk of driveline infection, malnutrition, poor balance and severe muscular deconditioning, significant neurocognitive dysfunction with low MOCA score, ongoing concerns regarding psychosocial support as per  evaluation. RV failure and renal failure remain the most challenging issues 
  
Severe AS  
Not a candidate for TAVR due to poor viability and cardiomyopathy 
  
CAD with  of RAD and RCA Currently on ASA and statin No BBrx while on dobutamine cMRI results show severe CAD with  of the LAD and RCA with poor viability of myocardium.  
  
RV failure Dobutamine Inadequate diuresis Continue PA cath - requires continuous hemodynamic monitoring  
  
Sepsis of unknown etiology Procalcitonin down to 3.2 WBC WNL Blood cx NGTD - repeat Ancef while impella in place Continue levaquin for broad spectrum coverage Daily ESR, lactics, procalcitonin Replaced all lines 5/21 IVIG given to boost immunity - however, developed reaction to second dose requiring discontinuation of infusion and treatment with IV steroids and benadryl  
  
SCD high risk Changed code status to DNR 
  
Hx of atrial fibrillation    
Monitor 
  
AV dissociation EP consult appreciated Intolerant of dig 
  
HLD Lipitor 40 mg every night  
  
T2DM On lantus 10units Glipizide SSI 
  
Hepatic cyst 
Benign per GI Appreciate consult  
  
Left leg wounds s/p fall Wound consult appreciated Leg is erythematous and warm, pt is afebrile  
  
CKD4 Creatinine stable Inadequate diuresis n bumex infusion 2 mg/hour Appreciate renal consult Hold on CRRT given patient's desire to withdraw support 
  
Hyponatremia Likely related to volume overload Monitor I/O's and daily Na+ Current Na 124 Cont spironolactone  
  
Malnutrition Prealbumin 10 Cont Marinol RD consult  
  
Constipation Bowel regimen KUB negative 
  
Altered mental status 19/30 on MOCA  
  
Hematuria Urology consult appreciated Will leave lowe in for now Remains off anticoagulation  
  
PPX PPI Ancef while impella in place Aggressive electrolyte replacement Replaced lines 5/21/19 ACP: 
Changed to DNR Spoke with Raul Escobedo, close friend Re-consult palliative care Consult hospice 
  
Dispo:  
Plan Impella removal at bedside tomorrow. Will use IV morphine as needed for any dyspnea. History: 
Past Medical History:  
Diagnosis Date  3-vessel coronary artery disease  Aortic stenosis, severe  Chronic kidney disease (CKD), stage III (moderate) ([facility])  Chronic total occlusion of coronary artery  Hypertension  Ischemic cardiomyopathy  Peripheral vascular disease ([facility] [address]  Type 2 diabetes mellitus treated without insulin ([facility] [address] Past Surgical History:  
Procedure Laterality Date  HX AMPUTATION TOE Left 2017 Social History Socioeconomic History  Marital status:  Spouse name: Not on file  Number of children: Not on file  Years of education: Not on file  Highest education level: Not on file Occupational History  Not on file Social Needs  Financial resource strain: Not on file  Food insecurity:  
  Worry: Not on file Inability: Not on file  Transportation needs:  
  Medical: Not on file Non-medical: Not on file Tobacco Use  Smoking status: Former Smoker  Smokeless tobacco: Never Used Substance and Sexual Activity  Alcohol use: Not Currently  Drug use: Not on file  Sexual activity: Not on file Lifestyle  Physical activity:  
  Days per week: Not on file Minutes per session: Not on file  Stress: Not on file Relationships  Social connections:  
  Talks on phone: Not on file Gets together: Not on file Attends [employer] service: Not on file Active member of club or organization: Not on file Attends meetings of clubs or organizations: Not on file Relationship status: Not on file  Intimate partner violence:  
  Fear of current or ex partner: Not on file Emotionally abused: Not on file Physically abused: Not on file Forced sexual activity: Not on file Other Topics Concern  Not on file Social History Narrative  Not on file History reviewed. No pertinent family history. Current Medications:  
Current Facility-Administered Medications Medication Dose Route Frequency Provider Last Rate Last Dose  melatonin tablet 3 mg  3 mg Oral QHS PRN Magali Ross MD   3 mg at 05/25/19 2129  ALPHA CRS+ Cellular Vitality Complex (Patient Supplied)  4 Cap Oral DAILY WITH BREAKFAST Magali Ross MD   4 Cap at 05/26/19 0900  xEO AUDREY Essential oil omega complex  (Patient Supplied)  4 Cap Oral DAILY WITH BREAKFAST Magali Ross MD   4 Cap at 05/26/19 0900  MICRO PLEX VMz Food nutrient complex (Patient Supplied)  4 Cap Oral DAILY WITH BREAKFAST Cris Miguel MD   4 Cap at 05/26/19 0900  
 insulin glargine (LANTUS) injection 10 Units  10 Units SubCUTAneous QHS Rae Schroeder NP   10 Units at 05/25/19 2129  bumetanide (BUMEX) injection 1 mg  1 mg IntraVENous Q6H PRN Shelley Montero NP      
 bumetanide (BUMEX) 0.25 mg/mL infusion  2 mg/hr IntraVENous CONTINUOUS Magali Ross MD 8 mL/hr at 05/26/19 0757 2 mg/hr at 05/26/19 0757  levoFLOXacin (LEVAQUIN) tablet 750 mg  750 mg Oral Q48H Shelley Montero NP   750 mg at 05/25/19 7176  lidocaine (XYLOCAINE) 2 % jelly   Mucous Membrane PRN Liz Linder MD      
 ceFAZolin (ANCEF) 1 g in 0.9% sodium chloride (MBP/ADV) 50 mL  1 g IntraVENous Q12H Iftikhar Mora  mL/hr at 05/26/19 0857 1 g at 05/26/19 0857  
 glipiZIDE (GLUCOTROL) tablet 5 mg  5 mg Oral ACB&D Kaden Rendon NP   5 mg at 05/26/19 9305  tamsulosin (FLOMAX) capsule 0.4 mg  0.4 mg Oral DAILY Polliard, Rakeshella Arrow, NP   0.4 mg at 05/26/19 1988  albumin human 25% (BUMINATE) solution 12.5 g  12.5 g IntraVENous TID Polliard, Raphael Ilda T, NP   12.5 g at 05/26/19 0857  
 senna-docusate (PERICOLACE) 8.6-50 mg per tablet 1 Tab  1 Tab Oral BID Gabby Solo T, NP   1 Tab at 05/26/19 4836  polyethylene glycol (MIRALAX) packet 17 g  17 g Oral BID PolliardRakeshella Arrow, NP   Stopped at 05/25/19 1800  
 balsam peru-castor oil (VENELEX) ointment   Topical BID PolliardNadeen Arrow, NP      
 0.9% sodium chloride infusion  9 mL/hr IntraVENous CONTINUOUS PolliardNadeen Arrow, NP 9 mL/hr at 05/26/19 0758 9 mL/hr at 05/26/19 2558  dextrose 5% infusion  4-20 mL/hr IntraVENous CONTINUOUS Colette Schroeder, NP 14.1 mL/hr at 05/25/19 2105 14.1 mL/hr at 05/25/19 2105  calcium carbonate (TUMS) chewable tablet 200 mg [elemental]  200 mg Oral TID PRN Krysten MC, NP   200 mg at 05/25/19 1749  
 magnesium oxide (MAG-OX) tablet 800 mg  800 mg Oral BID Krysteneliel Ortize B, NP   800 mg at 05/26/19 7666  sodium chloride (OCEAN) 0.65 % nasal squeeze bottle 2 Spray  2 Spray Both Nostrils Q2H PRN Elda, Colette B, NP      
 dronabinol (MARINOL) capsule 2.5 mg  2.5 mg Oral DAILY Elda Colette B, NP   2.5 mg at 05/26/19 4438  insulin lispro (HUMALOG) injection   SubCUTAneous AC&HS Elda, Colette B, NP   3 Units at 05/26/19 0711  
 glucose chewable tablet 16 g  4 Tab Oral PRN Krysten MC, NP      
 dextrose (D50W) injection syrg 12.5-25 g  12.5-25 g IntraVENous PRN Elda, Colette B, NP   25 g at 05/21/19 0559  
 glucagon (GLUCAGEN) injection 1 mg  1 mg IntraMUSCular PRN Krysten MC NP      
 traMADol (ULTRAM) tablet 50 mg  50 mg Oral Q8H PRN Colette Schroeder NP   50 mg at 05/24/19 1025  aspirin chewable tablet 81 mg  81 mg Oral DAILY Colette Schroeder NP   81 mg at 05/26/19 7509  atorvastatin (LIPITOR) tablet 40 mg  40 mg Oral QHS Elda, Colette B, NP   40 mg at 05/25/19 2129  
 sodium chloride (NS) flush 5-40 mL  5-40 mL IntraVENous Q8H Elda, Colette B, NP   10 mL at 05/24/19 7974  sodium chloride (NS) flush 5-40 mL  5-40 mL IntraVENous PRN Elda, Colette B, NP      
 ondansetron (ZOFRAN) injection 4 mg  4 mg IntraVENous Q6H PRN Elda, Colette B, NP   4 mg at 05/24/19 1128  acetaminophen (TYLENOL) tablet 650 mg  650 mg Oral Q6H PRN Krysta Martelller B, NP   650 mg at 05/21/19 2149  
 docusate sodium (COLACE) capsule 100 mg  100 mg Oral PRN Brain Esteban NP      
 DOBUTamine (DOBUTREX) 500 mg/250 mL (2,000 mcg/mL) infusion  10 mcg/kg/min IntraVENous CONTINUOUS Bev Montero, NP 25.4 mL/hr at 05/26/19 0757 10 mcg/kg/min at 05/26/19 0757  pantoprazole (PROTONIX) tablet 40 mg  40 mg Oral ACB Elda, Colette B, NP   40 mg at 05/26/19 5411 Allergies: Allergies Allergen Reactions  Penicillins Hives ROS:   
Review of Systems Constitutional: Positive for malaise/fatigue. HENT: Negative. Eyes: Negative. Respiratory: Positive for shortness of breath. Negative for cough. Cardiovascular: Positive for leg swelling. Negative for chest pain and palpitations. Gastrointestinal: Negative. Genitourinary: Negative. Musculoskeletal: Negative. Skin: Negative. Neurological: Positive for weakness. Physical Exam:  
Physical Exam  
Constitutional: He is oriented to person, place, and time. He appears well-developed. No distress. HENT:  
Head: Normocephalic and atraumatic. Eyes: EOM are normal.  
Neck: Normal range of motion. Neck supple. No tracheal deviation present. Cardiovascular: Normal rate. Exam reveals no gallop and no friction rub. Pulmonary/Chest: Effort normal and breath sounds normal. No respiratory distress. Abdominal: Soft. He exhibits distension. There is no guarding. Musculoskeletal: He exhibits deformity. Neurological: He is alert and oriented to person, place, and time. Skin: Skin is warm and dry. There is erythema. Vitals:  
Visit Vitals /88 (BP 1 Location: Left arm, BP Patient Position: Sitting) Pulse 65 Temp 97.7 °F (36.5 °C) Resp 12 Ht 5' 11\" (1.803 m) Wt 192 lb 7.4 oz (87.3 kg) SpO2 100% BMI 26.84 kg/m² Temp (24hrs), Av.4 °F (36.9 °C), Min:97.7 °F (36.5 °C), Max:98.8 °F (37.1 °C) Hemodynamics: 
 CO: CO (l/min): 4.7 l/min CI: CI (l/min/m2): 2.4 l/min/m2 CVP: CVP (mmHg): 11 mmHg (19 1000) SVR: SVR (dyne*sec)/cm5: 902 (dyne*sec)/cm5 (19 0800) PAP Systolic: PAP Systolic: 48 (91/88/95 8715) PAP Diastolic: PAP Diastolic: 21 (37/61/36 8809) PVR:   
 SV02: SVO2 (%): 69 % (19 1000) SCV02:   
 
 
Impella 5.0 P 9 Flow 4.9 L/min Purge Flow 14.6 mL/hour Purge Pressure 454 mmHg Motor Current 758 mA Admission Weight: Last Weight Weight: 186 lb 15.2 oz (84.8 kg) Weight: 192 lb 7.4 oz (87.3 kg) Intake / Output / Drain: 
Last 24 hrs.:  
 
Intake/Output Summary (Last 24 hours) at 2019 1039 Last data filed at 2019 1000 Gross per 24 hour Intake 2379.87 ml Output 505 ml Net 1874.87 ml Oxygen Therapy: 
Oxygen Therapy O2 Sat (%): 100 % (19 0800) Pulse via Oximetry: 73 beats per minute (19 1000) O2 Device: Nasal cannula (19 0400) O2 Flow Rate (L/min): 4 l/min (19 0400) FIO2 (%): 36 % (19 1146) Recent Labs:  
Labs Latest Ref Rng & Units 2019 WBC 4.1 - 11.1 K/uL 7.9 6.9 - 6.6 - - -  
RBC 4.10 - 5.70 M/uL 2.69(L) 2.94(L) - 2.94(L) - - - Hemoglobin 12.1 - 17.0 g/dL 7. 1(L) 7. 8(L) - 7. 8(L) - - - Hematocrit 36.6 - 50.3 % 22. 5(L) 24. 6(L) - 24. 7(L) - - - MCV 80.0 - 99.0 FL 83.6 83.7 - 84.0 - - - Platelets 304 - 603 K/uL 69(L) 66(L) - 77(L) - - -  
 Lymphocytes 12 - 49 % - - - - - - - Monocytes 5 - 13 % - - - - - - - Eosinophils 0 - 7 % - - - - - - - Basophils 0 - 1 % - - - - - - - Albumin 3.5 - 5.0 g/dL 2. 8(L) 2. 7(L) 2. 8(L) 2. 7(L) - - 2. 7(L) Calcium 8.5 - 10.1 MG/DL 8. 1(L) 8.2(L) 8. 3(L) 8.4(L) - - 8. 0(L) SGOT 15 - 37 U/L 13(L) 10(L) 17 19 - - -  
Glucose 65 - 100 mg/dL 176(H) 187(H) 244(H) 242(H) - - 372(H) BUN 6 - 20 MG/DL 76(H) 75(H) 73(H) 73(H) - - 71(H) Creatinine 0.70 - 1.30 MG/DL 2.61(H) 2.55(H) 2.59(H) 2.62(H) - - 2.73(H) Sodium 136 - 145 mmol/L 123(L) 124(L) 124(L) 123(L) - 123(L) 120(L) Potassium 3.5 - 5.1 mmol/L 4.6 4.5 4.7 4.8 - - 5. 3(H) TSH 0.450 - 4.500 uIU/mL - - - - - - -  
LDH 85 - 241 U/L 471(H) 485(H) - - 616(H) - -  
 
EKG:  
EKG Results Procedure 720 Value Units Date/Time SCANNED CARDIAC RHYTHM STRIP [[id number]] Collected:  05/26/19 0335 Order Status:  Completed Updated:  05/26/19 0479 SCANNED CARDIAC RHYTHM STRIP [[id number]] Collected:  05/25/19 1116 Order Status:  Completed Updated:  05/25/19 8733 SCANNED CARDIAC RHYTHM STRIP [[id number]] Collected:  05/23/19 5597 Order Status:  Completed Updated:  05/23/19 6489 EKG, 12 LEAD, INITIAL [[id number]] Collected:  05/21/19 1011 Order Status:  Completed Updated:  05/21/19 1500 Ventricular Rate 75 BPM   
  Atrial Rate 105 BPM   
  QRS Duration 84 ms Q-T Interval 392 ms QTC Calculation (Bezet) 437 ms Calculated R Axis 49 degrees Calculated T Axis 87 degrees Diagnosis --  
  probable Mobitz 1 Nonspecific ST abnormality Confirmed by Char Escobar M.D., [address] ([id number]) on 5/21/2019 3:00:34 PM 
  
 EKG, 12 LEAD, INITIAL [[id number]] Collected:  05/20/19 1048 Order Status:  Completed Updated:  05/20/19 1543 Ventricular Rate 77 BPM   
  Atrial Rate 107 BPM   
  QRS Duration 94 ms Q-T Interval 386 ms QTC Calculation (Bezet) 436 ms Calculated R Axis 48 degrees Calculated T Axis 89 degrees Diagnosis --  
  Sinus tachycardia with 2nd degree AV block (Mobitz I) Low voltage QRS Cannot rule out Anteroseptal infarct (cited on or before 09-MAY-2019) When compared with ECG of 10-MAY-2019 11:21, Sinus rhythm has replaced with AV dissociation Confirmed by Foster Zavala MD. ([id number]) on 5/20/2019 3:42:59 PM 
  
 SCANNED CARDIAC RHYTHM STRIP [[id number]] Collected:  05/20/19 0451 Order Status:  Completed Updated:  05/20/19 2056 SCANNED CARDIAC RHYTHM STRIP [[id number]] Collected:  05/19/19 0313 Order Status:  Completed Updated:  05/19/19 6372 SCANNED CARDIAC RHYTHM STRIP [[id number]] Collected:  05/18/19 0707 Order Status:  Completed Updated:  05/18/19 3419 SCANNED CARDIAC RHYTHM STRIP [[id number]] Collected:  05/17/19 6494 Order Status:  Completed Updated:  05/17/19 7552 SCANNED CARDIAC RHYTHM STRIP [[id number]] Collected:  05/16/19 4320 Order Status:  Completed Updated:  05/16/19 5986 SCANNED CARDIAC RHYTHM STRIP [[id number]] Collected:  05/13/19 0430 Order Status:  Completed Updated:  05/13/19 1657 SCANNED CARDIAC RHYTHM STRIP [[id number]] Collected:  05/13/19 9385 Order Status:  Completed Updated:  05/13/19 109 [facility] SCANNED CARDIAC RHYTHM STRIP [[id number]] Collected:  05/11/19 0321 Order Status:  Completed Updated:  05/11/19 2129 EKG, 12 LEAD, INITIAL [[id number]] Collected:  05/10/19 1121 Order Status:  Completed Updated:  05/10/19 1207 Ventricular Rate 64 BPM   
  Atrial Rate 77 BPM   
  QRS Duration 104 ms Q-T Interval 460 ms QTC Calculation (Bezet) 474 ms Calculated P Axis 41 degrees Calculated R Axis 35 degrees Calculated T Axis 133 degrees Diagnosis --  
  Sinus rhythm with AV dissociation and Junctional rhythm Low voltage QRS Cannot rule out Anteroseptal infarct (cited on or before 09-MAY-2019) When compared with ECG of 09-MAY-2019 20:01, 
Junctional rhythm has replaced Atrial fibrillation Questionable change in initial forces of Anterior leads Nonspecific T wave abnormality no longer evident in Inferior leads Nonspecific T wave abnormality, improved in Lateral leads Confirmed by Severo Spaniel, MD, Christianne Meza ([id number]) on 5/10/2019 12:07:00 PM 
  
 EKG, 12 LEAD, INITIAL [[id number]] Collected:  05/09/19 2001 Order Status:  Completed Updated:  05/10/19 0197 Ventricular Rate 60 BPM   
  Atrial Rate 60 BPM   
  QRS Duration 86 ms   
  Q-T Interval 428 ms QTC Calculation (Bezet) 428 ms Calculated R Axis 34 degrees Calculated T Axis 142 degrees Diagnosis -- Atrial fibrillation Low voltage QRS Septal infarct , age undetermined No previous ECGs available Confirmed by Severo Spaniel, MD, Christianne Meza [id number]) on 5/10/2019 8:37:23 AM 
  
 [address] [place] [address] [[id number]] Collected:  05/10/19 0331 Order Status:  Completed Updated:  05/10/19 1306 Echocardiogram: · Left Ventricle: Moderately dilated left ventricle. Severe systolic dysfunction. Estimated left ventricular ejection fraction is 16 - 20%. Visually measured ejection fraction. · Right Ventricle: Catheter present · Aortic Valve: Aortic valve leaflet calcification present. Severe aortic valve stenosis is present. Impella distal part is 4.4 cm from aortic valve 
  
Left Ventricle Moderately dilated left ventricle. Severe systolic dysfunction. The estimated ejection fraction is 16 - 20%. Visually measured ejection fraction. Impella distal part is 4.4 cm from aortic valve Right Ventricle Catheter present . Aortic Valve There is leaflet calcification. There is severe aortic stenosis. Cardiac Catheterizations:  
Findings/PostOp Diagnosis: 1. Severe two vessel CAD 2. Elevated LVEDP 3. Severely reduced LVEF w/ WMA described below 4. Severe aortic stenosis 
  
Recommendations: 1. Continue medical therapy. CTS consult; doubt CABG candidate. ? High-risk TAVR? 2. Routine post procedure & access site care 3. Continue aggressive medical management and risk factor modification  
  
 
  
Hemodynamics: Ao: 96/68/81 LV: 137/20 
  
AoV mean gradient by dual lumen Real:  41mmHg 
  
Cors:  
  
Dominance: [x] Right  [] Left  [] Mixed 
  
LM: Large caliber vessel without significant stenosis  
  
LAD: Moderate caliber heavily calcified vessel that is occluded in the mid without distal collateralization. D1: Moderate caliber calcified vessel with severe diffuse disease 
  
LCX: Moderate caliber calcified vessel without significant stenosis. OM1: Moderate caliber vessel with luminal irregularities and small vessel severe distal disease OM2: Very small caliber vessel without significant stenosis.  
  
RCA:   Large caliber heavily calcified dominant vessel that is occluded in the mid. A small segment of the PDA is filled faintly by left to right collaterals. PDA: Occluded PLB: Occluded 
  
  
LV angiography:  
EF: 10% Wall motion: severe basal HK. The anterior wall, anteroseptum, anterolateral, apical, and mid to distal inferior wall are akinetic. MR:  mild 
  
Olesya Shone III, DO Cardiac MRI:  
INDICATION: cardiomyopathy 
  
PROCEDURAL DATA: 
  
CPT Codes: 22461 
  
SCANS PERFORMED:  
Spin Echo: Axial. 
FIESTA/SSFP Rest Perfusion LGE 
  
Contrast Agent: Magnevist.  Contrast Dose: 33ml. 
  
CLINICAL DATA:  HR = 84/min, BP = 101/71mmHg,  HT = 5 foot 10inc, WT = 178lb.   
  
           
IMPRESSION Impression: 
  
1. Markedly dilated left ventricle by 3-D volumetric assessment index to body 
surface area. The LV end-diastolic volume index is 102 mL/sq m. Severe left 
ventricular systolic dysfunction. Severe hypokinesis of the mid to distal 
anterior wall, anteroseptal wall, anterolateral wall. Dyskinesis of the apex, 
apical septal wall, apical lateral wall, inferoapical wall. Severe hypokinesis of the base to mid inferoseptal wall. The entire apex, apical septal wall, 
apical lateral wall and anteroapical wall is significantly thinned. 3-D LVEF 15% 
(patient was on dobutamine when this EF was calculated). 2. Mildly dilated right ventricle by 3-D volumetric assessment and index to body 
surface area. Severe right ventricular systolic dysfunction. Severe global 
hypokinesis with regional variation. Akinesis of the mid to distal anterior wall 
of the right ventricle. 3-D RVEF 17% (patient was on dobutamine when this EF was 
calculated). 3. Severe aortic valve stenosis with aortic valve area of 0.7 sq cm by 
planimetry. 4. On LGE study for viability, there is a large infarct involving greater than 
75% thickness of the mid to distal anterior wall, anteroapical wall, apex, 
apical septal wall, mid septal wall, inferoapical wall without any significant 
viable myocardium. There is a medium-size subendocardial infarct involving 50-75% thickness of the base to mid inferolateral and anterolateral wall. The 
only myocardial walls which demonstrate significant viability are mid to distal 
inferior wall, and lateral wall which are in the distribution of the RCA and 
circumflex territories. 5. Normal pleura and pericardium. Small to medium-sized right pleural effusion. 6. Large right hepatic cyst measuring 8.5 x 6 cm. 
  
  
  
  
CARDIAC CHAMBERS: 
  
Left Atrium:  54 mm (<40mm) Left Ventricular Size: LVEDD:  45 mm (Normal 37-57mm) Left Ventricular Size: LVESD:  41 mm (Normal <40mm) LVEF:  15 % (Normal 55-75%) LV Hypertrophy: Moderate septal hypertrophy. LV septal wall = 15 mm, LV 
posterior wall =  13 mm (Normal <11mm)  
  
Right Atrium:  55 mm (<40mm) Right Ventricular Size: RVEDD:  58 mm (Normal 26-45mm) Right Ventricular Size: RVESD:  55 mm (Normal <25mm) RVEF:  17 % (Normal 50-60%) RV Hypertrophy:  None 
  
  
Mass/Tumor/Thrombus:  None. Dissection: None. Atherosclerosis:  None. Inferior Vena Cava:  Normal. 
Inter Atrial Septum:  Normal. 
  
VALVULAR: 
  
Mitral Valve:  Normal mitral valve leaflets. Trace to mild mitral regurgitation. Aortic Valve:  Trileaflet aortic valve. Calcified aortic valve leaflets. Severe 
aortic stenosis with aortic valve area of 0.7 sq cm by planimetry. No 
significant aortic regurgitation. Tricuspid Valve:  Normal tricuspid valve. Mild 1+ tricuspid regurgitation. Pulmonary Valve:  Normal pulmonic valve. 
  
Pericardium:  Normal. (<3.5mm) Pericardial Effusion:  None. Pleural Effusion:  Small to medium-sized right pleural effusion. 
  
VOLUMETRIC DATA: 
  
LVEDVI:  143 ml/m2 LVESVI:  121 ml/m2 LVSVI:  22 ml/m2 LVMI:  36 g/m2 
  
  
RVEDVI:  111 ml/m2 RVESVI:  92 ml/m2 RVSVI:  19 ml/m2 Radiology (CXR, CT scans): EXAM: XR CHEST PORT 
  
INDICATION: PAC, cardiac assist device, Weston-Jennifer catheter 
  
COMPARISON: 5/21/2019 
  
FINDINGS: A portable AP radiograph of the chest was obtained at 0907 hours. The 
patient is on a cardiac monitor. The cardiac assist device and left IJ Weston-Jennifer 
catheter are in satisfactory position. Lungs demonstrate persistent atelectasis 
medially in the right lower lobe. Left lung reveals improved aeration. No 
pulmonary edema no pneumothorax. 
  
IMPRESSION 1. Persistent areas of atelectasis are noted medially at the right lung base. EXAM: US ABD COMP  
  
INDICATION: Hepatic cyst. 
  
COMPARISON: CT 5/17/2019. 
  
TECHNIQUE:  
Real-time sonography of the abdomen was performed with multiple static images of 
the liver, gallbladder, pancreas, spleen, kidneys and retroperitoneum obtained. 
  
FINDINGS: 
LIVER:  
The liver is normal in echotexture with no mass or other focal abnormality.  
  
LIVER VASCULATURE: The portal vein flow is within normal limits. The diameter measures 0.9 cm. The 
velocity measures 23 cm/s. The right and left portal veins are patent. The 
hepatic veins are patent. . 
  
GALLBLADDER: 
 The gallbladder is mildly distended with sludge. There is no wall thickening or 
fluid around the gallbladder.  
  
COMMON BILE DUCT: 
There is no biliary duct dilatation and the common duct measures mm in diameter. 
  
  
PANCREAS: 
Not well visualized secondary to bowel gas and body habitus. 
  
SPLEEN: 
The spleen measures 13.0 x 5.2 x 13.4 cm for a volume of 473 mL. This is on the 
upper limits of normal for size. 
  
RIGHT KIDNEY: 
The right kidney demonstrates normal echogenicity with no mass, stone or 
hydronephrosis. The right kidney measures 10.6 cm in length. 
  
LEFT KIDNEY: 
The left kidney demonstrates normal echogenicity with no mass, stone or 
hydronephrosis. The left kidney measures 11.1 cm in length.  
  
RETROPERITONEUM: 
The aorta is not well visualized secondary to bowel gas and body habitus. The IVC is normal. 
No retroperitoneal mass is identified. 
  
A right pleural effusion is present. 
  
IMPRESSION 1. Unremarkable liver. No evidence of a hepatic cyst. 
2. Gallbladder distention is likely related to fasting. Biliary sludge is 
present. 3. Right pleural effusion again seen. 4. Spleen on the upper limits of normal for size. 
 
  
 
Critical care was necessary to treat or prevent imminent or life threatening deterioration of the following conditions: cardiac failure, respiratory failure and CNS failure or compromise Total Critical Care time spent:  
30 minutes. There was no overlap with other services Services Provided: 12:00-12:30 1. Telemetry review and 12 lead ECG interpretation 2. Hemodynamic interpretation, assessment, and management 3. Review and interpretation of CXR 4. Review and interpretation of lab values 5. Review and interpretation of microbiologic data and culture results 6. Review of medications and administration 7. Review and interpretation of nutrition requirements and management 8. Discussion of management withother consultants and services 9. Clinical update to family members Cris Buitrago MD, [facility] Medical Director Ned Lackey [address], [address] [facility], [address] [place] [address] Office [phone] Fax [phone]

## 2019-05-26 NOTE — PROGRESS NOTES
1930 - Bedside and Verbal shift change report given to Diego Dsouza RN (oncoming nurse) by Saeed Smith RN (offgoing nurse). Report included the following information SBAR, Kardex, Intake/Output, MAR, Recent Results, Cardiac Rhythm Sinus Rhythm and Alarm Parameters . Pt assessed. Medications verified. Pt sitting up in chair with family at bedside - AOx4, states \"I had a decent day and just want some sleep. \" 
2000 - Pharmacy tech at bedside to retrieve pt home medications. 2129 - pt given 3mg po melatonin for sleep 2220 - Pt has not voided post lowe removal.  Bladder scan reveals 434cc. 2245 - Pt straight cath'd.  400cc clear yellow urine obtained with sediment. 0600 - Pt CHG bath performed. Bladder scan reveals 220cc. 
0700 - Pt transfer from chair position in bed to standing scale to sitting up in chair with 2x assist.  Pt continues to demonstrate significant weakness in BLE, requiring significant support from RN to ambulate. 0730 - Bedside and Verbal shift change report given to Saeed Smith RN (oncoming nurse) by Diego Dsouza RN (offgoing nurse). Report included the following information SBAR, Kardex, Intake/Output, MAR, Recent Results, Cardiac Rhythm Sinus Rhythm and Alarm Parameters .

## 2019-05-26 NOTE — PROGRESS NOTES
0800 Bedside shift change report given to Candace (oncoming nurse) by Florentino Shepherd (offgoing nurse). Report included the following information SBAR, MAR and Cardiac Rhythm NSR.  
 
1130 Pt bladder scanned. >875ml retained. Pt refusing straight cath. Per Dr. Jessica Thomas will insert indwelling catheter for comfort. 1200 Dr. Jessica Thomas at bedside. Pt code status changed to DNR, pt wants comfort care and withdrawal of life sustaining interventions. Will have impella removed 5/27. Orders received to wean impella to P3. Pt's girlfriend discussed plan of care with Dr. Jessica Thomas.  
 
1300 Palliative called to come to bedside to recommend interventions for comfort care. Per palliative Hospice consulted and called to come to bedside by palliative MD.  
 
1310 Impella weaned to P8 
 
1400 Pt refusing all PO medications, blood sugar checks, and sliding scale insulin. 1500 Impella weaned to P7. Family at bedside. 1600 Colon placed. [address] Dr. Jessica Thomas paged due to increased dysnpea at rest. SPO2 100%, SVO2 65. Orders received for 2mg Q2hr PRN morphine for discomfort with breathing. 2000 Bedside shift change report given to Nadya (oncoming nurse) by Jack Chahal (offgoing nurse). Report included the following information SBAR, MAR and Cardiac Rhythm NSR 1st degree block.

## 2019-05-26 NOTE — PROGRESS NOTES
Infectious Diseases Progress Note Antibiotic Summary: Ancef   -- present Levaquin  -- present Subjective: He slept about an hour. He has been constipated. His last BM may have been about 10 days ago. Appetite poor. He has mils dyspnea. Urine output remains sluggish. Objective:  
 
Vitals:  
Visit Vitals /88 (BP 1 Location: Left arm, BP Patient Position: Sitting) Pulse 65 Temp 97.7 °F (36.5 °C) Resp 12 Ht 5' 11\" (1.803 m) Wt 87.3 kg (192 lb 7.4 oz) SpO2 100% BMI 26.84 kg/m² Tmax:  Temp (24hrs), Av.4 °F (36.9 °C), Min:97.7 °F (36.5 °C), Max:98.8 °F (37.1 °C) Exam:  General appearance: alert, no distress; chronically ill appearing Oropharynx: benign Neck: Left IJ SG site is benign Lungs: few basilar rales Heart: irregular; Impella Abdomen: mild distention; nontender Extremities: 3+ pretibial edema Skin: no rash Neurologic: A&O IV Lines: Left IJ SG inserted  Right Impella inserted  Left radial A-line inserted  Labs:   
Recent Labs  
  19 
0349 19 
0345 19 
6932 19 
1447 19 
0428 19 
1206 WBC  --  7.9 6.9  --  6.6  --   
HGB  --  7.1* 7.8*  --  7.8*  --   
PLT  --  69* 66*  --  77*  --   
BUN 76*  --  75* 73* 73* 71* CREA 2.61*  --  2.55* 2.59* 2.62* 2.73* TBILI 1.1*  --  0.9 1.0 1.0  --   
SGOT 13*  --  10* 17 19  --   
AP 55  --  56 63 70  --   
 
BLOOD CULTURES: 
 5/15 = NG 
  = NG 
  = NGSF Assessment: 1. Elevated procalcitonin level: The procalcitonin level was <0.1 on 5/15, 3.0 on , 1.6 on , 1.3 on , but then 8.1 on . The procalcitonin fell to 5.2 on  and 3.2 on . He had low grade fevers from  thru  but recent temps since then have been normal and WBC normal. The Impella has now been in place x 10 days since . The LIJ SG and left radial A-line have been in place since .  His portable CXR was unremarkable. No focal site of infection is apparent so far. 
  
2. OHD -- ischemic CM and AS 
  
3. NIDDM 
  
4. HTN 
  
5. Periph arterial disease 
  
6. S/P left great toe and right 5th toe amputations Plan: 1. He remains on Ancef + Levaquin Discussed with the patient and his family. I will check him on Tues 5/28; call for problems on 5/27 Evie Melvin MD

## 2019-05-26 NOTE — PROGRESS NOTES
Infectious Diseases Progress Note Subjective: He slept poorly; appetite fair with no vomiting or diarrhea. He is sitting up in chair and does not feel SOB. He has been up x 3 hours and is getting tired. Objective:  
 
Vitals:  
Visit Vitals /88 (BP 1 Location: Left arm, BP Patient Position: Sitting) Pulse 92 Temp 98.5 °F (36.9 °C) Resp 11 Ht 5' 11\" (1.803 m) Wt 85.2 kg (187 lb 13.3 oz) SpO2 92% BMI 26.20 kg/m² Tmax:  Temp (24hrs), Av.2 °F (36.8 °C), Min:97.5 °F (36.4 °C), Max:98.5 °F (36.9 °C) Exam:  General appearance: alert, no distress; chronically ill appearing Eyes: no conjunctival petechiae Neck: supple Lungs: basilar rales Heart: irregular; Impella Abdomen: mild distention; nontender Extremities: 3+ pretibial edema Skin: no rash Neurologic: A&O IV Lines: Left IJ SG inserted  Right Impella inserted  Left radial A-line inserted  Labs:   
Recent Labs  
  19 
6139 19 
1447 19 
0428 19 
1206 19 
2076 WBC 6.9  --  6.6  --  4.0* HGB 7.8*  --  7.8*  --  8.1* PLT 66*  --  77*  --  73* BUN 75* 73* 73* 71* 69* CREA 2.55* 2.59* 2.62* 2.73* 2.67* TBILI 0.9 1.0 1.0  --  0.9 SGOT 10* 17 19  --  28  
AP 56 63 70  --  77 BLOOD CULTURES: 
 5/15 = NG 
  = NG 
  = NGSF Assessment: 1. Elevated procalcitonin level: The procalcitonin level was <0.1 on 5/15, 3.0 on , 1.6 on , 1.3 on , but then 8.1 on . The procalcitonin fell to 5.2 on . He had low grade fevers from  thru  but recent temps since then have been normal and WBC normal. The Impella has now been in place x 9 days since . The LIJ SG and left radial A-line have been in place since . His portable CXR is unremarkable. No focal site of infection is apparent so far. 
  
2. OHD -- ischemic CM and AS 
  
3. NIDDM 
  
4.  HTN 
  
5. Periph arterial disease 
  
 6. S/P left great toe and right 5th toe amputations Plan: 1. He remains on Ancef + Levaquin Discussed with the patient and his family Devang Khan MD

## 2019-05-26 NOTE — PROGRESS NOTES
[facility] ICU Progress Note Admit Date: 2019 POD: 10 Procedure:  Procedure(s): RIGHT AXILLARY IMPELLA/ [id number] Subjective:  
Pt seen with Dr. Macarena Banda Dobutamine; Bumex Impella P-9 CI 2.3 60% PT 50  BNP > 35K HF management Objective:  
Vitals: 
Blood pressure 122/88, pulse 76, temperature 97.7 °F (36.5 °C), resp. rate 13, height 5' 11\" (1.803 m), weight 192 lb 7.4 oz (87.3 kg), SpO2 100 %. Temp (24hrs), Av.4 °F (36.9 °C), Min:97.7 °F (36.5 °C), Max:98.8 °F (37.1 °C) Hemodynamics: 
 CO: CO (l/min): 4.9 l/min CI: CI (l/min/m2): 2.5 l/min/m2 CVP: CVP (mmHg): 10 mmHg (19) SVR: SVR (dyne*sec)/cm5: 902 (dyne*sec)/cm5 (19 08) PAP Systolic: PAP Systolic: 46 (70 5569) PAP Diastolic: PAP Diastolic: 20 (/86 7500) PVR:   
 SV02: SVO2 (%): 66 % (19) SCV02:   
 
EKG/Rhythm:  Afib - rate controlled Oxygen Therapy: 
Oxygen Therapy O2 Sat (%): 100 % (19 08) Pulse via Oximetry: 72 beats per minute (19 09) O2 Device: Nasal cannula (19 040) O2 Flow Rate (L/min): 4 l/min (19 0400) FIO2 (%): 36 % (19 1146) CXR:  
CXR Results  (Last 48 hours) None Admission Weight: Last Weight Weight: 186 lb 15.2 oz (84.8 kg) Weight: 192 lb 7.4 oz (87.3 kg) Intake / Output / Drain: 
Current Shift:  07 -  190 In: 383 [P.O.:120; I.V.:263] Out: - Last 24 hrs.:  
 
Intake/Output Summary (Last 24 hours) at 2019 1018 Last data filed at 2019 0900 Gross per 24 hour Intake 2323.37 ml Output 505 ml Net 1818.37 ml EXAM: 
General:  Alert, NAD Lungs:   Clear upper, diminished bases to auscultation bilaterally - improved. Incision:  impella dsg cdi Heart:  Irregular rate and rhythm, S1, S2 normal, no murmur, click, rub or gallop. Abdomen:   Soft, non-tender.  Bowel sounds normal. No masses,  No organomegaly. Extremities:  2+ LE edema. PPP. Neurologic:  Gross motor and sensory apparatus intact. Labs:  
Recent Labs  
  19 
0705 19 
0349 19 
0345 WBC  --   --  7.9 HGB  --   --  7.1* HCT  --   --  22.5* PLT  --   --  69* NA  --  123*  --   
K  --  4.6  --   
BUN  --  76*  --   
CREA  --  2.61*  --   
GLU  --  176*  --   
GLUCPOC 201*  --   --   
 
 
 Assessment:  
 
Active Problems: 
  Acute on chronic systolic (congestive) heart failure ([facility] [address] (2019) Plan/Recommendations/Medical Decision Makin. Acute on chronic systolic heart failure (NYHA class IV on admission): AHF following, on dobut 10, bumex gtt. LVAD workup ongoing. Purge now D5 due to high PTT. BNP >35K. On ancef for impella ppx. Echo on 19 EF 15-20% 2. Severe AS: no plans for TAVR at this time 3. CAD with  of RAD and RCA: on ASA and statin, no BB dt dobut. Cardiac MRI showed nonviability of LV. 4. Afib: has PPM, was on eliquis - hold for impella. Not on any anticoagulation, evaluated by EP - no changes for now 5. HLD: on lipitor 6. DM type II: on glipizide, SSI, A1C 6.9. BS much higher as pt eating more, will start Lantus per AHFS 7. CKD: Cr holding, 2.62 today, monitor with diuretics. Renal following 8. Left leg wounds s/p fall: wound care consulted. 9. Anemia, iron deficiency: Hgb 7.8, cont PO iron, monitor. Transfuse prn 10. Thrombocytopenia: plts 77K, monior 11. Hyponatremia: Na 123, monitor. Renal on board - tolvaptan prn 12. Urinary retention: cont flomax, Colon reinserted 13. Nutrition: cont marinol per AHFS. Nutrition consult 14. Hyperkalemia: K better, monitor 15. Dispo: Cont ICU care, PT/OT, LVAD and further workup/treatment per AHF Signed By: OBED Medley

## 2019-05-26 NOTE — PROGRESS NOTES
Problem: Falls - Risk of 
Goal: *Absence of Falls Description Document Easter Getting Fall Risk and appropriate interventions in the flowsheet. Outcome: Progressing Towards Goal 
  
Problem: Heart Failure: Day 5 Goal: Off Pathway (Use only if patient is Off Pathway) Outcome: Progressing Towards Goal 
Bumex gtt increased to 2mg/hr Continued potential LVAD workup Impella p-9 
dobutamine Problem: Pressure Injury - Risk of 
Goal: *Prevention of pressure injury Description Document Terry Scale and appropriate interventions in the flowsheet.  
Outcome: Progressing Towards Goal

## 2019-05-27 NOTE — PROGRESS NOTES
Palliative Medicine Consult Terrence: [id number] ([id number]) Patient Name: Jannette Ramirez 
YOB: 1950 Date of Initial Consult: 5/24/19 Reason for Consult: Care decisions Requesting Provider: Timothy Ramirez Primary Care Physician: Sulma Olivares MD 
 
 SUMMARY:  
Jannette Ramirez is a 77 y.o. with a past history of CAD (3 vessel disease, recent cath at [facility]), ICM w/ EF 10%, severe AS, DM who was admitted on 5/9/2019 from home after being unable to walk more than a block due to SOB. Started on dobutamine, cardiac MRI completed w/ poor viability, impella placed 5/16, work up for LVAD being done but concern about RHF. Other issues incl THEODORE on CKD likely from CRS, not candidate for TAVR. Current medical issues leading to Palliative Medicine involvement include:  Care decisions as  LVAD work up completed. Social- This admission completed an AMD, naming his brother and friend. Theronmatt Mireles has completed the caregiver assessment. PALLIATIVE DIAGNOSES:  
1. Shortness of breath 2. Fatigue 3. Debility 4. Goals of care discussion 5. DNR discussion PLAN:  
1. Patient was seen and evaluated today 5/26, per his request. I was on call palliative provider over weekend, received call from patients CVICU nurse, Anita Dejesus, asking for patient to be seen today, stating that patient has verbalized his wishes to transition to comfort only care. Hospice liaison had just left [facility], unable to see until 5/27. Once arrived to unit, reviewed with RN Anita Dejesus. I met with patient, he was alert and oriented x4, clear mentation, at his edside was with long time friend/roomate, Edd Wynn.  Had a lengthy discussion with both regarding purpose of visit, assessed patients wishes regarding discontinuing LVAD workup/full restorative tx, discontinuing impella etc. Patient stated that he has been weighing benefits and risks of continuing current tx, but that he is tired and with his age, he does not think  that he will be able to get back to his pre-hospital baseline function and that is what is most important to him, if he cant be guaranteed that, he would rather focuse on being comfortable with hospice. He did not have any other questions or concerns, only that he not suffer, he made it clear that he had made his decision, was not going to change it. Patients friend, Polly Don was intermittently tearful, struggling to accept patients wishes, spent considerable time listening and acknowledging her feelings, reminding her that he has the capacity to make his own decisions. 2. Plan clear- Transitioned to comfort, Hospice consulted. Has started weaning impella, will removed in morning on 5.27. 
3. Patient acknowledged wishes for DNR code status in presence of his friend, Polly Don. He tells me he has appointed his brother as his mPOA, that he is aware of the patients wishes to transition to comfort. A copy of AMD in EMR. 4. Comfort orders placed. New orders for IV Dilaudid and IV lorazepam placed. I reviewed other non comfort orders with patients night nurse Myriam Thomson, discontinued all other non essential orders, but left those associated with impella, they will be discontinued tomorrow 5/27, when its removed. 5. Psychosocial assessment- Added additional information obtained since initial assessment. Patients friend and roommate x 20 years, would benefit from SW support, she has 6 children, oldest 25 and youngest 3yo. Younger children think of patient as their father, would likely benefit from grief counseling services/ camps for children. 6. Initial consult note routed to primary continuity provider and/or primary health care team members 7. Communicated plan of care with: Palliative IDT, Hospice Liaison NADEGE Matta notified, NADEGE Chu and Dr. Feliz Conception GOALS OF CARE / TREATMENT PREFERENCES:  
 
GOALS OF CARE: 
Patient/Health Care Proxy Stated Goals: Comfort TREATMENT PREFERENCES:  
Code Status: DNR Advance Care Planning: 
[] [facility] Interdisciplinary Team has updated the ACP Navigator with [place] and Patient Capacity Primary Decision Maker: Mike George ([facility]) - Brother - [phone] Secondary Decision Maker: Cristela Macario Valenzuela) - Friend - [phone] Advance Care Planning 5/9/2019 Confirm Advance Directive Yes, not on file Medical Interventions: Comfort measures Artificially Administered Nutrition: No feeding tube Other: As far as possible, the palliative care team has discussed with patient / health care proxy about goals of care / treatment preferences for patient. HISTORY:  
 
History obtained from: Pt, chart, staff CHIEF COMPLAINT: Im tired, getting weaker HPI/SUBJECTIVE: The patient is:  
[x] Verbal and participatory [] Non-participatory due to:  
 
Pt sitting up, quiet, but communicated his wishes. He reported being physically tired, denied depression, denied feeling sad,stated that he has intermittent painful spasms in his abdomen, denied at time of visit. Denied anxiety, reported not sleeping well, had been given melatonin, but it is \"like candy\", was not effective. Patient stated he is increasingly weak, difficulty moving arms, does not have strength to push buttons on remote See palliative ESAS from 5/26 Clinical Pain Assessment (nonverbal scale for severity on nonverbal patients):  
Clinical Pain Assessment Severity: 0 Location: (abdomen and chest, but not at time of visit) Character: (painful spasms) Duration: (intermittent spasms last 20 seconds, started within last couple of days) Effect: not comfortable Factors: (unknown) Frequency: (multiple times a day) Activity (Movement): Lying quietly, normal position Duration: for how long has pt been experiencing pain (e.g., 2 days, 1 month, years) Frequency: how often pain is an issue (e.g., several times per day, once every few days, constant) FUNCTIONAL ASSESSMENT:  
 
Palliative Performance Scale (PPS): PPS: 40 PSYCHOSOCIAL/SPIRITUAL SCREENING:  
 
Palliative IDT has assessed this patient for cultural preferences / practices and a referral made as appropriate to needs (Cultural Services, Patient Advocacy, Ethics, etc.) Any spiritual / Holiness concerns: 
[] Yes /  [x] No 
 
Caregiver Burnout: 
[] Yes /  [x] No /  [] No Caregiver Present Anticipatory grief assessment:  
[x] Normal  / [] Maladaptive ESAS Anxiety: Anxiety: 0 
 
ESAS Depression: Depression: 0 REVIEW OF SYSTEMS:  
 
Positive and pertinent negative findings in ROS are noted above in HPI. The following systems were [x] reviewed / [] unable to be reviewed as noted in HPI Other findings are noted below. Systems: constitutional, ears/nose/mouth/throat, respiratory, gastrointestinal, genitourinary, musculoskeletal, integumentary, neurologic, psychiatric, endocrine. Positive findings noted below. Modified ESAS Completed by: provider Fatigue: 9 Drowsiness: 0 Depression: 0 Pain: (non at time of visit, stated gets intermittent painful spasms) Anxiety: 0 Nausea: 0 Anorexia: 8 Dyspnea: 5 Constipation: No  
  Stool Occurrence(s): 1(pt reported) PHYSICAL EXAM:  
 
From RN flowsheet: 
Wt Readings from Last 3 Encounters:  
05/26/19 87.3 kg (192 lb 7.4 oz) 05/10/19 84.6 kg (186 lb 8.2 oz) 05/07/19 87.7 kg (193 lb 6.4 oz) Blood pressure 122/88, pulse (!) 59, temperature 96.3 °F (35.7 °C), resp. rate 10, height 5' 11\" (1.803 m), weight 87.3 kg (192 lb 7.4 oz), SpO2 99 %. Pain Scale 1: Numeric (0 - 10) Pain Intensity 1: 0 Pain Onset 1: acute Pain Location 1: Chest 
Pain Orientation 1: Upper Pain Description 1: Pressure Pain Intervention(s) 1: MD notified (comment) Last bowel movement, if known: Constitutional: awake, alert, oriented,fatigued, weak Eyes: pupils equal, anicteric ENMT: no nasal discharge, moist mucous membranes Respiratory: breathing not labored Musculoskeletal: no deformity Skin: warm, dry Neurologic: following commands, moving all extremities Psychiatric: full affect, no hallucinations HISTORY:  
 
Active Problems: 
  Acute on chronic systolic (congestive) heart failure ([address] (5/9/2019) Past Medical History:  
Diagnosis Date  3-vessel coronary artery disease  Aortic stenosis, severe  Chronic kidney disease (CKD), stage III (moderate) (HCC)  Chronic total occlusion of coronary artery  Hypertension  Ischemic cardiomyopathy  Peripheral vascular disease ([address]  Type 2 diabetes mellitus treated without insulin ([address] Past Surgical History:  
Procedure Laterality Date  HX AMPUTATION TOE Left 2017 History reviewed. No pertinent family history. History reviewed, no pertinent family history. Social History Tobacco Use  Smoking status: Former Smoker  Smokeless tobacco: Never Used Substance Use Topics  Alcohol use: Not Currently Allergies Allergen Reactions  Penicillins Hives Current Facility-Administered Medications Medication Dose Route Frequency  HYDROmorphone (PF) (DILAUDID) injection 1 mg  1 mg IntraVENous Q15MIN PRN  
 LORazepam (ATIVAN) injection 1 mg  1 mg IntraVENous Q15MIN PRN  
 bumetanide (BUMEX) injection 1 mg  1 mg IntraVENous Q6H PRN  
 bumetanide (BUMEX) 0.25 mg/mL infusion  2 mg/hr IntraVENous CONTINUOUS  
 lidocaine (XYLOCAINE) 2 % jelly   Mucous Membrane PRN  
 tamsulosin (FLOMAX) capsule 0.4 mg  0.4 mg Oral DAILY  senna-docusate (PERICOLACE) 8.6-50 mg per tablet 1 Tab  1 Tab Oral BID  polyethylene glycol (MIRALAX) packet 17 g  17 g Oral BID  
 balsam peru-castor oil (VENELEX) ointment   Topical BID  
 0.9% sodium chloride infusion  9 mL/hr IntraVENous CONTINUOUS  
  dextrose 5% infusion  4-20 mL/hr IntraVENous CONTINUOUS  
 calcium carbonate (TUMS) chewable tablet 200 mg [elemental]  200 mg Oral TID PRN  
 sodium chloride (OCEAN) 0.65 % nasal squeeze bottle 2 Spray  2 Spray Both Nostrils Q2H PRN  
 dronabinol (MARINOL) capsule 2.5 mg  2.5 mg Oral DAILY  glucagon (GLUCAGEN) injection 1 mg  1 mg IntraMUSCular PRN  
 sodium chloride (NS) flush 5-40 mL  5-40 mL IntraVENous Q8H  
 sodium chloride (NS) flush 5-40 mL  5-40 mL IntraVENous PRN  
 ondansetron (ZOFRAN) injection 4 mg  4 mg IntraVENous Q6H PRN  
 acetaminophen (TYLENOL) tablet 650 mg  650 mg Oral Q6H PRN  
 docusate sodium (COLACE) capsule 100 mg  100 mg Oral PRN  
 DOBUTamine (DOBUTREX) 500 mg/250 mL (2,000 mcg/mL) infusion  10 mcg/kg/min IntraVENous CONTINUOUS  
 pantoprazole (PROTONIX) tablet 40 mg  40 mg Oral ACB  
 
 
 
 LAB AND IMAGING FINDINGS:  
 
Lab Results Component Value Date/Time WBC 7.9 05/26/2019 03:45 AM  
 HGB 7.1 (L) 05/26/2019 03:45 AM  
 PLATELET 69 (L) 54/94/9458 03:45 AM  
 
Lab Results Component Value Date/Time Sodium 123 (L) 05/26/2019 03:49 AM  
 Potassium 4.6 05/26/2019 03:49 AM  
 Chloride 90 (L) 05/26/2019 03:49 AM  
 CO2 23 05/26/2019 03:49 AM  
 BUN 76 (H) 05/26/2019 03:49 AM  
 Creatinine 2.61 (H) 05/26/2019 03:49 AM  
 Calcium 8.1 (L) 05/26/2019 03:49 AM  
 Magnesium 2.3 05/26/2019 03:49 AM  
 Phosphorus 2.3 (L) 05/23/2019 12:06 PM  
  
Lab Results Component Value Date/Time AST (SGOT) 13 (L) 05/26/2019 03:49 AM  
 Alk. phosphatase 55 05/26/2019 03:49 AM  
 Protein, total 5.2 (L) 05/26/2019 03:49 AM  
 Albumin 2.8 (L) 05/26/2019 03:49 AM  
 Globulin 2.4 05/26/2019 03:49 AM  
 
Lab Results Component Value Date/Time INR 1.4 (H) 05/16/2019 03:57 AM  
 Prothrombin time 14.1 (H) 05/16/2019 03:57 AM  
 aPTT 50.4 (H) 05/26/2019 10:39 AM  
  
Lab Results Component Value Date/Time  Iron 80 05/22/2019 09:34 AM  
 TIBC 199 (L) 05/22/2019 09:34 AM  
 Iron % saturation 40 05/22/2019 09:34 AM  
 Ferritin 885 (H) 05/22/2019 09:34 AM  
  
No results found for: PH, PCO2, PO2 No components found for: [place] No results found for: CPK, CKMB Total time: 65 
Counseling / coordination time, spent as noted above: 50> 50% counseling / coordination?:  
 
Prolonged service was provided for  [x]30 min   []75 min in face to face time in the presence of the patient, spent as noted above. Time Start: 1900 Time End: 2030 Note: this can only be billed with 17842 (initial) or 28316 (follow up). If multiple start / stop times, list each separately.

## 2019-05-27 NOTE — PROGRESS NOTES
Family and patient requested impella withdrawal with comfort care only Time of death 10:15 No heart rate or spontaneous breaths Dr. Lashanda Iglesias at bedside

## 2019-05-27 NOTE — PROGRESS NOTES
1950 Bedside report from Sneha Pate., RN. Dual verification of drips: Dobut, Bumex. Palliative Care NP, Clemente Gould at bedside discussing plan of care wishes with patient and close friend, Raul Escobedo. Plan of care for tonight to include weaning Impella to P3 as tolerated. Palliative orders to follow. 2030 Discussed plan of care with Palliative NPPricilla. Will d/c most PO medications, continue IV dobutamine and bumex for Impella wean. Morphine q2, switched to Dilaudid q15 PRN. Patient on comfort care; d/c'd labs. Hospice to see patient tomorrow. 2100 Discussed plan of care with patient. In agreement, would like to rest; limiting turning to only when he was awake, do not disturb unless necessary. 2300 Impella to P5 
 
0300 Impella to P4; MAPS sustaining >60, SBP upper 80s/low 90s. Patient comfortable in bed, resting quietly. No c/o pain.  
 
0700 Patient bathed, weighed, linens changed. Patient requested to remain in bed.  
 
0745 Bedside shift change report given to RN (oncoming nurse) by Wellington Dakin., RN (offgoing nurse). Report included the following information SBAR. Problem: Diabetes Self-Management Goal: *Using medications safely Description State effect of diabetes medications on diabetes; name diabetes medication taking, action and side effects. 5/26/2019 2351 by Maria Luz Moon Outcome: Progressing Towards Goal 
5/26/2019 2351 by Maria Luz Moon Outcome: Progressing Towards Goal 
Goal: *Monitoring blood glucose, interpreting and using results Description Identify recommended blood glucose targets  and personal targets. 5/26/2019 2351 by Maria Luz Moon Outcome: Progressing Towards Goal 
5/26/2019 2351 by Maria Luz Moon Outcome: Progressing Towards Goal 
  
Problem: Falls - Risk of 
Goal: *Absence of Falls Description Document Audelia Murphy Fall Risk and appropriate interventions in the flowsheet.  
5/26/2019 2351 by Maria Luz Moon 
 Outcome: Progressing Towards Goal 
5/26/2019 2351 by Quinn Ojeda Outcome: Progressing Towards Goal 
  
Problem: Heart Failure: Discharge Outcomes Goal: *Verbalizes understanding/describes prescribed medications Outcome: Progressing Towards Goal 
  
Problem: Pressure Injury - Risk of 
Goal: *Prevention of pressure injury Description Document Terry Scale and appropriate interventions in the flowsheet. Outcome: Progressing Towards Goal 
  
Problem: Infection - Risk of, Urinary Catheter-Associated Urinary Tract Infection Goal: *Absence of infection signs and symptoms Outcome: Progressing Towards Goal

## 2019-05-27 NOTE — OP NOTES
[address]  
OPERATIVE REPORT Name:  Chandler Child 
MR#:  [id number] :  1950 ACCOUNT #:  [de-identified] DATE OF SERVICE:  2019 PREOPERATIVE DIAGNOSIS:  Congestive heart failure. POSTOPERATIVE DIAGNOSIS:  Congestive heart failure. PROCEDURE PERFORMED:  Removal of Impella left ventricular assist device. SURGEON:  Evy Culp MD 
 
ASSISTANT:  OBED Perez 
 
ANESTHESIA:  Local. 
 
COMPLICATIONS:  None recognized. SPECIMENS REMOVED:  none. IMPLANTS:  None. ESTIMATED BLOOD LOSS:  100 mL. ANESTHESIOLOGIST:  None. INDICATION:  The patient had opted to have support withdrawn and to receive only comfort care. PROCEDURE:  The operative field was prepared with chlorhexidine. Sterile drapes were applied. Marcaine 1% was infiltrated for local anesthesia. The lateral skin staples in the right infraclavicular axillary wound were removed and the skin suture securing the Impella introducer to the skin were released. The Dacron chimney was withdrawn. The device was turned off, and while maintaining control of the Chimney, the Impella was easily removed. Flushing was briefly permitted. A Ruddy catheter was passed through the chimney into the axillary artery and withdrawn without yield of any promise. A small amount of additional flushing was accomplished. A vascular stapler was placed across the chimney low in the wound and fired. Hemostasis was excellent. The muscle and subcutaneous tissues were closed with interrupted Vicryl. The skin was closed with staples. Sterile dressing was applied. Skyla Urbina MD [id number] 
D:  2019 9:44 
T:  2019 14:33 
JOB #:  [id number]

## 2019-05-27 NOTE — PROGRESS NOTES
0800 - Bedside report received from Kita Garrett RN. MAP 63. Pt resting comfortably. 0830 - Dr. Jorge Dominguez at bedside for round. Impella decreased to P3. MAP 56. Plan for Impella removal at 0900.  
0915 - Dr. Jorge Dominguez and OR team at bedside for Impella removal. PRN Dilaudid and 1mg Versed given per . 0932 - Impella out, MAP 51.  
0935 - HR 42, MAP 40's. 0940 - 1 Albumin given per Dr. Jorge Dominguez. Family called to come back up to unit. [address] [facility] [address], friend, at bedside with her son. 1000 -  at bedside. 1015 - Asystole, apneic. OBED Aguillon updated. 1100 - Dr. Jorge Dominguez at bedside.

## 2019-05-27 NOTE — PROGRESS NOTES
responded to staff request. Pt's friend LACEY was at bedside and one son was with her. Pt shared that she has 6 children 3 boys and 3 girls, and they were trying to get to the hospital.  provided pastoral support and assurance of prayer. [address] will continue to follow. Vickie Wang, MACE 
 [phone] ([phone])

## 2019-05-27 NOTE — PERIOP NOTES
Impella removal completed in CVICU at bedside under local. Consent present on chart, patient verified identity by name and date of birth. Sterility maintained throughout duration of procedure.

## 2019-05-27 NOTE — PROGRESS NOTES
responded to pt death. Pt's friend Berlin Fine was out in the clement.  provided pastoral support. Berlin Fine shared that other children may be coming to the hospital.  let family know to contact [id number] [facility] for further support.  follow up as needed. Vickie Wang, MACE 
 [phone] ([phone])

## 2019-05-30 NOTE — DISCHARGE SUMMARY
[facility] Death Summary Patient ID: Jorge Dawson 
[id number] 
64 y.o. 
1950 Admit Date: 5/9/2019 Discharge Date: 5/27/2019 Admitting Physician: Jason Mary MD  
 
Discharge Physician: Hoda Langston MD  
 
Admission Diagnoses: Acute on chronic systolic (congestive) heart failure ([address] [I50.23] Discharge Diagnoses: Acute on chronic systolic heart failure Procedures for this admission: Procedure(s): RIGHT AXILLARY IMPELLA  5.0 REMOVAL, COMPLETED UNDER LOCAL AT BEDSIDE IN CVICU Hospital Course: 77y.o. year old male with a history of HTN, T2DM, PVD, CAD, ICM, severe AS who presented for further evaluation of his chronic systolic heart failure. He developed progressive fatigue and dyspnea with exertion prompting further evaluation by Dr. April Albarado with a heart cath at [facility]. His cath revealed severe 3 vessel disease with  of his LAD, severe LV systolic failure (LVEF 15%), and severe AS. He was admitted to [facility] on 5/9/19 for consideration of advanced therapies for his advanced heart failure. Inpatient evaluation revealed severe biventricular failure and AS which was not amenable to intervention due to end stage HF. He underwent implantation of Impella 5.0 as bridge to decision on 5/17. Despite maximal Impella support he required escalation in inotropic support. Mr. Prashanth Deutsch continued to demonstrate severe BiV failure despite maximum mechanical and inotropic support and developed progressively worsening renal failure.   Due to worsening RV failure, leukocytosis of unknown source, poor renal function at 100% risk of temporary dialysis and > 50% risk of permanent dialysis after LVAD, hypogammaglobulinemia in the setting of hypoproteinemia and hypoalbuminemia due to proteinuria nearing nephrotic range at high risk of driveline infection, malnutrition, poor balance and severe muscular deconditioning, significant neurocognitive dysfunction with low MOCA score, along with ongoing concerns regarding psychosocial support as per  evaluation, Mr. Sulma Bay was deemed a poor candidate for durable MCS. He elected to withdraw Impella support on  and  at 1015.

## 2025-05-09 NOTE — CONSULTS
[address] [facility]. 
[address] [facility] [address] [place], [address] 
[phone] GI CONSULTATION NOTE Pokayla Contreras, AGACNP-BC 
 
NAME:  Kandee Schwab  
:   1950 MRN:   [id number] Referring Provider: Janette Quinonez Consult Date: 2019 Chief Complaint: Hepatic cyst 
 
History of Present Illness:  77year old male with history detailed below. He was admitted on  for progressive shortness of breath secondary to acute on chronic heart failure. He has an Impellla placed and is being diuresed complicated by renal dysfunction. Through out his cardiac work up he had a cardiac MRI which incidentally demonstrated a 8.5x6cm hepatic cyst. CT of a/p/c non contrasted is without mention it. Patient is unaware of this previously. He had a colonoscopy about 1.5 years ago reported 3 polyps removed with 5 year recall. Denies weight loss, fever or chills. PMH: 
Past Medical History:  
Diagnosis Date  3-vessel coronary artery disease  Aortic stenosis, severe  Chronic kidney disease (CKD), stage III (moderate) (HCC)  Chronic total occlusion of coronary artery  Hypertension  Ischemic cardiomyopathy  Peripheral vascular disease ([facility] [address]  Type 2 diabetes mellitus treated without insulin ([facility] [address] PSH: 
Past Surgical History:  
Procedure Laterality Date  HX AMPUTATION TOE Left  Allergies: Allergies Allergen Reactions  Penicillins Hives Home Medications: 
Prior to Admission Medications Prescriptions Last Dose Informant Patient Reported? Taking? apixaban (ELIQUIS) 5 mg tablet 2019 at Unknown time  No Yes Sig: Take 1 Tab by mouth two (2) times a day. aspirin 81 mg chewable tablet 2019 at Unknown time  Yes Yes Sig: Take 81 mg by mouth daily. atorvastatin (LIPITOR) 40 mg tablet 2019 at Unknown time  No Yes Sig: Take 1 Tab by mouth nightly. canagliflozin (INVOKANA) 300 mg tablet 2019 at Unknown time  Yes Yes Sig: Take 300 mg by mouth Daily (before breakfast). dulaglutide (TRULICITY) 1.5 VH/5.8 mL sub-q pen   Yes Yes Si.5 mg by SubCUTAneous route every . esomeprazole (NEXIUM) 20 mg capsule 2019 at Unknown time  Yes Yes Sig: Take 20 mg by mouth daily. furosemide (LASIX) 40 mg tablet 2019 at Unknown time  No Yes Sig: Take one tab daily  
glimepiride (AMARYL) 4 mg tablet   Yes Yes Sig: Take 4 mg by mouth two (2) times a day. metFORMIN (GLUCOPHAGE) 1,000 mg tablet 2019 at Unknown time  Yes Yes Sig: Take 1,000 mg by mouth two (2) times daily (with meals). metoprolol succinate (TOPROL-XL) 100 mg tablet 2019 at Unknown time  No Yes Sig: Take 1 Tab by mouth daily. sacubitril-valsartan (ENTRESTO) 24 mg/26 mg tablet 2019 at Unknown time  No Yes Sig: Take 1 Tab by mouth every twelve (12) hours. spironolactone (ALDACTONE) 25 mg tablet 2019 at Unknown time  No Yes Sig: TAKE 1 TABLET BY MOUTH EVERY DAY Facility-Administered Medications: None Hospital Medications: 
Current Facility-Administered Medications Medication Dose Route Frequency  glipiZIDE (GLUCOTROL) tablet 5 mg  5 mg Oral ACB&D  
 tamsulosin (FLOMAX) capsule 0.4 mg  0.4 mg Oral DAILY  bumetanide (BUMEX) injection 1 mg  1 mg IntraVENous TID  albumin human 25% (BUMINATE) solution 12.5 g  12.5 g IntraVENous TID  levoFLOXacin (LEVAQUIN) 750 mg in D5W IVPB  750 mg IntraVENous Q48H  potassium chloride (KLOR-CON) packet for solution 40 mEq  40 mEq Oral BID WITH MEALS  senna-docusate (PERICOLACE) 8.6-50 mg per tablet 1 Tab  1 Tab Oral BID  polyethylene glycol (MIRALAX) packet 17 g  17 g Oral BID  
 balsam peru-castor oil (VENELEX) ointment   Topical BID  
 0.9% sodium chloride infusion  9 mL/hr IntraVENous CONTINUOUS  
 dextrose 5% infusion  4-20 mL/hr IntraVENous CONTINUOUS  
  calcium carbonate (TUMS) chewable tablet 200 mg [elemental]  200 mg Oral TID PRN  
 bumetanide (BUMEX) injection 1 mg  1 mg IntraVENous Q6H PRN  
 magnesium oxide (MAG-OX) tablet 800 mg  800 mg Oral BID  sodium chloride (OCEAN) 0.65 % nasal squeeze bottle 2 Spray  2 Spray Both Nostrils Q2H PRN  
 dronabinol (MARINOL) capsule 2.5 mg  2.5 mg Oral DAILY  vasopressin (VASOSTRICT) 20 Units in 0.9% sodium chloride 100 mL infusion  0-0.1 Units/min IntraVENous TITRATE  dextrose 5% infusion  4-20 mL/hr IntraVENous CONTINUOUS  
 ceFAZolin (ANCEF) 2 g/20 mL in sterile water IV syringe  2 g IntraVENous Q8H  
 insulin lispro (HUMALOG) injection   SubCUTAneous AC&HS  
 glucose chewable tablet 16 g  4 Tab Oral PRN  
 dextrose (D50W) injection syrg 12.5-25 g  12.5-25 g IntraVENous PRN  
 glucagon (GLUCAGEN) injection 1 mg  1 mg IntraMUSCular PRN  
 traMADol (ULTRAM) tablet 50 mg  50 mg Oral Q8H PRN  
 aspirin chewable tablet 81 mg  81 mg Oral DAILY  atorvastatin (LIPITOR) tablet 40 mg  40 mg Oral QHS  sodium chloride (NS) flush 5-40 mL  5-40 mL IntraVENous Q8H  
 sodium chloride (NS) flush 5-40 mL  5-40 mL IntraVENous PRN  
 ondansetron (ZOFRAN) injection 4 mg  4 mg IntraVENous Q6H PRN  
 acetaminophen (TYLENOL) tablet 650 mg  650 mg Oral Q6H PRN  
 docusate sodium (COLACE) capsule 100 mg  100 mg Oral PRN  
 DOBUTamine (DOBUTREX) 500 mg/250 mL (2,000 mcg/mL) infusion  7.5 mcg/kg/min IntraVENous CONTINUOUS  
 pantoprazole (PROTONIX) tablet 40 mg  40 mg Oral ACB Social History: 
Social History Tobacco Use  Smoking status: Former Smoker  Smokeless tobacco: Never Used Substance Use Topics  Alcohol use: Not Currently Family History: 
History reviewed. No pertinent family history. Review of Systems: 
 
Constitutional: negative fever, negative chills, negative weight loss Eyes:   negative visual changes ENT:   negative sore throat, tongue or lip swelling Respiratory:  negative cough, negative dyspnea Cards:  negative for chest pain, palpitations, lower extremity edema GI:   See HPI 
:  negative for frequency, dysuria Integument:  negative for rash and pruritus Heme:  negative for easy bruising and gum/nose bleeding Musculoskel: negative for myalgias, back pain and muscle weakness Neuro:  negative for headaches, dizziness, vertigo Psych: negative for feelings of anxiety, depression Objective:  
 
Patient Vitals for the past 8 hrs: 
 Temp Pulse Resp SpO2  
05/21/19 1000  78 9 90 % 05/21/19 0900 98.4 °F (36.9 °C) 69 11 97 % 05/21/19 0800  73 (!) 0 100 % 05/21/19 0700  74 14 100 % 05/21/19 0600  70 16 97 % 05/21/19 0500  84  96 % 05/21/19 0400 98.3 °F (36.8 °C) 81 14 96 % No intake/output data recorded. 05/19 1901 - 05/21 0700 In: 1922.7 [P.O.:560; I.V.:1362.7] Out: 1645 [OHXYI:6944] PHYSICAL EXAM: 
General: Well developed, Well nourished. Chronically ill appearing male. Alert, cooperative, no acute distress.   
HEENT: Normocephalic, Atraumatic. PERRLA, EOMI. Anicteric sclerae. Lungs:  CTA Bilaterally. No Wheezing/Rhonchi/Rales. Heart:  Regular rate and rhythm, No murmur, No Rubs, No Gallops. Impella right neck. Abdomen: Soft, non-distended, non-tender.  +Bowel sounds, no hepatomegaly Extremities: No cyanosis,clubing. BLE edema Neurologic:  Alert and oriented X 3. No acute neurological distress. Psych:   Good insight. Not anxious nor agitated. Data Review Recent Labs  
  05/21/19 
0355 05/20/19 
0328 WBC 4.9 3.5* HGB 9.1* 9.0*  
HCT 28.5* 28.2*  
PLT 91* 99* Recent Labs  
  05/21/19 
0355 05/20/19 
1729 * 126*  
K 4.0 4.0  
CL 93* 92* CO2 25 26 BUN 55* 55* CREA 2.13* 2.16* GLU 65 110* CA 8.4* 8.3* Recent Labs  
  05/21/19 
0355 05/20/19 
1028 05/20/19 
0328 SGOT 22  --  23  
AP 73  --  67  
TP 5.2*  --  4.9* ALB 2.2*  --  2.1*  
GLOB 3.0  --  2.8 LPSE  --  67*  --   
 
 Recent Labs  
  05/21/19 
0355 05/20/19 
0328 APTT 115.0* 45.8* Imaging studies reviewed Assessment: 1. Right hepatic cyst 8.5x6cm found on a cardiac MRI. Asymptomatic. Likely a benign cyst 
2. Heart failure Patient Active Problem List  
Diagnosis Code  Ischemic cardiomyopathy I25.5  Coronary artery disease involving native coronary artery of native heart without angina pectoris I25.10  Essential hypertension I10  
 Controlled type 2 diabetes mellitus with circulatory disorder, without long-term current use of insulin (HCC) E11.59  Severe aortic stenosis I35.0  Peripheral vascular disease (HCC) I73.9  Acute on chronic systolic (congestive) heart failure (HCC) I50.23 Plan:  
-Will follow up with US of abd 
-Further recommendations to follow.  
-Discussed with Dr. Nunu Galarza 
-Will follow along with you 
-Thank you kindly for allowing us to participate in the care of this patient I have examined the patient. I have reviewed the chart and agree with the documentation recorded by the NP, including the assessment, treatment plan, and disposition. ASSESSMENT AND PLAN: 
77 yr old male being treated for acute on chronic systolic heart failure status post Impella placement and renal dysfunction is being worked up for potential LVAD. Cardiac MRI detected a 8x 6.5 cyst but patient is completely asymptomatic. LFTs are normal. 
Abd CT without contrast reveals no cyst 
Abdominal US done and reveals no cyst. 
No further GI work up is recommended at present time. We will address MRI discrepancy with radiologist. 
Thank you for consultation.  
 
Nadia Blank MD 
 No

## (undated) DEVICE — PACK PROCEDURE SURG HRT CATH

## (undated) DEVICE — SURGICAL PROCEDURE PACK BASIN MAJ SET CUST NO CAUT

## (undated) DEVICE — ROSEN CURVED WIRE GUIDE: Brand: ROSEN

## (undated) DEVICE — ANGIOGRAPHY KIT CUST [K0910930B] [MERIT MEDICAL SYSTEMS INC]

## (undated) DEVICE — SUTURE VCRL SZ 0 L18IN ABSRB VLT L40MM CT 1/2 CIR J752D

## (undated) DEVICE — GLIDESHEATH SLENDER ACCESS KIT: Brand: GLIDESHEATH SLENDER

## (undated) DEVICE — DBD-PACK,LAPAROTOMY,2 REINFORCED GOWNS: Brand: MEDLINE

## (undated) DEVICE — CATHETER ANGIO JL3.5 6 FRX 100 CM 2.5 CM PERFORMA

## (undated) DEVICE — COVER,TABLE,HEAVY DUTY,60"X90",STRL: Brand: MEDLINE

## (undated) DEVICE — ANGIOGRAPHIC CATHETER: Brand: IMPULSE™

## (undated) DEVICE — DRESSING AQUACEL ADVANTAGE ALG W4XL5IN RECT GREATER ABSRB HYDRFBR TECHNOLOGY

## (undated) DEVICE — LUB SURG MEDC STRL 2OZ TUBE MC -- MEDICHOICE

## (undated) DEVICE — TIDISHIELD TRANSPORT, CONTAINMENT COVER FOR BACK TABLE 4'6" (1.37M) TO 8' (2.43M) IN LENGTH: Brand: TIDISHIELD

## (undated) DEVICE — TUBING PRSS MON L6IN PVC M FEM CONN

## (undated) DEVICE — DRAPE,UTILTY,TAPE,15X26, 4EA/PK: Brand: MEDLINE

## (undated) DEVICE — SYR 3ML LL TIP 1/10ML GRAD --

## (undated) DEVICE — FOGARTY ARTERIAL EMBOLECTOMY CATHETER 4F 80CM: Brand: FOGARTY

## (undated) DEVICE — TR BAND RADIAL ARTERY COMPRESSION DEVICE: Brand: TR BAND

## (undated) DEVICE — SUTURE SZ 0 27IN 5/8 CIR UR-6  TAPER PT VIOLET ABSRB VICRYL J603H

## (undated) DEVICE — GOWN,SIRUS,NONRNF,SETINSLV,XL,20/CS: Brand: MEDLINE

## (undated) DEVICE — (D)PREP SKN CHLRAPRP APPL 26ML -- CONVERT TO ITEM 371833

## (undated) DEVICE — Z CONVERTED USE 2107985 COVER FLROSCP W36XL28IN 4 SIDE ADH

## (undated) DEVICE — SENSOR TEMP SKIN DISP

## (undated) DEVICE — SPLINT WR POS F/ARTERIAL ACC -- BX/10

## (undated) DEVICE — SOLUTION IV 1000ML 0.9% SOD CHL

## (undated) DEVICE — SOLUTION IV 500ML 0.9% SOD CHL FLX CONT

## (undated) DEVICE — CATHETER PULM ART 7FR BLLN 1.25CC L110CM DIA11MM DBL LUMN

## (undated) DEVICE — Z CONVERTED USE 2271365 TRAY SKIN W3XL3IN S STL STPL REMV GZ PD DISP MEDI PK

## (undated) DEVICE — STERILE POLYISOPRENE POWDER-FREE SURGICAL GLOVES WITH EMOLLIENT COATING: Brand: PROTEXIS

## (undated) DEVICE — GLOVE SURG SZ 7.5 L11.2IN THK9.8MIL STRW LTX POLYMER BEAD

## (undated) DEVICE — INTENDED FOR TISSUE SEPARATION, AND OTHER PROCEDURES THAT REQUIRE A SHARP SURGICAL BLADE TO PUNCTURE OR CUT.: Brand: BARD-PARKER ® CARBON RIB-BACK BLADES

## (undated) DEVICE — INFECTION CONTROL KIT SYS

## (undated) DEVICE — INTENDED TO STANDARDIZE OR CAMERAS TO ALLOW FOR THE USE OF THE OR CAMERA COVER: Brand: ASPEN® O.R. CAMERA COVER

## (undated) DEVICE — DRAPE FLD WRM W44XL66IN C6L FOR INTRATEMP SYS THERMABASIN

## (undated) DEVICE — Z DISCONTINUED NO SUB IDED CATHETER ANGIO 5-6FR L110CM GWIRE 0.038IN 145DEG PGTL 5-8

## (undated) DEVICE — SYR LR LCK 1ML GRAD NSAF 30ML --

## (undated) DEVICE — PAD,ABDOMINAL,5"X9",ST,LF,25/BX: Brand: MEDLINE INDUSTRIES, INC.

## (undated) DEVICE — SET CATH PMP L VENT ASST RAPIDLY DEPLOYABLE IMPELLA RP

## (undated) DEVICE — AMPLATZ EXTRA STIFF WIRE GUIDE: Brand: AMPLATZ

## (undated) DEVICE — SLIM BODY SKIN STAPLER: Brand: APPOSE ULC

## (undated) DEVICE — SYR 10ML LUER LOK 1/5ML GRAD --

## (undated) DEVICE — STRAP POS KNEE BODY VELC

## (undated) DEVICE — DRAPE PRB US TRNSDCR 6X96IN --

## (undated) DEVICE — MEDI-VAC NON-CONDUCTIVE SUCTION TUBING: Brand: CARDINAL HEALTH

## (undated) DEVICE — 1200 GUARD II KIT W/5MM TUBE W/O VAC TUBE: Brand: GUARDIAN

## (undated) DEVICE — GLUE TISS 10ML PLAS STD SPRD TIP SYR PLUNG PREFIL SKIN CLSR 5PK

## (undated) DEVICE — REM POLYHESIVE ADULT PATIENT RETURN ELECTRODE: Brand: VALLEYLAB

## (undated) DEVICE — STAPLER SKIN L320MM 35MM VASC TISS 12 FIRING B FRM PWR

## (undated) DEVICE — 3M™ TEGADERM™ TRANSPARENT FILM DRESSING FRAME STYLE, 1626W, 4 IN X 4-3/4 IN (10 CM X 12 CM), 50/CT 4CT/CASE: Brand: 3M™ TEGADERM™

## (undated) DEVICE — CATHETER ETER ANGIO L110CM OD5FR ID046IN L75CM 038IN 145DEG CARD

## (undated) DEVICE — CATHETER ANGIO JR4 0.054 INX6 FRX100 CM 1.9 CM PERFORMA

## (undated) DEVICE — SPONGE GZ W4XL4IN COT 12 PLY TYP VII WVN C FLD DSGN

## (undated) DEVICE — BAG PRESSURE INFUSION 1000ML -- CONVERT TO ITEM 360122

## (undated) DEVICE — COVER LT HNDL PLAS RIG 1 PER PK

## (undated) DEVICE — BAG RED 3PLY 2MIL 30X40 IN

## (undated) DEVICE — SYR 50ML LR LCK 1ML GRAD NSAF --

## (undated) DEVICE — SYR POWER 150ML 8IN FILL TUBE --

## (undated) DEVICE — SYRINGE ANGIO 10 CC BRL STD PRNT POLYCARB LT BLU MEDALLION

## (undated) DEVICE — RELOAD STPL H1-2.5XL35MM VASC THN TISS WHT B FRM NAT ARTC

## (undated) DEVICE — SOLUTION IRRIG 1000ML H2O STRL BLT

## (undated) DEVICE — ELECTROSURGICAL DEVICE HOLSTER;FOR USE WITH MAXIMUM PEAK VOLTAGE OF 4000 V: Brand: FORCE TRIVERSE